# Patient Record
Sex: FEMALE | Race: WHITE | NOT HISPANIC OR LATINO | URBAN - METROPOLITAN AREA
[De-identification: names, ages, dates, MRNs, and addresses within clinical notes are randomized per-mention and may not be internally consistent; named-entity substitution may affect disease eponyms.]

---

## 2017-07-01 ENCOUNTER — OUTPATIENT (OUTPATIENT)
Dept: OUTPATIENT SERVICES | Facility: HOSPITAL | Age: 72
LOS: 1 days | End: 2017-07-01
Payer: MEDICAID

## 2017-07-01 DIAGNOSIS — Z98.89 OTHER SPECIFIED POSTPROCEDURAL STATES: Chronic | ICD-10-CM

## 2017-07-17 DIAGNOSIS — R69 ILLNESS, UNSPECIFIED: ICD-10-CM

## 2017-08-01 PROCEDURE — G9001: CPT

## 2018-01-13 ENCOUNTER — INPATIENT (INPATIENT)
Facility: HOSPITAL | Age: 73
LOS: 3 days | Discharge: HOME | DRG: 66 | End: 2018-01-17
Attending: GENERAL ACUTE CARE HOSPITAL | Admitting: HOSPITALIST
Payer: COMMERCIAL

## 2018-01-13 VITALS
TEMPERATURE: 98 F | HEIGHT: 64 IN | OXYGEN SATURATION: 100 % | SYSTOLIC BLOOD PRESSURE: 183 MMHG | WEIGHT: 244.93 LBS | HEART RATE: 80 BPM | DIASTOLIC BLOOD PRESSURE: 83 MMHG | RESPIRATION RATE: 16 BRPM

## 2018-01-13 DIAGNOSIS — Z98.89 OTHER SPECIFIED POSTPROCEDURAL STATES: Chronic | ICD-10-CM

## 2018-01-13 DIAGNOSIS — G45.9 TRANSIENT CEREBRAL ISCHEMIC ATTACK, UNSPECIFIED: ICD-10-CM

## 2018-01-13 LAB
ALBUMIN SERPL ELPH-MCNC: 4.4 G/DL — SIGNIFICANT CHANGE UP (ref 3.3–5.2)
ALP SERPL-CCNC: 95 U/L — SIGNIFICANT CHANGE UP (ref 40–120)
ALT FLD-CCNC: 21 U/L — SIGNIFICANT CHANGE UP
ANION GAP SERPL CALC-SCNC: 14 MMOL/L — SIGNIFICANT CHANGE UP (ref 5–17)
APTT BLD: 36.5 SEC — SIGNIFICANT CHANGE UP (ref 27.5–37.4)
AST SERPL-CCNC: 21 U/L — SIGNIFICANT CHANGE UP
BASOPHILS # BLD AUTO: 0 K/UL — SIGNIFICANT CHANGE UP (ref 0–0.2)
BASOPHILS NFR BLD AUTO: 0.3 % — SIGNIFICANT CHANGE UP (ref 0–2)
BILIRUB SERPL-MCNC: 0.3 MG/DL — LOW (ref 0.4–2)
BUN SERPL-MCNC: 18 MG/DL — SIGNIFICANT CHANGE UP (ref 8–20)
CALCIUM SERPL-MCNC: 9.7 MG/DL — SIGNIFICANT CHANGE UP (ref 8.6–10.2)
CHLORIDE SERPL-SCNC: 101 MMOL/L — SIGNIFICANT CHANGE UP (ref 98–107)
CO2 SERPL-SCNC: 28 MMOL/L — SIGNIFICANT CHANGE UP (ref 22–29)
CREAT SERPL-MCNC: 0.65 MG/DL — SIGNIFICANT CHANGE UP (ref 0.5–1.3)
EOSINOPHIL # BLD AUTO: 0.1 K/UL — SIGNIFICANT CHANGE UP (ref 0–0.5)
EOSINOPHIL NFR BLD AUTO: 1.8 % — SIGNIFICANT CHANGE UP (ref 0–6)
GLUCOSE SERPL-MCNC: 105 MG/DL — SIGNIFICANT CHANGE UP (ref 70–115)
HCT VFR BLD CALC: 39.6 % — SIGNIFICANT CHANGE UP (ref 37–47)
HGB BLD-MCNC: 12.6 G/DL — SIGNIFICANT CHANGE UP (ref 12–16)
INR BLD: 1.36 RATIO — HIGH (ref 0.88–1.16)
LYMPHOCYTES # BLD AUTO: 2.2 K/UL — SIGNIFICANT CHANGE UP (ref 1–4.8)
LYMPHOCYTES # BLD AUTO: 28.6 % — SIGNIFICANT CHANGE UP (ref 20–55)
MCHC RBC-ENTMCNC: 26.9 PG — LOW (ref 27–31)
MCHC RBC-ENTMCNC: 31.8 G/DL — LOW (ref 32–36)
MCV RBC AUTO: 84.6 FL — SIGNIFICANT CHANGE UP (ref 81–99)
MONOCYTES # BLD AUTO: 0.7 K/UL — SIGNIFICANT CHANGE UP (ref 0–0.8)
MONOCYTES NFR BLD AUTO: 9.3 % — SIGNIFICANT CHANGE UP (ref 3–10)
NEUTROPHILS # BLD AUTO: 4.7 K/UL — SIGNIFICANT CHANGE UP (ref 1.8–8)
NEUTROPHILS NFR BLD AUTO: 59.9 % — SIGNIFICANT CHANGE UP (ref 37–73)
PLATELET # BLD AUTO: 364 K/UL — SIGNIFICANT CHANGE UP (ref 150–400)
POTASSIUM SERPL-MCNC: 3.4 MMOL/L — LOW (ref 3.5–5.3)
POTASSIUM SERPL-SCNC: 3.4 MMOL/L — LOW (ref 3.5–5.3)
PROT SERPL-MCNC: 8.2 G/DL — SIGNIFICANT CHANGE UP (ref 6.6–8.7)
PROTHROM AB SERPL-ACNC: 15.1 SEC — HIGH (ref 9.8–12.7)
RBC # BLD: 4.68 M/UL — SIGNIFICANT CHANGE UP (ref 4.4–5.2)
RBC # FLD: 15.2 % — SIGNIFICANT CHANGE UP (ref 11–15.6)
SODIUM SERPL-SCNC: 143 MMOL/L — SIGNIFICANT CHANGE UP (ref 135–145)
WBC # BLD: 7.8 K/UL — SIGNIFICANT CHANGE UP (ref 4.8–10.8)
WBC # FLD AUTO: 7.8 K/UL — SIGNIFICANT CHANGE UP (ref 4.8–10.8)

## 2018-01-13 PROCEDURE — 93010 ELECTROCARDIOGRAM REPORT: CPT

## 2018-01-13 PROCEDURE — 99223 1ST HOSP IP/OBS HIGH 75: CPT

## 2018-01-13 PROCEDURE — 71045 X-RAY EXAM CHEST 1 VIEW: CPT | Mod: 26

## 2018-01-13 PROCEDURE — 70450 CT HEAD/BRAIN W/O DYE: CPT | Mod: 26

## 2018-01-13 PROCEDURE — 99285 EMERGENCY DEPT VISIT HI MDM: CPT

## 2018-01-13 RX ORDER — SENNA PLUS 8.6 MG/1
2 TABLET ORAL AT BEDTIME
Qty: 0 | Refills: 0 | Status: DISCONTINUED | OUTPATIENT
Start: 2018-01-13 | End: 2018-01-17

## 2018-01-13 RX ORDER — ATORVASTATIN CALCIUM 80 MG/1
40 TABLET, FILM COATED ORAL AT BEDTIME
Qty: 0 | Refills: 0 | Status: DISCONTINUED | OUTPATIENT
Start: 2018-01-13 | End: 2018-01-17

## 2018-01-13 RX ORDER — ASPIRIN/CALCIUM CARB/MAGNESIUM 324 MG
81 TABLET ORAL ONCE
Qty: 0 | Refills: 0 | Status: COMPLETED | OUTPATIENT
Start: 2018-01-13 | End: 2018-01-13

## 2018-01-13 RX ORDER — VALSARTAN 80 MG/1
160 TABLET ORAL DAILY
Qty: 0 | Refills: 0 | Status: DISCONTINUED | OUTPATIENT
Start: 2018-01-13 | End: 2018-01-17

## 2018-01-13 RX ORDER — APIXABAN 2.5 MG/1
5 TABLET, FILM COATED ORAL EVERY 12 HOURS
Qty: 0 | Refills: 0 | Status: DISCONTINUED | OUTPATIENT
Start: 2018-01-13 | End: 2018-01-17

## 2018-01-13 RX ORDER — METOPROLOL TARTRATE 50 MG
25 TABLET ORAL
Qty: 0 | Refills: 0 | Status: DISCONTINUED | OUTPATIENT
Start: 2018-01-13 | End: 2018-01-15

## 2018-01-13 RX ORDER — DOCUSATE SODIUM 100 MG
100 CAPSULE ORAL THREE TIMES A DAY
Qty: 0 | Refills: 0 | Status: DISCONTINUED | OUTPATIENT
Start: 2018-01-13 | End: 2018-01-17

## 2018-01-13 RX ORDER — TRAMADOL HYDROCHLORIDE 50 MG/1
50 TABLET ORAL EVERY 6 HOURS
Qty: 0 | Refills: 0 | Status: DISCONTINUED | OUTPATIENT
Start: 2018-01-13 | End: 2018-01-17

## 2018-01-13 RX ADMIN — Medication 81 MILLIGRAM(S): at 23:00

## 2018-01-13 NOTE — CONSULT NOTE ADULT - PROBLEM SELECTOR RECOMMENDATION 9
-telemetry monitoring  -continue Eliquis 5 mg BID  -continue Lipitor 40 mg   -start Aspirin 81 mg daily  -carotid dopplers  -MRI/MRA brain, MRA neck  -2D echo  -neuro checks

## 2018-01-13 NOTE — CONSULT NOTE ADULT - ATTENDING COMMENTS
Unclear etiology for TIA. Has had significant workup for afib recently. On eliquis. Could be related to plaque. Plan for aspirin 81 mg. MRi pending.   No [facility] as inpt.   No further cardiac workup.

## 2018-01-13 NOTE — H&P ADULT - NEUROLOGICAL DETAILS
normal strength/responds to verbal commands/deep reflexes intact/alert and oriented x 3/responds to pain/sensation intact/cranial nerves intact/superficial reflexes intact

## 2018-01-13 NOTE — ED ADULT NURSE NOTE - OBJECTIVE STATEMENT
[facility] and daughter noticed sudden onset of confusion and probably a droop at the time. EMS called and symptoms improved en route.

## 2018-01-13 NOTE — ED CLERICAL - DIVISION
[place]... Visit Information Date & Time Provider Department Dept. Phone Encounter #  
 2/9/2017 11:15 AM Jose uRssell MD [facility] [address] [place] [address] [id number] Follow-up Instructions Return in about 6 months (around 8/9/2017) for Cleared for planned surgery-moderate risk. Your Appointments 8/24/2017 10:00 AM  
ESTABLISHED PATIENT with Jose Russell MD  
[facility]) Appt Note: 6 months [address], [address] [facility] [address] [facility], [address] [place] [address] Upcoming Health Maintenance Date Due  
 FOOT EXAM Q1 9/12/1966 MICROALBUMIN Q1 9/12/1966 EYE EXAM RETINAL OR DILATED Q1 9/12/1966 DTaP/Tdap/Td series (1 - Tdap) 9/12/1977 FOBT Q 1 YEAR AGE 50-75 9/12/2006 BREAST CANCER SCRN MAMMOGRAM 3/20/2015 PAP AKA CERVICAL CYTOLOGY 8/27/2015 Pneumococcal 19-64 Highest Risk (2 of 3 - PCV13) 7/10/2016 INFLUENZA AGE 9 TO ADULT 8/1/2016 ZOSTER VACCINE AGE 60> 9/12/2016 HEMOGLOBIN A1C Q6M 4/30/2017 LIPID PANEL Q1 11/6/2017 Allergies as of 2/9/2017  Review Complete On: 2/9/2017 By: Jose Russell MD  
  
 Severity Noted Reaction Type Reactions Moxifloxacin  09/24/2015    Rash Pcn [Penicillins]    Swelling Sulfa (Sulfonamide Antibiotics)    Swelling Current Immunizations  Reviewed on 11/29/2012 Name Date Influenza Vaccine 10/10/2015, 12/29/2014 Influenza Vaccine Split 10/30/2012 Pneumococcal Polysaccharide (PPSV-23) 7/10/2015 Not reviewed this visit You Were Diagnosed With   
  
 Codes Comments Chronic diastolic heart failure (HCC)    -  Primary ICD-10-CM: I50.32 
ICD-9-CM: 428.32 stable 
sob multifactrial  
 Abnormal nuclear stress test     ICD-10-CM: R94.39 
ICD-9-CM: 794.39 stable on medical managment Essential hypertension, benign     ICD-10-CM: I10 
ICD-9-CM: 401.1 controlled Pre-operative cardiovascular examination     ICD-10-CM: Z01.810 ICD-9-CM: V72.81 ok to stop plavix for colonosciopy 
moderate risk Vitals BP Pulse Height(growth percentile) Weight(growth percentile) LMP BMI  
 105/75 (BP 1 Location: Left arm, BP Patient Position: At rest) 90 5' 5\" (1.651 m) 219 lb (99.3 kg) 11/25/2012 36.44 kg/m2 OB Status Smoking Status Postmenopausal Former Smoker Vitals History BMI and BSA Data Body Mass Index Body Surface Area  
 36.44 kg/m 2 2.13 m 2 Preferred Pharmacy Pharmacy Name Phone [facility] Keturah Noriega, [address] [phone] Your Updated Medication List  
  
   
This list is accurate as of: 2/9/17 12:29 PM.  Always use your most recent med list.  
  
  
  
  
 ABILIFY 5 mg tablet Generic drug:  ARIPiprazole Take 10 mg by mouth daily. * albuterol 2.5 mg /3 mL (0.083 %) nebulizer solution Commonly known as:  PROVENTIL VENTOLIN  
USE 1 NEB EVERY 4 HOURS FOR WHEEZING AND SHORTNESS OF BREATH  Indications: CHRONIC OBSTRUCTIVE PULMONARY DISEASE * VENTOLIN HFA 90 mcg/actuation inhaler Generic drug:  albuterol INHALE 2 PUFFS EVERY 4 HOURS AS NEEDED  
  
 aspirin 81 mg tablet Take 81 mg by mouth daily. clopidogrel 75 mg Tab Commonly known as:  PLAVIX Take 1 Tab by mouth daily. DEPAKOTE 250 mg tablet Generic drug:  divalproex DR Take 250 mg by mouth nightly. eplerenone 25 mg tablet Commonly known as:  Ayo  Take  by mouth daily. furosemide 20 mg tablet Commonly known as:  LASIX Take 1 Tab by mouth daily. insulin CONCENTRATED regular 500 unit/mL Soln Commonly known as:  U-500 CONCENTRATED  
20 Units by SubCUTAneous route. With breakfast, lunch, and dinner  
  
 insulin glargine 100 unit/mL injection Commonly known as:  LANTUS  
60 units subcutaneously every night  
  
 insulin syringe,safetyneedle 1 mL 30 gauge x 5/16\" Syrg As directed METANX 2.8-2-25 mg Tab Generic drug:  l-methylfolate-vit b12-vit b6 Take  by mouth.  
  
 metOLazone 5 mg tablet Commonly known as:  Haven Ou Take 0.5 Tabs by mouth Every Mon, Wed & Sun.  
  
 [employer] Kit Use with nebulizer machine NEURONTIN 300 mg capsule Generic drug:  gabapentin Take 300 mg by mouth three (3) times daily. Oxygen-Air Delivery Systems  
by Nasal route continuous. 2 LNC potassium chloride 20 mEq packet Commonly known as:  KLOR-CON Take 1 Packet by mouth daily. predniSONE 5 mg tablet Commonly known as:  Samanta Hawks Take 1 Tab by mouth daily. rosuvastatin 5 mg tablet Commonly known as:  CRESTOR Take 1 Tab by mouth nightly. SYNTHROID 100 mcg tablet Generic drug:  levothyroxine Take  by mouth Daily (before breakfast). traZODone 100 mg tablet Commonly known as:  Orma Paddy Take 100 mg by mouth nightly. TYLENOL 325 mg tablet Generic drug:  acetaminophen Take 325 mg by mouth every six (6) hours as needed for Pain. * umeclidinium-vilanterol 62.5-25 mcg/actuation inhaler Commonly known as:  Paulo Gusman Take 1 Puff by inhalation daily. * umeclidinium-vilanterol 62.5-25 mcg/actuation inhaler Commonly known as:  Paulo Gusman Take 1 Puff by inhalation daily. VITAMIN D2 50,000 unit capsule Generic drug:  ergocalciferol Take 50,000 Units by mouth daily. ZOLOFT 100 mg tablet Generic drug:  sertraline Take 50 mg by mouth daily. * Notice: This list has 4 medication(s) that are the same as other medications prescribed for you. Read the directions carefully, and ask your doctor or other care provider to review them with you. Follow-up Instructions Return in about 6 months (around 8/9/2017) for Cleared for planned surgery-moderate risk. Please provide this summary of care documentation to your next provider. Your primary care clinician is listed as Cleveland Santo. If you have any questions after today's visit, please call [phone].

## 2018-01-13 NOTE — H&P ADULT - PMH
Afib  s/p ablation  Anxiety    CVA (cerebral vascular accident)    HLD (hyperlipidemia)    HTN (hypertension)

## 2018-01-13 NOTE — ED PROVIDER NOTE - OBJECTIVE STATEMENT
Pt. present to ED s/p episode of confusion and expressive aphasia and facial droop. As per daughter, she noticed her mother in the car and asked her to open the car. The patient could not open the car. Pt. was not able to follow any commands. Pt. was trying to speak but could not. Pt. was having expressive aphasia. Episode lasted a few minutes. Pt. currently has no symptoms. Pt. has hx of  CVA (2 years ago) and then A-fib with ablation 1 1/2 years ago. Pt. asymptomatic at this time.

## 2018-01-13 NOTE — ED ADULT TRIAGE NOTE - CHIEF COMPLAINT QUOTE
as per pt, had few minutes in the car where she had trouble speaking while in car with family and felt confused. as per family, facial droop with onset of confusion. pt is a and o x3, conversing w/o issue at this time. haskins with full stregnth. perrl. denies numbness, tingling. no facial droop noted. speech clear. breathing even and unlabored fingerstick 98. dr. preciado at bedside. pt has no complaints at this time. hx of cva.

## 2018-01-13 NOTE — H&P ADULT - NEGATIVE NEUROLOGICAL SYMPTOMS
no paresthesias/no headache/no tremors/no transient paralysis/no syncope/no vertigo/no loss of consciousness/no weakness/no generalized seizures/no focal seizures

## 2018-01-14 DIAGNOSIS — G45.9 TRANSIENT CEREBRAL ISCHEMIC ATTACK, UNSPECIFIED: ICD-10-CM

## 2018-01-14 DIAGNOSIS — E78.5 HYPERLIPIDEMIA, UNSPECIFIED: ICD-10-CM

## 2018-01-14 DIAGNOSIS — I10 ESSENTIAL (PRIMARY) HYPERTENSION: ICD-10-CM

## 2018-01-14 LAB
ANION GAP SERPL CALC-SCNC: 14 MMOL/L — SIGNIFICANT CHANGE UP (ref 5–17)
BUN SERPL-MCNC: 14 MG/DL — SIGNIFICANT CHANGE UP (ref 8–20)
CALCIUM SERPL-MCNC: 9.4 MG/DL — SIGNIFICANT CHANGE UP (ref 8.6–10.2)
CHLORIDE SERPL-SCNC: 102 MMOL/L — SIGNIFICANT CHANGE UP (ref 98–107)
CHOLEST SERPL-MCNC: 157 MG/DL — SIGNIFICANT CHANGE UP (ref 110–199)
CO2 SERPL-SCNC: 27 MMOL/L — SIGNIFICANT CHANGE UP (ref 22–29)
CREAT SERPL-MCNC: 0.56 MG/DL — SIGNIFICANT CHANGE UP (ref 0.5–1.3)
GLUCOSE SERPL-MCNC: 119 MG/DL — HIGH (ref 70–115)
HCT VFR BLD CALC: 38.2 % — SIGNIFICANT CHANGE UP (ref 37–47)
HDLC SERPL-MCNC: 75 MG/DL — SIGNIFICANT CHANGE UP
HGB BLD-MCNC: 12.2 G/DL — SIGNIFICANT CHANGE UP (ref 12–16)
LIPID PNL WITH DIRECT LDL SERPL: 63 MG/DL — SIGNIFICANT CHANGE UP
MCHC RBC-ENTMCNC: 26.5 PG — LOW (ref 27–31)
MCHC RBC-ENTMCNC: 31.9 G/DL — LOW (ref 32–36)
MCV RBC AUTO: 83 FL — SIGNIFICANT CHANGE UP (ref 81–99)
PLATELET # BLD AUTO: 352 K/UL — SIGNIFICANT CHANGE UP (ref 150–400)
POTASSIUM SERPL-MCNC: 4.3 MMOL/L — SIGNIFICANT CHANGE UP (ref 3.5–5.3)
POTASSIUM SERPL-SCNC: 4.3 MMOL/L — SIGNIFICANT CHANGE UP (ref 3.5–5.3)
RBC # BLD: 4.6 M/UL — SIGNIFICANT CHANGE UP (ref 4.4–5.2)
RBC # FLD: 15.1 % — SIGNIFICANT CHANGE UP (ref 11–15.6)
SODIUM SERPL-SCNC: 143 MMOL/L — SIGNIFICANT CHANGE UP (ref 135–145)
TOTAL CHOLESTEROL/HDL RATIO MEASUREMENT: 2 RATIO — LOW (ref 3.3–7.1)
TRIGL SERPL-MCNC: 97 MG/DL — SIGNIFICANT CHANGE UP (ref 10–200)
WBC # BLD: 7.5 K/UL — SIGNIFICANT CHANGE UP (ref 4.8–10.8)
WBC # FLD AUTO: 7.5 K/UL — SIGNIFICANT CHANGE UP (ref 4.8–10.8)

## 2018-01-14 PROCEDURE — 99233 SBSQ HOSP IP/OBS HIGH 50: CPT

## 2018-01-14 PROCEDURE — 99222 1ST HOSP IP/OBS MODERATE 55: CPT

## 2018-01-14 PROCEDURE — 93880 EXTRACRANIAL BILAT STUDY: CPT | Mod: 26

## 2018-01-14 RX ORDER — ASPIRIN/CALCIUM CARB/MAGNESIUM 324 MG
81 TABLET ORAL DAILY
Qty: 0 | Refills: 0 | Status: DISCONTINUED | OUTPATIENT
Start: 2018-01-14 | End: 2018-01-17

## 2018-01-14 RX ORDER — ALPRAZOLAM 0.25 MG
0.5 TABLET ORAL
Qty: 0 | Refills: 0 | Status: DISCONTINUED | OUTPATIENT
Start: 2018-01-14 | End: 2018-01-17

## 2018-01-14 RX ADMIN — APIXABAN 5 MILLIGRAM(S): 2.5 TABLET, FILM COATED ORAL at 17:26

## 2018-01-14 RX ADMIN — Medication 25 MILLIGRAM(S): at 05:32

## 2018-01-14 RX ADMIN — Medication 0.5 MILLIGRAM(S): at 08:11

## 2018-01-14 RX ADMIN — Medication 0.5 MILLIGRAM(S): at 21:39

## 2018-01-14 RX ADMIN — VALSARTAN 160 MILLIGRAM(S): 80 TABLET ORAL at 05:32

## 2018-01-14 RX ADMIN — Medication 25 MILLIGRAM(S): at 17:26

## 2018-01-14 RX ADMIN — APIXABAN 5 MILLIGRAM(S): 2.5 TABLET, FILM COATED ORAL at 05:32

## 2018-01-14 RX ADMIN — Medication 81 MILLIGRAM(S): at 11:38

## 2018-01-14 RX ADMIN — ATORVASTATIN CALCIUM 40 MILLIGRAM(S): 80 TABLET, FILM COATED ORAL at 21:39

## 2018-01-14 NOTE — PROGRESS NOTE ADULT - SUBJECTIVE AND OBJECTIVE BOX
CHIEF COMPLAINT/INTERVAL HISTORY:    Patient is a 72y old  Female who presents with a chief complaint of Slurred speech/Expressive aphagia, and facial droop (13 Jan 2018 22:07)    SUBJECTIVE & OBJECTIVE: Pt seen and examined at bedside. Admitted with TIA symptoms. No overnight events. Denies any active complaints today. MRI with conscious sedation to be arranged.     ROS: No chest pain, palpitations, SOB, light headedness, dizziness, headache, nausea/vomiting, fevers/chills, abdominal pain, dysuria or increased urinary frequency.      ICU Vital Signs Last 24 Hrs  T(C): 36.7 (14 Jan 2018 17:25), Max: 37 (14 Jan 2018 02:13)  T(F): 98.1 (14 Jan 2018 17:25), Max: 98.6 (14 Jan 2018 02:13)  HR: 74 (14 Jan 2018 17:25) (63 - 84)  BP: 140/60 (14 Jan 2018 17:25) (135/65 - 189/75)  BP(mean): 116 (13 Jan 2018 22:07) (116 - 116)  RR: 20 (14 Jan 2018 17:25) (16 - 20)  SpO2: 100% (14 Jan 2018 17:25) (97% - 100%)    MEDICATIONS  (STANDING):  apixaban 5 milliGRAM(s) Oral every 12 hours  aspirin enteric coated 81 milliGRAM(s) Oral daily  atorvastatin 40 milliGRAM(s) Oral at bedtime  metoprolol     tartrate 25 milliGRAM(s) Oral two times a day  valsartan 160 milliGRAM(s) Oral daily    MEDICATIONS  (PRN):  ALPRAZolam 0.5 milliGRAM(s) Oral two times a day PRN anxiety  docusate sodium 100 milliGRAM(s) Oral three times a day PRN Constipation  senna 2 Tablet(s) Oral at bedtime PRN Constipation  traMADol 50 milliGRAM(s) Oral every 6 hours PRN Severe Pain (7 - 10)      LABS:                        12.2   7.5   )-----------( 352      ( 14 Jan 2018 06:33 )             38.2     01-14    143  |  102  |  14.0  ----------------------------<  119<H>  4.3   |  27.0  |  0.56    Ca    9.4      14 Jan 2018 06:33    TPro  8.2  /  Alb  4.4  /  TBili  0.3<L>  /  DBili  x   /  AST  21  /  ALT  21  /  AlkPhos  95  01-13    PT/INR - ( 13 Jan 2018 19:13 )   PT: 15.1 sec;   INR: 1.36 ratio         PTT - ( 13 Jan 2018 19:13 )  PTT:36.5 sec      RADIOLOGY & ADDITIONAL TESTS:  < from: US Duplex Carotid Arteries Complete, Bilateral (01.14.18 @ 11:19) >  Impression:  No evidence of hemodynamically significant carotid stenosis..    < end of copied text >      PHYSICAL EXAM:    GENERAL: NAD, well-groomed, well-developed  HEAD:  Atraumatic, Normocephalic  EYES: EOMI, PERRLA, conjunctiva and sclera clear  ENMT: Moist mucous membranes  NECK: Supple, No JVD  NERVOUS SYSTEM:  Alert & Oriented X3, Motor Strength 5/5 B/L upper and lower extremities, no facial droop, no aphasia, sensation in tact  CHEST/LUNG: Clear to auscultation bilaterally; No rales, rhonchi, wheezing, or rubs  HEART: Regular rate and rhythm; + S1/S2  ABDOMEN: Soft, Nontender, Nondistended; Bowel sounds present  EXTREMITIES:  2+ Peripheral Pulses, No clubbing, cyanosis, or edema

## 2018-01-14 NOTE — PROGRESS NOTE ADULT - ASSESSMENT
Patient is 72 year old female with PMH of HTN, HLD, PAF S/P ablation, Anxiety and previous CVA (2015) who presented with left facial droop and slurred speech likely secondary to TIA.     1. TIA - symptoms resolved; no facial droop or slurred speech on exam today.   -Telemonitoring  -Started ASA 81 mg daily  -Lipid panel reviewed - continue statin   -CT head negative  -Carotid duplex - no stenosis  -TTE pending  -MRI of the brain to be arranged with conscious sedation in AM  -Neurology recommendations appreciated   -Continue serial neurochecks     2. HTN  -Continue with metoprolol, and Diovan with holding parameters  -low sodium diet    3. HLD  -Continue with statin     4. Atrial Fib  -EKG shows NSR  -Continue with metoprolol for rate control  -Continue eliquis  -Cardiology recommendations appreciated; no further cardiac work up or indication for inpatient [facility]    5. Anxiety- Xanax PRN    6. DVT prophylaxis - on Eliquis

## 2018-01-15 LAB
ANION GAP SERPL CALC-SCNC: 12 MMOL/L — SIGNIFICANT CHANGE UP (ref 5–17)
BASOPHILS # BLD AUTO: 0 K/UL — SIGNIFICANT CHANGE UP (ref 0–0.2)
BASOPHILS NFR BLD AUTO: 0.2 % — SIGNIFICANT CHANGE UP (ref 0–2)
BUN SERPL-MCNC: 14 MG/DL — SIGNIFICANT CHANGE UP (ref 8–20)
CALCIUM SERPL-MCNC: 9.1 MG/DL — SIGNIFICANT CHANGE UP (ref 8.6–10.2)
CHLORIDE SERPL-SCNC: 105 MMOL/L — SIGNIFICANT CHANGE UP (ref 98–107)
CO2 SERPL-SCNC: 26 MMOL/L — SIGNIFICANT CHANGE UP (ref 22–29)
CREAT SERPL-MCNC: 0.61 MG/DL — SIGNIFICANT CHANGE UP (ref 0.5–1.3)
EOSINOPHIL # BLD AUTO: 0.1 K/UL — SIGNIFICANT CHANGE UP (ref 0–0.5)
EOSINOPHIL NFR BLD AUTO: 2.3 % — SIGNIFICANT CHANGE UP (ref 0–6)
GLUCOSE SERPL-MCNC: 112 MG/DL — SIGNIFICANT CHANGE UP (ref 70–115)
HCT VFR BLD CALC: 38.5 % — SIGNIFICANT CHANGE UP (ref 37–47)
HGB BLD-MCNC: 12.4 G/DL — SIGNIFICANT CHANGE UP (ref 12–16)
LYMPHOCYTES # BLD AUTO: 1.9 K/UL — SIGNIFICANT CHANGE UP (ref 1–4.8)
LYMPHOCYTES # BLD AUTO: 31.1 % — SIGNIFICANT CHANGE UP (ref 20–55)
MAGNESIUM SERPL-MCNC: 2.1 MG/DL — SIGNIFICANT CHANGE UP (ref 1.6–2.6)
MCHC RBC-ENTMCNC: 26.8 PG — LOW (ref 27–31)
MCHC RBC-ENTMCNC: 32.2 G/DL — SIGNIFICANT CHANGE UP (ref 32–36)
MCV RBC AUTO: 83.3 FL — SIGNIFICANT CHANGE UP (ref 81–99)
MONOCYTES # BLD AUTO: 0.5 K/UL — SIGNIFICANT CHANGE UP (ref 0–0.8)
MONOCYTES NFR BLD AUTO: 7.7 % — SIGNIFICANT CHANGE UP (ref 3–10)
NEUTROPHILS # BLD AUTO: 3.5 K/UL — SIGNIFICANT CHANGE UP (ref 1.8–8)
NEUTROPHILS NFR BLD AUTO: 58.5 % — SIGNIFICANT CHANGE UP (ref 37–73)
PHOSPHATE SERPL-MCNC: 3.5 MG/DL — SIGNIFICANT CHANGE UP (ref 2.4–4.7)
PLATELET # BLD AUTO: 322 K/UL — SIGNIFICANT CHANGE UP (ref 150–400)
POTASSIUM SERPL-MCNC: 4 MMOL/L — SIGNIFICANT CHANGE UP (ref 3.5–5.3)
POTASSIUM SERPL-SCNC: 4 MMOL/L — SIGNIFICANT CHANGE UP (ref 3.5–5.3)
RBC # BLD: 4.62 M/UL — SIGNIFICANT CHANGE UP (ref 4.4–5.2)
RBC # FLD: 15 % — SIGNIFICANT CHANGE UP (ref 11–15.6)
SODIUM SERPL-SCNC: 143 MMOL/L — SIGNIFICANT CHANGE UP (ref 135–145)
WBC # BLD: 6 K/UL — SIGNIFICANT CHANGE UP (ref 4.8–10.8)
WBC # FLD AUTO: 6 K/UL — SIGNIFICANT CHANGE UP (ref 4.8–10.8)

## 2018-01-15 PROCEDURE — 99231 SBSQ HOSP IP/OBS SF/LOW 25: CPT

## 2018-01-15 PROCEDURE — 99233 SBSQ HOSP IP/OBS HIGH 50: CPT

## 2018-01-15 RX ORDER — METOPROLOL TARTRATE 50 MG
50 TABLET ORAL
Qty: 0 | Refills: 0 | Status: DISCONTINUED | OUTPATIENT
Start: 2018-01-15 | End: 2018-01-17

## 2018-01-15 RX ADMIN — Medication 81 MILLIGRAM(S): at 13:28

## 2018-01-15 RX ADMIN — VALSARTAN 160 MILLIGRAM(S): 80 TABLET ORAL at 05:17

## 2018-01-15 RX ADMIN — Medication 0.5 MILLIGRAM(S): at 21:00

## 2018-01-15 RX ADMIN — APIXABAN 5 MILLIGRAM(S): 2.5 TABLET, FILM COATED ORAL at 17:45

## 2018-01-15 RX ADMIN — APIXABAN 5 MILLIGRAM(S): 2.5 TABLET, FILM COATED ORAL at 05:17

## 2018-01-15 RX ADMIN — Medication 50 MILLIGRAM(S): at 17:45

## 2018-01-15 RX ADMIN — ATORVASTATIN CALCIUM 40 MILLIGRAM(S): 80 TABLET, FILM COATED ORAL at 21:00

## 2018-01-15 RX ADMIN — Medication 25 MILLIGRAM(S): at 05:17

## 2018-01-15 NOTE — PROGRESS NOTE ADULT - SUBJECTIVE AND OBJECTIVE BOX
CHIEF COMPLAINT/INTERVAL HISTORY:    Patient is a 72y old  Female who presents with a chief complaint of Slurred speech/Expressive aphagia, and facial droop (13 Jan 2018 22:07)    SUBJECTIVE & OBJECTIVE: Pt seen and examined at bedside. No overnight events. Denies any new complaints today. MRI/MRA scheduled with anesthesia on 1/16. NPO after midnight. TTE today.     ROS: No chest pain, palpitations, SOB, light headedness, dizziness, headache, nausea/vomiting, fevers/chills, abdominal pain, dysuria or increased urinary frequency.    ICU Vital Signs Last 24 Hrs  T(C): 36.7 (15 Stephen 2018 10:05), Max: 36.9 (14 Jan 2018 21:23)  T(F): 98 (15 Stephen 2018 10:05), Max: 98.5 (14 Jan 2018 21:23)  HR: 70 (15 Stephen 2018 10:05) (69 - 77)  BP: 159/70 (15 Stephen 2018 10:05) (118/54 - 183/77)  RR: 18 (15 Stephen 2018 10:05) (18 - 20)  SpO2: 97% (15 Stephen 2018 10:05) (97% - 100%)    MEDICATIONS  (STANDING):  apixaban 5 milliGRAM(s) Oral every 12 hours  aspirin enteric coated 81 milliGRAM(s) Oral daily  atorvastatin 40 milliGRAM(s) Oral at bedtime  metoprolol     tartrate 50 milliGRAM(s) Oral two times a day  valsartan 160 milliGRAM(s) Oral daily    MEDICATIONS  (PRN):  ALPRAZolam 0.5 milliGRAM(s) Oral two times a day PRN anxiety  docusate sodium 100 milliGRAM(s) Oral three times a day PRN Constipation  senna 2 Tablet(s) Oral at bedtime PRN Constipation  traMADol 50 milliGRAM(s) Oral every 6 hours PRN Severe Pain (7 - 10)      LABS:                        12.4   6.0   )-----------( 322      ( 15 Stephen 2018 06:03 )             38.5     01-15    143  |  105  |  14.0  ----------------------------<  112  4.0   |  26.0  |  0.61    Ca    9.1      15 Stephen 2018 06:03  Phos  3.5     01-15  Mg     2.1     01-15    TPro  8.2  /  Alb  4.4  /  TBili  0.3<L>  /  DBili  x   /  AST  21  /  ALT  21  /  AlkPhos  95  01-13    PT/INR - ( 13 Jan 2018 19:13 )   PT: 15.1 sec;   INR: 1.36 ratio         PTT - ( 13 Jan 2018 19:13 )  PTT:36.5 sec      RADIOLOGY & ADDITIONAL TESTS:      PHYSICAL EXAM:    GENERAL: NAD, well-groomed, well-developed  HEAD:  Atraumatic, Normocephalic  EYES: EOMI, PERRLA, conjunctiva and sclera clear  ENMT: Moist mucous membranes  NECK: Supple, No JVD  NERVOUS SYSTEM:  Alert & Oriented X3, Motor Strength 5/5 B/L upper and lower extremities, no facial droop, no aphasia, sensation in tact  CHEST/LUNG: Clear to auscultation bilaterally; No rales, rhonchi, wheezing, or rubs  HEART: Regular rate and rhythm; + S1/S2  ABDOMEN: Soft, Nontender, Nondistended; Bowel sounds present  EXTREMITIES:  2+ Peripheral Pulses, No clubbing, cyanosis, or edema

## 2018-01-15 NOTE — PROGRESS NOTE ADULT - ASSESSMENT
Patient is 72 year old female with PMH of HTN, HLD, PAF S/P ablation, Anxiety and previous CVA (2015) who presented with left facial droop and slurred speech likely secondary to TIA.     1. TIA - symptoms resolved; no facial droop or slurred speech on exam today.   -Telemonitoring - no events overnight  -Started on ASA 81 on admission  -Lipid panel reviewed - continue statin   -CT head negative  -Carotid duplex - no stenosis  -TTE completed; f/u results  -MRI/MRA of the brain to be arranged with conscious sedation on 1/16 (discussed with radiology)  -Neurology recommendations appreciated   -No further cardiac work up      2. HTN  -Continue with metoprolol, and Diovan with holding parameters  -low sodium diet    3. HLD  -Continue with statin     4. Atrial Fib  -EKG shows NSR  -Continue with metoprolol   -Continue eliquis  -Cardiology recommendations appreciated; no further cardiac work up or indication for inpatient [facility]    5. Anxiety- Xanax PRN    6. DVT prophylaxis - on Eliquis    Attending Attestation:   Plan discussed with patient. Patient is 72 year old female with PMH of HTN, HLD, PAF S/P ablation, Anxiety and previous CVA (2015) who presented with left facial droop and slurred speech likely secondary to TIA.     1. TIA - symptoms resolved; no facial droop or slurred speech on exam today.   -Telemonitoring - no events overnight  -Started on ASA 81 on admission  -Lipid panel reviewed - continue statin   -CT head negative  -Carotid duplex - no stenosis  -TTE completed; f/u results  -MRI/MRA of the brain to be arranged with conscious sedation on 1/16 (discussed with radiology)  -Neurology recommendations appreciated   -No further cardiac work up      2. HTN  -Continue with metoprolol, and Diovan with holding parameters  -low sodium diet    3. HLD  -Continue with statin     4. Atrial Fib  -EKG shows NSR  -Continue with metoprolol   -Continue eliquis  -Cardiology recommendations appreciated; no further cardiac work up or indication for inpatient [facility]    5. Anxiety- Xanax PRN    6. DVT prophylaxis - on Eliquis    PT -  home    Attending Attestation:   Plan discussed with patient. Patient is 72 year old female with PMH of HTN, HLD, PAF S/P ablation, Anxiety and previous CVA (2015) who presented with left facial droop and slurred speech likely secondary to TIA.     1. TIA - symptoms resolved; no facial droop or slurred speech on exam today.   -Telemonitoring - no events overnight  -Started on ASA 81 on admission  -Lipid panel reviewed - continue statin   -CT head negative  -Carotid duplex - no stenosis  -TTE completed; f/u results  -MRI/MRA of the brain to be arranged with conscious sedation on 1/16 (discussed with radiology)  -Neurology recommendations appreciated   -No further cardiac work up      2. HTN  -BP elevated; increase metoprolol to 50 mg BID with holding parameters  -Continue Diovan  -low sodium diet    3. HLD  -Continue with statin     4. Atrial Fib  -EKG shows NSR  -Continue with metoprolol   -Continue eliquis  -Cardiology recommendations appreciated; no further cardiac work up or indication for inpatient [facility]    5. Anxiety- Xanax PRN    6. DVT prophylaxis - on Eliquis    PT -  home    Attending Attestation:   Plan discussed with patient.

## 2018-01-15 NOTE — PROGRESS NOTE ADULT - ASSESSMENT
The patient is a 72y Female status post TIA.   Prior atrial fibrillation status post ablation.  On Eliquis and statin.     Awaiting MRI with sedation.

## 2018-01-15 NOTE — PROGRESS NOTE ADULT - SUBJECTIVE AND OBJECTIVE BOX
TTe reviewed- normal.   No tele events.     Planned for MRI with sedation tomorrow.     No further cardiac workup.

## 2018-01-16 LAB
ANION GAP SERPL CALC-SCNC: 12 MMOL/L — SIGNIFICANT CHANGE UP (ref 5–17)
BASOPHILS # BLD AUTO: 0 K/UL — SIGNIFICANT CHANGE UP (ref 0–0.2)
BASOPHILS NFR BLD AUTO: 0.3 % — SIGNIFICANT CHANGE UP (ref 0–2)
BUN SERPL-MCNC: 14 MG/DL — SIGNIFICANT CHANGE UP (ref 8–20)
CALCIUM SERPL-MCNC: 8.6 MG/DL — SIGNIFICANT CHANGE UP (ref 8.6–10.2)
CHLORIDE SERPL-SCNC: 107 MMOL/L — SIGNIFICANT CHANGE UP (ref 98–107)
CO2 SERPL-SCNC: 25 MMOL/L — SIGNIFICANT CHANGE UP (ref 22–29)
CREAT SERPL-MCNC: 0.61 MG/DL — SIGNIFICANT CHANGE UP (ref 0.5–1.3)
EOSINOPHIL # BLD AUTO: 0.2 K/UL — SIGNIFICANT CHANGE UP (ref 0–0.5)
EOSINOPHIL NFR BLD AUTO: 2.9 % — SIGNIFICANT CHANGE UP (ref 0–6)
GLUCOSE SERPL-MCNC: 127 MG/DL — HIGH (ref 70–115)
HCT VFR BLD CALC: 34.9 % — LOW (ref 37–47)
HGB BLD-MCNC: 11.1 G/DL — LOW (ref 12–16)
LYMPHOCYTES # BLD AUTO: 1.5 K/UL — SIGNIFICANT CHANGE UP (ref 1–4.8)
LYMPHOCYTES # BLD AUTO: 24.5 % — SIGNIFICANT CHANGE UP (ref 20–55)
MAGNESIUM SERPL-MCNC: 2 MG/DL — SIGNIFICANT CHANGE UP (ref 1.6–2.6)
MCHC RBC-ENTMCNC: 26.5 PG — LOW (ref 27–31)
MCHC RBC-ENTMCNC: 31.8 G/DL — LOW (ref 32–36)
MCV RBC AUTO: 83.3 FL — SIGNIFICANT CHANGE UP (ref 81–99)
MONOCYTES # BLD AUTO: 0.6 K/UL — SIGNIFICANT CHANGE UP (ref 0–0.8)
MONOCYTES NFR BLD AUTO: 9.5 % — SIGNIFICANT CHANGE UP (ref 3–10)
NEUTROPHILS # BLD AUTO: 3.9 K/UL — SIGNIFICANT CHANGE UP (ref 1.8–8)
NEUTROPHILS NFR BLD AUTO: 62.5 % — SIGNIFICANT CHANGE UP (ref 37–73)
PHOSPHATE SERPL-MCNC: 3.6 MG/DL — SIGNIFICANT CHANGE UP (ref 2.4–4.7)
PLATELET # BLD AUTO: 289 K/UL — SIGNIFICANT CHANGE UP (ref 150–400)
POTASSIUM SERPL-MCNC: 3.7 MMOL/L — SIGNIFICANT CHANGE UP (ref 3.5–5.3)
POTASSIUM SERPL-SCNC: 3.7 MMOL/L — SIGNIFICANT CHANGE UP (ref 3.5–5.3)
RBC # BLD: 4.19 M/UL — LOW (ref 4.4–5.2)
RBC # FLD: 14.9 % — SIGNIFICANT CHANGE UP (ref 11–15.6)
SODIUM SERPL-SCNC: 144 MMOL/L — SIGNIFICANT CHANGE UP (ref 135–145)
WBC # BLD: 6.2 K/UL — SIGNIFICANT CHANGE UP (ref 4.8–10.8)
WBC # FLD AUTO: 6.2 K/UL — SIGNIFICANT CHANGE UP (ref 4.8–10.8)

## 2018-01-16 PROCEDURE — 70551 MRI BRAIN STEM W/O DYE: CPT | Mod: 26

## 2018-01-16 PROCEDURE — 70544 MR ANGIOGRAPHY HEAD W/O DYE: CPT | Mod: 26,59

## 2018-01-16 PROCEDURE — 99221 1ST HOSP IP/OBS SF/LOW 40: CPT

## 2018-01-16 PROCEDURE — 99233 SBSQ HOSP IP/OBS HIGH 50: CPT

## 2018-01-16 RX ADMIN — APIXABAN 5 MILLIGRAM(S): 2.5 TABLET, FILM COATED ORAL at 05:48

## 2018-01-16 RX ADMIN — Medication 81 MILLIGRAM(S): at 17:37

## 2018-01-16 RX ADMIN — Medication 50 MILLIGRAM(S): at 05:48

## 2018-01-16 RX ADMIN — APIXABAN 5 MILLIGRAM(S): 2.5 TABLET, FILM COATED ORAL at 17:37

## 2018-01-16 RX ADMIN — VALSARTAN 160 MILLIGRAM(S): 80 TABLET ORAL at 05:48

## 2018-01-16 RX ADMIN — ATORVASTATIN CALCIUM 40 MILLIGRAM(S): 80 TABLET, FILM COATED ORAL at 21:32

## 2018-01-16 RX ADMIN — Medication 50 MILLIGRAM(S): at 17:37

## 2018-01-16 RX ADMIN — Medication 0.5 MILLIGRAM(S): at 21:39

## 2018-01-16 NOTE — PROGRESS NOTE ADULT - SUBJECTIVE AND OBJECTIVE BOX
[facility]                                        [facility] [place]                                 Shashi Cabrera, & Miguel                                  [address]                                        [place], [place], [address]                                             [phone]        No new complaints.    Vital Signs Last 24 Hrs  T(F): 98.3 (16 Jan 2018 05:36), Max: 98.6 (15 Stephen 2018 20:55)  HR: 76 (16 Jan 2018 05:36) (70 - 85)  BP: 146/65 (16 Jan 2018 05:36) (120/62 - 146/65)  RR: 18 (16 Jan 2018 05:36) (18 - 18)  SpO2: 99% (16 Jan 2018 05:36) (99% - 99%)    Awake and alert.  Pupils react.  Face symmetric.  Good strength bilaterally.

## 2018-01-16 NOTE — PROGRESS NOTE ADULT - SUBJECTIVE AND OBJECTIVE BOX
CHIEF COMPLAINT/INTERVAL HISTORY:    Patient is a 72y old  Female who presents with a chief complaint of Slurred speech/Expressive aphagia, and facial droop (13 Jan 2018 22:07)    SUBJECTIVE & OBJECTIVE: Pt seen and examined at bedside. No overnight events. Denies any new complaints today. MRI/MRA completed today.    ROS: No chest pain, palpitations, SOB, light headedness, dizziness, headache, nausea/vomiting, fevers/chills, abdominal pain, dysuria or increased urinary frequency.    ICU Vital Signs Last 24 Hrs  T(C): 36.5 (16 Jan 2018 15:46), Max: 37 (15 Stephen 2018 20:55)  T(F): 97.7 (16 Jan 2018 15:46), Max: 98.6 (15 Stephen 2018 20:55)  HR: 67 (16 Jan 2018 15:46) (63 - 76)  BP: 133/52 (16 Jan 2018 15:46) (132/48 - 152/67)  RR: 18 (16 Jan 2018 15:46) (18 - 21)  SpO2: 100% (16 Jan 2018 14:00) (98% - 100%)    MEDICATIONS  (STANDING):  apixaban 5 milliGRAM(s) Oral every 12 hours  aspirin enteric coated 81 milliGRAM(s) Oral daily  atorvastatin 40 milliGRAM(s) Oral at bedtime  metoprolol     tartrate 50 milliGRAM(s) Oral two times a day  valsartan 160 milliGRAM(s) Oral daily    MEDICATIONS  (PRN):  ALPRAZolam 0.5 milliGRAM(s) Oral two times a day PRN anxiety  docusate sodium 100 milliGRAM(s) Oral three times a day PRN Constipation  senna 2 Tablet(s) Oral at bedtime PRN Constipation  traMADol 50 milliGRAM(s) Oral every 6 hours PRN Severe Pain (7 - 10)      LABS:                        11.1   6.2   )-----------( 289      ( 16 Jan 2018 06:18 )             34.9     01-16    144  |  107  |  14.0  ----------------------------<  127<H>  3.7   |  25.0  |  0.61    Ca    8.6      16 Jan 2018 06:19  Phos  3.6     01-16  Mg     2.0     01-16    RADIOLOGY & ADDITIONAL TESTS:  < from: MR Angio Head No Cont (01.16.18 @ 13:00) >  FINDINGS:  Moderate bilateral cavernous carotid artery stenosis, unchanged.  The [place] of [place] and vertebrobasilar system shows no other evidence   of significant stenosis, occlusion or saccular aneurysm dilation. No   evidence for arterial venous malformation. The vertebral arteries are   codominant.    IMPRESSION: No large vessel occlusion.    < end of copied text >    < from: MR Head No Cont (01.16.18 @ 13:00) >    IMPRESSION: Acute tiny left frontal lobe infarcts.    < end of copied text >      PHYSICAL EXAM: CHIEF COMPLAINT/INTERVAL HISTORY:    Patient is a 72y old  Female who presents with a chief complaint of Slurred speech/Expressive aphagia, and facial droop (13 Jan 2018 22:07)    SUBJECTIVE & OBJECTIVE: Pt seen and examined at bedside. No overnight events. Denies any new complaints today. MRI/MRA completed today.    ROS: No chest pain, palpitations, SOB, light headedness, dizziness, headache, nausea/vomiting, fevers/chills, abdominal pain, dysuria or increased urinary frequency.    ICU Vital Signs Last 24 Hrs  T(C): 36.5 (16 Jan 2018 15:46), Max: 37 (15 Stephen 2018 20:55)  T(F): 97.7 (16 Jan 2018 15:46), Max: 98.6 (15 Stephen 2018 20:55)  HR: 67 (16 Jan 2018 15:46) (63 - 76)  BP: 133/52 (16 Jan 2018 15:46) (132/48 - 152/67)  RR: 18 (16 Jan 2018 15:46) (18 - 21)  SpO2: 100% (16 Jan 2018 14:00) (98% - 100%)    MEDICATIONS  (STANDING):  apixaban 5 milliGRAM(s) Oral every 12 hours  aspirin enteric coated 81 milliGRAM(s) Oral daily  atorvastatin 40 milliGRAM(s) Oral at bedtime  metoprolol     tartrate 50 milliGRAM(s) Oral two times a day  valsartan 160 milliGRAM(s) Oral daily    MEDICATIONS  (PRN):  ALPRAZolam 0.5 milliGRAM(s) Oral two times a day PRN anxiety  docusate sodium 100 milliGRAM(s) Oral three times a day PRN Constipation  senna 2 Tablet(s) Oral at bedtime PRN Constipation  traMADol 50 milliGRAM(s) Oral every 6 hours PRN Severe Pain (7 - 10)      LABS:                        11.1   6.2   )-----------( 289      ( 16 Jan 2018 06:18 )             34.9     01-16    144  |  107  |  14.0  ----------------------------<  127<H>  3.7   |  25.0  |  0.61    Ca    8.6      16 Jan 2018 06:19  Phos  3.6     01-16  Mg     2.0     01-16    RADIOLOGY & ADDITIONAL TESTS:  < from: MR Angio Head No Cont (01.16.18 @ 13:00) >  FINDINGS:  Moderate bilateral cavernous carotid artery stenosis, unchanged.  The [place] of [place] and vertebrobasilar system shows no other evidence   of significant stenosis, occlusion or saccular aneurysm dilation. No   evidence for arterial venous malformation. The vertebral arteries are   codominant.    IMPRESSION: No large vessel occlusion.    < end of copied text >    < from: MR Head No Cont (01.16.18 @ 13:00) >    IMPRESSION: Acute tiny left frontal lobe infarcts.    < end of copied text >      PHYSICAL EXAM:  GENERAL: NAD, well-groomed, well-developed  HEAD:  Atraumatic, Normocephalic  EYES: EOMI, PERRLA, conjunctiva and sclera clear  ENMT: Moist mucous membranes  NECK: Supple, No JVD  NERVOUS SYSTEM:  Alert & Oriented X3, Motor Strength 5/5 B/L upper and lower extremities, no facial droop, no aphasia, sensation in tact  CHEST/LUNG: Clear to auscultation bilaterally; No rales, rhonchi, wheezing, or rubs  HEART: Regular rate and rhythm; + S1/S2  ABDOMEN: Soft, Nontender, Nondistended; Bowel sounds present  EXTREMITIES:  2+ Peripheral Pulses, No clubbing, cyanosis, or edema

## 2018-01-16 NOTE — PROGRESS NOTE ADULT - ASSESSMENT
The patient is a 72y Female status post TIA.   Prior atrial fibrillation status post ablation.  On Eliquis and statin.     Still waiting for MRI with sedation.

## 2018-01-16 NOTE — CONSULT NOTE ADULT - ASSESSMENT
ASSESSMENT: Patient is a 72y old f , s/p TIA 1/13, vascular surgery consulted for findings of MRA 1/16 which showed moderated carotid artery stenosis in cavernous.     PLAN:   - continue medical management - continue statin, antiplatelet asa, elequis  - carotid duplex negative   - no vascular surgery indicated at this time for carotid stenosis, recommend consulting neurosx or neuroradiology based on location of stenosis.  - Patient discussed with attending Dr. Keenan - detailed plan to follow in AM.
Patient is 71 yo female with hx of HTN, HLD, CVA (3 years ago, residual R wrist numbness), and Atrial fib (s/p successful ablation August 2016) presenting with slurred speech, expressive aphagia, and left facial droop. Symptoms only lasted a few minutes and resolved by the time she got to the hospital. Head CT negative for any acute pathology.
The patient is a 72y Female with history of atrial fibrillation status post ablation.   Current episode suggestive of TIA.     Agree with adding baby aspirin.   Continue Eliquis and statin.     Agree with checking MRI brain.

## 2018-01-16 NOTE — PROGRESS NOTE ADULT - ASSESSMENT
Patient is 72 year old female with PMH of HTN, HLD, PAF S/P ablation, Anxiety and previous CVA (2015) who presented with left facial droop and slurred speech likely secondary to TIA.     1. CVA-  -NIHSS 0  -continue telemonitoring  -Continue ASA and statin   -CT head negative  -Carotid duplex - no stenosis  -TTE reviewed  -MRI/MRA completed and reviewed - tiny left frontal infarcts  -Vascular surgery consult due to moderate carotid stenosis   -No further cardiac work up as per cardio, but discuss re: inpatient [facility] vs loop recorder  -F/u further neuro recommendations    2. HTN  -BP better controlled  -continue metoprolol to 50 mg BID with holding parameters  -Continue Diovan  -low sodium diet    3. HLD  -Continue with statin     4. Atrial Fib  -EKG shows NSR  -Continue with metoprolol   -Continue eliquis  -Cardiology recommendations appreciated    5. Anxiety- Xanax PRN    6. DVT prophylaxis - on Eliquis    PT -  home    Attending Attestation:   Plan discussed with patient. Patient is 72 year old female with PMH of HTN, HLD, PAF S/P ablation, Anxiety and previous CVA (2015) who presented with left facial droop and slurred speech likely secondary to TIA.     1. CVA-  -NIHSS 0  -continue telemonitoring  -Continue ASA and statin   -CT head negative  -Carotid duplex - no stenosis  -TTE reviewed  -MRI/MRA completed and reviewed - tiny left frontal infarcts  -Vascular surgery consult due to moderate carotid stenosis   -No further cardiac work up as per cardio   -F/u further neuro recommendations    2. HTN  -BP better controlled  -continue metoprolol to 50 mg BID with holding parameters  -Continue Diovan  -low sodium diet    3. HLD  -Continue with statin     4. Atrial Fib  -EKG shows NSR  -Continue with metoprolol   -Continue eliquis  -Cardiology recommendations appreciated    5. Anxiety- Xanax PRN    6. DVT prophylaxis - on Eliquis    PT -  home    Attending Attestation:   Plan discussed with patient.

## 2018-01-16 NOTE — CONSULT NOTE ADULT - SUBJECTIVE AND OBJECTIVE BOX
History obtained by: Patient and medical record     obtained: No    Chief complaint:  "I couldn't speak"    HPI:  Patient is 73 yo female with hx of HTN, HLD, CVA (3 years ago, residual R wrist numbness), and Atrial fib (s/p successful ablation August 2016) presenting with slurred speech, expressive aphagia, and left facial droop that started today around 5:30pm, and resolved by the time patient was seen.  Patient reports that she was with her daughter in the car, her daughter asked her to open the door and that's when she found her mother to be confused with slurred speech, expressive aphasia and unilateral facial drooping.  911 was called, and patient presented to the ER.  At this time, symptoms resolved. Pt denies headache, CP or palpitations. Pt reports she had Holter monitor a few times since her ablation, as recent as last month and she's been in sinus rhythm with no episodes of A-fib. She's been compliant with her Eliquis BID.     Cardiologist: Dr Lele Palmer- [place] tel. [phone]      REVIEW OF SYMPTOMS: Cardiovascular:  denies chest pain,  dyspnea,  syncope, palpitations, orthopnea, paroxsymal nocturnal dyspnea;    Respiratory: denies dyspnea, cough,    Genitourinary:  No dysuria, no hematuria;   Gastrointestinal:   No dark color stool, no melena, no diarrhea, no constipation, no abdominal pain;   Neurological: as per HPI   Psychiatric: No agitation, no anxiety.  ALL OTHER REVIEW OF SYSTEMS ARE NEGATIVE.    MEDICATIONS  (STANDING):  apixaban 5 milliGRAM(s) Oral every 12 hours  atorvastatin 40 milliGRAM(s) Oral at bedtime  metoprolol     tartrate 25 milliGRAM(s) Oral two times a day  valsartan 160 milliGRAM(s) Oral daily    MEDICATIONS  (PRN):  docusate sodium 100 milliGRAM(s) Oral three times a day PRN Constipation  senna 2 Tablet(s) Oral at bedtime PRN Constipation  traMADol 50 milliGRAM(s) Oral every 6 hours PRN Severe Pain (7 - 10)        PAST MEDICAL & SURGICAL HISTORY:  CVA (cerebral vascular accident)  HLD (hyperlipidemia)  Anxiety  Afib: s/p ablation x2  HTN (hypertension)  Status post ORIF of fracture of ankle   s/p R hip replacement  s/p R shoulder surgery  S/P popliteal-tibial bypass      FAMILY HISTORY:  No pertinent family history in first degree relatives      SOCIAL HISTORY: , lives alone    CIGARETTES: never smoked    ALCOHOL: denies    DRUGS: denies    Vital Signs Last 24 Hrs  T(C): 36.7 (13 Jan 2018 22:17), Max: 36.7 (13 Jan 2018 18:19)  T(F): 98 (13 Jan 2018 22:17), Max: 98.1 (13 Jan 2018 18:19)  HR: 84 (13 Jan 2018 22:17) (80 - 84)  BP: 142/63 (13 Jan 2018 22:17) (142/63 - 183/83)  BP(mean): 116 (13 Jan 2018 22:07) (116 - 116)  RR: 16 (13 Jan 2018 22:17) (16 - 16)  SpO2: 100% (13 Jan 2018 22:17) (100% - 100%)    PHYSICAL EXAM:  General: WN/WD NAD  Neurology: A&Ox3, nonfocal, PAGAN x 4  Eyes: PERRLA/ EOMI, Gross vision intact  ENT/Neck: Neck supple, trachea midline, No JVD, Gross hearing intact  Respiratory: CTA B/L, No wheezing, rales, rhonchi  CV: RRR, S1S2, no murmurs, rubs or gallops  Abdominal: Soft, NT, ND +BS,   Extremities: No edema, + peripheral pulses  Skin: No Rashes, Hematoma, Ecchymosis    INTERPRETATION OF TELEMETRY: SR 80's, occasional PVC    ECG: SR 83 bpm with sinus arrhythmia    I&O's Detail      LABS:                        12.6   7.8   )-----------( 364      ( 13 Jan 2018 19:13 )             39.6     01-13    143  |  101  |  18.0  ----------------------------<  105  3.4<L>   |  28.0  |  0.65    Ca    9.7      13 Jan 2018 19:13    TPro  8.2  /  Alb  4.4  /  TBili  0.3<L>  /  DBili  x   /  AST  21  /  ALT  21  /  AlkPhos  95  01-13        PT/INR - ( 13 Jan 2018 19:13 )   PT: 15.1 sec;   INR: 1.36 ratio         PTT - ( 13 Jan 2018 19:13 )  PTT:36.5 sec    I&O's Summary      RADIOLOGY & ADDITIONAL STUDIES:    < from: CT Brain Stroke Protocol (01.13.18 @ 19:34) >    FINDINGS:    There is volume loss and atherosclerosis. White matter hypodensities   noted compatible with at least moderate chronic microvascular ischemic   change in this age group. There is no midline shift, mass effect, extra   axial collection, intracranial hemorrhage, or ventriculomegaly.    The calvarium is intact. Polyps or mucous retention cysts noted along the   maxillary sinuses. The mastoid air cells are clear.    IMPRESSION:    No acute hemorrhage or mass effect. Chronic changes noted    < end of copied text >    PREVIOUS DIAGNOSTIC TESTING:      ECHO: n/a  STRESS: n/a  CATHETERIZATION: n/a
[facility]                                        [facility] [place]                                  Shashi Cabrera, & Miguel                                      [address]                                           [place], [place], [address]                                                [phone]    HISTORY:    The patient is a 72y Female who was waiting in the car for her daughter yesterday when she became confused and did not understand what was being said to her. Her daughter felt she had some drooping of the face and called 911.    By the time of EMS arrival the symptoms had resolved.     PAST MEDICAL & SURGICAL HISTORY:  CVA (cerebral vascular accident)  HLD (hyperlipidemia)  Anxiety  Afib: s/p ablation  HTN (hypertension)  Status post ORIF of fracture of ankle: s/p rght TR  S/P popliteal-tibial bypass    MEDICATION PRIOR TO ADMISSION:  · 	flecainide 100 mg oral tablet  · 	flecainide 50 mg oral tablet  · 	Diovan HCT 12.5 mg-160 mg oral tablet  · 	Lopressor 100 mg oral tablet  · 	lovastatin 40 mg oral tablet  · 	sertraline 25 mg oral tablet  .           Eliquis.     MEDICATIONS  (STANDING):  apixaban 5 milliGRAM(s) Oral every 12 hours  aspirin enteric coated 81 milliGRAM(s) Oral daily  atorvastatin 40 milliGRAM(s) Oral at bedtime  metoprolol     tartrate 25 milliGRAM(s) Oral two times a day  valsartan 160 milliGRAM(s) Oral daily    MEDICATIONS  (PRN):  ALPRAZolam 0.5 milliGRAM(s) Oral two times a day PRN anxiety  docusate sodium 100 milliGRAM(s) Oral three times a day PRN Constipation  senna 2 Tablet(s) Oral at bedtime PRN Constipation  traMADol 50 milliGRAM(s) Oral every 6 hours PRN Severe Pain (7 - 10)      Allergies  No Known Drug Allergies  shellfish (Other (Moderate))    SOCIAL HISTORY:  Non smoker.    FAMILY HISTORY:  No pertinent family history in first degree relatives    ROS:  The patient denies fevers or weight changes.  Denies headache or dizziness.  Denies chest pain.  Denies shortness of breath.  Denies abdominal pain, nausea, or vomiting.  Denies change in urinary pattern.  Denies rash.  Denies recent mood changes.    Exam:  Vital Signs Last 24 Hrs  T(F): 97.7 (14 Jan 2018 07:33), Max: 98.6 (14 Jan 2018 02:13)  HR: 69 (14 Jan 2018 11:17) (63 - 84)  BP: 157/68 (14 Jan 2018 11:17) (135/65 - 189/75)  BP(mean): 116 (13 Jan 2018 22:07) (116 - 116)  RR: 17 (14 Jan 2018 11:17) (16 - 18)  SpO2: 97% (14 Jan 2018 11:17) (97% - 100%)  General: NAD.   Carotid bruits absent.     Mental status: The patient is awake, alert, and fully oriented. There is no aphasia.    Cranial nerves: There is no papilledema. Pupils react Symmetrically to light. There is no visual field deficit to confrontation. Extraocular motion is full with no nystagmus. There is no ptosis. Facial sensation is intact. Facial musculature is symmetric. Palate elevates symmetrically. Tongue is midline.    Motor: There is normal bulk and tone.  Strength is 5/5 in the right arm and leg.   Strength is 5/5 in the left arm and leg.    Sensation: Intact to light touch and pin.    Reflexes: 2+ throughout and plantar responses are flexor.    Cerebellar: There is no dysmetria on finger to nose testing.    LABS:                         12.2   7.5   )-----------( 352      ( 14 Jan 2018 06:33 )             38.2       01-14    143  |  102  |  14.0  ----------------------------<  119<H>  4.3   |  27.0  |  0.56    Ca    9.4      14 Jan 2018 06:33    TPro  8.2  /  Alb  4.4  /  TBili  0.3<L>  /  DBili  x   /  AST  21  /  ALT  21  /  AlkPhos  95  01-13      PT/INR - ( 13 Jan 2018 19:13 )   PT: 15.1 sec;   INR: 1.36 ratio         PTT - ( 13 Jan 2018 19:13 )  PTT:36.5 sec    RADIOLOGY & ADDITIONAL STUDIES:  CT head reviewed: There is no acute pathology.     Carotid duplex negative.
VASCULAR SURGERY CONSULT NOTE  --------------------------------------------------------------------------------------------    Patient is a 72y old  Female who presents with a chief complaint of Slurred speech/Expressive aphagia, and facial droop (13 Jan 2018 22:07)      HPI: Patient is 73 yo female with hx of HTN, HLD, CVA, and Atrial fib presenting with Slurred speech, expressive aphagia, and left facial droop that started 1/13 and lasted about 5 minutes, as per patient. Patient reports that she was with her daughter in the Car, when patient asked to open the door, patient noticed to be confused with slurred speech, and expressive aphasia.  At the time patient had presented to the ED, her symptoms had resolved.   CT head showed no hemorrhage   Carotid duplex was negative for carotid stenosis.   Vascular surgery was consulted on 1/16 for results of MRA 1/16 which showed Moderate bilateral cavernous carotid artery stenosis.    Pt was seen and examined at bedside, laying comfortably and in good spirits. Denies any residual effects from her TIA, denies any vision changes for the past few months, denies weaknes. Patient denies fevers/chills, denies lightheadedness/dizziness, denies SOB/chest pain, denies nausea/vomiting, denies constipation/diarrhea.      ROS: 10-system review is otherwise negative except HPI above.      PAST MEDICAL & SURGICAL HISTORY:  CVA (cerebral vascular accident)  HLD (hyperlipidemia)  Anxiety  Afib: s/p ablation  HTN (hypertension)  Status post ORIF of fracture of ankle: s/p rght TR  S/P popliteal-tibial bypass    FAMILY HISTORY:  No pertinent family history in first degree relatives    Family history not pertinent as reviewed with the patient and family    ALLERGIES: No Known Drug Allergies  shellfish (Other (Moderate))      HOME MEDICATIONS:    CURRENT MEDICATIONS  MEDICATIONS (STANDING): apixaban 5 milliGRAM(s) Oral every 12 hours  aspirin enteric coated 81 milliGRAM(s) Oral daily  atorvastatin 40 milliGRAM(s) Oral at bedtime  metoprolol     tartrate 50 milliGRAM(s) Oral two times a day  valsartan 160 milliGRAM(s) Oral daily    MEDICATIONS (PRN):ALPRAZolam 0.5 milliGRAM(s) Oral two times a day PRN anxiety  docusate sodium 100 milliGRAM(s) Oral three times a day PRN Constipation  senna 2 Tablet(s) Oral at bedtime PRN Constipation  traMADol 50 milliGRAM(s) Oral every 6 hours PRN Severe Pain (7 - 10)    --------------------------------------------------------------------------------------------    Vitals:   T(C): 37.2 (01-16-18 @ 21:29), Max: 37.2 (01-16-18 @ 21:29)  HR: 71 (01-16-18 @ 21:29) (63 - 76)  BP: 129/52 (01-16-18 @ 21:29) (129/52 - 152/67)  BP(mean): --  RR: 18 (01-16-18 @ 21:29) (18 - 21)  SpO2: 98% (01-16-18 @ 21:29) (98% - 100%)  Wt(kg): --  CAPILLARY BLOOD GLUCOSE        CAPILLARY BLOOD GLUCOSE      01-15 @ 07:01  -  01-16 @ 07:00  --------------------------------------------------------  IN:    Oral Fluid: 960 mL  Total IN: 960 mL    OUT:  Total OUT: 0 mL    Total NET: 960 mL      Height (cm): 162.56 (01-13 @ 18:19)  Weight (kg): 111.1 (01-13 @ 18:19)  BMI (kg/m2): 42 (01-13 @ 18:19)  BSA (m2): 2.13 (01-13 @ 18:19)    PHYSICAL EXAM:  General: NAD, Lying in bed comfortably  Neuro: A+Ox3  HEENT: NC/AT, EOMI  Neck: Soft, supple  Cardio: RRR, nml S1/S2  Resp: Good effort, CTA b/l, non-labored  GI/Abd: Soft, NT/ND, no rebound/guarding, no masses palpated  Vascular: All 4 extremities warm, B/l radial pulses palpable, extremties are warm and well perfused.   Skin: Intact, no breakdown  Lymphatic/Nodes: No palpable lymphadenopathy  Musculoskeletal: All 4 extremities moving spontaneously, no limitations.  strength 5/5 to UE and LE.   --------------------------------------------------------------------------------------------    LABS  CBC (01-16 @ 06:18)                              11.1<L>                         6.2     )----------------(  289        62.5  % Neutrophils, 24.5  % Lymphocytes, ANC: 3.9                                 34.9<L>    BMP (01-16 @ 06:19)             144     |  107     |  14.0  		Ca++ --      Ca 8.6                ---------------------------------( 127<H>		Mg 2.0                3.7     |  25.0    |  0.61  			Ph 3.6           --------------------------------------------------------------------------------------------    MICROBIOLOGY      --------------------------------------------------------------------------------------------    IMAGING    < from: MR Angio Head No Cont (01.16.18 @ 13:00) >    TECHNIQUE: MRA brain. 3-D time of flight imaging with 2D MIP   reconstructions.    COMPARISON: 1/13/2015    FINDINGS:  Moderate bilateral cavernous carotid artery stenosis, unchanged.  The [place] of [place] and vertebrobasilar system shows no other evidence   of significant stenosis, occlusion or saccular aneurysm dilation. No   evidence for arterial venous malformation. The vertebral arteries are   codominant.    < end of copied text >    < from: US Duplex Carotid Arteries Complete, Bilateral (01.14.18 @ 11:19) >    INTERPRETATION:  VASCULAR CAROTID DUPLEX:    History: Slurred speech.    Date and time of exam: 1/14/2018 10:55 AM.    Technique: Real time, color flow,and pulsed Doppler study of the carotid   arteries were performed.  Peak systolic velocities were obtained and   compared with tables establishing criteria for carotid stenosis.    Findings:   Peak systolic velocities:  Right CCA 93.5 cm/sec  Right internal carotid artery 103.2 cm/sec  Right external carotid artery 66.0 cm/sec  Left CCA 99.9 cm/sec  Left internal carotid artery 1.0 cm/sec  Left external carotid artery 64.4 cm/sec  Internal carotid/common carotid ratio: 1.4 on the right and 1.9 onthe   left.  Vertebral artery flow is antegrade bilaterally.    Impression:  No evidence of hemodynamically significant carotid stenosis..    < end of copied text >

## 2018-01-16 NOTE — OCCUPATIONAL THERAPY INITIAL EVALUATION ADULT - ADDITIONAL COMMENTS
pt independent prior.  Pt had recent THR and still adheres to THP's.  Pt owns hip kit, commode rw.  Pt lives alone and has no stairs.

## 2018-01-17 ENCOUNTER — TRANSCRIPTION ENCOUNTER (OUTPATIENT)
Age: 73
End: 2018-01-17

## 2018-01-17 VITALS
RESPIRATION RATE: 17 BRPM | HEART RATE: 74 BPM | OXYGEN SATURATION: 99 % | SYSTOLIC BLOOD PRESSURE: 140 MMHG | DIASTOLIC BLOOD PRESSURE: 70 MMHG | TEMPERATURE: 98 F

## 2018-01-17 LAB
ANION GAP SERPL CALC-SCNC: 12 MMOL/L — SIGNIFICANT CHANGE UP (ref 5–17)
BASOPHILS # BLD AUTO: 0 K/UL — SIGNIFICANT CHANGE UP (ref 0–0.2)
BASOPHILS NFR BLD AUTO: 0.1 % — SIGNIFICANT CHANGE UP (ref 0–2)
BUN SERPL-MCNC: 18 MG/DL — SIGNIFICANT CHANGE UP (ref 8–20)
CALCIUM SERPL-MCNC: 8.7 MG/DL — SIGNIFICANT CHANGE UP (ref 8.6–10.2)
CHLORIDE SERPL-SCNC: 101 MMOL/L — SIGNIFICANT CHANGE UP (ref 98–107)
CO2 SERPL-SCNC: 26 MMOL/L — SIGNIFICANT CHANGE UP (ref 22–29)
CREAT SERPL-MCNC: 0.59 MG/DL — SIGNIFICANT CHANGE UP (ref 0.5–1.3)
EOSINOPHIL # BLD AUTO: 0.2 K/UL — SIGNIFICANT CHANGE UP (ref 0–0.5)
EOSINOPHIL NFR BLD AUTO: 1.9 % — SIGNIFICANT CHANGE UP (ref 0–6)
GLUCOSE SERPL-MCNC: 104 MG/DL — SIGNIFICANT CHANGE UP (ref 70–115)
HCT VFR BLD CALC: 34.2 % — LOW (ref 37–47)
HGB BLD-MCNC: 11 G/DL — LOW (ref 12–16)
LYMPHOCYTES # BLD AUTO: 1.7 K/UL — SIGNIFICANT CHANGE UP (ref 1–4.8)
LYMPHOCYTES # BLD AUTO: 21.6 % — SIGNIFICANT CHANGE UP (ref 20–55)
MAGNESIUM SERPL-MCNC: 2 MG/DL — SIGNIFICANT CHANGE UP (ref 1.6–2.6)
MCHC RBC-ENTMCNC: 26.6 PG — LOW (ref 27–31)
MCHC RBC-ENTMCNC: 32.2 G/DL — SIGNIFICANT CHANGE UP (ref 32–36)
MCV RBC AUTO: 82.6 FL — SIGNIFICANT CHANGE UP (ref 81–99)
MONOCYTES # BLD AUTO: 0.7 K/UL — SIGNIFICANT CHANGE UP (ref 0–0.8)
MONOCYTES NFR BLD AUTO: 9.3 % — SIGNIFICANT CHANGE UP (ref 3–10)
NEUTROPHILS # BLD AUTO: 5.2 K/UL — SIGNIFICANT CHANGE UP (ref 1.8–8)
NEUTROPHILS NFR BLD AUTO: 66.8 % — SIGNIFICANT CHANGE UP (ref 37–73)
PHOSPHATE SERPL-MCNC: 3.4 MG/DL — SIGNIFICANT CHANGE UP (ref 2.4–4.7)
PLATELET # BLD AUTO: 289 K/UL — SIGNIFICANT CHANGE UP (ref 150–400)
POTASSIUM SERPL-MCNC: 3.8 MMOL/L — SIGNIFICANT CHANGE UP (ref 3.5–5.3)
POTASSIUM SERPL-SCNC: 3.8 MMOL/L — SIGNIFICANT CHANGE UP (ref 3.5–5.3)
RBC # BLD: 4.14 M/UL — LOW (ref 4.4–5.2)
RBC # FLD: 14.9 % — SIGNIFICANT CHANGE UP (ref 11–15.6)
SODIUM SERPL-SCNC: 139 MMOL/L — SIGNIFICANT CHANGE UP (ref 135–145)
WBC # BLD: 7.7 K/UL — SIGNIFICANT CHANGE UP (ref 4.8–10.8)
WBC # FLD AUTO: 7.7 K/UL — SIGNIFICANT CHANGE UP (ref 4.8–10.8)

## 2018-01-17 PROCEDURE — 93306 TTE W/DOPPLER COMPLETE: CPT

## 2018-01-17 PROCEDURE — 82962 GLUCOSE BLOOD TEST: CPT

## 2018-01-17 PROCEDURE — 93005 ELECTROCARDIOGRAM TRACING: CPT

## 2018-01-17 PROCEDURE — 84100 ASSAY OF PHOSPHORUS: CPT

## 2018-01-17 PROCEDURE — 99231 SBSQ HOSP IP/OBS SF/LOW 25: CPT

## 2018-01-17 PROCEDURE — 71045 X-RAY EXAM CHEST 1 VIEW: CPT

## 2018-01-17 PROCEDURE — 80053 COMPREHEN METABOLIC PANEL: CPT

## 2018-01-17 PROCEDURE — 70544 MR ANGIOGRAPHY HEAD W/O DYE: CPT

## 2018-01-17 PROCEDURE — 97167 OT EVAL HIGH COMPLEX 60 MIN: CPT

## 2018-01-17 PROCEDURE — 85027 COMPLETE CBC AUTOMATED: CPT

## 2018-01-17 PROCEDURE — 70450 CT HEAD/BRAIN W/O DYE: CPT

## 2018-01-17 PROCEDURE — 83735 ASSAY OF MAGNESIUM: CPT

## 2018-01-17 PROCEDURE — 36415 COLL VENOUS BLD VENIPUNCTURE: CPT

## 2018-01-17 PROCEDURE — 99239 HOSP IP/OBS DSCHRG MGMT >30: CPT

## 2018-01-17 PROCEDURE — 80048 BASIC METABOLIC PNL TOTAL CA: CPT

## 2018-01-17 PROCEDURE — 70551 MRI BRAIN STEM W/O DYE: CPT

## 2018-01-17 PROCEDURE — 85610 PROTHROMBIN TIME: CPT

## 2018-01-17 PROCEDURE — 93880 EXTRACRANIAL BILAT STUDY: CPT

## 2018-01-17 PROCEDURE — 80061 LIPID PANEL: CPT

## 2018-01-17 PROCEDURE — 99285 EMERGENCY DEPT VISIT HI MDM: CPT | Mod: 25

## 2018-01-17 PROCEDURE — 97163 PT EVAL HIGH COMPLEX 45 MIN: CPT

## 2018-01-17 PROCEDURE — 85730 THROMBOPLASTIN TIME PARTIAL: CPT

## 2018-01-17 RX ORDER — ALPRAZOLAM 0.25 MG
1 TABLET ORAL
Qty: 0 | Refills: 0 | DISCHARGE
Start: 2018-01-17

## 2018-01-17 RX ORDER — ASPIRIN/CALCIUM CARB/MAGNESIUM 324 MG
1 TABLET ORAL
Qty: 0 | Refills: 0 | DISCHARGE
Start: 2018-01-17

## 2018-01-17 RX ORDER — ASPIRIN/CALCIUM CARB/MAGNESIUM 324 MG
1 TABLET ORAL
Qty: 30 | Refills: 0
Start: 2018-01-17

## 2018-01-17 RX ADMIN — Medication 50 MILLIGRAM(S): at 05:31

## 2018-01-17 RX ADMIN — APIXABAN 5 MILLIGRAM(S): 2.5 TABLET, FILM COATED ORAL at 17:06

## 2018-01-17 RX ADMIN — VALSARTAN 160 MILLIGRAM(S): 80 TABLET ORAL at 05:31

## 2018-01-17 RX ADMIN — APIXABAN 5 MILLIGRAM(S): 2.5 TABLET, FILM COATED ORAL at 05:31

## 2018-01-17 RX ADMIN — Medication 50 MILLIGRAM(S): at 17:06

## 2018-01-17 RX ADMIN — Medication 81 MILLIGRAM(S): at 11:18

## 2018-01-17 NOTE — PROGRESS NOTE ADULT - ASSESSMENT
The patient is a 72y Female with transient symptoms of aphasia and tiny infarcts on MRI, possible m,ore small vessle in anture than embolic.  She is on eliquis for Afib, but is s/p ablation and has been in sinus rhythm for long time.  no further neuro workup  ok to d/c home on ECASA 81 and eliquis and statin  no need to further w/u stable moderate stenosis of intracranial carotid stenosis    she should f/u as outpatient in 2-4 weeks.    Thank you for allowing me to participate in the care of your patient    Mark Danielle MD, PhD   [id number]

## 2018-01-17 NOTE — DISCHARGE NOTE ADULT - CARE PLAN
Principal Discharge DX:	CVA (cerebral vascular accident)  Goal:	compliance with therapy  Assessment and plan of treatment:	continue aspirin and lipitor daily  follow up with neurology as outpatient  please follow up with your PCP within 1 week  Secondary Diagnosis:	Afib  Goal:	rate controlled  Assessment and plan of treatment:	continue metoprolol and eliquis  follow up with your cardiologist upon discharge  Secondary Diagnosis:	Hypertension, unspecified type  Assessment and plan of treatment:	continue metoprolol and diovan HCT  Secondary Diagnosis:	Anxiety  Assessment and plan of treatment:	continue xanax  Secondary Diagnosis:	HLD (hyperlipidemia)  Assessment and plan of treatment:	continue lipitor

## 2018-01-17 NOTE — DISCHARGE NOTE ADULT - MEDICATION SUMMARY - MEDICATIONS TO STOP TAKING
I will STOP taking the medications listed below when I get home from the hospital:    sertraline 25 mg oral tablet  -- 1 tab(s) by mouth once a day    flecainide 100 mg oral tablet  -- 1 tab(s) by mouth once a day    flecainide 50 mg oral tablet  -- 1 tab(s) by mouth once a day (at bedtime)    Coumadin 2.5 mg oral tablet  -- 1 tab(s) by mouth once a day

## 2018-01-17 NOTE — DISCHARGE NOTE ADULT - HOSPITAL COURSE
Patient is 72 year old female with PMH of HTN, HLD, PAF S/P ablation, Anxiety and previous CVA (2015) who presented with left facial droop and slurred speech; admitted with acute left frontal CVA    1. CVA  -symptoms resolved; no facial droop or slurred speech on exam today.   -Started on ASA 81 on admission  -Lipid panel reviewed - continue statin   -CT head negative  -Carotid duplex - no stenosis  -TTE completed - EF 60-65% - no cardiac work up required as per cardiology. patient may follow up with her cardiologist upon discharge.  -MRI/MRA completed and reviewed - tiny left frontal infarcts  -Vascular surgery consulted due to moderate cavernous carotid stenosis on imaging; no intervention indicated  -OT/PT recommendations appreciated; patient independent  -Cleared for discharge home as per neurology with outpatient follow up    2. HTN  -BP controlled  -Continue metoprolol and resume Diovan-HCTZ upon discharge  -low sodium diet    3. HLD  -Continue lipitor    4. Atrial Fib  -EKG shows NSR  -Continue with metoprolol   -Continue eliquis  -Cardiology recommendations appreciated; no further cardiac work up or indication for inpatient INGRIS    5. Anxiety- Xanax PRN    Medically cleared for discharge. Stable for discharge from neurology standpoint. No PT/OT needs. Time spent on discharge 45 minutes.

## 2018-01-17 NOTE — DISCHARGE NOTE ADULT - MEDICATION SUMMARY - MEDICATIONS TO TAKE
I will START or STAY ON the medications listed below when I get home from the hospital:    traMADol 50 mg oral tablet  -- 1 tab(s) by mouth every 6 hours, As Needed  -- Indication: For Pain    aspirin 81 mg oral delayed release tablet  -- 1 tab(s) by mouth once a day  -- Indication: For CVA (cerebral vascular accident)    Eliquis 5 mg oral tablet  -- 1 tab(s) by mouth 2 times a day  -- Indication: For Atrial Fibrillation    Lipitor  -- 40 milligram(s) by mouth once a day (at bedtime)  -- Indication: For HLD and CVA    Diovan HCT 12.5 mg-160 mg oral tablet  -- 1 tab(s) by mouth once a day  -- Indication: For HTN    ALPRAZolam 0.5 mg oral tablet  -- 1 tab(s) by mouth 2 times a day, As needed, anxiety  -- Indication: For Anxiety    Metoprolol Tartrate 25 mg oral tablet  -- 1 tab(s) by mouth 2 times a day  -- Indication: For HTN

## 2018-01-17 NOTE — DISCHARGE NOTE ADULT - PLAN OF CARE
compliance with therapy continue aspirin and lipitor daily  follow up with neurology as outpatient  please follow up with your PCP within 1 week rate controlled continue metoprolol and eliquis  follow up with your cardiologist upon discharge continue metoprolol and diovan HCT continue xanax continue lipitor

## 2018-01-17 NOTE — DISCHARGE NOTE ADULT - CARE PROVIDER_API CALL
Nicanor Lomas), Neurology; Vascular Neurology  [address] [place] [address]  Phone: [phone]  Fax: [phone]

## 2018-01-17 NOTE — DISCHARGE NOTE ADULT - ADDITIONAL INSTRUCTIONS
Please follow up with your PMD within 1 week  Please follow up with Neurology within 1-2 weeks  Please continue aspirin and lipitor  Please return to the ED if symptoms reoccur.

## 2018-01-17 NOTE — PROGRESS NOTE ADULT - SUBJECTIVE AND OBJECTIVE BOX
[facility]                                     Neurology at [place]                                 Shashi Cabrera & Miguel                                  [address]                                        [place], [place], [address]                                             [phone]      Vital signs:  T(C): 36.8 (01-17-18 @ 16:59), Max: 37.2 (01-16-18 @ 21:29)  HR: 74 (01-17-18 @ 16:59) (71 - 74)  BP: 140/70 (01-17-18 @ 16:59) (115/59 - 145/69)  RR: 17 (01-17-18 @ 16:59) (15 - 18)  SpO2: 99% (01-17-18 @ 16:59) (97% - 99%)  Wt(kg): --    Exam:    No new complaints.  Awake and alert.  speech language intact  Pupils react.  Face symmetric smile and sensation  hearing symmetric  tongue ML  5/5 power in 4 ext  no drift  intact FT x 4 ext  no dysmetria FTN    MRI brain shows tiny/punctate CVAs in left frontal cortex  MRA head moderate b/l cavernous carotid stenosis, stable c/w 1/13/15 MRA

## 2018-01-19 PROBLEM — I63.9 CEREBRAL INFARCTION, UNSPECIFIED: Chronic | Status: ACTIVE | Noted: 2018-01-13

## 2018-01-26 ENCOUNTER — APPOINTMENT (OUTPATIENT)
Dept: NEUROLOGY | Facility: CLINIC | Age: 73
End: 2018-01-26
Payer: MEDICARE

## 2018-01-26 VITALS
WEIGHT: 258 LBS | SYSTOLIC BLOOD PRESSURE: 158 MMHG | BODY MASS INDEX: 46.29 KG/M2 | DIASTOLIC BLOOD PRESSURE: 77 MMHG | HEIGHT: 62.5 IN | HEART RATE: 82 BPM

## 2018-01-26 PROCEDURE — 99214 OFFICE O/P EST MOD 30 MIN: CPT

## 2018-01-30 ENCOUNTER — APPOINTMENT (OUTPATIENT)
Dept: OPHTHALMOLOGY | Facility: CLINIC | Age: 73
End: 2018-01-30
Payer: MEDICARE

## 2018-01-30 DIAGNOSIS — Z86.39 PERSONAL HISTORY OF OTHER ENDOCRINE, NUTRITIONAL AND METABOLIC DISEASE: ICD-10-CM

## 2018-01-30 PROCEDURE — 99204 OFFICE O/P NEW MOD 45 MIN: CPT

## 2018-01-30 PROCEDURE — 92133 CPTRZD OPH DX IMG PST SGM ON: CPT

## 2018-01-30 PROCEDURE — 92083 EXTENDED VISUAL FIELD XM: CPT

## 2018-02-07 ENCOUNTER — APPOINTMENT (OUTPATIENT)
Dept: MRI IMAGING | Facility: CLINIC | Age: 73
End: 2018-02-07
Payer: MEDICARE

## 2018-02-07 ENCOUNTER — APPOINTMENT (OUTPATIENT)
Dept: CT IMAGING | Facility: CLINIC | Age: 73
End: 2018-02-07
Payer: MEDICARE

## 2018-02-07 ENCOUNTER — OUTPATIENT (OUTPATIENT)
Dept: OUTPATIENT SERVICES | Facility: HOSPITAL | Age: 73
LOS: 1 days | End: 2018-02-07
Payer: COMMERCIAL

## 2018-02-07 DIAGNOSIS — H53.462 HOMONYMOUS BILATERAL FIELD DEFECTS, LEFT SIDE: ICD-10-CM

## 2018-02-07 DIAGNOSIS — Z98.89 OTHER SPECIFIED POSTPROCEDURAL STATES: Chronic | ICD-10-CM

## 2018-02-07 PROCEDURE — 70553 MRI BRAIN STEM W/O & W/DYE: CPT

## 2018-02-07 PROCEDURE — 70553 MRI BRAIN STEM W/O & W/DYE: CPT | Mod: 26

## 2018-02-07 PROCEDURE — A9585: CPT

## 2018-02-09 ENCOUNTER — OTHER (OUTPATIENT)
Age: 73
End: 2018-02-09

## 2018-02-23 ENCOUNTER — OTHER (OUTPATIENT)
Age: 73
End: 2018-02-23

## 2018-02-28 ENCOUNTER — APPOINTMENT (OUTPATIENT)
Dept: CT IMAGING | Facility: CLINIC | Age: 73
End: 2018-02-28
Payer: MEDICARE

## 2018-02-28 ENCOUNTER — OUTPATIENT (OUTPATIENT)
Dept: OUTPATIENT SERVICES | Facility: HOSPITAL | Age: 73
LOS: 1 days | End: 2018-02-28
Payer: COMMERCIAL

## 2018-02-28 DIAGNOSIS — Z98.89 OTHER SPECIFIED POSTPROCEDURAL STATES: Chronic | ICD-10-CM

## 2018-02-28 DIAGNOSIS — Z00.8 ENCOUNTER FOR OTHER GENERAL EXAMINATION: ICD-10-CM

## 2018-02-28 PROCEDURE — 70496 CT ANGIOGRAPHY HEAD: CPT

## 2018-02-28 PROCEDURE — 82565 ASSAY OF CREATININE: CPT

## 2018-02-28 PROCEDURE — 70496 CT ANGIOGRAPHY HEAD: CPT | Mod: 26

## 2018-03-14 ENCOUNTER — APPOINTMENT (OUTPATIENT)
Dept: NEUROLOGY | Facility: CLINIC | Age: 73
End: 2018-03-14
Payer: MEDICARE

## 2018-03-14 VITALS — DIASTOLIC BLOOD PRESSURE: 80 MMHG | SYSTOLIC BLOOD PRESSURE: 173 MMHG | HEIGHT: 64 IN | HEART RATE: 72 BPM

## 2018-03-14 DIAGNOSIS — H53.462 HOMONYMOUS BILATERAL FIELD DEFECTS, LEFT SIDE: ICD-10-CM

## 2018-03-14 DIAGNOSIS — Z82.3 FAMILY HISTORY OF STROKE: ICD-10-CM

## 2018-03-14 DIAGNOSIS — I63.9 CEREBRAL INFARCTION, UNSPECIFIED: ICD-10-CM

## 2018-03-14 PROCEDURE — 99215 OFFICE O/P EST HI 40 MIN: CPT

## 2018-04-12 ENCOUNTER — APPOINTMENT (OUTPATIENT)
Dept: NEUROLOGY | Facility: CLINIC | Age: 73
End: 2018-04-12

## 2018-04-17 NOTE — ED ADULT NURSE NOTE - NIH STROKE SCALE: 5B. MOTOR ARM, RIGHT, QM
Calling about results.   [phone]   (0) No drift; limb holds 90 (or 45) degrees for full 10 secs

## 2018-05-09 ENCOUNTER — APPOINTMENT (OUTPATIENT)
Dept: OPHTHALMOLOGY | Facility: CLINIC | Age: 73
End: 2018-05-09
Payer: MEDICARE

## 2018-05-09 DIAGNOSIS — Z83.511 FAMILY HISTORY OF GLAUCOMA: ICD-10-CM

## 2018-05-09 PROCEDURE — 92012 INTRM OPH EXAM EST PATIENT: CPT

## 2018-05-16 ENCOUNTER — APPOINTMENT (OUTPATIENT)
Dept: OPHTHALMOLOGY | Facility: CLINIC | Age: 73
End: 2018-05-16
Payer: MEDICARE

## 2018-05-16 PROCEDURE — 92083 EXTENDED VISUAL FIELD XM: CPT

## 2018-09-25 ENCOUNTER — APPOINTMENT (OUTPATIENT)
Dept: NEUROLOGY | Facility: CLINIC | Age: 73
End: 2018-09-25

## 2018-11-21 ENCOUNTER — APPOINTMENT (OUTPATIENT)
Dept: OPHTHALMOLOGY | Facility: CLINIC | Age: 73
End: 2018-11-21

## 2021-02-09 ENCOUNTER — INPATIENT (INPATIENT)
Facility: HOSPITAL | Age: 76
LOS: 2 days | Discharge: HOME | DRG: 312 | End: 2021-02-12
Attending: HOSPITALIST | Admitting: INTERNAL MEDICINE
Payer: MEDICARE

## 2021-02-09 VITALS
HEART RATE: 100 BPM | WEIGHT: 259.93 LBS | TEMPERATURE: 99 F | DIASTOLIC BLOOD PRESSURE: 87 MMHG | HEIGHT: 64 IN | OXYGEN SATURATION: 90 % | SYSTOLIC BLOOD PRESSURE: 195 MMHG | RESPIRATION RATE: 17 BRPM

## 2021-02-09 DIAGNOSIS — Z98.89 OTHER SPECIFIED POSTPROCEDURAL STATES: Chronic | ICD-10-CM

## 2021-02-09 LAB
HCT VFR BLD CALC: 38.7 % — SIGNIFICANT CHANGE UP (ref 34.5–45)
HGB BLD-MCNC: 12.3 G/DL — SIGNIFICANT CHANGE UP (ref 11.5–15.5)
MCHC RBC-ENTMCNC: 26.7 PG — LOW (ref 27–34)
MCHC RBC-ENTMCNC: 31.8 GM/DL — LOW (ref 32–36)
MCV RBC AUTO: 83.9 FL — SIGNIFICANT CHANGE UP (ref 80–100)
PLATELET # BLD AUTO: 303 K/UL — SIGNIFICANT CHANGE UP (ref 150–400)
RBC # BLD: 4.61 M/UL — SIGNIFICANT CHANGE UP (ref 3.8–5.2)
RBC # FLD: 16.4 % — HIGH (ref 10.3–14.5)
WBC # BLD: 8.1 K/UL — SIGNIFICANT CHANGE UP (ref 3.8–10.5)
WBC # FLD AUTO: 8.1 K/UL — SIGNIFICANT CHANGE UP (ref 3.8–10.5)

## 2021-02-09 PROCEDURE — 71045 X-RAY EXAM CHEST 1 VIEW: CPT | Mod: 26

## 2021-02-09 RX ORDER — ONDANSETRON 8 MG/1
4 TABLET, FILM COATED ORAL ONCE
Refills: 0 | Status: COMPLETED | OUTPATIENT
Start: 2021-02-09 | End: 2021-02-09

## 2021-02-09 RX ORDER — FAMOTIDINE 10 MG/ML
20 INJECTION INTRAVENOUS ONCE
Refills: 0 | Status: COMPLETED | OUTPATIENT
Start: 2021-02-09 | End: 2021-02-09

## 2021-02-09 RX ORDER — SODIUM CHLORIDE 9 MG/ML
1000 INJECTION INTRAMUSCULAR; INTRAVENOUS; SUBCUTANEOUS ONCE
Refills: 0 | Status: COMPLETED | OUTPATIENT
Start: 2021-02-09 | End: 2021-02-09

## 2021-02-09 RX ADMIN — ONDANSETRON 4 MILLIGRAM(S): 8 TABLET, FILM COATED ORAL at 23:29

## 2021-02-09 RX ADMIN — FAMOTIDINE 20 MILLIGRAM(S): 10 INJECTION INTRAVENOUS at 23:29

## 2021-02-09 RX ADMIN — SODIUM CHLORIDE 1000 MILLILITER(S): 9 INJECTION INTRAMUSCULAR; INTRAVENOUS; SUBCUTANEOUS at 23:30

## 2021-02-09 NOTE — ED PROVIDER NOTE - OBJECTIVE STATEMENT
74 yo female pw nausea, vomiting and dizziness. Pt had ablation yesterday at [facility] for a fib, was d/c from [facility] today and just "did not feel well" pt is on eliquis

## 2021-02-09 NOTE — ED ADULT NURSE NOTE - CHIEF COMPLAINT QUOTE
near syncope, patient states she has been feeling weak and dizzy today.  patient had an ablation done yesterday at [facility] for Afib

## 2021-02-09 NOTE — ED ADULT TRIAGE NOTE - NS ED NURSE AMBULANCES
Patient missed appointment on 4/17/17.  Left message to reschedule.  Missed appointment letter sent.   [facility]

## 2021-02-09 NOTE — ED ADULT TRIAGE NOTE - CHIEF COMPLAINT QUOTE
near syncope, patient states she has been feeling weak and dizzy today.  patient had an ablation done yesterday at [facility] for Afib

## 2021-02-09 NOTE — ED ADULT NURSE NOTE - OBJECTIVE STATEMENT
74 y/o female comes in with c/o of N/V and syncope. Pt states that "I Passed out and my daughter and  caught me." pt denies falling on floor and hitting her head, denies LOC or pain at this time. pt states one episode of emesis denies blood in stool and emesis.  EKG performed, cardiac monitor in place, call bell within reach, bed in lowest position.

## 2021-02-10 DIAGNOSIS — I63.9 CEREBRAL INFARCTION, UNSPECIFIED: ICD-10-CM

## 2021-02-10 DIAGNOSIS — R77.8 OTHER SPECIFIED ABNORMALITIES OF PLASMA PROTEINS: ICD-10-CM

## 2021-02-10 DIAGNOSIS — E78.5 HYPERLIPIDEMIA, UNSPECIFIED: ICD-10-CM

## 2021-02-10 DIAGNOSIS — R42 DIZZINESS AND GIDDINESS: ICD-10-CM

## 2021-02-10 DIAGNOSIS — I10 ESSENTIAL (PRIMARY) HYPERTENSION: ICD-10-CM

## 2021-02-10 DIAGNOSIS — I48.11 LONGSTANDING PERSISTENT ATRIAL FIBRILLATION: ICD-10-CM

## 2021-02-10 LAB
ALBUMIN SERPL ELPH-MCNC: 3.1 G/DL — LOW (ref 3.3–5)
ALP SERPL-CCNC: 78 U/L — SIGNIFICANT CHANGE UP (ref 40–120)
ALT FLD-CCNC: 39 U/L — SIGNIFICANT CHANGE UP (ref 12–78)
ANION GAP SERPL CALC-SCNC: 8 MMOL/L — SIGNIFICANT CHANGE UP (ref 5–17)
APPEARANCE UR: ABNORMAL
AST SERPL-CCNC: 35 U/L — SIGNIFICANT CHANGE UP (ref 15–37)
BASOPHILS # BLD AUTO: 0.02 K/UL — SIGNIFICANT CHANGE UP (ref 0–0.2)
BASOPHILS NFR BLD AUTO: 0.2 % — SIGNIFICANT CHANGE UP (ref 0–2)
BILIRUB SERPL-MCNC: 0.9 MG/DL — SIGNIFICANT CHANGE UP (ref 0.2–1.2)
BILIRUB UR-MCNC: NEGATIVE — SIGNIFICANT CHANGE UP
BUN SERPL-MCNC: 8 MG/DL — SIGNIFICANT CHANGE UP (ref 7–23)
CALCIUM SERPL-MCNC: 9 MG/DL — SIGNIFICANT CHANGE UP (ref 8.5–10.1)
CHLORIDE SERPL-SCNC: 102 MMOL/L — SIGNIFICANT CHANGE UP (ref 96–108)
CO2 SERPL-SCNC: 29 MMOL/L — SIGNIFICANT CHANGE UP (ref 22–31)
COLOR SPEC: YELLOW — SIGNIFICANT CHANGE UP
CREAT SERPL-MCNC: 0.73 MG/DL — SIGNIFICANT CHANGE UP (ref 0.5–1.3)
DIFF PNL FLD: ABNORMAL
EOSINOPHIL # BLD AUTO: 0.01 K/UL — SIGNIFICANT CHANGE UP (ref 0–0.5)
EOSINOPHIL NFR BLD AUTO: 0.1 % — SIGNIFICANT CHANGE UP (ref 0–6)
GLUCOSE SERPL-MCNC: 165 MG/DL — HIGH (ref 70–99)
GLUCOSE UR QL: NEGATIVE MG/DL — SIGNIFICANT CHANGE UP
IMM GRANULOCYTES NFR BLD AUTO: 0.4 % — SIGNIFICANT CHANGE UP (ref 0–1.5)
KETONES UR-MCNC: NEGATIVE — SIGNIFICANT CHANGE UP
LACTATE SERPL-SCNC: 0.9 MMOL/L — SIGNIFICANT CHANGE UP (ref 0.7–2)
LEUKOCYTE ESTERASE UR-ACNC: NEGATIVE — SIGNIFICANT CHANGE UP
LYMPHOCYTES # BLD AUTO: 0.3 K/UL — LOW (ref 1–3.3)
LYMPHOCYTES # BLD AUTO: 3.7 % — LOW (ref 13–44)
MONOCYTES # BLD AUTO: 0.59 K/UL — SIGNIFICANT CHANGE UP (ref 0–0.9)
MONOCYTES NFR BLD AUTO: 7.3 % — SIGNIFICANT CHANGE UP (ref 2–14)
NEUTROPHILS # BLD AUTO: 7.15 K/UL — SIGNIFICANT CHANGE UP (ref 1.8–7.4)
NEUTROPHILS NFR BLD AUTO: 88.3 % — HIGH (ref 43–77)
NITRITE UR-MCNC: NEGATIVE — SIGNIFICANT CHANGE UP
PH UR: 6 — SIGNIFICANT CHANGE UP (ref 5–8)
POTASSIUM SERPL-MCNC: 3.3 MMOL/L — LOW (ref 3.5–5.3)
POTASSIUM SERPL-SCNC: 3.3 MMOL/L — LOW (ref 3.5–5.3)
PROT SERPL-MCNC: 7.4 GM/DL — SIGNIFICANT CHANGE UP (ref 6–8.3)
PROT UR-MCNC: 15 MG/DL
RAPID RVP RESULT: SIGNIFICANT CHANGE UP
SARS-COV-2 IGG SERPL QL IA: NEGATIVE — SIGNIFICANT CHANGE UP
SARS-COV-2 IGM SERPL IA-ACNC: 0.06 INDEX — SIGNIFICANT CHANGE UP
SARS-COV-2 RNA SPEC QL NAA+PROBE: SIGNIFICANT CHANGE UP
SODIUM SERPL-SCNC: 139 MMOL/L — SIGNIFICANT CHANGE UP (ref 135–145)
SP GR SPEC: 1.02 — SIGNIFICANT CHANGE UP (ref 1.01–1.02)
TROPONIN I SERPL-MCNC: 0.09 NG/ML — HIGH (ref 0.01–0.04)
TROPONIN I SERPL-MCNC: 0.12 NG/ML — HIGH (ref 0.01–0.04)
TROPONIN I SERPL-MCNC: 0.21 NG/ML — HIGH (ref 0.01–0.04)
UROBILINOGEN FLD QL: NEGATIVE MG/DL — SIGNIFICANT CHANGE UP

## 2021-02-10 PROCEDURE — 70450 CT HEAD/BRAIN W/O DYE: CPT | Mod: 26

## 2021-02-10 PROCEDURE — 80048 BASIC METABOLIC PNL TOTAL CA: CPT

## 2021-02-10 PROCEDURE — 83735 ASSAY OF MAGNESIUM: CPT

## 2021-02-10 PROCEDURE — 83605 ASSAY OF LACTIC ACID: CPT

## 2021-02-10 PROCEDURE — 85652 RBC SED RATE AUTOMATED: CPT

## 2021-02-10 PROCEDURE — 93306 TTE W/DOPPLER COMPLETE: CPT | Mod: 26

## 2021-02-10 PROCEDURE — 36415 COLL VENOUS BLD VENIPUNCTURE: CPT

## 2021-02-10 PROCEDURE — 86769 SARS-COV-2 COVID-19 ANTIBODY: CPT

## 2021-02-10 PROCEDURE — U0003: CPT

## 2021-02-10 PROCEDURE — 93880 EXTRACRANIAL BILAT STUDY: CPT | Mod: 26

## 2021-02-10 PROCEDURE — U0005: CPT

## 2021-02-10 PROCEDURE — 93306 TTE W/DOPPLER COMPLETE: CPT

## 2021-02-10 PROCEDURE — 86140 C-REACTIVE PROTEIN: CPT

## 2021-02-10 PROCEDURE — 85027 COMPLETE CBC AUTOMATED: CPT

## 2021-02-10 PROCEDURE — 99223 1ST HOSP IP/OBS HIGH 75: CPT

## 2021-02-10 PROCEDURE — 87086 URINE CULTURE/COLONY COUNT: CPT

## 2021-02-10 PROCEDURE — 87040 BLOOD CULTURE FOR BACTERIA: CPT

## 2021-02-10 PROCEDURE — 93880 EXTRACRANIAL BILAT STUDY: CPT

## 2021-02-10 PROCEDURE — 81001 URINALYSIS AUTO W/SCOPE: CPT

## 2021-02-10 PROCEDURE — 70450 CT HEAD/BRAIN W/O DYE: CPT

## 2021-02-10 PROCEDURE — 84484 ASSAY OF TROPONIN QUANT: CPT

## 2021-02-10 PROCEDURE — 83880 ASSAY OF NATRIURETIC PEPTIDE: CPT

## 2021-02-10 RX ORDER — ALPRAZOLAM 0.25 MG
0.25 TABLET ORAL DAILY
Refills: 0 | Status: DISCONTINUED | OUTPATIENT
Start: 2021-02-10 | End: 2021-02-12

## 2021-02-10 RX ORDER — APIXABAN 2.5 MG/1
5 TABLET, FILM COATED ORAL EVERY 12 HOURS
Refills: 0 | Status: DISCONTINUED | OUTPATIENT
Start: 2021-02-10 | End: 2021-02-12

## 2021-02-10 RX ORDER — ATORVASTATIN CALCIUM 80 MG/1
40 TABLET, FILM COATED ORAL AT BEDTIME
Refills: 0 | Status: DISCONTINUED | OUTPATIENT
Start: 2021-02-10 | End: 2021-02-12

## 2021-02-10 RX ORDER — LANOLIN ALCOHOL/MO/W.PET/CERES
5 CREAM (GRAM) TOPICAL AT BEDTIME
Refills: 0 | Status: DISCONTINUED | OUTPATIENT
Start: 2021-02-10 | End: 2021-02-12

## 2021-02-10 RX ORDER — METOPROLOL TARTRATE 50 MG
1 TABLET ORAL
Qty: 0 | Refills: 0 | DISCHARGE

## 2021-02-10 RX ORDER — ACETAMINOPHEN 500 MG
1000 TABLET ORAL ONCE
Refills: 0 | Status: COMPLETED | OUTPATIENT
Start: 2021-02-10 | End: 2021-02-10

## 2021-02-10 RX ORDER — METOPROLOL TARTRATE 50 MG
50 TABLET ORAL
Refills: 0 | Status: DISCONTINUED | OUTPATIENT
Start: 2021-02-10 | End: 2021-02-12

## 2021-02-10 RX ORDER — ASPIRIN/CALCIUM CARB/MAGNESIUM 324 MG
81 TABLET ORAL DAILY
Refills: 0 | Status: DISCONTINUED | OUTPATIENT
Start: 2021-02-10 | End: 2021-02-12

## 2021-02-10 RX ORDER — TRAMADOL HYDROCHLORIDE 50 MG/1
1 TABLET ORAL
Qty: 0 | Refills: 0 | DISCHARGE

## 2021-02-10 RX ORDER — POTASSIUM CHLORIDE 20 MEQ
10 PACKET (EA) ORAL ONCE
Refills: 0 | Status: COMPLETED | OUTPATIENT
Start: 2021-02-10 | End: 2021-02-10

## 2021-02-10 RX ORDER — ACETAMINOPHEN 500 MG
650 TABLET ORAL EVERY 6 HOURS
Refills: 0 | Status: DISCONTINUED | OUTPATIENT
Start: 2021-02-10 | End: 2021-02-12

## 2021-02-10 RX ORDER — ASPIRIN/CALCIUM CARB/MAGNESIUM 324 MG
325 TABLET ORAL ONCE
Refills: 0 | Status: COMPLETED | OUTPATIENT
Start: 2021-02-10 | End: 2021-02-10

## 2021-02-10 RX ADMIN — Medication 5 MILLIGRAM(S): at 22:05

## 2021-02-10 RX ADMIN — Medication 50 MILLIGRAM(S): at 22:05

## 2021-02-10 RX ADMIN — Medication 0.25 MILLIGRAM(S): at 22:05

## 2021-02-10 RX ADMIN — Medication 325 MILLIGRAM(S): at 01:38

## 2021-02-10 RX ADMIN — Medication 400 MILLIGRAM(S): at 00:38

## 2021-02-10 RX ADMIN — ATORVASTATIN CALCIUM 40 MILLIGRAM(S): 80 TABLET, FILM COATED ORAL at 22:05

## 2021-02-10 RX ADMIN — Medication 81 MILLIGRAM(S): at 09:35

## 2021-02-10 RX ADMIN — APIXABAN 5 MILLIGRAM(S): 2.5 TABLET, FILM COATED ORAL at 09:35

## 2021-02-10 RX ADMIN — Medication 50 MILLIGRAM(S): at 09:35

## 2021-02-10 RX ADMIN — Medication 100 MILLIEQUIVALENT(S): at 01:38

## 2021-02-10 RX ADMIN — Medication 650 MILLIGRAM(S): at 22:05

## 2021-02-10 RX ADMIN — Medication 10 MILLIEQUIVALENT(S): at 02:59

## 2021-02-10 RX ADMIN — APIXABAN 5 MILLIGRAM(S): 2.5 TABLET, FILM COATED ORAL at 22:04

## 2021-02-10 NOTE — H&P ADULT - NSHPPHYSICALEXAM_GEN_ALL_CORE
PHYSICAL EXAM:    General: female in no acute distress  Eyes: PERRLA, EOMI; conjunctiva and sclera clear  Head: Normocephalic; atraumatic  ENMT: No nasal discharge; airway clear  Neck: Supple; non tender; no masses  Respiratory: CTA BL, + crackles at bases  Cardiovascular: S1, S2 reg  Gastrointestinal: Soft non-tender non-distended; Normal bowel sounds  Genitourinary: No costovertebral angle tenderness  Extremities: No clubbing, cyanosis, BL LE pitting edema R>L  Vascular: Peripheral pulses palpable 2+ bilaterally  Neurological: Alert and oriented x4, no gross deficits  Skin: Warm and dry.   Musculoskeletal: Normal tone, without deformities  Psychiatric: Cooperative and appropriate

## 2021-02-10 NOTE — H&P ADULT - ASSESSMENT
74 y.o. female with PMHx of HTN, HLD, Hx of CVA, PAF s/p ablation x2 last 2 days ago after neg INGRIS by Dr. Meredith developed chills/weakness, lethargy, poor appetite and fainted as per daughter - pt does not remember.    1. Syncope in the settings of dehydration with decreased po intake/diuretics/chills 2 days after atrial flutter ablation - likely vasovagal, but can not rule out new pericardial effusion after recent ablation/less likely but possible CVA after ablation as eliquis was stopped for 24h   - due to fever will need to r/o infectious etiology - BCx, UA and if abn UCx   - no abx for now as fevers resolved and no leukocytosis present    2. Troponinemia - expected after ablation - trend    3. LE edema with crackles on exam of lungs - possibly related to IVF given vs underlying HFpEF   - f/u BNP and TTE    4. HTN on BBL    5. HLD on statin    6. PAF s/p ablation - discussed with NP from Dr. Meredith ([phone]) - will f/u TTE, BNP, troponins, cardiology eval and update [facility] team if significant abnormalities present    7. Hx of PVD s/p bypass RLE - neg cath 4 y.a., will obtain CUS    8. VTE proph - on eliquis

## 2021-02-10 NOTE — H&P ADULT - NSICDXPASTSURGICALHX_GEN_ALL_CORE_FT
PAST SURGICAL HISTORY:  S/P popliteal-tibial bypass     Status post ORIF of fracture of ankle s/p rght TR

## 2021-02-10 NOTE — PROGRESS NOTE ADULT - ASSESSMENT
My differential diagnosis includes but is not limited to     Most likely: Dehydration/orthostasis due to pt report of decreased Po intake  Most lethal: CVA due to history of CVA and risk factors such as Afib, HTN and HLD. Pt is also on Eliquis. However, Pt has no sensory or motor deficits.  Most lethal: MI due to elevated troponin, however EKG shows no ischemia or ST elevation. This could also be elevated from demand ischemia as her serum Pro- BNP is elevated  Most lethal: pericardial effusion due to recent atrial ablation     This is a 74 yo female with PMH of CVA, HLD, Anxiety , Afib on Eliquis  s/p ablation and INGRIS 2 days ago at [facility], hx Cardioversion x2 and HTN who presents with chills, decreased appetite, dizziness and syncope reported by daughter. Pt reports associated symptom of diarrhea.  This is a 76 yo female with PMH of CVA, HLD, Anxiety , Afib on Eliquis  s/p ablation and INGRIS 2 days ago at [facility], hx Cardioversion x2 and HTN who presents with chills, decreased appetite, dizziness and syncope reported by daughter. Pt reports associated symptom of diarrhea.     Assessment/Plan    1. Syncope in setting of dehydration due to decreased po intake, diuretics. CVA also likely due to hx of PAF, HTn,HLD-   Non-con Ct head ordered. F/U carotid ultrasound     2. Elevated troponin    3. Fever/Chills: UA, C&S if positive, BC ordered. No leukocytosis. Will not order antibiotics for now    4.Pericardial effusion possible as pt is s/p ablation. TTE ordered    5. Edema probably from IV bolus received vs HFpEF, f/u BNP and TTE     6. HTN on Metoprolol tartrate    7. HLD on Lipitor    8. PAF- s/p ablation, cardiology f/u TTE ordered    9. VTE prophy- on Eliquis

## 2021-02-10 NOTE — PROGRESS NOTE ADULT - SUBJECTIVE AND OBJECTIVE BOX
Chief complaint: "I felt dizzy while walking to the bathroom, my daughter said I fainted but I don't remember."    HPI: This is a 74 yo female with PMH of CVA, HLD, Anxiety , Afib on Eliquis  s/p ablation and INGRIS 2 days ago at [facility], hx Cardioversion x2 and HTN who presents with acute onset of dizziness and nausea while ambulating to bathroom at home. Pt also reports 2 day hx of diarrhea. Pt stated that she was weak and had nothing to eat or drink after discharged home from [facility]. Pt reported lightheadedness and vertigo prior to syncope. Pt denies headache, chest pain, dyspnea, vomiting, hematuria or hematochezia. Clinical finding include RLL crackles, RLE +1 pitting edema and trace edema to LLE, elevated troponin I 0.2 and hypokalemia 3.3 and elevated serum Pro-.      Review Of Systems  General: negative for fever, weight loss/gain, heat/cold intolerance  HEENT: negative trauma, blurry or double vision, nasal discharge, hearing loss  Skin: No rash or erythema  Respiratory: positive cough, sore throat; negative for dyspnea, shortness of breath or orthopnea  Cardiac: negative for chest pain, palpitations  GI: positive nausea and diarrhea; negative emesis, bloating, hematochezia or melena  : negative for dysuria, hematuria, urinary frequency, incontinence  Musculoskeletal: negative for myalgia, arthralgia    PE:  VS: /58, HR 67, Resp 20, Temp 98.3, SPO2 100% on 2L nc  Constitutional: Well nourished, obese appearing female in no acute distress, Alert to person, place, time and situation  Integumentary: Skin is warm and dry, no rash or bruising   HEENT: Atraumatic, normocephalic, No tinnitus nasal discharge, uvula midline, no lymphadenopathies  Neuro: No focal deficits, No motor or sensory deficits  Eyes: no icteric sclera, redness or discharge  Cardiovascular: + Irregular rhythm, RLE +2 pitting edema, LLE +1 trace edema S1 S2 normal, no murmurs, gallops or bruits  Respiratory: RLL crackles, airway is patent no dyspnea, able to complete full sentences  GI: + nausea; abdomen is soft, non-tender, +bowel sounds in all quadrants, no vomiting  Musculoskeletal: positive ROM to all extremities muscle strength 5/5, no ataxia  Psych: no anxiety, depression or suicidal ideation   Chief complaint: "I felt dizzy while walking to the bathroom, my daughter said I fainted but I don't remember."    HPI: This is a 74 yo female with PMH of CVA, HLD, Anxiety , Afib on Eliquis  s/p ablation and INGRIS 2 days ago at [facility], hx Cardioversion x2 and HTN who presents with acute onset of dizziness and nausea while ambulating to bathroom at home. Pt also reports 2 day hx of diarrhea. Pt reports decreased appetite post discharge from [facility]. Pt reported lightheadedness and vertigo prior to syncope. Pt denies headache, chest pain, dyspnea, vomiting, hematuria or hematochezia. Clinical finding include RLL crackles, RLE +1 pitting edema and trace edema to LLE, elevated troponin I 0.2 and hypokalemia 3.3 and elevated serum Pro-.      Review Of Systems  General: + chills; negative weight loss/gain, heat/cold intolerance  HEENT: negative trauma, blurry or double vision, nasal discharge, hearing loss  Skin: No rash or erythema  Respiratory: positive cough, sore throat; negative for dyspnea, shortness of breath or orthopnea  Cardiac: negative for chest pain, palpitations  GI: positive nausea and diarrhea; negative emesis, bloating, hematochezia or melena  : negative for dysuria, hematuria, urinary frequency, incontinence  Musculoskeletal: negative for myalgia, arthralgia    PE:  VS: /58, HR 67, Resp 20, Temp 98.3, SPO2 100% on 2L nc  Constitutional: Well nourished, obese appearing female in no acute distress, Alert to person, place, time and situation  Integumentary: Skin is warm and dry, no rash or bruising   HEENT: Atraumatic, normocephalic, No tinnitus nasal discharge, uvula midline, no lymphadenopathies  Neuro: No focal deficits, No motor or sensory deficits  Eyes: no icteric sclera, redness or discharge  Cardiovascular: Sinus rhythm with PACs, RLE +2 pitting edema, LLE +1 trace edema S1 S2 normal, no murmurs, gallops or bruits  Respiratory: RLL crackles, airway is patent no dyspnea, able to complete full sentences  GI: + nausea; abdomen is soft, non-tender, +bowel sounds in all quadrants, no vomiting  Musculoskeletal: positive ROM to all extremities muscle strength 5/5, no ataxia  Psych: no anxiety, depression or suicidal ideation   Chief complaint: "I felt dizzy while walking to the bathroom, my daughter said I fainted but I don't remember."    HPI: This is a 76 yo female s/p ablation and negative INGRIS 2 days ago at [facility], with PMH of CVA, HTN, HLD, Anxiety , Afib, who presents with chills, weakness, decreased appetite and syncope per daughter. Pt also reports associated symptom of diarrhea.  Pt reported lightheadedness and vertigo prior to syncope. Pt denies headache, chest pain, dyspnea, vomiting, hematuria or hematochezia. Clinical finding include RLL crackles, RLE +1 pitting edema and trace edema to LLE, elevated troponin I 0.2 and hypokalemia 3.3 and elevated serum Pro-.      Review Of Systems  General: + chills; negative weight loss/gain, heat/cold intolerance  HEENT: negative trauma, blurry or double vision, nasal discharge, hearing loss  Skin: No rash or erythema  Respiratory: positive cough, sore throat; negative for dyspnea, shortness of breath or orthopnea  Cardiac: negative for chest pain, palpitations  GI: positive nausea and diarrhea; negative emesis, bloating, hematochezia or melena  : negative for dysuria, hematuria, urinary frequency, incontinence  Musculoskeletal: negative for myalgia, arthralgia    PE:  VS: /58, HR 67, Resp 20, Temp 98.3, SPO2 100% on 2L nc  Constitutional: Well nourished, obese appearing female in no acute distress, Alert to person, place, time and situation  Integumentary: Skin is warm and dry, no rash or bruising   HEENT: Atraumatic, normocephalic, No tinnitus nasal discharge, uvula midline, no lymphadenopathies  Neuro: No focal deficits, No motor or sensory deficits.  Eyes: PERRLA, EOMI, no icteric sclera, redness or discharge  Cardiac: NSR with PACs S1 S2 normal, no murmurs, gallops or bruits  Vascular: Palpable peripheral pulses 2+ bilaterally  Respiratory: CTA, crackles at bases; airway is patent no dyspnea, able to complete full sentences  GI: + nausea; abdomen is soft, non-tender, +bowel sounds in all quadrants, no vomiting  Extremities: Bilateral LE pitting edema R>L, no clubbing or cyanosis  Musculoskeletal: positive ROM to all extremities muscle strength 5/5, no ataxia  Psych: no anxiety, depression or suicidal ideation   Chief complaint: "I felt dizzy while walking to the bathroom, my daughter said I fainted but I don't remember."    HPI: This is a 74 yo female s/p ablation and INGRIS 2 days ago at [facility], with PMH of CVA, HTN, HLD, Anxiety , Afib, who presents with chills, weakness, decreased appetite and syncope per daughter. Pt does not remember passing out. Pt also reports associated symptom of diarrhea.  Pt reported lightheadedness and vertigo prior to syncope. Pt denies headache, chest pain, dyspnea, vomiting, hematuria or hematochezia.    Review Of Systems  General: + chills; negative weight loss/gain, heat/cold intolerance  HEENT: negative trauma, blurry or double vision, nasal discharge, hearing loss  Skin: No rash or erythema  Respiratory: positive cough, sore throat; negative for dyspnea, shortness of breath or orthopnea  Cardiac: negative for chest pain, palpitations  GI: positive nausea and diarrhea; negative emesis, bloating, hematochezia or melena  : negative for dysuria, hematuria, urinary frequency, incontinence  Musculoskeletal: negative for myalgia, arthralgia    PE:  VS: /58, HR 67, Resp 20, Temp 98.3, SPO2 100% on 2L nc  Constitutional: Well nourished, obese appearing female in no acute distress, Alert to person, place, time and situation  Integumentary: Skin is warm and dry, no rash or bruising   HEENT: Atraumatic, normocephalic, No tinnitus nasal discharge, uvula midline, no lymphadenopathies  Neuro: No focal deficits, No motor or sensory deficits.  Eyes: PERRLA, EOMI, no icteric sclera, redness or discharge  Cardiac: NSR with PACs S1 S2 normal, no murmurs, gallops or bruits  Vascular: Palpable peripheral pulses 2+ bilaterally  Respiratory: CTA, crackles at bases; airway is patent no dyspnea, able to complete full sentences  GI: + nausea; abdomen is soft, non-tender, +bowel sounds in all quadrants, no vomiting  Extremities: Bilateral LE pitting edema R>L, no clubbing or cyanosis  Musculoskeletal: positive ROM to all extremities muscle strength 5/5, no ataxia  Psych: no anxiety, depression or suicidal ideation

## 2021-02-10 NOTE — H&P ADULT - HISTORY OF PRESENT ILLNESS
74 y.o. female s/p recent ablation by Dr. Meredith 2 d.a. developed chills/weakness, lethargy, poor appetite and fainted as per daughter - pt does not remember. On the floor felt weak, dizzy, nauseous, had 1 episode of vomiting in ambulance. Feels better now

## 2021-02-10 NOTE — H&P ADULT - NSICDXPASTMEDICALHX_GEN_ALL_CORE_FT
PAST MEDICAL HISTORY:  Afib s/p ablation    Anxiety     CVA (cerebral vascular accident)     HLD (hyperlipidemia)     HTN (hypertension)

## 2021-02-10 NOTE — ED ADULT NURSE REASSESSMENT NOTE - NS ED NURSE REASSESS COMMENT FT1
v/s stable, pt is up brushing her hair, cardiac monitor, bed in lowest position, call bell in reach assisted pt to the bathroom.

## 2021-02-10 NOTE — PROGRESS NOTE ADULT - PROBLEM SELECTOR PLAN 2
EKG showed SR with 1 deg AV block and PACs. No ST elevations will repeat as needed.  Trend troponin per protocol  Echo to r/o HF or pericardial effusion. Pt is s/p Cardiac ablation 2 days ago.

## 2021-02-11 LAB
ADD ON TEST-SPECIMEN IN LAB: SIGNIFICANT CHANGE UP
ADD ON TEST-SPECIMEN IN LAB: SIGNIFICANT CHANGE UP
ANION GAP SERPL CALC-SCNC: 3 MMOL/L — LOW (ref 5–17)
BUN SERPL-MCNC: 12 MG/DL — SIGNIFICANT CHANGE UP (ref 7–23)
CALCIUM SERPL-MCNC: 8.6 MG/DL — SIGNIFICANT CHANGE UP (ref 8.5–10.1)
CHLORIDE SERPL-SCNC: 108 MMOL/L — SIGNIFICANT CHANGE UP (ref 96–108)
CO2 SERPL-SCNC: 30 MMOL/L — SIGNIFICANT CHANGE UP (ref 22–31)
CREAT SERPL-MCNC: 0.65 MG/DL — SIGNIFICANT CHANGE UP (ref 0.5–1.3)
CRP SERPL-MCNC: 4.1 MG/DL — HIGH (ref 0–0.4)
ERYTHROCYTE [SEDIMENTATION RATE] IN BLOOD: 54 MM/HR — HIGH (ref 0–20)
GLUCOSE SERPL-MCNC: 120 MG/DL — HIGH (ref 70–99)
HCT VFR BLD CALC: 34.5 % — SIGNIFICANT CHANGE UP (ref 34.5–45)
HGB BLD-MCNC: 11.1 G/DL — LOW (ref 11.5–15.5)
MCHC RBC-ENTMCNC: 26.8 PG — LOW (ref 27–34)
MCHC RBC-ENTMCNC: 32.2 GM/DL — SIGNIFICANT CHANGE UP (ref 32–36)
MCV RBC AUTO: 83.3 FL — SIGNIFICANT CHANGE UP (ref 80–100)
PLATELET # BLD AUTO: 266 K/UL — SIGNIFICANT CHANGE UP (ref 150–400)
POTASSIUM SERPL-MCNC: 3.3 MMOL/L — LOW (ref 3.5–5.3)
POTASSIUM SERPL-SCNC: 3.3 MMOL/L — LOW (ref 3.5–5.3)
RBC # BLD: 4.14 M/UL — SIGNIFICANT CHANGE UP (ref 3.8–5.2)
RBC # FLD: 16.4 % — HIGH (ref 10.3–14.5)
SODIUM SERPL-SCNC: 141 MMOL/L — SIGNIFICANT CHANGE UP (ref 135–145)
WBC # BLD: 5.31 K/UL — SIGNIFICANT CHANGE UP (ref 3.8–10.5)
WBC # FLD AUTO: 5.31 K/UL — SIGNIFICANT CHANGE UP (ref 3.8–10.5)

## 2021-02-11 PROCEDURE — 99223 1ST HOSP IP/OBS HIGH 75: CPT

## 2021-02-11 PROCEDURE — 99232 SBSQ HOSP IP/OBS MODERATE 35: CPT

## 2021-02-11 RX ORDER — POTASSIUM CHLORIDE 20 MEQ
40 PACKET (EA) ORAL EVERY 4 HOURS
Refills: 0 | Status: COMPLETED | OUTPATIENT
Start: 2021-02-11 | End: 2021-02-11

## 2021-02-11 RX ORDER — BENZOCAINE AND MENTHOL 5; 1 G/100ML; G/100ML
1 LIQUID ORAL
Refills: 0 | Status: DISCONTINUED | OUTPATIENT
Start: 2021-02-11 | End: 2021-02-12

## 2021-02-11 RX ADMIN — Medication 50 MILLIGRAM(S): at 10:39

## 2021-02-11 RX ADMIN — Medication 5 MILLIGRAM(S): at 21:07

## 2021-02-11 RX ADMIN — Medication 0.25 MILLIGRAM(S): at 23:37

## 2021-02-11 RX ADMIN — Medication 40 MILLIEQUIVALENT(S): at 10:39

## 2021-02-11 RX ADMIN — BENZOCAINE AND MENTHOL 1 LOZENGE: 5; 1 LIQUID ORAL at 14:19

## 2021-02-11 RX ADMIN — ATORVASTATIN CALCIUM 40 MILLIGRAM(S): 80 TABLET, FILM COATED ORAL at 21:07

## 2021-02-11 RX ADMIN — Medication 81 MILLIGRAM(S): at 21:06

## 2021-02-11 RX ADMIN — Medication 40 MILLIEQUIVALENT(S): at 17:06

## 2021-02-11 RX ADMIN — Medication 50 MILLIGRAM(S): at 21:06

## 2021-02-11 RX ADMIN — APIXABAN 5 MILLIGRAM(S): 2.5 TABLET, FILM COATED ORAL at 10:39

## 2021-02-11 RX ADMIN — APIXABAN 5 MILLIGRAM(S): 2.5 TABLET, FILM COATED ORAL at 21:06

## 2021-02-11 NOTE — CONSULT NOTE ADULT - SUBJECTIVE AND OBJECTIVE BOX
Patient is a 75y old  Female who presents with a chief complaint of Fainted (2021 13:56)    ________________________________  PTed LOPEZ is a 75y year old Female with a past medical history of atrial fibrillation status post cryoablation , Non obs CAD, HTN, CVA due to subtherapeutic INR with rpt radiofrequency ablation of mitral flutter with an anterior mitral line 21 at [facility] who presents to the ER for syncope and fevers.  Syncope was assoc with diaphoresis and nausea.  Cardiac enzymes trending down.  Carotid Doppler negative.  EKG showed nonbilious brief bouts of PAT on telemetry.  ________________________________  Review of systems: A 10 point review of system has been performed, and is negative except for what has been mentioned in the above history of present illness.     PAST MEDICAL & SURGICAL HISTORY:  CVA (cerebral vascular accident)    HLD (hyperlipidemia)    Anxiety    Afib  s/p ablation    HTN (hypertension)    Status post ORIF of fracture of ankle  s/p rght TR    S/P popliteal-tibial bypass      FAMILY HISTORY:  No pertinent family history in first degree relatives       The patient denies any history of premature CAD or sudden cardiac death.    SOCIAL HISTORY: The patient denies any history of tobacco abuse, alcohol abuse or illicit drug use.    ALLERGIES:  No Known Drug Allergies  shellfish (Other (Moderate))    Home Medications:  Eliquis 5 mg oral tablet: 1 tab(s) orally 2 times a day (10 Feb 2021 08:59)  hydroCHLOROthiazide 25 mg oral tablet: 1 tab(s) orally once a day (10 Feb 2021 08:59)  Lipitor: 40 milligram(s) orally once a day (at bedtime) (10 Feb 2021 08:59)  Metoprolol Tartrate 25 mg oral tablet: 2 tab(s) orally 2 times a day (10 Feb 2021 08:59)  Xanax 0.25 mg oral tablet: 1 tab(s) orally once a day, As Needed (10 Feb 2021 08:59)    MEDICATIONS  (STANDING):  apixaban 5 milliGRAM(s) Oral every 12 hours  aspirin enteric coated 81 milliGRAM(s) Oral daily  atorvastatin 40 milliGRAM(s) Oral at bedtime  melatonin 5 milliGRAM(s) Oral at bedtime  metoprolol tartrate 50 milliGRAM(s) Oral two times a day    MEDICATIONS  (PRN):  acetaminophen   Tablet .. 650 milliGRAM(s) Oral every 6 hours PRN Temp greater or equal to 38C (100.4F), Mild Pain (1 - 3)  ALPRAZolam 0.25 milliGRAM(s) Oral daily PRN anxiety  benzocaine 15 mG/menthol 3.6 mG (Sugar-Free) Lozenge 1 Lozenge Oral every 3 hours PRN Sore Throat    Vital Signs Last 24 Hrs  T(C): 36.9 (2021 08:51), Max: 38.5 (10 Feb 2021 21:13)  T(F): 98.5 (2021 08:51), Max: 101.3 (10 Feb 2021 21:13)  HR: 75 (2021 08:51) (75 - 92)  BP: 157/64 (2021 08:51) (153/68 - 165/85)  BP(mean): --  RR: 18 (2021 08:51) (15 - 18)  SpO2: 95% (2021 08:51) (95% - 99%)  I&O's Summary    ________________________________  GENERAL APPEARANCE:  No acute distress  HEAD: normocephalic, atraumatic  NECK: supple, no jugular venous distention, no carotid bruit    HEART: Regular rate and rhythm, S1, S2 normal, 2/6 regurgitant murmur    CHEST:  No anterior chest wall tenderness    LUNGS:  Clear to auscultation, without any wheezing, rhonchi or rales    ABDOMEN: soft, nontender, nondistended, with positive bowel sounds appreciated  EXTREMITIES: no clubbing, cyanosis, or edema.   NEURO: Alert and oriented x3  PSYC:  Normal affect  SKIN:  Dry  ________________________________   TELEMETRY: Sinus rhythm with PAT.    ECG: Sinus rhythm without any ST-T wave changes.    LABS:                        11.1   5.31  )-----------( 266      ( 2021 07:15 )             34.5             02-11    141  |  108  |  12  ----------------------------<  120<H>  3.3<L>   |  30  |  0.65    Ca    8.6      2021 07:15    TPro  7.4  /  Alb  3.1<L>  /  TBili  0.9  /  DBili  x   /  AST  35  /  ALT  39  /  AlkPhos  78  02-09      LIVER FUNCTIONS - ( 2021 23:23 )  Alb: 3.1 g/dL / Pro: 7.4 gm/dL / ALK PHOS: 78 U/L / ALT: 39 U/L / AST: 35 U/L / GGT: x             02-10 @ 15:02  Trop-I  .087  CK      --  CK-MB   --    02-10 @ 10:04  Trop-I  0.119  CK      --  CK-MB   --     @ 23:23  Trop-I  0.210  CK      --  CK-MB   --  Urinalysis Basic - ( 10 Feb 2021 14:54 )    Color: Yellow / Appearance: Slightly Turbid / S.020 / pH: x  Gluc: x / Ketone: Negative  / Bili: Negative / Urobili: Negative mg/dL   Blood: x / Protein: 15 mg/dL / Nitrite: Negative   Leuk Esterase: Negative / RBC: 6-10 /HPF / WBC 0-2   Sq Epi: x / Non Sq Epi: Few / Bacteria: Occasional      Pro BNP  830 -10 @ 10:04  D Dimer  -- 02-10 @ 10:04      Urinalysis Basic - ( 10 Feb 2021 14:54 )    Color: Yellow / Appearance: Slightly Turbid / S.020 / pH: x  Gluc: x / Ketone: Negative  / Bili: Negative / Urobili: Negative mg/dL   Blood: x / Protein: 15 mg/dL / Nitrite: Negative   Leuk Esterase: Negative / RBC: 6-10 /HPF / WBC 0-2   Sq Epi: x / Non Sq Epi: Few / Bacteria: Occasional        P. LOPECK  CARDIAC MARKERS ( 10 Feb 2021 15:02 )  .087 ng/mL / x     / x     / x     / x      CARDIAC MARKERS ( 10 Feb 2021 10:04 )  0.119 ng/mL / x     / x     / x     / x      CARDIAC MARKERS ( 2021 23:23 )  0.210 ng/mL / x     / x     / x     / x          ________________________________    RADIOLOGY & ADDITIONAL STUDIES:     21  --Left ventricular ejection fraction is normal (65 %). No left heart thrombus was visualized.  --There is no thrombus in the left atrial appendage.  --There is no thrombus in the body of the left atrium.  --The right ventricle is normal in size. The right ventricle has normal wall motion.  --There is mild aortic valve thickening.  --There is mild mitral valve thickening.  --There is an atrial septal aneurysm. Location of fossa ovalis on the interatrial septum is   central. There is no patent foramen ovale (PFO) by color Doppler. There is no atrial septal   defect (ASD).  --There is trace pericardial effusion.  --There is moderate atherosclerosis in the descending aorta.      2/10/21   The left ventricle is normal in size, wall motion and contractility.   Mild concentric left ventricular hypertrophy is present.   Estimated left ventricular ejection fraction is 60-65 %.   The left atrium is mildly dilated.   Normal appearing right ventricle structure and function.   Normal aortic valve structure and function.   Mild to Moderate mitral regurgitation is present.   Mild mitral annular calcification is present.   Trace pericardial effusion is present.  ________________________________    ASSESSMENT:  Syncope, likely vagal  Atrial fibrillation status post ablation  Nonobstructive CAD  Cardiac enzymes-likely due to recent cardiac ablation  Trace pericardial effusion - seen on prior echo    PLAN:  In summary, this is a 75-year-old female admitted for syncope.  Syncope likely vasovagal in etiology given symptoms.  Continue anticoagulation.  Fever management per primary.  Regarding PAT on telemetry, can be seen after ablation.  No intervention.  Follow-up with EP after discharge.  Discharge planning per primary.    __________________________________________________________________________  Thank you for allowing me to participate in the care of your patient. Please contact me should any questions arise.    MAGDA Petersen DO, [facility]  [phone]  
Patient is a 75y old  Female who presents with a chief complaint of Fainted (11 Feb 2021 09:15)      HPI:  74 y.o. female s/p recent ablation by Dr. Meredith 2 d.a. developed chills/weakness, lethargy, poor appetite and fainted as per daughter - pt does not remember. On the floor felt weak, dizzy, nauseous, had 1 episode of vomiting in ambulance. Feels better now.    2/11/21: The patient tells me that she recalls being at home and felt like she had chills and felt nauseated. Her daughter assisted her walking to the bathroom and she started to feel worse - dizzy/lightheadedness and generalized weakness. She had no focal weakness. She began to pass out.  Her daughter lowered her to the floor. Duration of LOC was very brief (seconds) and not followed by confusion.  She denies any tongue bite but may have had urinary incontinence. When she came to she was helped to the toilet and had diarrhea.    She is feeling better now.      PAST MEDICAL & SURGICAL HISTORY:  CVA (cerebral vascular accident)    HLD (hyperlipidemia)    Anxiety    Afib  s/p ablation    HTN (hypertension)    Status post ORIF of fracture of ankle  s/p rght TR    S/P popliteal-tibial bypass        FAMILY HISTORY:  No pertinent family history in first degree relatives        Social Hx:  Nonsmoker, no drug or alcohol use    MEDICATIONS  (STANDING):  apixaban 5 milliGRAM(s) Oral every 12 hours  aspirin enteric coated 81 milliGRAM(s) Oral daily  atorvastatin 40 milliGRAM(s) Oral at bedtime  melatonin 5 milliGRAM(s) Oral at bedtime  metoprolol tartrate 50 milliGRAM(s) Oral two times a day  potassium chloride    Tablet ER 40 milliEquivalent(s) Oral every 4 hours       Allergies    No Known Drug Allergies  shellfish (Other (Moderate))    Intolerances        ROS: Pertinent positives in HPI, all other ROS were reviewed and are negative.      Vital Signs Last 24 Hrs  T(C): 36.9 (11 Feb 2021 08:51), Max: 38.5 (10 Feb 2021 21:13)  T(F): 98.5 (11 Feb 2021 08:51), Max: 101.3 (10 Feb 2021 21:13)  HR: 75 (11 Feb 2021 08:51) (75 - 92)  BP: 157/64 (11 Feb 2021 08:51) (140/48 - 165/85)  BP(mean): 71 (10 Feb 2021 15:21) (71 - 71)  RR: 18 (11 Feb 2021 08:51) (14 - 18)  SpO2: 95% (11 Feb 2021 08:51) (95% - 99%)        Constitutional: awake and alert.  HEENT: PERRLA, EOMI,   Neck: Supple.  Respiratory: Breath sounds are clear bilaterally  Cardiovascular: S1 and S2, regular / irregular rhythm  Gastrointestinal: soft, nontender  Extremities:  no edema  Vascular: Carotid Bruit - no  Musculoskeletal: no joint swelling/tenderness, no abnormal movements  Skin: No rashes    Neurological exam:  HF: A x O x 3. Appropriately interactive, normal affect. Speech fluent, No Aphasia or paraphasic errors. Naming /repetition intact   CN: JAUN, EOMI, VFF, facial sensation normal, no NLFD, tongue midline, Palate moves equally, SCM equal bilaterally  Motor: No pronator drift, Strength 5/5 in all 4 ext, normal bulk and tone, no tremor, rigidity or bradykinesia.    Sens: Intact to light touch   Reflexes: Symmetric and normal . BJ 1+, BR 1+, KJ 1+,  downgoing toes b/l  Coord:  No FNFA, dysmetria, FRANDY intact                 Labs:   02-11    141  |  108  |  12  ----------------------------<  120<H>  3.3<L>   |  30  |  0.65    Ca    8.6      11 Feb 2021 07:15    TPro  7.4  /  Alb  3.1<L>  /  TBili  0.9  /  DBili  x   /  AST  35  /  ALT  39  /  AlkPhos  78  02-09                              11.1   5.31  )-----------( 266      ( 11 Feb 2021 07:15 )             34.5       Radiology:  CT head 2/10/21:  No acute intracranial hemorrhage or mass effect.

## 2021-02-11 NOTE — CONSULT NOTE ADULT - ASSESSMENT
74 y.o. female s/p recent ablation by Dr. Meredith 2 d.a. developed chills/weakness, lethargy, poor appetite and LOC.    Syncope  -Likely vasovagal syncope  -Orthostatics negative  -Carotid ultrasound without any significant stenosis  -No suspicion of seizure at this time - very brief LOC with no post event confusion  -Not concerned for stroke, no focal findings. Patient states she can only do MRI brain with sedation and I do think this test would be low yield.    Will f/u as needed

## 2021-02-12 ENCOUNTER — TRANSCRIPTION ENCOUNTER (OUTPATIENT)
Age: 76
End: 2021-02-12

## 2021-02-12 VITALS
TEMPERATURE: 98 F | DIASTOLIC BLOOD PRESSURE: 61 MMHG | HEART RATE: 75 BPM | OXYGEN SATURATION: 95 % | RESPIRATION RATE: 18 BRPM | SYSTOLIC BLOOD PRESSURE: 134 MMHG

## 2021-02-12 LAB
ANION GAP SERPL CALC-SCNC: 5 MMOL/L — SIGNIFICANT CHANGE UP (ref 5–17)
BUN SERPL-MCNC: 9 MG/DL — SIGNIFICANT CHANGE UP (ref 7–23)
CALCIUM SERPL-MCNC: 8.6 MG/DL — SIGNIFICANT CHANGE UP (ref 8.5–10.1)
CHLORIDE SERPL-SCNC: 109 MMOL/L — HIGH (ref 96–108)
CO2 SERPL-SCNC: 27 MMOL/L — SIGNIFICANT CHANGE UP (ref 22–31)
CREAT SERPL-MCNC: 0.62 MG/DL — SIGNIFICANT CHANGE UP (ref 0.5–1.3)
GLUCOSE SERPL-MCNC: 119 MG/DL — HIGH (ref 70–99)
MAGNESIUM SERPL-MCNC: 2 MG/DL — SIGNIFICANT CHANGE UP (ref 1.6–2.6)
POTASSIUM SERPL-MCNC: 3.9 MMOL/L — SIGNIFICANT CHANGE UP (ref 3.5–5.3)
POTASSIUM SERPL-SCNC: 3.9 MMOL/L — SIGNIFICANT CHANGE UP (ref 3.5–5.3)
SARS-COV-2 RNA SPEC QL NAA+PROBE: SIGNIFICANT CHANGE UP
SODIUM SERPL-SCNC: 141 MMOL/L — SIGNIFICANT CHANGE UP (ref 135–145)

## 2021-02-12 PROCEDURE — 99239 HOSP IP/OBS DSCHRG MGMT >30: CPT

## 2021-02-12 RX ORDER — ATORVASTATIN CALCIUM 80 MG/1
40 TABLET, FILM COATED ORAL
Qty: 0 | Refills: 0 | DISCHARGE

## 2021-02-12 RX ORDER — ATORVASTATIN CALCIUM 80 MG/1
1 TABLET, FILM COATED ORAL
Qty: 0 | Refills: 0 | DISCHARGE
Start: 2021-02-12

## 2021-02-12 RX ADMIN — APIXABAN 5 MILLIGRAM(S): 2.5 TABLET, FILM COATED ORAL at 08:53

## 2021-02-12 RX ADMIN — Medication 50 MILLIGRAM(S): at 08:53

## 2021-02-12 NOTE — PROGRESS NOTE ADULT - SUBJECTIVE AND OBJECTIVE BOX
The patient was seen and examined. No acute events overnight.  No chest pain.  No shortness of breath.  No palpitations.    Initial Consult HPI    SID LOPEZ is a 75y year old Female with a past medical history of atrial fibrillation status post cryoablation , Non obs CAD, HTN, CVA due to subtherapeutic INR with rpt radiofrequency ablation of mitral flutter with an anterior mitral line 21 at [facility] who presents to the ER for syncope and fevers.  Syncope was assoc with diaphoresis and nausea.  Cardiac enzymes trending down.  Carotid Doppler negative.  EKG showed nonbilious brief bouts of PAT on telemetry.  ________________________________  Review of systems: A 10 point review of system has been performed, and is negative except for what has been mentioned in the above history of present illness.     PAST MEDICAL & SURGICAL HISTORY:  CVA (cerebral vascular accident)    HLD (hyperlipidemia)    Anxiety    Afib  s/p ablation    HTN (hypertension)    Status post ORIF of fracture of ankle  s/p rght TR    S/P popliteal-tibial bypass     ALLERGIES:  No Known Drug Allergies  shellfish (Other (Moderate))    Home Medications:  Eliquis 5 mg oral tablet: 1 tab(s) orally 2 times a day (10 Feb 2021 08:59)  hydroCHLOROthiazide 25 mg oral tablet: 1 tab(s) orally once a day (10 Feb 2021 08:59)  Lipitor: 40 milligram(s) orally once a day (at bedtime) (10 Feb 2021 08:59)  Metoprolol Tartrate 25 mg oral tablet: 2 tab(s) orally 2 times a day (10 Feb 2021 08:59)  Xanax 0.25 mg oral tablet: 1 tab(s) orally once a day, As Needed (10 Feb 2021 08:59)           MEDICATIONS  (STANDING):  apixaban 5 milliGRAM(s) Oral every 12 hours  aspirin enteric coated 81 milliGRAM(s) Oral daily  atorvastatin 40 milliGRAM(s) Oral at bedtime  melatonin 5 milliGRAM(s) Oral at bedtime  metoprolol tartrate 50 milliGRAM(s) Oral two times a day    MEDICATIONS  (PRN):  acetaminophen   Tablet .. 650 milliGRAM(s) Oral every 6 hours PRN Temp greater or equal to 38C (100.4F), Mild Pain (1 - 3)  ALPRAZolam 0.25 milliGRAM(s) Oral daily PRN anxiety  benzocaine 15 mG/menthol 3.6 mG (Sugar-Free) Lozenge 1 Lozenge Oral every 3 hours PRN Sore Throat      Vital Signs Last 24 Hrs  T(C): 36.8 (2021 08:28), Max: 37.2 (2021 21:00)  T(F): 98.3 (2021 08:28), Max: 99 (2021 21:00)  HR: 75 (2021 08:28) (75 - 84)  BP: 134/61 (2021 08:28) (126/53 - 134/61)  BP(mean): --  RR: 18 (2021 08:28) (17 - 18)  SpO2: 95% (2021 08:28) (95% - 95%)  I&O's Summary       ________________________________  GENERAL APPEARANCE:  No acute distress  HEAD: normocephalic, atraumatic  NECK: supple, no jugular venous distention, no carotid bruit    HEART: Regular rate and rhythm, S1, S2 normal, 2/6 regurgitant murmur    CHEST:  No anterior chest wall tenderness    LUNGS:  Clear to auscultation, without any wheezing, rhonchi or rales    ABDOMEN: soft, nontender, nondistended, with positive bowel sounds appreciated  EXTREMITIES: no clubbing, cyanosis, or edema.   NEURO: Alert and oriented x3  PSYC:  Normal affect  SKIN:  Dry  ________________________________   TELEMETRY: Sinus rhythm with PAT.    ECG: Sinus rhythm without any ST-T wave changes.    LABS:                          11.1   5.31  )-----------( 266      ( 2021 07:15 )             34.5          02-12    141  |  109<H>  |  9   ----------------------------<  119<H>  3.9   |  27  |  0.62    Ca    8.6      2021 07:20  Mg     2.0     02-12      CARDIAC MARKERS ( 10 Feb 2021 15:02 )  .087 ng/mL / x     / x     / x     / x            Urinalysis Basic - ( 10 Feb 2021 14:54 )    Color: Yellow / Appearance: Slightly Turbid / S.020 / pH: x  Gluc: x / Ketone: Negative  / Bili: Negative / Urobili: Negative mg/dL   Blood: x / Protein: 15 mg/dL / Nitrite: Negative   Leuk Esterase: Negative / RBC: 6-10 /HPF / WBC 0-2   Sq Epi: x / Non Sq Epi: Few / Bacteria: Occasional        ________________________________    RADIOLOGY & ADDITIONAL STUDIES:     21  --Left ventricular ejection fraction is normal (65 %). No left heart thrombus was visualized.  --There is no thrombus in the left atrial appendage.  --There is no thrombus in the body of the left atrium.  --The right ventricle is normal in size. The right ventricle has normal wall motion.  --There is mild aortic valve thickening.  --There is mild mitral valve thickening.  --There is an atrial septal aneurysm. Location of fossa ovalis on the interatrial septum is   central. There is no patent foramen ovale (PFO) by color Doppler. There is no atrial septal   defect (ASD).  --There is trace pericardial effusion.  --There is moderate atherosclerosis in the descending aorta.      2/10/21   The left ventricle is normal in size, wall motion and contractility.   Mild concentric left ventricular hypertrophy is present.   Estimated left ventricular ejection fraction is 60-65 %.   The left atrium is mildly dilated.   Normal appearing right ventricle structure and function.   Normal aortic valve structure and function.   Mild to Moderate mitral regurgitation is present.   Mild mitral annular calcification is present.   Trace pericardial effusion is present.  ________________________________    ASSESSMENT:  Syncope, likely vagal  Atrial fibrillation status post ablation  Nonobstructive CAD  Cardiac enzymes-likely due to recent cardiac ablation  Trace pericardial effusion - seen on prior echo    PLAN:  In summary, this is a 75-year-old female admitted for syncope.    Thus far work up negative.  Continue anticoagulation.  Fever management per primary.  Regarding PAT on telemetry, can be seen after ablation.  No intervention.  Follow-up with EP after discharge.  Discharge planning per primary.    __________________________________________________________________________  Thank you for allowing me to participate in the care of your patient. Please contact me should any questions arise.    MAGDA Petersen DO, [facility]  [phone]

## 2021-02-12 NOTE — DISCHARGE NOTE PROVIDER - CARE PROVIDER_API CALL
LAZARO ALLEN  Cardiovascular Diseases  [address] [place] [address]  Phone: ()-  Fax: ()-  Follow Up Time:    LAZARO ALLEN  Cardiovascular Diseases  [address] [place] [address]  Phone: ()-  Fax: ()-  Follow Up Time:     DIVYA GARCIA  [id number]  [address] [place] [id number]  Phone: ()-  Fax: ()-  Follow Up Time:

## 2021-02-12 NOTE — DISCHARGE NOTE PROVIDER - NSDCMRMEDTOKEN_GEN_ALL_CORE_FT
aspirin 81 mg oral delayed release tablet: 1 tab(s) orally once a day  atorvastatin 40 mg oral tablet: 1 tab(s) orally once a day (at bedtime)  Eliquis 5 mg oral tablet: 1 tab(s) orally 2 times a day  Metoprolol Tartrate 25 mg oral tablet: 2 tab(s) orally 2 times a day  Xanax 0.25 mg oral tablet: 1 tab(s) orally once a day, As Needed

## 2021-02-12 NOTE — DISCHARGE NOTE PROVIDER - NSDCCPCAREPLAN_GEN_ALL_CORE_FT
PRINCIPAL DISCHARGE DIAGNOSIS  Diagnosis: Dizziness  Assessment and Plan of Treatment: - You were admitted due to syncope and dizziness likely from dehydration and possible vasovagal episode. Your carotid ultrasound did not show any significant stenosis and your CT of your head did not reveal a stroke. Stay hydrated!  - Follow up with your cardiologist in 1 week after dscharge, please call to make an appointment.         SECONDARY DISCHARGE DIAGNOSES  Diagnosis: Atrial fibrillation  Assessment and Plan of Treatment: - You had history of atrial fibrillation and had a cardiac ablation.  THINGS TO DO: (1) Monitor your blood pressure and heart rate (2) Limit or avoid alcohol intake – alcohol can increase your risk of AFIB (3) Do not smoke – nicotine can damage your heart and make it difficult to manage your AFIB (4) Eat heart healthy foods like fruits, vegetables, and whole grains (5) Manage a healthy weight (5) Get regular physical activity  MONITOR THESE SIGNS AND SYMPTOMS: (1) Shortness of breath (2) Nausea or vomiting (3) Chest pain or pressure (4) Chest palpitations. If you experience any of these, DO alert your primary care provider, or return to the Emergency Department if you feel very sick.  MEDICATIONS:  - Metoprolol 50mg twice a day (no change, continue home dose), Eliquis 5mg twice a day (no change, continue home dose).   FOLLOW UP APPOINTMENTS:  - Schedule a follow up visit with your cardiologist in 1 week after dscharge, please call to make an appointment.     PRINCIPAL DISCHARGE DIAGNOSIS  Diagnosis: Dizziness  Assessment and Plan of Treatment: - You were admitted due to syncope and dizziness likely from dehydration and possible vasovagal episode. Your carotid ultrasound did not show any significant stenosis and your CT of your head did not reveal a stroke. Stay hydrated!  - Follow up with your cardiologist in 1 week after discharge, please call to make an appointment.         SECONDARY DISCHARGE DIAGNOSES  Diagnosis: Atrial fibrillation  Assessment and Plan of Treatment: - You had history of atrial fibrillation and had a cardiac ablation.  THINGS TO DO: (1) Monitor your blood pressure and heart rate (2) Limit or avoid alcohol intake – alcohol can increase your risk of AFIB (3) Do not smoke – nicotine can damage your heart and make it difficult to manage your AFIB (4) Eat heart healthy foods like fruits, vegetables, and whole grains (5) Manage a healthy weight (5) Get regular physical activity  MONITOR THESE SIGNS AND SYMPTOMS: (1) Shortness of breath (2) Nausea or vomiting (3) Chest pain or pressure (4) Chest palpitations. If you experience any of these, DO alert your primary care provider, or return to the Emergency Department if you feel very sick.  MEDICATIONS:  - Metoprolol 50mg twice a day (no change, continue home dose), Eliquis 5mg twice a day (no change, continue home dose).   FOLLOW UP APPOINTMENTS:  - Schedule a follow up visit with your cardiologist in 1 week after dscharge, please call to make an appointment.    Diagnosis: HTN (hypertension)  Assessment and Plan of Treatment: You have a history of high blood pressure  - STOP TAKING HYDROCHLORITHIAZIDE  - Continue to monitor your blood pressure 1 to 2 times a day and keep a log for your cardiologist.  Follow up with your cardiologist Dr. Patel in 1 to 2 weeks after discharge for further management and to discuss resuming home medications.    Diagnosis: Hematuria  Assessment and Plan of Treatment: - Repeat your urinalysis with your primary care provider at your next follow up appointment.

## 2021-02-12 NOTE — DISCHARGE NOTE NURSING/CASE MANAGEMENT/SOCIAL WORK - PATIENT PORTAL LINK FT
You can access the FollowMyHealth Patient Portal offered by [facility] by registering at the following website: [email or web address]. By joining [employer]’s FollowMyHealth portal, you will also be able to view your health information using other applications (apps) compatible with our system.

## 2021-02-12 NOTE — DISCHARGE NOTE PROVIDER - PROVIDER TOKENS
PROVIDER:[TOKEN:[[id number]]] PROVIDER:[TOKEN:[[id number]]],PROVIDER:[TOKEN:[[id number]]]

## 2021-02-12 NOTE — DISCHARGE NOTE PROVIDER - NSDCCAREPROVSEEN_GEN_ALL_CORE_FT
Rebecca Hyatt (Hospitalist)  Mirna Coronado (Nurse Practitioner)  Mayra Petersen (Cardiologist)  Paula Chavez (Neurologist)

## 2021-02-12 NOTE — DISCHARGE NOTE PROVIDER - HOSPITAL COURSE
CHIEF COMPLAINT/DIAGNOSIS: Syncope     HPI: 75 y/o F PMHx Afib s/p ablation, anxiety, CVA, HLD, HTN; s/p recent ablation by Dr. Meredith two days ago developed chills/weakness, lethargy, poor appetite and fainted as per daughter - pt does not remember. On the floor felt weak, dizzy, nauseous, had 1 episode of vomiting in ambulance. Feels better now    2/12:  REVIEW OF SYSTEMS:  All other review of systems is negative unless indicated above    PHYSICAL EXAM:  Constitutional: NAD, awake and alert, well-developed  HEENT: MMM  Neck: Soft and supple, No LAD, No JVD  Respiratory: Breath sounds are clear bilaterally, No wheezing, rales or rhonchi  Cardiovascular: S1 and S2, regular rate and rhythm, no Murmurs, gallops or rubs  Gastrointestinal: Bowel Sounds present, soft, nontender, nondistended, no guarding, no rebound  Extremities: No peripheral edema  Vascular: 2+ peripheral pulses  Neurological: A/O x 3, no focal deficits  Musculoskeletal: 5/5 strength b/l upper and lower extremities  Skin: No rashes. Groin incision C/D/I     Vital Signs Last 24 Hrs  T(C): 36.8 (12 Feb 2021 08:28), Max: 37.2 (11 Feb 2021 21:00)  T(F): 98.3 (12 Feb 2021 08:28), Max: 99 (11 Feb 2021 21:00)  HR: 75 (12 Feb 2021 08:28) (75 - 84)  BP: 134/61 (12 Feb 2021 08:28) (126/53 - 134/61)  BP(mean): --  RR: 18 (12 Feb 2021 08:28) (17 - 18)  SpO2: 95% (12 Feb 2021 08:28) (95% - 95%)    Labs, Radiology, Echo, Meds, Tele - reviewed     ASSESSMENT AND PLAN:    1) Syncope, suspected dehydration / poss vasovagal episode   - tele monitoring. tele review as above.   - s/p IVF in ED. Orthostatic VS negative  - Tmax 101.3 ~ Infectious w/u: UA neg, CXR clear, BCX NGTD, RVP/COVID neg, f/u Ucx  - CTH neg. Eliquis held for recent ablation. no MRI for now. no focal neuro deficits   - US Carotid Arterial, Bilateral: No significant hemodynamic stenosis of either carotid artery.  - Cardio, Neuro     2) PAF s/p ablation   - ECHO: EF 60-65%,  Mild to Moderate mitral regurgitation is present,  -Mild mitral annular calcification is present, -Trace pericardial effusion is present, expected after ablation. No clinical signs of pericarditis   - Troponin elevated expected after ablation.   - Cards eval appreciated.     3) HTN | HLD  - on BBL, statin  - stop HCTZ on d/c    4) Hx of PVD s/p bypass RLE      5) VTE proph   - Cont. Eliquis     Dispo: discharge to home in stable condition    Final diagnosis, treatment plan, and follow-up recommendations were discussed and explained to the patient. The patient was given an opportunity to ask questions concerning the diagnosis and treatment plan. The patient acknowledged understanding of the diagnosis, treatment, and follow-up recommendations. The patient was advised to seek urgent care upon discharge if worsening symptoms develop prior to scheduled follow-up. Time spent on discharge included time with the patient, and also coordinating discharge care as outlined below.    Total time spent: 50 min CHIEF COMPLAINT/DIAGNOSIS: Syncope     HPI: 73 y/o F PMHx Afib s/p ablation, anxiety, CVA, HLD, HTN; s/p recent ablation by Dr. Meredith two days ago developed chills/weakness, lethargy, poor appetite and fainted as per daughter - pt does not remember. On the floor felt weak, dizzy, nauseous, had 1 episode of vomiting in ambulance. Feels better now    2/12: no complaints. feeling well. reports x2 episodes of diarrhea and nothing further. family requesting re-swab on d/c.  REVIEW OF SYSTEMS:  All other review of systems is negative unless indicated above    PHYSICAL EXAM:  Constitutional: NAD, awake and alert, well-developed  HEENT: MMM  Neck: Soft and supple, No LAD, No JVD  Respiratory: Breath sounds are clear bilaterally, No wheezing, rales or rhonchi  Cardiovascular: S1 and S2, regular rate and rhythm, no Murmurs, gallops or rubs  Gastrointestinal: Bowel Sounds present, soft, nontender, nondistended, no guarding, no rebound  Extremities: No peripheral edema  Vascular: 2+ peripheral pulses  Neurological: A/O x 3, no focal deficits  Musculoskeletal: 5/5 strength b/l upper and lower extremities  Skin: No rashes. Groin incision C/D/I     Vital Signs Last 24 Hrs  T(C): 36.8 (12 Feb 2021 08:28), Max: 37.2 (11 Feb 2021 21:00)  T(F): 98.3 (12 Feb 2021 08:28), Max: 99 (11 Feb 2021 21:00)  HR: 75 (12 Feb 2021 08:28) (75 - 84)  BP: 134/61 (12 Feb 2021 08:28) (126/53 - 134/61)  BP(mean): --  RR: 18 (12 Feb 2021 08:28) (17 - 18)  SpO2: 95% (12 Feb 2021 08:28) (95% - 95%) -- room air    Labs, Radiology, Echo, Meds, Tele - reviewed     ASSESSMENT AND PLAN:    1) Syncope, suspected dehydration / poss vasovagal episode   - tele monitoring. tele review as above.   - s/p IVF in ED. Orthostatic VS negative  - Tmax 101.3 ~ Infectious w/u: UA neg, CXR clear, BCX NGTD, RVP/COVID neg, f/u Ucx  - CTH neg. Eliquis held for recent ablation. no MRI for now. no focal neuro deficits   - US Carotid Arterial, Bilateral: No significant hemodynamic stenosis of either carotid artery.  - Cardio, Neuro     2) PAF s/p ablation   - ECHO: EF 60-65%,  Mild to Moderate mitral regurgitation is present,  -Mild mitral annular calcification is present, -Trace pericardial effusion is present, expected after ablation. No clinical signs of pericarditis   - Troponin elevated expected after ablation.   - esr,crp mildly elevated - likely due to recent ablation.  - Cards eval appreciated.     3) HTN | HLD  - on BBL, statin  - stop HCTZ on d/c    4) Hx of PVD s/p bypass RLE      5) VTE proph   - Cont. Eliquis     Dispo: discharge to home in stable condition    Final diagnosis, treatment plan, and follow-up recommendations were discussed and explained to the patient. The patient was given an opportunity to ask questions concerning the diagnosis and treatment plan. The patient acknowledged understanding of the diagnosis, treatment, and follow-up recommendations. The patient was advised to seek urgent care upon discharge if worsening symptoms develop prior to scheduled follow-up. Time spent on discharge included time with the patient, and also coordinating discharge care as outlined below.    Total time spent: 50 min

## 2021-02-13 LAB
CULTURE RESULTS: SIGNIFICANT CHANGE UP
SPECIMEN SOURCE: SIGNIFICANT CHANGE UP

## 2021-02-15 LAB
CULTURE RESULTS: SIGNIFICANT CHANGE UP
CULTURE RESULTS: SIGNIFICANT CHANGE UP
SPECIMEN SOURCE: SIGNIFICANT CHANGE UP
SPECIMEN SOURCE: SIGNIFICANT CHANGE UP

## 2021-02-18 DIAGNOSIS — F41.9 ANXIETY DISORDER, UNSPECIFIED: ICD-10-CM

## 2021-02-18 DIAGNOSIS — I31.3 PERICARDIAL EFFUSION (NONINFLAMMATORY): ICD-10-CM

## 2021-02-18 DIAGNOSIS — Z87.81 PERSONAL HISTORY OF (HEALED) TRAUMATIC FRACTURE: ICD-10-CM

## 2021-02-18 DIAGNOSIS — Z91.013 ALLERGY TO SEAFOOD: ICD-10-CM

## 2021-02-18 DIAGNOSIS — Z79.899 OTHER LONG TERM (CURRENT) DRUG THERAPY: ICD-10-CM

## 2021-02-18 DIAGNOSIS — I48.0 PAROXYSMAL ATRIAL FIBRILLATION: ICD-10-CM

## 2021-02-18 DIAGNOSIS — Z86.79 PERSONAL HISTORY OF OTHER DISEASES OF THE CIRCULATORY SYSTEM: ICD-10-CM

## 2021-02-18 DIAGNOSIS — E78.5 HYPERLIPIDEMIA, UNSPECIFIED: ICD-10-CM

## 2021-02-18 DIAGNOSIS — R55 SYNCOPE AND COLLAPSE: ICD-10-CM

## 2021-02-18 DIAGNOSIS — R31.9 HEMATURIA, UNSPECIFIED: ICD-10-CM

## 2021-02-18 DIAGNOSIS — R79.89 OTHER SPECIFIED ABNORMAL FINDINGS OF BLOOD CHEMISTRY: ICD-10-CM

## 2021-02-18 DIAGNOSIS — Z86.73 PERSONAL HISTORY OF TRANSIENT ISCHEMIC ATTACK (TIA), AND CEREBRAL INFARCTION WITHOUT RESIDUAL DEFICITS: ICD-10-CM

## 2021-02-18 DIAGNOSIS — Z79.01 LONG TERM (CURRENT) USE OF ANTICOAGULANTS: ICD-10-CM

## 2021-02-18 DIAGNOSIS — I25.10 ATHEROSCLEROTIC HEART DISEASE OF NATIVE CORONARY ARTERY WITHOUT ANGINA PECTORIS: ICD-10-CM

## 2021-02-18 DIAGNOSIS — I10 ESSENTIAL (PRIMARY) HYPERTENSION: ICD-10-CM

## 2021-02-18 DIAGNOSIS — Z79.82 LONG TERM (CURRENT) USE OF ASPIRIN: ICD-10-CM

## 2021-02-18 DIAGNOSIS — E86.0 DEHYDRATION: ICD-10-CM

## 2021-07-19 ENCOUNTER — APPOINTMENT (OUTPATIENT)
Dept: INTERNAL MEDICINE | Facility: CLINIC | Age: 76
End: 2021-07-19
Payer: MEDICARE

## 2021-07-19 VITALS
TEMPERATURE: 97.8 F | DIASTOLIC BLOOD PRESSURE: 96 MMHG | HEART RATE: 92 BPM | HEIGHT: 64 IN | SYSTOLIC BLOOD PRESSURE: 168 MMHG | WEIGHT: 220 LBS | OXYGEN SATURATION: 94 % | RESPIRATION RATE: 17 BRPM | BODY MASS INDEX: 37.56 KG/M2

## 2021-07-19 DIAGNOSIS — J01.90 ACUTE SINUSITIS, UNSPECIFIED: ICD-10-CM

## 2021-07-19 DIAGNOSIS — H61.21 IMPACTED CERUMEN, RIGHT EAR: ICD-10-CM

## 2021-07-19 PROCEDURE — 99214 OFFICE O/P EST MOD 30 MIN: CPT

## 2021-07-19 NOTE — PHYSICAL EXAM
[No Acute Distress] : no acute distress [Well Nourished] : well nourished [Well Developed] : well developed [Well-Appearing] : well-appearing [Normal Sclera/Conjunctiva] : normal sclera/conjunctiva [PERRL] : pupils equal round and reactive to light [EOMI] : extraocular movements intact [Normal Oropharynx] : the oropharynx was normal [No JVD] : no jugular venous distention [No Lymphadenopathy] : no lymphadenopathy [Supple] : supple [Thyroid Normal, No Nodules] : the thyroid was normal and there were no nodules present [No Respiratory Distress] : no respiratory distress  [No Accessory Muscle Use] : no accessory muscle use [Clear to Auscultation] : lungs were clear to auscultation bilaterally [Normal Rate] : normal rate  [Regular Rhythm] : with a regular rhythm [Normal S1, S2] : normal S1 and S2 [No Murmur] : no murmur heard [No Carotid Bruits] : no carotid bruits [No Abdominal Bruit] : a ~M bruit was not heard ~T in the abdomen [No Varicosities] : no varicosities [Pedal Pulses Present] : the pedal pulses are present [No Edema] : there was no peripheral edema [No Palpable Aorta] : no palpable aorta [No Extremity Clubbing/Cyanosis] : no extremity clubbing/cyanosis [Soft] : abdomen soft [Non Tender] : non-tender [Non-distended] : non-distended [No Masses] : no abdominal mass palpated [No HSM] : no HSM [Normal Bowel Sounds] : normal bowel sounds [Normal Posterior Cervical Nodes] : no posterior cervical lymphadenopathy [Normal Anterior Cervical Nodes] : no anterior cervical lymphadenopathy [No CVA Tenderness] : no CVA  tenderness [No Spinal Tenderness] : no spinal tenderness [No Joint Swelling] : no joint swelling [Grossly Normal Strength/Tone] : grossly normal strength/tone [No Rash] : no rash [Coordination Grossly Intact] : coordination grossly intact [No Focal Deficits] : no focal deficits [Normal Gait] : normal gait [Deep Tendon Reflexes (DTR)] : deep tendon reflexes were 2+ and symmetric [Normal Affect] : the affect was normal [Normal Insight/Judgement] : insight and judgment were intact [de-identified] : right ear- wax, nasal congestion

## 2021-07-19 NOTE — HISTORY OF PRESENT ILLNESS
[FreeTextEntry1] : Sick Visit [de-identified] : 3d of prod cough, nonsmoker, tried mucinex-helped. \par also has a uti on macrobid, that’s feeling better.

## 2021-07-20 LAB — SARS-COV-2 N GENE NPH QL NAA+PROBE: NOT DETECTED

## 2021-08-14 DIAGNOSIS — Z78.9 OTHER SPECIFIED HEALTH STATUS: ICD-10-CM

## 2021-08-14 DIAGNOSIS — E66.01 MORBID (SEVERE) OBESITY DUE TO EXCESS CALORIES: ICD-10-CM

## 2021-08-14 DIAGNOSIS — R55 SYNCOPE AND COLLAPSE: ICD-10-CM

## 2021-08-14 DIAGNOSIS — M16.9 OSTEOARTHRITIS OF HIP, UNSPECIFIED: ICD-10-CM

## 2021-08-14 DIAGNOSIS — M25.559 PAIN IN UNSPECIFIED HIP: ICD-10-CM

## 2021-08-14 DIAGNOSIS — N95.0 POSTMENOPAUSAL BLEEDING: ICD-10-CM

## 2021-08-16 ENCOUNTER — APPOINTMENT (OUTPATIENT)
Dept: INTERNAL MEDICINE | Facility: CLINIC | Age: 76
End: 2021-08-16

## 2021-08-31 ENCOUNTER — RX RENEWAL (OUTPATIENT)
Age: 76
End: 2021-08-31

## 2021-09-13 ENCOUNTER — NON-APPOINTMENT (OUTPATIENT)
Age: 76
End: 2021-09-13

## 2021-09-17 ENCOUNTER — APPOINTMENT (OUTPATIENT)
Dept: INTERNAL MEDICINE | Facility: CLINIC | Age: 76
End: 2021-09-17
Payer: MEDICARE

## 2021-09-17 VITALS
WEIGHT: 219 LBS | HEART RATE: 80 BPM | SYSTOLIC BLOOD PRESSURE: 146 MMHG | RESPIRATION RATE: 17 BRPM | BODY MASS INDEX: 37.39 KG/M2 | DIASTOLIC BLOOD PRESSURE: 82 MMHG | HEIGHT: 64 IN | OXYGEN SATURATION: 96 % | TEMPERATURE: 97.4 F

## 2021-09-17 DIAGNOSIS — N83.9 NONINFLAMMATORY DISORDER OF OVARY, FALLOPIAN TUBE AND BROAD LIGAMENT, UNSPECIFIED: ICD-10-CM

## 2021-09-17 DIAGNOSIS — J12.9 VIRAL PNEUMONIA, UNSPECIFIED: ICD-10-CM

## 2021-09-17 PROCEDURE — 36415 COLL VENOUS BLD VENIPUNCTURE: CPT

## 2021-09-17 PROCEDURE — 99214 OFFICE O/P EST MOD 30 MIN: CPT | Mod: 25

## 2021-09-19 LAB
ALBUMIN SERPL ELPH-MCNC: 4.3 G/DL
ALP BLD-CCNC: 92 U/L
ALT SERPL-CCNC: 19 U/L
ANION GAP SERPL CALC-SCNC: 11 MMOL/L
AST SERPL-CCNC: 21 U/L
BASOPHILS # BLD AUTO: 0.05 K/UL
BASOPHILS NFR BLD AUTO: 0.5 %
BILIRUB SERPL-MCNC: 0.5 MG/DL
BUN SERPL-MCNC: 12 MG/DL
CALCIUM SERPL-MCNC: 10.2 MG/DL
CHLORIDE SERPL-SCNC: 104 MMOL/L
CHOLEST SERPL-MCNC: 144 MG/DL
CO2 SERPL-SCNC: 26 MMOL/L
CREAT SERPL-MCNC: 0.79 MG/DL
EOSINOPHIL # BLD AUTO: 0.11 K/UL
EOSINOPHIL NFR BLD AUTO: 1 %
ESTIMATED AVERAGE GLUCOSE: 154 MG/DL
GLUCOSE SERPL-MCNC: 152 MG/DL
HBA1C MFR BLD HPLC: 7 %
HCT VFR BLD CALC: 43 %
HDLC SERPL-MCNC: 61 MG/DL
HGB BLD-MCNC: 12.8 G/DL
IMM GRANULOCYTES NFR BLD AUTO: 0.3 %
LDLC SERPL CALC-MCNC: 63 MG/DL
LYMPHOCYTES # BLD AUTO: 1.47 K/UL
LYMPHOCYTES NFR BLD AUTO: 14 %
MAN DIFF?: NORMAL
MCHC RBC-ENTMCNC: 26.9 PG
MCHC RBC-ENTMCNC: 29.8 GM/DL
MCV RBC AUTO: 90.3 FL
MONOCYTES # BLD AUTO: 0.64 K/UL
MONOCYTES NFR BLD AUTO: 6.1 %
NEUTROPHILS # BLD AUTO: 8.18 K/UL
NEUTROPHILS NFR BLD AUTO: 78.1 %
NONHDLC SERPL-MCNC: 83 MG/DL
PLATELET # BLD AUTO: 419 K/UL
POTASSIUM SERPL-SCNC: 5.4 MMOL/L
PROT SERPL-MCNC: 7.6 G/DL
RBC # BLD: 4.76 M/UL
RBC # FLD: 15.7 %
SODIUM SERPL-SCNC: 141 MMOL/L
TRIGL SERPL-MCNC: 103 MG/DL
TSH SERPL-ACNC: 1.39 UIU/ML
WBC # FLD AUTO: 10.48 K/UL

## 2021-09-19 RX ORDER — MOMETASONE FUROATE 1 MG/G
0.1 CREAM TOPICAL
Refills: 0 | Status: DISCONTINUED | COMMUNITY
End: 2021-09-19

## 2021-09-19 RX ORDER — SULFAMETHOXAZOLE AND TRIMETHOPRIM 800; 160 MG/1; MG/1
800-160 TABLET ORAL
Refills: 0 | Status: DISCONTINUED | COMMUNITY
End: 2021-09-19

## 2021-09-19 RX ORDER — AZITHROMYCIN DIHYDRATE 250 MG/1
250 TABLET, FILM COATED ORAL
Refills: 0 | Status: DISCONTINUED | COMMUNITY
End: 2021-09-19

## 2021-09-19 RX ORDER — EUCALYP/ME-SALICYLATE/MEN/THYM
325 MOUTHWASH MUCOUS MEMBRANE
Qty: 70 | Refills: 0 | Status: DISCONTINUED | COMMUNITY
Start: 2017-09-25 | End: 2021-09-19

## 2021-09-19 RX ORDER — VALSARTAN 320 MG/1
320 TABLET ORAL
Refills: 0 | Status: DISCONTINUED | COMMUNITY
End: 2021-09-19

## 2021-09-19 RX ORDER — METOPROLOL TARTRATE 50 MG/1
50 TABLET, FILM COATED ORAL
Qty: 180 | Refills: 0 | Status: DISCONTINUED | COMMUNITY
Start: 2021-08-05

## 2021-09-19 RX ORDER — VALSARTAN AND HYDROCHLOROTHIAZIDE 160; 12.5 MG/1; MG/1
160-12.5 TABLET, FILM COATED ORAL
Qty: 90 | Refills: 0 | Status: DISCONTINUED | COMMUNITY
Start: 2017-11-10 | End: 2021-09-19

## 2021-09-19 RX ORDER — MOMETASONE FUROATE 1 MG/G
0.1 CREAM TOPICAL DAILY
Qty: 80 | Refills: 0 | Status: DISCONTINUED | COMMUNITY
Start: 2021-07-26 | End: 2021-09-19

## 2021-09-19 RX ORDER — AZITHROMYCIN 250 MG/1
250 TABLET, FILM COATED ORAL
Qty: 6 | Refills: 0 | Status: DISCONTINUED | COMMUNITY
Start: 2017-10-09 | End: 2021-09-19

## 2021-09-19 RX ORDER — MECLIZINE HYDROCHLORIDE 25 MG/1
25 TABLET ORAL
Refills: 0 | Status: DISCONTINUED | COMMUNITY
End: 2021-09-19

## 2021-09-19 RX ORDER — AMOXICILLIN 500 MG/1
500 TABLET, FILM COATED ORAL
Qty: 21 | Refills: 0 | Status: DISCONTINUED | COMMUNITY
Start: 2021-03-30

## 2021-09-19 RX ORDER — CLOTRIMAZOLE AND BETAMETHASONE DIPROPIONATE 10; .5 MG/G; MG/G
1-0.05 CREAM TOPICAL
Qty: 60 | Refills: 0 | Status: DISCONTINUED | COMMUNITY
Start: 2018-01-22 | End: 2021-09-19

## 2021-09-19 RX ORDER — TRAMADOL HYDROCHLORIDE 50 MG/1
50 TABLET, COATED ORAL
Qty: 90 | Refills: 0 | Status: DISCONTINUED | COMMUNITY
Start: 2017-08-25 | End: 2021-09-19

## 2021-09-19 RX ORDER — AZILSARTAN KAMEDOXOMIL AND CHLORTHALIDONE 40; 25 MG/1; MG/1
40-25 TABLET ORAL
Refills: 0 | Status: DISCONTINUED | COMMUNITY
End: 2021-09-19

## 2021-09-19 RX ORDER — AMOXICILLIN 500 MG/1
500 CAPSULE ORAL 3 TIMES DAILY
Qty: 30 | Refills: 0 | Status: DISCONTINUED | COMMUNITY
Start: 2017-08-15 | End: 2021-09-19

## 2021-09-19 RX ORDER — VALSARTAN AND HYDROCHLOROTHIAZIDE 320; 12.5 MG/1; MG/1
320-12.5 TABLET, FILM COATED ORAL
Qty: 90 | Refills: 0 | Status: DISCONTINUED | COMMUNITY
Start: 2018-01-22 | End: 2021-09-19

## 2021-09-19 RX ORDER — VALSARTAN 40 MG/1
40 TABLET, COATED ORAL
Qty: 90 | Refills: 0 | Status: DISCONTINUED | COMMUNITY
Start: 2021-03-19

## 2021-09-19 RX ORDER — TRAMADOL HYDROCHLORIDE AND ACETAMINOPHEN 37.5; 325 MG/1; MG/1
37.5-325 TABLET, FILM COATED ORAL
Qty: 100 | Refills: 0 | Status: DISCONTINUED | COMMUNITY
Start: 2018-01-12 | End: 2021-09-19

## 2021-09-19 RX ORDER — OSELTAMIVIR PHOSPHATE 75 MG/1
75 CAPSULE ORAL
Refills: 0 | Status: DISCONTINUED | COMMUNITY
End: 2021-09-19

## 2021-09-19 RX ORDER — HYDROMORPHONE HYDROCHLORIDE 2 MG/1
2 TABLET ORAL
Qty: 100 | Refills: 0 | Status: DISCONTINUED | COMMUNITY
Start: 2017-09-27 | End: 2021-09-19

## 2021-09-19 RX ORDER — METOPROLOL TARTRATE 25 MG/1
25 TABLET, FILM COATED ORAL
Qty: 180 | Refills: 0 | Status: DISCONTINUED | COMMUNITY
Start: 2017-07-18 | End: 2021-09-19

## 2021-09-19 RX ORDER — HYDROCHLOROTHIAZIDE 25 MG/1
25 TABLET ORAL
Refills: 0 | Status: DISCONTINUED | COMMUNITY
End: 2021-09-19

## 2021-09-19 RX ORDER — SULFAMETHOXAZOLE AND TRIMETHOPRIM 800; 160 MG/1; MG/1
800-160 TABLET ORAL
Qty: 10 | Refills: 0 | Status: DISCONTINUED | COMMUNITY
Start: 2018-01-02 | End: 2021-09-19

## 2021-09-19 RX ORDER — TRAMADOL HYDROCHLORIDE AND ACETAMINOPHEN 37.5; 325 MG/1; MG/1
37.5-325 TABLET, FILM COATED ORAL
Refills: 0 | Status: DISCONTINUED | COMMUNITY
End: 2021-09-19

## 2021-09-19 RX ORDER — IBUPROFEN 800 MG/1
800 TABLET, FILM COATED ORAL
Qty: 21 | Refills: 0 | Status: DISCONTINUED | COMMUNITY
Start: 2021-06-19

## 2021-09-19 RX ORDER — NITROFURANTOIN (MONOHYDRATE/MACROCRYSTALS) 25; 75 MG/1; MG/1
100 CAPSULE ORAL
Qty: 14 | Refills: 0 | Status: DISCONTINUED | COMMUNITY
Start: 2021-07-15 | End: 2021-09-19

## 2021-09-19 NOTE — PHYSICAL EXAM
[No Acute Distress] : no acute distress [Well Nourished] : well nourished [Well Developed] : well developed [Well-Appearing] : well-appearing [PERRL] : pupils equal round and reactive to light [Normal Sclera/Conjunctiva] : normal sclera/conjunctiva [EOMI] : extraocular movements intact [Normal Outer Ear/Nose] : the outer ears and nose were normal in appearance [Normal Oropharynx] : the oropharynx was normal [No JVD] : no jugular venous distention [No Lymphadenopathy] : no lymphadenopathy [Supple] : supple [Thyroid Normal, No Nodules] : the thyroid was normal and there were no nodules present [No Respiratory Distress] : no respiratory distress  [No Accessory Muscle Use] : no accessory muscle use [Clear to Auscultation] : lungs were clear to auscultation bilaterally [Normal Rate] : normal rate  [Normal S1, S2] : normal S1 and S2 [Regular Rhythm] : with a regular rhythm [No Murmur] : no murmur heard [No Carotid Bruits] : no carotid bruits [No Abdominal Bruit] : a ~M bruit was not heard ~T in the abdomen [No Varicosities] : no varicosities [Pedal Pulses Present] : the pedal pulses are present [No Edema] : there was no peripheral edema [No Palpable Aorta] : no palpable aorta [No Extremity Clubbing/Cyanosis] : no extremity clubbing/cyanosis [Soft] : abdomen soft [Non Tender] : non-tender [Non-distended] : non-distended [No Masses] : no abdominal mass palpated [No HSM] : no HSM [Normal Bowel Sounds] : normal bowel sounds [Normal Posterior Cervical Nodes] : no posterior cervical lymphadenopathy [Normal Anterior Cervical Nodes] : no anterior cervical lymphadenopathy [No CVA Tenderness] : no CVA  tenderness [No Spinal Tenderness] : no spinal tenderness [No Joint Swelling] : no joint swelling [Grossly Normal Strength/Tone] : grossly normal strength/tone [Coordination Grossly Intact] : coordination grossly intact [No Rash] : no rash [No Focal Deficits] : no focal deficits [Normal Gait] : normal gait [Deep Tendon Reflexes (DTR)] : deep tendon reflexes were 2+ and symmetric [Normal Affect] : the affect was normal [Normal Insight/Judgement] : insight and judgment were intact

## 2021-09-20 PROBLEM — N83.9 LESION OF LEFT OVARY: Status: ACTIVE | Noted: 2021-09-20

## 2021-09-20 RX ORDER — SPIRONOLACTONE 25 MG/1
25 TABLET ORAL
Qty: 90 | Refills: 0 | Status: DISCONTINUED | COMMUNITY
Start: 2021-09-09 | End: 2021-09-20

## 2021-09-20 RX ORDER — OLMESARTAN MEDOXOMIL AND HYDROCHLOROTHIAZIDE 40; 25 MG/1; MG/1
40-25 TABLET ORAL
Refills: 0 | Status: DISCONTINUED | COMMUNITY
End: 2021-09-20

## 2021-09-20 NOTE — ASSESSMENT
[FreeTextEntry1] : Pancreatic Lesion- in optimal condition for MRI with sedation\par h/o CVA-asa, lipitor\par h/o afib- s/p ablation, eliquis\par HTN- stable,cotn valsartan,metoprolol\par HLD- stable, cont atorvastatin\par Left Ovarian Lesion- see gyn\par Viral Pneumonia- f/u with pulm, f/u cxr to document resolution\par Total time 30 min- 20 min. FTF and 10 min chart review and documentation.\par

## 2021-09-20 NOTE — HISTORY OF PRESENT ILLNESS
[FreeTextEntry1] : post hospital [de-identified] : s/p hosp for viral pneumonia. feeling better. Had CT a/p while in hosp which showed pancreatic distal cystic lesion ? IPMN.\par going for MRI pancreas - somethilng was seen while in recent hosp. MRI will be with sedation\par also found to have left ovarian lesion.

## 2021-09-23 ENCOUNTER — TRANSCRIPTION ENCOUNTER (OUTPATIENT)
Age: 76
End: 2021-09-23

## 2021-10-08 ENCOUNTER — TRANSCRIPTION ENCOUNTER (OUTPATIENT)
Age: 76
End: 2021-10-08

## 2021-12-06 RX ORDER — ONDANSETRON 8 MG/1
1 TABLET, FILM COATED ORAL
Qty: 0 | Refills: 3 | DISCHARGE
Start: 2021-12-06

## 2021-12-23 ENCOUNTER — INPATIENT (INPATIENT)
Facility: HOSPITAL | Age: 76
LOS: 4 days | Discharge: HOME HEALTH | DRG: 808 | End: 2021-12-28
Attending: FAMILY MEDICINE | Admitting: INTERNAL MEDICINE
Payer: MEDICARE

## 2021-12-23 VITALS
TEMPERATURE: 99 F | OXYGEN SATURATION: 94 % | WEIGHT: 210.1 LBS | RESPIRATION RATE: 17 BRPM | SYSTOLIC BLOOD PRESSURE: 127 MMHG | DIASTOLIC BLOOD PRESSURE: 47 MMHG | HEART RATE: 103 BPM | HEIGHT: 64 IN

## 2021-12-23 DIAGNOSIS — Z98.89 OTHER SPECIFIED POSTPROCEDURAL STATES: Chronic | ICD-10-CM

## 2021-12-23 LAB
APTT BLD: 29.7 SEC — SIGNIFICANT CHANGE UP (ref 27.5–35.5)
HCT VFR BLD CALC: 28.7 % — LOW (ref 34.5–45)
HGB BLD-MCNC: 9.1 G/DL — LOW (ref 11.5–15.5)
INR BLD: 2.42 RATIO — HIGH (ref 0.88–1.16)
LACTATE SERPL-SCNC: 2.3 MMOL/L — HIGH (ref 0.7–2)
MCHC RBC-ENTMCNC: 27.3 PG — SIGNIFICANT CHANGE UP (ref 27–34)
MCHC RBC-ENTMCNC: 31.7 GM/DL — LOW (ref 32–36)
MCV RBC AUTO: 86.2 FL — SIGNIFICANT CHANGE UP (ref 80–100)
PLATELET # BLD AUTO: 208 K/UL — SIGNIFICANT CHANGE UP (ref 150–400)
PROTHROM AB SERPL-ACNC: 27.1 SEC — HIGH (ref 10.6–13.6)
RBC # BLD: 3.33 M/UL — LOW (ref 3.8–5.2)
RBC # FLD: 15.5 % — HIGH (ref 10.3–14.5)
WBC # BLD: 1.53 K/UL — LOW (ref 3.8–10.5)
WBC # FLD AUTO: 1.53 K/UL — LOW (ref 3.8–10.5)

## 2021-12-23 PROCEDURE — 93010 ELECTROCARDIOGRAM REPORT: CPT

## 2021-12-23 PROCEDURE — 71045 X-RAY EXAM CHEST 1 VIEW: CPT | Mod: 26

## 2021-12-23 PROCEDURE — 99285 EMERGENCY DEPT VISIT HI MDM: CPT

## 2021-12-23 RX ORDER — ACETAMINOPHEN 500 MG
1000 TABLET ORAL ONCE
Refills: 0 | Status: COMPLETED | OUTPATIENT
Start: 2021-12-23 | End: 2021-12-23

## 2021-12-23 RX ORDER — CEFTRIAXONE 500 MG/1
1000 INJECTION, POWDER, FOR SOLUTION INTRAMUSCULAR; INTRAVENOUS ONCE
Refills: 0 | Status: DISCONTINUED | OUTPATIENT
Start: 2021-12-23 | End: 2021-12-23

## 2021-12-23 RX ORDER — SODIUM CHLORIDE 9 MG/ML
1700 INJECTION, SOLUTION INTRAVENOUS ONCE
Refills: 0 | Status: COMPLETED | OUTPATIENT
Start: 2021-12-23 | End: 2021-12-23

## 2021-12-23 RX ORDER — CEFTRIAXONE 500 MG/1
1000 INJECTION, POWDER, FOR SOLUTION INTRAMUSCULAR; INTRAVENOUS ONCE
Refills: 0 | Status: COMPLETED | OUTPATIENT
Start: 2021-12-23 | End: 2021-12-23

## 2021-12-23 RX ADMIN — Medication 1000 MILLIGRAM(S): at 23:39

## 2021-12-23 RX ADMIN — CEFTRIAXONE 100 MILLIGRAM(S): 500 INJECTION, POWDER, FOR SOLUTION INTRAMUSCULAR; INTRAVENOUS at 23:39

## 2021-12-23 RX ADMIN — SODIUM CHLORIDE 1700 MILLILITER(S): 9 INJECTION, SOLUTION INTRAVENOUS at 23:39

## 2021-12-23 NOTE — ED PROVIDER NOTE - OBJECTIVE STATEMENT
75 y/o F with h/o pancreatic CA treated with chemotherapy last dose on 12/20/21 at [facility] with right chest port placed about 3 weeks ago BIBEMS for fever with TMax of 101.1 F about 1-2 hours ago.  She denies any cough, sore throat, chest pain, SOB, abdominal pain or vomiting.  She endorses diarrhea for the past 2 days.  Pt notes this decreased with imodium taken yesterday.  Pt was seen at [place] ED this afternoon for syncope and had a normal cardiac workup.  Pt had an ablation for Afib about 3 months ago.  She is not vaccinated against Covid.  She didn't take any antipyretics PTA.

## 2021-12-23 NOTE — ED ADULT NURSE NOTE - OBJECTIVE STATEMENT
77 y/o female comes into the ED for c/o of fever, pt reports a fever of 101.0 temp at home. pt is alert airway is patent, pt on room air 90%, placed on 3L NC spo3 now 96-97%, breathing is unlabored but pt reports a stuffy nose to right nostril. pmhx of  pancreatic cancer dx october 2021, last chemo was Monday. pt with febrile in  rectal temp. MD Arias aware, sepsis workup in place, EKG performed at bedside

## 2021-12-23 NOTE — ED ADULT NURSE NOTE - CHIEF COMPLAINT QUOTE
Patient presents in ED with fever and chills from home. Patient was seen in [place] ED today and discharged to home after a near syncope episode.

## 2021-12-23 NOTE — ED PROVIDER NOTE - CLINICAL SUMMARY MEDICAL DECISION MAKING FREE TEXT BOX
Sepsis - undifferentiated.  (Other DDx includes: PE, adrenal insufficiency, DKA, pancreatitis, anaphylaxis, SBO/LBO, hypovolemia/severe anemia, colitis, vasculitis, tox or w/d depending on clinical context & hx)  Plan: 1) D-stick/rectal temp/IV Access/IVF/Labs/UA/BCx & UCx.  2) CXR.  3) IV Abx.  4) Frequent reassessment - if no improvement, consider expanding ABx, pressors, echo, CT, LP or nonifectious etiology (above).  5) ICU if poor responce to therapy or persistently unstable VS.  6) Admit

## 2021-12-23 NOTE — ED ADULT TRIAGE NOTE - CHIEF COMPLAINT QUOTE
Patient presents in ED with fever and chills from home. Patient was seen in [place] ED today and discharged to home after a near syncope episode.

## 2021-12-23 NOTE — ED ADULT NURSE NOTE - NSIMPLEMENTINTERV_GEN_ALL_ED
Implemented All Fall with Harm Risk Interventions:  [place] to call system. Call bell, personal items and telephone within reach. Instruct patient to call for assistance. Room bathroom lighting operational. Non-slip footwear when patient is off stretcher. Physically safe environment: no spills, clutter or unnecessary equipment. Stretcher in lowest position, wheels locked, appropriate side rails in place. Provide visual cue, wrist band, yellow gown, etc. Monitor gait and stability. Monitor for mental status changes and reorient to person, place, and time. Review medications for side effects contributing to fall risk. Reinforce activity limits and safety measures with patient and family. Provide visual clues: red socks.

## 2021-12-23 NOTE — ED PROVIDER NOTE - PHYSICAL EXAMINATION

## 2021-12-24 DIAGNOSIS — D70.9 NEUTROPENIA, UNSPECIFIED: ICD-10-CM

## 2021-12-24 LAB
ALBUMIN SERPL ELPH-MCNC: 2.2 G/DL — LOW (ref 3.3–5)
ALP SERPL-CCNC: 61 U/L — SIGNIFICANT CHANGE UP (ref 40–120)
ALT FLD-CCNC: 74 U/L — SIGNIFICANT CHANGE UP (ref 12–78)
ANION GAP SERPL CALC-SCNC: 3 MMOL/L — LOW (ref 5–17)
ANION GAP SERPL CALC-SCNC: 5 MMOL/L — SIGNIFICANT CHANGE UP (ref 5–17)
APPEARANCE UR: CLEAR — SIGNIFICANT CHANGE UP
AST SERPL-CCNC: 37 U/L — SIGNIFICANT CHANGE UP (ref 15–37)
BASOPHILS # BLD AUTO: 0 K/UL — SIGNIFICANT CHANGE UP (ref 0–0.2)
BASOPHILS NFR BLD AUTO: 0 % — SIGNIFICANT CHANGE UP (ref 0–2)
BILIRUB SERPL-MCNC: 1 MG/DL — SIGNIFICANT CHANGE UP (ref 0.2–1.2)
BILIRUB UR-MCNC: NEGATIVE — SIGNIFICANT CHANGE UP
BUN SERPL-MCNC: 11 MG/DL — SIGNIFICANT CHANGE UP (ref 7–23)
BUN SERPL-MCNC: 8 MG/DL — SIGNIFICANT CHANGE UP (ref 7–23)
CALCIUM SERPL-MCNC: 8.3 MG/DL — LOW (ref 8.5–10.1)
CALCIUM SERPL-MCNC: 8.4 MG/DL — LOW (ref 8.5–10.1)
CHLORIDE SERPL-SCNC: 107 MMOL/L — SIGNIFICANT CHANGE UP (ref 96–108)
CHLORIDE SERPL-SCNC: 109 MMOL/L — HIGH (ref 96–108)
CO2 SERPL-SCNC: 28 MMOL/L — SIGNIFICANT CHANGE UP (ref 22–31)
CO2 SERPL-SCNC: 28 MMOL/L — SIGNIFICANT CHANGE UP (ref 22–31)
COLOR SPEC: YELLOW — SIGNIFICANT CHANGE UP
CREAT SERPL-MCNC: 0.47 MG/DL — LOW (ref 0.5–1.3)
CREAT SERPL-MCNC: 0.65 MG/DL — SIGNIFICANT CHANGE UP (ref 0.5–1.3)
DIFF PNL FLD: NEGATIVE — SIGNIFICANT CHANGE UP
EOSINOPHIL # BLD AUTO: 0 K/UL — SIGNIFICANT CHANGE UP (ref 0–0.5)
EOSINOPHIL NFR BLD AUTO: 0 % — SIGNIFICANT CHANGE UP (ref 0–6)
FLUAV AG NPH QL: SIGNIFICANT CHANGE UP
FLUBV AG NPH QL: SIGNIFICANT CHANGE UP
GLUCOSE SERPL-MCNC: 122 MG/DL — HIGH (ref 70–99)
GLUCOSE SERPL-MCNC: 160 MG/DL — HIGH (ref 70–99)
GLUCOSE UR QL: NEGATIVE MG/DL — SIGNIFICANT CHANGE UP
HCT VFR BLD CALC: 25.6 % — LOW (ref 34.5–45)
HGB BLD-MCNC: 8.2 G/DL — LOW (ref 11.5–15.5)
KETONES UR-MCNC: NEGATIVE — SIGNIFICANT CHANGE UP
LEUKOCYTE ESTERASE UR-ACNC: NEGATIVE — SIGNIFICANT CHANGE UP
LYMPHOCYTES # BLD AUTO: 0.26 K/UL — LOW (ref 1–3.3)
LYMPHOCYTES # BLD AUTO: 17 % — SIGNIFICANT CHANGE UP (ref 13–44)
MCHC RBC-ENTMCNC: 27.6 PG — SIGNIFICANT CHANGE UP (ref 27–34)
MCHC RBC-ENTMCNC: 32 GM/DL — SIGNIFICANT CHANGE UP (ref 32–36)
MCV RBC AUTO: 86.2 FL — SIGNIFICANT CHANGE UP (ref 80–100)
MONOCYTES # BLD AUTO: 0.02 K/UL — SIGNIFICANT CHANGE UP (ref 0–0.9)
MONOCYTES NFR BLD AUTO: 1 % — LOW (ref 2–14)
NEUTROPHILS # BLD AUTO: 1.25 K/UL — LOW (ref 1.8–7.4)
NEUTROPHILS NFR BLD AUTO: 82 % — HIGH (ref 43–77)
NITRITE UR-MCNC: NEGATIVE — SIGNIFICANT CHANGE UP
NRBC # BLD: SIGNIFICANT CHANGE UP /100 WBCS (ref 0–0)
PH UR: 5 — SIGNIFICANT CHANGE UP (ref 5–8)
PLATELET # BLD AUTO: 192 K/UL — SIGNIFICANT CHANGE UP (ref 150–400)
POTASSIUM SERPL-MCNC: 4.2 MMOL/L — SIGNIFICANT CHANGE UP (ref 3.5–5.3)
POTASSIUM SERPL-MCNC: 4.3 MMOL/L — SIGNIFICANT CHANGE UP (ref 3.5–5.3)
POTASSIUM SERPL-SCNC: 4.2 MMOL/L — SIGNIFICANT CHANGE UP (ref 3.5–5.3)
POTASSIUM SERPL-SCNC: 4.3 MMOL/L — SIGNIFICANT CHANGE UP (ref 3.5–5.3)
PROT SERPL-MCNC: 6.1 GM/DL — SIGNIFICANT CHANGE UP (ref 6–8.3)
PROT UR-MCNC: 30 MG/DL
RBC # BLD: 2.97 M/UL — LOW (ref 3.8–5.2)
RBC # FLD: 15.4 % — HIGH (ref 10.3–14.5)
RSV RNA NPH QL NAA+NON-PROBE: SIGNIFICANT CHANGE UP
SARS-COV-2 RNA SPEC QL NAA+PROBE: SIGNIFICANT CHANGE UP
SODIUM SERPL-SCNC: 140 MMOL/L — SIGNIFICANT CHANGE UP (ref 135–145)
SODIUM SERPL-SCNC: 140 MMOL/L — SIGNIFICANT CHANGE UP (ref 135–145)
SP GR SPEC: 1.01 — SIGNIFICANT CHANGE UP (ref 1.01–1.02)
UROBILINOGEN FLD QL: NEGATIVE MG/DL — SIGNIFICANT CHANGE UP
WBC # BLD: 1.07 K/UL — CRITICAL LOW (ref 3.8–10.5)
WBC # FLD AUTO: 1.07 K/UL — CRITICAL LOW (ref 3.8–10.5)

## 2021-12-24 PROCEDURE — 71250 CT THORAX DX C-: CPT | Mod: 26,MA

## 2021-12-24 PROCEDURE — U0005: CPT

## 2021-12-24 PROCEDURE — 97116 GAIT TRAINING THERAPY: CPT | Mod: GP

## 2021-12-24 PROCEDURE — 97161 PT EVAL LOW COMPLEX 20 MIN: CPT | Mod: GP

## 2021-12-24 PROCEDURE — 83735 ASSAY OF MAGNESIUM: CPT

## 2021-12-24 PROCEDURE — 83550 IRON BINDING TEST: CPT

## 2021-12-24 PROCEDURE — 86140 C-REACTIVE PROTEIN: CPT

## 2021-12-24 PROCEDURE — 84100 ASSAY OF PHOSPHORUS: CPT

## 2021-12-24 PROCEDURE — 99222 1ST HOSP IP/OBS MODERATE 55: CPT

## 2021-12-24 PROCEDURE — 80048 BASIC METABOLIC PNL TOTAL CA: CPT

## 2021-12-24 PROCEDURE — 82728 ASSAY OF FERRITIN: CPT

## 2021-12-24 PROCEDURE — 82306 VITAMIN D 25 HYDROXY: CPT

## 2021-12-24 PROCEDURE — 86769 SARS-COV-2 COVID-19 ANTIBODY: CPT

## 2021-12-24 PROCEDURE — 80053 COMPREHEN METABOLIC PANEL: CPT

## 2021-12-24 PROCEDURE — 82607 VITAMIN B-12: CPT

## 2021-12-24 PROCEDURE — 36415 COLL VENOUS BLD VENIPUNCTURE: CPT

## 2021-12-24 PROCEDURE — 85025 COMPLETE CBC W/AUTO DIFF WBC: CPT

## 2021-12-24 PROCEDURE — 82746 ASSAY OF FOLIC ACID SERUM: CPT

## 2021-12-24 PROCEDURE — 83540 ASSAY OF IRON: CPT

## 2021-12-24 PROCEDURE — U0003: CPT

## 2021-12-24 PROCEDURE — 85027 COMPLETE CBC AUTOMATED: CPT

## 2021-12-24 RX ORDER — ACETAMINOPHEN 500 MG
650 TABLET ORAL EVERY 6 HOURS
Refills: 0 | Status: DISCONTINUED | OUTPATIENT
Start: 2021-12-24 | End: 2021-12-28

## 2021-12-24 RX ORDER — METOPROLOL TARTRATE 50 MG
50 TABLET ORAL
Refills: 0 | Status: DISCONTINUED | OUTPATIENT
Start: 2021-12-24 | End: 2021-12-28

## 2021-12-24 RX ORDER — CEFTRIAXONE 500 MG/1
1000 INJECTION, POWDER, FOR SOLUTION INTRAMUSCULAR; INTRAVENOUS EVERY 24 HOURS
Refills: 0 | Status: DISCONTINUED | OUTPATIENT
Start: 2021-12-24 | End: 2021-12-24

## 2021-12-24 RX ORDER — ONDANSETRON 8 MG/1
4 TABLET, FILM COATED ORAL EVERY 6 HOURS
Refills: 0 | Status: DISCONTINUED | OUTPATIENT
Start: 2021-12-24 | End: 2021-12-28

## 2021-12-24 RX ORDER — CEFTRIAXONE 500 MG/1
1000 INJECTION, POWDER, FOR SOLUTION INTRAMUSCULAR; INTRAVENOUS EVERY 24 HOURS
Refills: 0 | Status: DISCONTINUED | OUTPATIENT
Start: 2021-12-24 | End: 2021-12-27

## 2021-12-24 RX ORDER — APIXABAN 2.5 MG/1
5 TABLET, FILM COATED ORAL EVERY 12 HOURS
Refills: 0 | Status: DISCONTINUED | OUTPATIENT
Start: 2021-12-24 | End: 2021-12-28

## 2021-12-24 RX ORDER — AZITHROMYCIN 500 MG/1
500 TABLET, FILM COATED ORAL ONCE
Refills: 0 | Status: COMPLETED | OUTPATIENT
Start: 2021-12-24 | End: 2021-12-24

## 2021-12-24 RX ORDER — ALPRAZOLAM 0.25 MG
0.25 TABLET ORAL DAILY
Refills: 0 | Status: DISCONTINUED | OUTPATIENT
Start: 2021-12-24 | End: 2021-12-28

## 2021-12-24 RX ORDER — SODIUM CHLORIDE 9 MG/ML
500 INJECTION, SOLUTION INTRAVENOUS ONCE
Refills: 0 | Status: COMPLETED | OUTPATIENT
Start: 2021-12-24 | End: 2021-12-24

## 2021-12-24 RX ORDER — LANOLIN ALCOHOL/MO/W.PET/CERES
3 CREAM (GRAM) TOPICAL AT BEDTIME
Refills: 0 | Status: DISCONTINUED | OUTPATIENT
Start: 2021-12-24 | End: 2021-12-28

## 2021-12-24 RX ORDER — ASPIRIN/CALCIUM CARB/MAGNESIUM 324 MG
81 TABLET ORAL DAILY
Refills: 0 | Status: DISCONTINUED | OUTPATIENT
Start: 2021-12-24 | End: 2021-12-28

## 2021-12-24 RX ORDER — AZITHROMYCIN 500 MG/1
500 TABLET, FILM COATED ORAL EVERY 24 HOURS
Refills: 0 | Status: DISCONTINUED | OUTPATIENT
Start: 2021-12-24 | End: 2021-12-27

## 2021-12-24 RX ORDER — ATORVASTATIN CALCIUM 80 MG/1
40 TABLET, FILM COATED ORAL AT BEDTIME
Refills: 0 | Status: DISCONTINUED | OUTPATIENT
Start: 2021-12-24 | End: 2021-12-28

## 2021-12-24 RX ADMIN — CEFTRIAXONE 100 MILLIGRAM(S): 500 INJECTION, POWDER, FOR SOLUTION INTRAMUSCULAR; INTRAVENOUS at 21:34

## 2021-12-24 RX ADMIN — APIXABAN 5 MILLIGRAM(S): 2.5 TABLET, FILM COATED ORAL at 21:34

## 2021-12-24 RX ADMIN — SODIUM CHLORIDE 500 MILLILITER(S): 9 INJECTION, SOLUTION INTRAVENOUS at 05:19

## 2021-12-24 RX ADMIN — APIXABAN 5 MILLIGRAM(S): 2.5 TABLET, FILM COATED ORAL at 10:34

## 2021-12-24 RX ADMIN — ONDANSETRON 4 MILLIGRAM(S): 8 TABLET, FILM COATED ORAL at 20:00

## 2021-12-24 RX ADMIN — ATORVASTATIN CALCIUM 40 MILLIGRAM(S): 80 TABLET, FILM COATED ORAL at 21:34

## 2021-12-24 RX ADMIN — Medication 81 MILLIGRAM(S): at 10:34

## 2021-12-24 RX ADMIN — Medication 3 MILLIGRAM(S): at 21:34

## 2021-12-24 RX ADMIN — Medication 0.25 MILLIGRAM(S): at 21:34

## 2021-12-24 RX ADMIN — AZITHROMYCIN 255 MILLIGRAM(S): 500 TABLET, FILM COATED ORAL at 01:55

## 2021-12-24 RX ADMIN — Medication 50 MILLIGRAM(S): at 10:34

## 2021-12-24 RX ADMIN — Medication 50 MILLIGRAM(S): at 21:34

## 2021-12-24 NOTE — H&P ADULT - NSHPPHYSICALEXAM_GEN_ALL_CORE
T(C): 36.6 (12-24-21 @ 05:41), Max: 38.9 (12-23-21 @ 23:13)  HR: 91 (12-24-21 @ 05:41) (84 - 103)  BP: 146/57 (12-24-21 @ 05:41) (81/61 - 146/57)  RR: 17 (12-24-21 @ 05:41) (16 - 21)  SpO2: 100% (12-24-21 @ 05:41) (90% - 100%)    CONSTITUTIONAL: Well groomed, no apparent distress    EYES: PERRLA and symmetric, EOMI, No conjunctival or scleral injection, non-icteric    ENMT: Oral mucosa with moist membranes. No external nasal lesions; nasal mucosa not inflamed; normal dentition; no pharyngeal injection or exudates. Otoscopic exam with normal tympanic membranes; no gross hearing impairment noted.  	NECK: Supple, symmetric and without tracheal deviation; thyroid gland not enlarged and without palpable masses    RESPIRATORY: No respiratory distress, no use of accessory muscles; CTA b/l, no wheezes, rales or rhonchi, no dullness or hyperresonance to percussion, no tactile fremitus, no subcutaneous emphysema    CARDIOVASCULAR: RRRR, +S1S2, no murmurs, no rubs, no gallops; no JVD; no peripheral edema  	Vascular: no carotid bruits; no abdominal bruit; carotid pulse palpable, radial pulse palpable, femoral pulse palpable, dorsalis pedis pulse palpable, posterior tibialis pulse palpable    GASTROINTESTINAL: Soft, non tender, non distended, no rebound, no guarding; No palpable masses; no hepatosplenomegaly; no hernia palpated;  	  LYMPHATIC: No cervical LAD or tenderness; no axillary LAD or tenderness; no inguinal LAD or tenderness    MUSCULOSKELETAL: Normal gait and station; no digital clubbing or cyanosis; examination of the (head/neck, spine/ribs/pelvis, RUE, LUE, RLE, LLE) without misalignment, normal range of motion without pain, no spinal tenderness, normal muscle strength/tone    SKIN: No rashes or ulcers noted; no subcutaneous nodules or induration palpable    NEUROLOGIC: CN II-XII intact; normal reflexes in upper and lower extremities, sensation intact in upper and lower extremities b/l to light touch; Babinski down b/l; no Kernig’s sign, no Bruingriszinski’s sign    PSYCHIATRIC: Appropriate insight/judgment; A+O x 3, mood and affect appropriate, recent/remote memory intact

## 2021-12-24 NOTE — H&P ADULT - ASSESSMENT
A/P:    1.  Neutropenia  Fever  Lung Infiltrate  -started on IV Ceftriaxone and Zithromax  -follow cultures   -give tylenol as needed for fever  -follow hem/onc consult for possible Neupogen therapy    2.  h/o Afib  -continue Metoprolol and Eliquis     3.  Eliquis for DVT ppx    4.  Code status  -patient is in Full code status

## 2021-12-24 NOTE — H&P ADULT - HISTORY OF PRESENT ILLNESS
75 y/o F with h/o pancreatic CA treated with chemotherapy last dose on 12/20/21 at [facility] with right chest port placed about 3 weeks ago BIBEMS for fever with TMax of 101.1 F about 1-2 hours ago.  She denies any cough, sore throat, chest pain, SOB, abdominal pain or vomiting.  She endorses diarrhea for the past 2 days.  Pt notes this decreased with imodium taken yesterday.  Pt was seen at [place] ED this afternoon for syncope and had a normal cardiac workup.  Pt had an ablation for Afib about 3 months ago.  She is not vaccinated against Covid.  She didn't take any antipyretics PTA. 75 y/o Female with h/o Pancreatic CA treated with chemotherapy last dose on 12/20/21 at [facility] with right chest port placed about 3 weeks ago BIBEMS for fever with TMax of 101.1 F about 1-2 hours ago before arriving to the ED.  She denies any cough, sore throat, chest pain, SOB, abdominal pain or vomiting.  She endorses diarrhea for the past 2 days. No diarrhea today. Patient notes this decreased with imodium taken yesterday. Patient was seen at [place] ED this afternoon for syncope and had a normal cardiac workup.  Patient had an ablation for Afib about 3 months ago.  She is not vaccinated against Covid.  She didn't take any antipyretics Prior to arrival.

## 2021-12-24 NOTE — PATIENT PROFILE ADULT - FALL HARM RISK - HARM RISK INTERVENTIONS

## 2021-12-25 LAB
ADD ON TEST-SPECIMEN IN LAB: SIGNIFICANT CHANGE UP
ANION GAP SERPL CALC-SCNC: 4 MMOL/L — LOW (ref 5–17)
BASOPHILS # BLD AUTO: 0.01 K/UL — SIGNIFICANT CHANGE UP (ref 0–0.2)
BASOPHILS NFR BLD AUTO: 0.6 % — SIGNIFICANT CHANGE UP (ref 0–2)
BUN SERPL-MCNC: 6 MG/DL — LOW (ref 7–23)
CALCIUM SERPL-MCNC: 8.2 MG/DL — LOW (ref 8.5–10.1)
CHLORIDE SERPL-SCNC: 109 MMOL/L — HIGH (ref 96–108)
CO2 SERPL-SCNC: 28 MMOL/L — SIGNIFICANT CHANGE UP (ref 22–31)
CREAT SERPL-MCNC: 0.53 MG/DL — SIGNIFICANT CHANGE UP (ref 0.5–1.3)
CULTURE RESULTS: SIGNIFICANT CHANGE UP
EOSINOPHIL # BLD AUTO: 0.06 K/UL — SIGNIFICANT CHANGE UP (ref 0–0.5)
EOSINOPHIL NFR BLD AUTO: 3.7 % — SIGNIFICANT CHANGE UP (ref 0–6)
GLUCOSE SERPL-MCNC: 114 MG/DL — HIGH (ref 70–99)
HCT VFR BLD CALC: 26.3 % — LOW (ref 34.5–45)
HGB BLD-MCNC: 8.4 G/DL — LOW (ref 11.5–15.5)
IMM GRANULOCYTES NFR BLD AUTO: 0.6 % — SIGNIFICANT CHANGE UP (ref 0–1.5)
LYMPHOCYTES # BLD AUTO: 0.71 K/UL — LOW (ref 1–3.3)
LYMPHOCYTES # BLD AUTO: 43.8 % — SIGNIFICANT CHANGE UP (ref 13–44)
MAGNESIUM SERPL-MCNC: 1.8 MG/DL — SIGNIFICANT CHANGE UP (ref 1.6–2.6)
MCHC RBC-ENTMCNC: 27.4 PG — SIGNIFICANT CHANGE UP (ref 27–34)
MCHC RBC-ENTMCNC: 31.9 GM/DL — LOW (ref 32–36)
MCV RBC AUTO: 85.7 FL — SIGNIFICANT CHANGE UP (ref 80–100)
MONOCYTES # BLD AUTO: 0.04 K/UL — SIGNIFICANT CHANGE UP (ref 0–0.9)
MONOCYTES NFR BLD AUTO: 2.5 % — SIGNIFICANT CHANGE UP (ref 2–14)
NEUTROPHILS # BLD AUTO: 0.79 K/UL — LOW (ref 1.8–7.4)
NEUTROPHILS NFR BLD AUTO: 48.8 % — SIGNIFICANT CHANGE UP (ref 43–77)
PLATELET # BLD AUTO: 220 K/UL — SIGNIFICANT CHANGE UP (ref 150–400)
POTASSIUM SERPL-MCNC: 3.8 MMOL/L — SIGNIFICANT CHANGE UP (ref 3.5–5.3)
POTASSIUM SERPL-SCNC: 3.8 MMOL/L — SIGNIFICANT CHANGE UP (ref 3.5–5.3)
RBC # BLD: 3.07 M/UL — LOW (ref 3.8–5.2)
RBC # FLD: 15.5 % — HIGH (ref 10.3–14.5)
SODIUM SERPL-SCNC: 141 MMOL/L — SIGNIFICANT CHANGE UP (ref 135–145)
SPECIMEN SOURCE: SIGNIFICANT CHANGE UP
WBC # BLD: 1.62 K/UL — LOW (ref 3.8–10.5)
WBC # FLD AUTO: 1.62 K/UL — LOW (ref 3.8–10.5)

## 2021-12-25 PROCEDURE — 99232 SBSQ HOSP IP/OBS MODERATE 35: CPT

## 2021-12-25 PROCEDURE — 12345: CPT | Mod: NC

## 2021-12-25 RX ORDER — LOPERAMIDE HCL 2 MG
2 TABLET ORAL DAILY
Refills: 0 | Status: DISCONTINUED | OUTPATIENT
Start: 2021-12-25 | End: 2021-12-28

## 2021-12-25 RX ORDER — LACTOBACILLUS ACIDOPHILUS 100MM CELL
1 CAPSULE ORAL
Refills: 0 | Status: DISCONTINUED | OUTPATIENT
Start: 2021-12-25 | End: 2021-12-28

## 2021-12-25 RX ORDER — FILGRASTIM 480MCG/1.6
300 VIAL (ML) INJECTION DAILY
Refills: 0 | Status: COMPLETED | OUTPATIENT
Start: 2021-12-25 | End: 2021-12-27

## 2021-12-25 RX ADMIN — ATORVASTATIN CALCIUM 40 MILLIGRAM(S): 80 TABLET, FILM COATED ORAL at 22:54

## 2021-12-25 RX ADMIN — Medication 3 MILLIGRAM(S): at 22:56

## 2021-12-25 RX ADMIN — ONDANSETRON 4 MILLIGRAM(S): 8 TABLET, FILM COATED ORAL at 09:20

## 2021-12-25 RX ADMIN — Medication 50 MILLIGRAM(S): at 10:21

## 2021-12-25 RX ADMIN — APIXABAN 5 MILLIGRAM(S): 2.5 TABLET, FILM COATED ORAL at 22:53

## 2021-12-25 RX ADMIN — Medication 50 MILLIGRAM(S): at 22:53

## 2021-12-25 RX ADMIN — AZITHROMYCIN 255 MILLIGRAM(S): 500 TABLET, FILM COATED ORAL at 00:15

## 2021-12-25 RX ADMIN — Medication 0.25 MILLIGRAM(S): at 22:56

## 2021-12-25 RX ADMIN — Medication 81 MILLIGRAM(S): at 09:20

## 2021-12-25 RX ADMIN — CEFTRIAXONE 100 MILLIGRAM(S): 500 INJECTION, POWDER, FOR SOLUTION INTRAMUSCULAR; INTRAVENOUS at 22:54

## 2021-12-25 RX ADMIN — APIXABAN 5 MILLIGRAM(S): 2.5 TABLET, FILM COATED ORAL at 09:20

## 2021-12-25 RX ADMIN — Medication 300 MICROGRAM(S): at 22:54

## 2021-12-25 NOTE — CONSULT NOTE ADULT - ASSESSMENT
77 y/o Female with h/o Pancreatic Ca treated with chemotherapy last dose on 12/20/21 at [facility], right chest mediport placed about 3 weeks PTA, A.fib s/p ablation, old CVA, HL, HTN was admitted on 12/24 for fever with TMax of 101.1 F about 1-2 hours ago before arriving to the ED.  She denies cough, sore throat, chest pain, SOB, abdominal pain or vomiting.  She endorses diarrhea for the past 2 days PTA. No diarrhea on the day of admission s/p imodium. Patient was seen at [place] ED on the day of admission for syncope and had a normal cardiac workup. In ER she received ceftriaxone and azithromycin.     1. Febrile syndrome. Multiple innumerable patchy nodular pulmonary opacities. Possible superimposed pneumonia. Pancreatic Ca on chemotherapy. Immunocompromised host.   -leukopenia  -obtain BC x 2, urine c/s  -start ceftriaxone 1 gm IV qd and azithromycin 500 mg OV qd  -reason for abx use and side effects reviewed with patient; monitor BMP   -respiratory care  -old chart reviewed to assess prior cultures  -monitor temps  -f/u CBC  -supportive care  2. Other issues:   -care per medicine       77 y/o Female with h/o Pancreatic Ca treated with chemotherapy last dose on 12/20/21 at [facility], right chest mediport placed about 3 weeks PTA, A.fib s/p ablation, old CVA, HL, HTN was admitted on 12/24 for fever with TMax of 101.1 F about 1-2 hours ago before arriving to the ED.  She denies cough, sore throat, chest pain, SOB, abdominal pain or vomiting.  She endorses diarrhea for the past 2 days PTA. No diarrhea on the day of admission s/p imodium. Patient was seen at [place] ED on the day of admission for syncope and had a normal cardiac workup. In ER she received ceftriaxone and azithromycin.     1. Febrile syndrome. Multiple innumerable patchy nodular pulmonary opacities. Possible superimposed pneumonia. Pancreatic Ca on chemotherapy. Immunocompromised host.   -leukopenia  -obtain BC x 2, urine c/s  -start ceftriaxone 1 gm IV qd and azithromycin 500 mg OV qd  -reason for abx use and side effects reviewed with patient; monitor BMP   -respiratory care  -old chart reviewed to assess prior cultures  -urged her to get the COVID vaccine  -monitor temps  -f/u CBC  -supportive care  2. Other issues:   -care per medicine

## 2021-12-25 NOTE — CONSULT NOTE ADULT - SUBJECTIVE AND OBJECTIVE BOX
76-year-old female  Lives in [facility]  Staying with the daughter and excels    October 2021 diagnosed with pancreatic cancer, Biopsy-proven metastasis to liver  Being treated with palliative gemcitabine and Abraxane day 1, 5, 15  At [facility]  Completed 3 cycles,Last given December 20 2021  Had loose stools and vasovagal Syncope yesterday, Went to [place] ED and discharged home with cardiac evaluation negative  Went back to her daughter developed fevers to 101.1 and chills  Return to [place] ED and admitted    Loose bowel movement times several days  No fevers cough drenching night sweats dysuria,Pain nausea or vomiting    Patient and family are not vaccinated Covid    Past medical history includes A. fib, ablation, anxiety, CVA, hypertension hyperlipidemia    Current non-smoker nondrinker     lives alone in [place]    PE  Well-developed increased BMI well-nourished 76-year-old female comfortable at rest  HEENT normocephalic anicteric poor dentition  Neck supple  Lungs clear  Abdomen soft nontender bowel sounds present no definitive masses noted  Extremities without edema  Lymph nodes nonpalpable    
Patient is a 76y old  Female who presents with a chief complaint of Fever     HPI:  75 y/o Female with h/o Pancreatic Ca treated with chemotherapy last dose on 21 at [facility], right chest mediport placed about 3 weeks PTA, A.fib s/p ablation, old CVA, HL, HTN was admitted on  for fever with TMax of 101.1 F about 1-2 hours ago before arriving to the ED.  She denies cough, sore throat, chest pain, SOB, abdominal pain or vomiting.  She endorses diarrhea for the past 2 days PTA. No diarrhea on the day of admission s/p imodium. Patient was seen at [place] ED on the day of admission for syncope and had a normal cardiac workup. In ER she received ceftriaxone and azithromycin.   She is not vaccinated against Covid.      PAST MEDICAL HISTORY:  Afib s/p ablation  Anxiety   CVA (cerebral vascular accident)   HLD (hyperlipidemia)   HTN (hypertension).     PAST SURGICAL HISTORY:  S/P popliteal-tibial bypass   Status post ORIF of fracture of ankle s/p rght TR.       Meds: per reconciliation sheet, noted below  MEDICATIONS  (STANDING):  apixaban 5 milliGRAM(s) Oral every 12 hours  aspirin enteric coated 81 milliGRAM(s) Oral daily  atorvastatin 40 milliGRAM(s) Oral at bedtime  azithromycin  IVPB 500 milliGRAM(s) IV Intermittent every 24 hours  cefTRIAXone   IVPB 1000 milliGRAM(s) IV Intermittent every 24 hours  metoprolol tartrate 50 milliGRAM(s) Oral two times a day    MEDICATIONS  (PRN):  acetaminophen     Tablet .. 650 milliGRAM(s) Oral every 6 hours PRN Mild Pain (1 - 3)  ALPRAZolam 0.25 milliGRAM(s) Oral daily PRN anxiety  aluminum hydroxide/magnesium hydroxide/simethicone Suspension 30 milliLiter(s) Oral every 4 hours PRN Dyspepsia  melatonin 3 milliGRAM(s) Oral at bedtime PRN Insomnia  ondansetron Injectable 4 milliGRAM(s) IV Push every 6 hours PRN Nausea and/or Vomiting    Allergies    No Known Drug Allergies  shellfish (Other (Moderate))    Intolerances      Social: no smoking, no alcohol, no illegal drugs; no recent travel, no exposure to TB  FAMILY HISTORY:  No pertinent family history in first degree relatives      no history of premature cardiovascular disease in first degree relatives    ROS: the patient denies HA, no seizures, no dizziness, no sore throat, no nasal congestion, no blurry vision, no CP, no palpitations, has SOB, has cough, no abdominal pain, no diarrhea, no N/V, no dysuria, no leg pain, no claudication, no rash, no joint aches, no rectal pain or bleeding, no night sweats  All other systems reviewed and are negative    Vital Signs Last 24 Hrs  T(C): 36.6 (25 Dec 2021 09:27), Max: 37.2 (24 Dec 2021 16:59)  T(F): 97.9 (25 Dec 2021 09:27), Max: 98.9 (24 Dec 2021 16:59)  HR: 92 (25 Dec 2021 09:) (87 - 92)  BP: 111/51 (25 Dec 2021 09:27) (111/51 - 136/50)  BP(mean): --  RR: 18 (25 Dec 2021 09:) (18 - 18)  SpO2: 95% (25 Dec 2021 09:27) (95% - 98%)  Daily     Daily     PE:    Constitutional:  No acute distress  HEENT: NC/AT, EOMI, PERRLA, conjunctivae clear; ears and nose atraumatic; pharynx benign  Neck: supple; thyroid not palpable  Back: no tenderness  Respiratory: respiratory effort normal; crackles at bases  Cardiovascular: S1S2 regular, no murmurs  Abdomen: soft, not tender, not distended, positive BS; no liver or spleen organomegaly  Genitourinary: no suprapubic tenderness  Lymphatic: no LN palpable  Musculoskeletal: no muscle tenderness, no joint swelling or tenderness  Extremities: no pedal edema  Neurological/ Psychiatric: AxOx3, judgement and insight normal; moving all extremities  Skin: no rashes; no palpable lesions    Labs: all available labs reviewed                        8.4    1.62  )-----------( 220      ( 25 Dec 2021 06:37 )             26.3         141  |  109<H>  |  6<L>  ----------------------------<  114<H>  3.8   |  28  |  0.53    Ca    8.2<L>      25 Dec 2021 06:37  Mg     1.8     12-    TPro  6.1  /  Alb  2.2<L>  /  TBili  1.0  /  DBili  x   /  AST  37  /  ALT  74  /  AlkPhos  61  12-     LIVER FUNCTIONS - ( 23 Dec 2021 23:29 )  Alb: 2.2 g/dL / Pro: 6.1 gm/dL / ALK PHOS: 61 U/L / ALT: 74 U/L / AST: 37 U/L / GGT: x           Urinalysis Basic - ( 24 Dec 2021 02:51 )    Color: Yellow / Appearance: Clear / S.010 / pH: x  Gluc: x / Ketone: Negative  / Bili: Negative / Urobili: Negative mg/dL   Blood: x / Protein: 30 mg/dL / Nitrite: Negative   Leuk Esterase: Negative / RBC: 3-5 /HPF / WBC 3-5   Sq Epi: x / Non Sq Epi: Occasional / Bacteria: Few        Radiology: all available radiological tests reviewed    < from: CT Chest No Cont (. @ 01:51) >  Innumerable patchy nodular opacities throughout the lungs, probably   infectious/inflammatory. Follow-up is recommended to ensure resolution.    < end of copied text >      Advanced directives addressed: full resuscitation

## 2021-12-25 NOTE — CONSULT NOTE ADULT - ASSESSMENT
CBC Full  -  ( 25 Dec 2021 06:37 )  WBC Count : 1.62 K/uL  RBC Count : 3.07 M/uL  Hemoglobin : 8.4 g/dL  Hematocrit : 26.3 %  Platelet Count - Automated : 220 K/uL  Mean Cell Volume : 85.7 fl  Mean Cell Hemoglobin : 27.4 pg  Mean Cell Hemoglobin Concentration : 31.9 gm/dL  Auto Neutrophil # : 0.79 K/uL  Auto Lymphocyte # : 0.71 K/uL  Auto Monocyte # : 0.04 K/uL  Auto Eosinophil # : 0.06 K/uL  Auto Basophil # : 0.01 K/uL  Auto Neutrophil % : 48.8 %  Auto Lymphocyte % : 43.8 %  Auto Monocyte % : 2.5 %  Auto Eosinophil % : 3.7 %  Auto Basophil % : 0.6 %      12-25    141  |  109<H>  |  6<L>  ----------------------------<  114<H>  3.8   |  28  |  0.53    Ca    8.2<L>      25 Dec 2021 06:37  Mg     1.8     12-25    TPro  6.1  /  Alb  2.2<L>  /  TBili  1.0  /  DBili  x   /  AST  37  /  ALT  74  /  AlkPhos  61  12-23    PT/INR - ( 23 Dec 2021 23:29 )   PT: 27.1 sec;   INR: 2.42 ratio         PTT - ( 23 Dec 2021 23:29 )  PTT:29.7 sec    Vital Signs Last 24 Hrs  T(C): 36.6 (25 Dec 2021 09:27), Max: 37.2 (24 Dec 2021 16:59)  T(F): 97.9 (25 Dec 2021 09:27), Max: 98.9 (24 Dec 2021 16:59)  HR: 92 (25 Dec 2021 09:27) (87 - 92)  BP: 111/51 (25 Dec 2021 09:27) (111/51 - 136/50)  BP(mean): --  RR: 18 (25 Dec 2021 09:27) (18 - 18)  SpO2: 95% (25 Dec 2021 09:27) (95% - 98%)      ACC: [id number] EXAM:  CT CHEST                          PROCEDURE DATE:  12/24/2021          INTERPRETATION:  CLINICAL INFORMATION: Fever    COMPARISON: None.    CONTRAST/COMPLICATIONS:  IV Contrast: NONE  Oral Contrast: NONE  Complications: None reported at time of study completion    PROCEDURE:  CT of the Chest was performed.  Sagittal and coronal reformats were performed.    FINDINGS:    LUNGS AND AIRWAYS: Patent central airways.  Innumerable patchy nodular   opacities throughout the lungs, probably infectious/inflammatory.  PLEURA: No pleural effusion.  MEDIASTINUM AND KAREN: No lymphadenopathy.  VESSELS: Atherosclerosis.  HEART: Heart size is normal. No pericardial effusion. Coronary artery   calcifications.  CHEST WALL AND LOWER NECK: Within normal limits.  VISUALIZED UPPER ABDOMEN: Within normal limits.  BONES: Degenerative changes.    IMPRESSION:  Innumerable patchy nodular opacities throughout the lungs, probably   infectious/inflammatory. Follow-up is recommended to ensure resolution.

## 2021-12-25 NOTE — CONSULT NOTE ADULT - NSCONSULTADDITIONALINFOA_GEN_ALL_CORE
76-year-old female October 2021 diagnosed with stage IV pancreatic cancer  Disease in pancreas and apparently lung biopsy confirmed metastatic disease  Has now completed cycle #3 of gemcitabine and Abraxane, all treatment at [facility]    Now with neutropenia and fevers fatigue rigors  Chest Skin possible infectious process    Plan  Antibiotics as per infectious disease  2 days of Zarxio/G-CSF in light of neutropenia    Nutritional supports Check B12 iron folate levels    I strongly suggested patient and her family consider Covid vaccine given her high risk situation    Above reviewed with hospitalist and patient's daughter

## 2021-12-26 LAB
24R-OH-CALCIDIOL SERPL-MCNC: 16 NG/ML — LOW (ref 30–80)
ALBUMIN SERPL ELPH-MCNC: 1.9 G/DL — LOW (ref 3.3–5)
ALP SERPL-CCNC: 66 U/L — SIGNIFICANT CHANGE UP (ref 40–120)
ALT FLD-CCNC: 65 U/L — SIGNIFICANT CHANGE UP (ref 12–78)
ANION GAP SERPL CALC-SCNC: 7 MMOL/L — SIGNIFICANT CHANGE UP (ref 5–17)
AST SERPL-CCNC: 36 U/L — SIGNIFICANT CHANGE UP (ref 15–37)
BASOPHILS # BLD AUTO: 0.04 K/UL — SIGNIFICANT CHANGE UP (ref 0–0.2)
BASOPHILS NFR BLD AUTO: 0.8 % — SIGNIFICANT CHANGE UP (ref 0–2)
BILIRUB SERPL-MCNC: 0.4 MG/DL — SIGNIFICANT CHANGE UP (ref 0.2–1.2)
BUN SERPL-MCNC: 9 MG/DL — SIGNIFICANT CHANGE UP (ref 7–23)
CALCIUM SERPL-MCNC: 8.2 MG/DL — LOW (ref 8.5–10.1)
CHLORIDE SERPL-SCNC: 109 MMOL/L — HIGH (ref 96–108)
CO2 SERPL-SCNC: 25 MMOL/L — SIGNIFICANT CHANGE UP (ref 22–31)
COVID-19 NUCLEOCAPSID GAM AB INTERP: NEGATIVE — SIGNIFICANT CHANGE UP
COVID-19 NUCLEOCAPSID TOTAL GAM ANTIBODY RESULT: 0.09 INDEX — SIGNIFICANT CHANGE UP
COVID-19 SPIKE DOMAIN AB INTERP: NEGATIVE — SIGNIFICANT CHANGE UP
COVID-19 SPIKE DOMAIN ANTIBODY RESULT: 0.4 U/ML — SIGNIFICANT CHANGE UP
CREAT SERPL-MCNC: 0.56 MG/DL — SIGNIFICANT CHANGE UP (ref 0.5–1.3)
CRP SERPL-MCNC: 66 MG/L — HIGH
EOSINOPHIL # BLD AUTO: 0.02 K/UL — SIGNIFICANT CHANGE UP (ref 0–0.5)
EOSINOPHIL NFR BLD AUTO: 0.4 % — SIGNIFICANT CHANGE UP (ref 0–6)
FERRITIN SERPL-MCNC: 472 NG/ML — HIGH (ref 15–150)
FOLATE SERPL-MCNC: 10.3 NG/ML — SIGNIFICANT CHANGE UP
FOLATE SERPL-MCNC: 13.2 NG/ML — SIGNIFICANT CHANGE UP
GLUCOSE SERPL-MCNC: 103 MG/DL — HIGH (ref 70–99)
HCT VFR BLD CALC: 27.1 % — LOW (ref 34.5–45)
HGB BLD-MCNC: 8.8 G/DL — LOW (ref 11.5–15.5)
IMM GRANULOCYTES NFR BLD AUTO: 1.1 % — SIGNIFICANT CHANGE UP (ref 0–1.5)
IRON SATN MFR SERPL: 14 % — SIGNIFICANT CHANGE UP (ref 14–50)
IRON SATN MFR SERPL: 21 % — SIGNIFICANT CHANGE UP (ref 14–50)
IRON SATN MFR SERPL: 27 UG/DL — LOW (ref 30–160)
IRON SATN MFR SERPL: 41 UG/DL — SIGNIFICANT CHANGE UP (ref 30–160)
LYMPHOCYTES # BLD AUTO: 0.67 K/UL — LOW (ref 1–3.3)
LYMPHOCYTES # BLD AUTO: 14.2 % — SIGNIFICANT CHANGE UP (ref 13–44)
MCHC RBC-ENTMCNC: 27.7 PG — SIGNIFICANT CHANGE UP (ref 27–34)
MCHC RBC-ENTMCNC: 32.5 GM/DL — SIGNIFICANT CHANGE UP (ref 32–36)
MCV RBC AUTO: 85.2 FL — SIGNIFICANT CHANGE UP (ref 80–100)
MONOCYTES # BLD AUTO: 0.25 K/UL — SIGNIFICANT CHANGE UP (ref 0–0.9)
MONOCYTES NFR BLD AUTO: 5.3 % — SIGNIFICANT CHANGE UP (ref 2–14)
NEUTROPHILS # BLD AUTO: 3.69 K/UL — SIGNIFICANT CHANGE UP (ref 1.8–7.4)
NEUTROPHILS NFR BLD AUTO: 78.2 % — HIGH (ref 43–77)
PLATELET # BLD AUTO: 244 K/UL — SIGNIFICANT CHANGE UP (ref 150–400)
POTASSIUM SERPL-MCNC: 3.3 MMOL/L — LOW (ref 3.5–5.3)
POTASSIUM SERPL-SCNC: 3.3 MMOL/L — LOW (ref 3.5–5.3)
PROT SERPL-MCNC: 5.8 GM/DL — LOW (ref 6–8.3)
RBC # BLD: 3.18 M/UL — LOW (ref 3.8–5.2)
RBC # FLD: 15.6 % — HIGH (ref 10.3–14.5)
SARS-COV-2 IGG+IGM SERPL QL IA: 0.09 INDEX — SIGNIFICANT CHANGE UP
SARS-COV-2 IGG+IGM SERPL QL IA: 0.4 U/ML — SIGNIFICANT CHANGE UP
SARS-COV-2 IGG+IGM SERPL QL IA: NEGATIVE — SIGNIFICANT CHANGE UP
SARS-COV-2 IGG+IGM SERPL QL IA: NEGATIVE — SIGNIFICANT CHANGE UP
SODIUM SERPL-SCNC: 141 MMOL/L — SIGNIFICANT CHANGE UP (ref 135–145)
TIBC SERPL-MCNC: 195 UG/DL — LOW (ref 220–430)
TIBC SERPL-MCNC: 202 UG/DL — LOW (ref 220–430)
UIBC SERPL-MCNC: 154 UG/DL — SIGNIFICANT CHANGE UP (ref 110–370)
UIBC SERPL-MCNC: 175 UG/DL — SIGNIFICANT CHANGE UP (ref 110–370)
VIT B12 SERPL-MCNC: 783 PG/ML — SIGNIFICANT CHANGE UP (ref 232–1245)
VIT B12 SERPL-MCNC: 898 PG/ML — SIGNIFICANT CHANGE UP (ref 232–1245)
WBC # BLD: 4.72 K/UL — SIGNIFICANT CHANGE UP (ref 3.8–10.5)
WBC # FLD AUTO: 4.72 K/UL — SIGNIFICANT CHANGE UP (ref 3.8–10.5)

## 2021-12-26 PROCEDURE — 99232 SBSQ HOSP IP/OBS MODERATE 35: CPT

## 2021-12-26 RX ORDER — METOCLOPRAMIDE HCL 10 MG
10 TABLET ORAL ONCE
Refills: 0 | Status: COMPLETED | OUTPATIENT
Start: 2021-12-26 | End: 2021-12-26

## 2021-12-26 RX ORDER — ERGOCALCIFEROL 1.25 MG/1
50000 CAPSULE ORAL
Refills: 0 | Status: DISCONTINUED | OUTPATIENT
Start: 2021-12-26 | End: 2021-12-28

## 2021-12-26 RX ORDER — POTASSIUM CHLORIDE 20 MEQ
10 PACKET (EA) ORAL
Refills: 0 | Status: COMPLETED | OUTPATIENT
Start: 2021-12-26 | End: 2021-12-26

## 2021-12-26 RX ADMIN — Medication 0.25 MILLIGRAM(S): at 22:15

## 2021-12-26 RX ADMIN — Medication 10 MILLIEQUIVALENT(S): at 09:58

## 2021-12-26 RX ADMIN — Medication 300 MICROGRAM(S): at 21:32

## 2021-12-26 RX ADMIN — AZITHROMYCIN 255 MILLIGRAM(S): 500 TABLET, FILM COATED ORAL at 00:07

## 2021-12-26 RX ADMIN — Medication 50 MILLIGRAM(S): at 09:58

## 2021-12-26 RX ADMIN — Medication 2 MILLIGRAM(S): at 09:58

## 2021-12-26 RX ADMIN — Medication 3 MILLIGRAM(S): at 22:15

## 2021-12-26 RX ADMIN — Medication 10 MILLIGRAM(S): at 21:31

## 2021-12-26 RX ADMIN — ONDANSETRON 4 MILLIGRAM(S): 8 TABLET, FILM COATED ORAL at 10:05

## 2021-12-26 RX ADMIN — Medication 81 MILLIGRAM(S): at 09:59

## 2021-12-26 RX ADMIN — ATORVASTATIN CALCIUM 40 MILLIGRAM(S): 80 TABLET, FILM COATED ORAL at 21:28

## 2021-12-26 RX ADMIN — Medication 1 TABLET(S): at 18:19

## 2021-12-26 RX ADMIN — Medication 10 MILLIEQUIVALENT(S): at 18:19

## 2021-12-26 RX ADMIN — APIXABAN 5 MILLIGRAM(S): 2.5 TABLET, FILM COATED ORAL at 21:28

## 2021-12-26 RX ADMIN — Medication 50 MILLIGRAM(S): at 21:28

## 2021-12-26 RX ADMIN — CEFTRIAXONE 100 MILLIGRAM(S): 500 INJECTION, POWDER, FOR SOLUTION INTRAMUSCULAR; INTRAVENOUS at 21:28

## 2021-12-26 RX ADMIN — ONDANSETRON 4 MILLIGRAM(S): 8 TABLET, FILM COATED ORAL at 18:18

## 2021-12-26 RX ADMIN — ERGOCALCIFEROL 50000 UNIT(S): 1.25 CAPSULE ORAL at 18:19

## 2021-12-26 RX ADMIN — Medication 10 MILLIEQUIVALENT(S): at 14:40

## 2021-12-26 RX ADMIN — APIXABAN 5 MILLIGRAM(S): 2.5 TABLET, FILM COATED ORAL at 09:58

## 2021-12-26 RX ADMIN — Medication 1 TABLET(S): at 09:58

## 2021-12-26 RX ADMIN — Medication 10 MILLIEQUIVALENT(S): at 12:26

## 2021-12-26 RX ADMIN — AZITHROMYCIN 255 MILLIGRAM(S): 500 TABLET, FILM COATED ORAL at 22:11

## 2021-12-27 ENCOUNTER — TRANSCRIPTION ENCOUNTER (OUTPATIENT)
Age: 76
End: 2021-12-27

## 2021-12-27 LAB
ANION GAP SERPL CALC-SCNC: 7 MMOL/L — SIGNIFICANT CHANGE UP (ref 5–17)
BUN SERPL-MCNC: 7 MG/DL — SIGNIFICANT CHANGE UP (ref 7–23)
CALCIUM SERPL-MCNC: 8 MG/DL — LOW (ref 8.5–10.1)
CHLORIDE SERPL-SCNC: 112 MMOL/L — HIGH (ref 96–108)
CO2 SERPL-SCNC: 24 MMOL/L — SIGNIFICANT CHANGE UP (ref 22–31)
CREAT SERPL-MCNC: 0.59 MG/DL — SIGNIFICANT CHANGE UP (ref 0.5–1.3)
GLUCOSE SERPL-MCNC: 107 MG/DL — HIGH (ref 70–99)
HCT VFR BLD CALC: 26.3 % — LOW (ref 34.5–45)
HGB BLD-MCNC: 8.4 G/DL — LOW (ref 11.5–15.5)
MAGNESIUM SERPL-MCNC: 1.6 MG/DL — SIGNIFICANT CHANGE UP (ref 1.6–2.6)
MCHC RBC-ENTMCNC: 27.5 PG — SIGNIFICANT CHANGE UP (ref 27–34)
MCHC RBC-ENTMCNC: 31.9 GM/DL — LOW (ref 32–36)
MCV RBC AUTO: 85.9 FL — SIGNIFICANT CHANGE UP (ref 80–100)
NRBC # BLD: 1 /100 WBCS — HIGH (ref 0–0)
PHOSPHATE SERPL-MCNC: 3 MG/DL — SIGNIFICANT CHANGE UP (ref 2.5–4.5)
PLATELET # BLD AUTO: 213 K/UL — SIGNIFICANT CHANGE UP (ref 150–400)
POTASSIUM SERPL-MCNC: 3.7 MMOL/L — SIGNIFICANT CHANGE UP (ref 3.5–5.3)
POTASSIUM SERPL-SCNC: 3.7 MMOL/L — SIGNIFICANT CHANGE UP (ref 3.5–5.3)
RBC # BLD: 3.06 M/UL — LOW (ref 3.8–5.2)
RBC # FLD: 15.8 % — HIGH (ref 10.3–14.5)
SARS-COV-2 RNA SPEC QL NAA+PROBE: SIGNIFICANT CHANGE UP
SODIUM SERPL-SCNC: 143 MMOL/L — SIGNIFICANT CHANGE UP (ref 135–145)
WBC # BLD: 12.27 K/UL — HIGH (ref 3.8–10.5)
WBC # FLD AUTO: 12.27 K/UL — HIGH (ref 3.8–10.5)

## 2021-12-27 PROCEDURE — 99232 SBSQ HOSP IP/OBS MODERATE 35: CPT

## 2021-12-27 RX ORDER — ONDANSETRON 8 MG/1
1 TABLET, FILM COATED ORAL
Qty: 30 | Refills: 0
Start: 2021-12-27 | End: 2022-01-05

## 2021-12-27 RX ORDER — LOPERAMIDE HCL 2 MG
1 TABLET ORAL
Qty: 0 | Refills: 0 | DISCHARGE
Start: 2021-12-27

## 2021-12-27 RX ORDER — LACTOBACILLUS ACIDOPHILUS 100MM CELL
1 CAPSULE ORAL
Qty: 20 | Refills: 0
Start: 2021-12-27 | End: 2022-01-05

## 2021-12-27 RX ORDER — CEFUROXIME AXETIL 250 MG
500 TABLET ORAL EVERY 12 HOURS
Refills: 0 | Status: DISCONTINUED | OUTPATIENT
Start: 2021-12-27 | End: 2021-12-28

## 2021-12-27 RX ORDER — VALSARTAN 80 MG/1
1 TABLET ORAL
Qty: 0 | Refills: 0 | DISCHARGE

## 2021-12-27 RX ORDER — AZITHROMYCIN 500 MG/1
500 TABLET, FILM COATED ORAL DAILY
Refills: 0 | Status: DISCONTINUED | OUTPATIENT
Start: 2021-12-28 | End: 2021-12-28

## 2021-12-27 RX ORDER — AZITHROMYCIN 500 MG/1
1 TABLET, FILM COATED ORAL
Qty: 2 | Refills: 0
Start: 2021-12-27 | End: 2021-12-28

## 2021-12-27 RX ORDER — ERGOCALCIFEROL 1.25 MG/1
1 CAPSULE ORAL
Qty: 4 | Refills: 0
Start: 2021-12-27 | End: 2022-01-25

## 2021-12-27 RX ORDER — SPIRONOLACTONE 25 MG/1
1 TABLET, FILM COATED ORAL
Qty: 0 | Refills: 0 | DISCHARGE

## 2021-12-27 RX ORDER — CEFUROXIME AXETIL 250 MG
1 TABLET ORAL
Qty: 8 | Refills: 0
Start: 2021-12-27 | End: 2021-12-30

## 2021-12-27 RX ADMIN — APIXABAN 5 MILLIGRAM(S): 2.5 TABLET, FILM COATED ORAL at 09:34

## 2021-12-27 RX ADMIN — Medication 2 MILLIGRAM(S): at 09:33

## 2021-12-27 RX ADMIN — ATORVASTATIN CALCIUM 40 MILLIGRAM(S): 80 TABLET, FILM COATED ORAL at 21:33

## 2021-12-27 RX ADMIN — Medication 81 MILLIGRAM(S): at 09:33

## 2021-12-27 RX ADMIN — Medication 0.25 MILLIGRAM(S): at 23:22

## 2021-12-27 RX ADMIN — Medication 1 TABLET(S): at 09:33

## 2021-12-27 RX ADMIN — Medication 500 MILLIGRAM(S): at 21:33

## 2021-12-27 RX ADMIN — ONDANSETRON 4 MILLIGRAM(S): 8 TABLET, FILM COATED ORAL at 12:30

## 2021-12-27 RX ADMIN — APIXABAN 5 MILLIGRAM(S): 2.5 TABLET, FILM COATED ORAL at 21:33

## 2021-12-27 RX ADMIN — Medication 50 MILLIGRAM(S): at 09:34

## 2021-12-27 RX ADMIN — Medication 50 MILLIGRAM(S): at 21:33

## 2021-12-27 RX ADMIN — Medication 3 MILLIGRAM(S): at 23:23

## 2021-12-27 NOTE — PHYSICAL THERAPY INITIAL EVALUATION ADULT - DIAGNOSIS, PT EVAL
Neutropenia and anemia due to recent chemo, Febrile syndrome with possible postobstructive  PNA h/o lung metastasis

## 2021-12-27 NOTE — PHYSICAL THERAPY INITIAL EVALUATION ADULT - GENERAL OBSERVATIONS, REHAB EVAL
Pt was found lying in bed, Pt is pleasant and willing to participate in PT, requesting for Imodium prior to amb as having diarrhea frequently

## 2021-12-27 NOTE — DISCHARGE NOTE PROVIDER - HOSPITAL COURSE
Patient is a 76y old  Female who presents with a chief complaint of Fever     HPI:      77 y/o Female with h/o Pancreatic CA  with metastasis to the lung s/p  chemotherapy last dose on 12/20/21 at [facility] with right chest port placed about 3 weeks ago BIBEMS for fever with TMax of 101.1 F about 1-2 hours ago before arriving to the ED on 12/24/21 .  She denies any cough, sore throat, chest pain, SOB, abdominal pain or vomiting.  She endorses diarrhea for the past 2 days. No diarrhea today. Patient notes this decreased with imodium taken yesterday. Patient was seen at [place] ED this afternoon for syncope and had a normal cardiac workup.  Patient had an ablation for Afib about 3 months ago.  She is not vaccinated against Covid.  She didn't take any antipyretics Prior to arrival.      12/25 -  Patient seen and examined at bedside earlier today, denies cp, dyspnea, cough, afebrile, tolerating po intake , + lose bm , plan discussed in length  12/26 - pt seen and examined ,denies cough, dyspnea, abdominal pain, 1 lose bm in am, tolerating po intake, afebrile  12/27 - pt seen and examined, denies dyspnea, + occasional cough, afebrile, lose bm, plan discussed     Review of system- Rest of the review of system are negative except mentioned in HPI    Objective:   Vitals reviewed for last 24 hours  T(C): 37.1 (12-27-21 @ 16:05), Max: 37.1 (12-27-21 @ 16:05)  T(F): 98.8 (12-27-21 @ 16:05), Max: 98.8 (12-27-21 @ 16:05)  HR: 80 (12-27-21 @ 16:05) (80 - 98)  BP: 111/48 (12-27-21 @ 16:05) (111/48 - 138/51)  RR: 18 (12-27-21 @ 16:05) (18 - 18)  SpO2: 93% (12-27-21 @ 16:05) (93% - 98%)  Wt(kg):Physical exam:   General : NAD  NERVOUS SYSTEM:  Alert & Oriented X3, non- focal exam, Motor Strength 5/5 B/L upper and lower extremities; DTRs 2+ intact and symmetric  HEAD:  Atraumatic, Normocephalic  EYES: EOMI, PERRLA, conjunctiva and sclera clear  HEENT: Moist mucous membranes  NECK: Supple, No JVD  CHEST/LUNG: Clear to auscultation bilaterally; No rales, no rhonchi, no wheezing  HEART: Regular rate and rhythm; No murmurs, no rubs or gallops  ABDOMEN: Soft, Non-tender, Non-distended; Bowel sounds present  GENITOURINARY- Voiding, no suprapubic tenderness  EXTREMITIES:  2+ Peripheral Pulses, No clubbing, cyanosis,   edema  MUSCULOSKELETAL:- No muscle tenderness, Muscle tone normal, No joint tenderness, no Joint swelling,  Joint ROM –normal   SKIN-no rash, no lesion    LABS: all reviewed  Neutropenia and anemia due to recent chemo  Febrile syndrome with possible postobstructive  PNA h/o lung metastasis   Metastatic pancreatic cancer on chemo   - c/w IV Ceftriaxone and Zithromax--> ceftin  po to complete 7 days , azithromycin 5 days  - follow cultures  - neg blood and urine   - hem/onc consult for possible Neupogen therapy   - ID consult appreciated   - lose bm - monitor can be due to recent fever, start probiotics  - check iron studies - anemia in neoplastic disease , stable   -  s/p  Zarxio x2 days  as per oncology  WBC 4     Paroxysmal Afib  -continue Metoprolol and Eliquis     HTN- overcontrolled - hold valsartan for now, continue metoprolol only     h/o CVA - c/w ASA, statins    Anxiety - c/w xanax prn     Severe vitamin D deficiency - start high dose vit D weekly     COVID exposure  - pts grandson is Dx with covid today, check covid PCR  neg  , patient can only return to her home upon discharge, pt has negative covid ab       Code status  -patient is in Full code status  Disposition - medically optimized to be discharged home with close follow up with PCP within 1 week  complete antibiotics  return to ED if fever, abdominal pain, nausea, vomiting, chest pain, dyspnea  Discharge plan discussed with patient, RN  Patient advised to follow up with PCP within 3-7 days  time spend 40 min  Discharge note faxed to PCP with my contact information to call me back   PCP Dr. Riley Patient is a 76y old  Female who presents with a chief complaint of Fever     HPI:      75 y/o Female with h/o Pancreatic CA  with metastasis to the lung s/p  chemotherapy last dose on 12/20/21 at [facility] with right chest port placed about 3 weeks ago BIBEMS for fever with TMax of 101.1 F about 1-2 hours ago before arriving to the ED on 12/24/21 .  She denies any cough, sore throat, chest pain, SOB, abdominal pain or vomiting.  She endorses diarrhea for the past 2 days. No diarrhea today. Patient notes this decreased with imodium taken yesterday. Patient was seen at [place] ED this afternoon for syncope and had a normal cardiac workup.  Patient had an ablation for Afib about 3 months ago.  She is not vaccinated against Covid.  She didn't take any antipyretics Prior to arrival.      12/25 -  Patient seen and examined at bedside earlier today, denies cp, dyspnea, cough, afebrile, tolerating po intake , + lose bm , plan discussed in length  12/26 - pt seen and examined ,denies cough, dyspnea, abdominal pain, 1 lose bm in am, tolerating po intake, afebrile  12/27 - pt seen and examined, denies dyspnea, + occasional cough, afebrile, lose bm, plan discussed   12/28 - pt seen and examined, no events, denies cp, dyspnea, afebrile, no diarrhea   Review of system- Rest of the review of system are negative except mentioned in HPI    Objective:   Vitals reviewed for last 24 hours  T(C): 36.5 (12-28-21 @ 09:18), Max: 37.1 (12-27-21 @ 16:05)  T(F): 97.7 (12-28-21 @ 09:18), Max: 98.8 (12-27-21 @ 16:05)  HR: 85 (12-28-21 @ 09:18) (80 - 85)  BP: 138/51 (12-28-21 @ 09:18) (111/48 - 138/51)  RR: 18 (12-28-21 @ 09:18) (18 - 18)  SpO2: 94% (12-28-21 @ 09:18) (93% - 96%)  Wt(kg): --      Wt(kg):Physical exam:   General : NAD  NERVOUS SYSTEM:  Alert & Oriented X3, non- focal exam, Motor Strength 5/5 B/L upper and lower extremities; DTRs 2+ intact and symmetric  HEAD:  Atraumatic, Normocephalic  EYES: EOMI, PERRLA, conjunctiva and sclera clear  HEENT: Moist mucous membranes  NECK: Supple, No JVD  CHEST/LUNG: Clear to auscultation bilaterally; No rales, no rhonchi, no wheezing  HEART: Regular rate and rhythm; No murmurs, no rubs or gallops  ABDOMEN: Soft, Non-tender, Non-distended; Bowel sounds present  GENITOURINARY- Voiding, no suprapubic tenderness  EXTREMITIES:  2+ Peripheral Pulses, No clubbing, cyanosis,   edema  MUSCULOSKELETAL:- No muscle tenderness, Muscle tone normal, No joint tenderness, no Joint swelling,  Joint ROM –normal   SKIN-no rash, no lesion    LABS: all reviewed  Neutropenia and anemia due to recent chemo  Febrile syndrome with possible postobstructive  PNA h/o lung metastasis   Metastatic pancreatic cancer on chemo   - c/w IV Ceftriaxone and Zithromax--> ceftin  po to complete 7 days , azithromycin 5 days  - follow cultures  - neg blood and urine   - hem/onc consult for possible Neupogen therapy   - ID consult appreciated   - lose bm - monitor can be due to recent fever, start probiotics  - check iron studies - anemia in neoplastic disease , stable   -  s/p  Zarxio x2 days  as per oncology  WBC 4     Paroxysmal Afib  -continue Metoprolol and Eliquis     HTN- overcontrolled - hold valsartan for now, continue metoprolol only     h/o CVA - c/w ASA, statins    Anxiety - c/w xanax prn     Severe vitamin D deficiency - start high dose vit D weekly     COVID exposure  - pts grandson is Dx with covid today, check covid PCR  neg  , patient can only return to her home upon discharge, pt has negative covid ab       Code status  -patient is in Full code status  Disposition - medically optimized to be discharged home with close follow up with PCP within 1 week  complete antibiotics  return to ED if fever, abdominal pain, nausea, vomiting, chest pain, dyspnea  Discharge plan discussed with patient, RN  Patient advised to follow up with PCP within 3-7 days  time spend 40 min  Discharge note faxed to PCP with my contact information to call me back   PCP Dr. Riley

## 2021-12-27 NOTE — DISCHARGE NOTE PROVIDER - CARE PROVIDER_API CALL
John Riley)  Family Medicine  [address] [place] [id number]  Phone: [phone]  Fax: [phone]  Follow Up Time: 1 week    Roberto Matias)  Medical Oncology  [address] [place] [address], [address] [place] [address]  Phone: [phone]  Fax: [phone]  Follow Up Time: 1 week

## 2021-12-27 NOTE — DISCHARGE NOTE NURSING/CASE MANAGEMENT/SOCIAL WORK - PATIENT PORTAL LINK FT
You can access the FollowMyHealth Patient Portal offered by [facility] by registering at the following website: [email or web address]. By joining [employer]’s FollowMyHealth portal, you will also be able to view your health information using other applications (apps) compatible with our system.

## 2021-12-27 NOTE — DISCHARGE NOTE NURSING/CASE MANAGEMENT/SOCIAL WORK - NSDCPEFALRISK_GEN_ALL_CORE
For information on Fall & Injury Prevention, visit: [email or web address] OR  [email or web address] OR  https://www.cdc.gov/steadi/patient.html

## 2021-12-27 NOTE — DISCHARGE NOTE PROVIDER - CARE PROVIDERS DIRECT ADDRESSES
,DirectAddress_Unknown,[email or web address]

## 2021-12-27 NOTE — DISCHARGE NOTE PROVIDER - NSDCCPCAREPLAN_GEN_ALL_CORE_FT
PRINCIPAL DISCHARGE DIAGNOSIS  Diagnosis: Pneumonia  Assessment and Plan of Treatment: complete antibiotics as prescribed, follow up with PCP within 1 week, repeat imaging to establish resolution  stongly advised to get covid vaccine, patient  refused      SECONDARY DISCHARGE DIAGNOSES  Diagnosis: Neutropenia, febrile  Assessment and Plan of Treatment: due to chemo for pancreatic cancer , resolved, follow up with oncologist within 1 week for further treatment    Diagnosis: Mild HTN  Assessment and Plan of Treatment: stop valsartan, continue metoprolo, follow up with PCP within 1 week for further monitoring    Diagnosis: Vitamin D deficiency  Assessment and Plan of Treatment: take vitamin D once weekly high dose, recheck the level in 4 weeks , follow up with PCP within 1 week

## 2021-12-27 NOTE — DISCHARGE NOTE PROVIDER - PROVIDER TOKENS
PROVIDER:[TOKEN:[[id number]],FOLLOWUP:[1 week]],PROVIDER:[TOKEN:[[id number]],FOLLOWUP:[1 week]]

## 2021-12-27 NOTE — PHYSICAL THERAPY INITIAL EVALUATION ADULT - PERTINENT HX OF CURRENT PROBLEM, REHAB EVAL
75 y/o Female with h/o Pancreatic CA  with metastasis to the lung s/p  chemotherapy last dose on 12/20/21 at [facility] with right chest port placed about 3 weeks ago BIBEMS for fever with TMax of 101.1 F about 1-2 hours ago before arriving to the ED on 12/24/21, She endorses diarrhea for the past 2 days,Patient was seen at [place] ED this afternoon for syncope and had a normal cardiac workup.  Patient had an ablation for Afib about 3 months ago.  She is not vaccinated against Covid.,

## 2021-12-28 VITALS
RESPIRATION RATE: 18 BRPM | DIASTOLIC BLOOD PRESSURE: 76 MMHG | HEART RATE: 99 BPM | TEMPERATURE: 98 F | SYSTOLIC BLOOD PRESSURE: 124 MMHG | OXYGEN SATURATION: 99 %

## 2021-12-28 PROCEDURE — 99239 HOSP IP/OBS DSCHRG MGMT >30: CPT

## 2021-12-28 RX ADMIN — APIXABAN 5 MILLIGRAM(S): 2.5 TABLET, FILM COATED ORAL at 09:34

## 2021-12-28 RX ADMIN — AZITHROMYCIN 500 MILLIGRAM(S): 500 TABLET, FILM COATED ORAL at 09:35

## 2021-12-28 RX ADMIN — Medication 50 MILLIGRAM(S): at 09:35

## 2021-12-28 RX ADMIN — Medication 500 MILLIGRAM(S): at 09:35

## 2021-12-28 RX ADMIN — ONDANSETRON 4 MILLIGRAM(S): 8 TABLET, FILM COATED ORAL at 09:34

## 2021-12-28 RX ADMIN — Medication 1 TABLET(S): at 09:35

## 2021-12-28 RX ADMIN — Medication 81 MILLIGRAM(S): at 09:34

## 2021-12-28 NOTE — PROGRESS NOTE ADULT - ASSESSMENT
75 y/o Female with h/o Pancreatic CA  with metastasis to the lung s/p  chemotherapy last dose on 12/20/21 at [facility] with right chest port placed about 3 weeks ago BIBEMS for fever with TMax of 101.1 F about 1-2 hours ago before arriving to the ED on 12/24/21 .      Neutropenia and anemia due to recent chemo  Febrile syndrome with possible postobstructive  PNA h/o lung metastasis   Metastatic pancreatic cancer on chemo   - c/w IV Ceftriaxone and Zithromax--> ceftin  po to complete 7 days , azithromycin 5 days  - follow cultures  - neg blood and urine   - hem/onc consult for possible Neupogen therapy   - ID consult appreciated   - lose bm - monitor can be due to recent fever, start probiotics  - check iron studies - anemia in neoplastic disease , stable   -  s/p  Zarxio x2 days  as per oncology  WBC 4     Paroxysmal Afib  -continue Metoprolol and Eliquis     HTN- overcontrolled - hold valsartan for now, continue metoprolol only     h/o CVA - c/w ASA, statins    Anxiety - c/w xanax prn     Severe vitamin D deficiency - start high dose vit D weekly     COVID exposure  - pts grandson is Dx with covid today, check covid PCR  neg  , patient can only return to her home upon discharge, pt has negative covid ab       Code status  -patient is in Full code status    Dispo - stable  for  d/c 
 77 y/o Female with h/o Pancreatic CA  with metastasis to the lung s/p  chemotherapy last dose on 12/20/21 at [facility] with right chest port placed about 3 weeks ago BIBEMS for fever with TMax of 101.1 F about 1-2 hours ago before arriving to the ED on 12/24/21 .      Neutropenia and anemia due to recent chemo  Febrile syndrome with possible postobstructive  PNA h/o lung metastasis   Metastatic pancreatic cancer on chemo   - c/w IV Ceftriaxone and Zithromax--> ceftin  po to complete 7 days , azithromycin 5 days  - follow cultures  - neg blood and urine   - hem/onc consult for possible Neupogen therapy   - ID consult appreciated   - lose bm - monitor can be due to recent fever, start probiotics  - check iron studies - anemia in neoplastic disease , stable   -  s/p  Zarxio x2 days  as per oncology  WBC 4     Paroxysmal Afib  -continue Metoprolol and Eliquis     HTN- overcontrolled - hold valsartan for now, continue metoprolol only     h/o CVA - c/w ASA, statins    Anxiety - c/w xanax prn     Severe vitamin D deficiency - start high dose vit D weekly     COVID exposure  - pts grandson is Dx with covid today, check covid PCR  neg  , patient can only return to her home upon discharge, pt has negative covid ab       Code status  -patient is in Full code status    Dispo - stable  for  d/c 
75 y/o Female with h/o Pancreatic Ca treated with chemotherapy last dose on 12/20/21 at [facility], right chest mediport placed about 3 weeks PTA, A.fib s/p ablation, old CVA, HL, HTN was admitted on 12/24 for fever with TMax of 101.1 F about 1-2 hours ago before arriving to the ED.  She denies cough, sore throat, chest pain, SOB, abdominal pain or vomiting.  She endorses diarrhea for the past 2 days PTA. No diarrhea on the day of admission s/p imodium. Patient was seen at [place] ED on the day of admission for syncope and had a normal cardiac workup. In ER she received ceftriaxone and azithromycin.     1. Febrile syndrome improving. Multiple innumerable patchy nodular pulmonary opacities. Possible superimposed pneumonia. Pancreatic Ca on chemotherapy. Immunocompromised host.   -cell cts improving  -fever is down  -BC x 2, urine c/s collected  -s/p ceftriaxone 1 gm IV qd and azithromycin 500 mg OV qd # 3  -on po ceftin complete 4 days, 5 days total azithromycin  -tolerating abx well so far; no side effects noted  -respiratory care  -monitor temps  -f/u CBC  -supportive care  2. Other issues:   -care per medicine      
77 y/o Female with h/o Pancreatic Ca treated with chemotherapy last dose on 12/20/21 at [facility], right chest mediport placed about 3 weeks PTA, A.fib s/p ablation, old CVA, HL, HTN was admitted on 12/24 for fever with TMax of 101.1 F about 1-2 hours ago before arriving to the ED.  She denies cough, sore throat, chest pain, SOB, abdominal pain or vomiting.  She endorses diarrhea for the past 2 days PTA. No diarrhea on the day of admission s/p imodium. Patient was seen at [place] ED on the day of admission for syncope and had a normal cardiac workup. In ER she received ceftriaxone and azithromycin.     1. Febrile syndrome improving. Multiple innumerable patchy nodular pulmonary opacities. Possible superimposed pneumonia. Pancreatic Ca on chemotherapy. Immunocompromised host.   -cell cts improving  -fever is down  -BC x 2, urine c/s collected  -on ceftriaxone 1 gm IV qd and azithromycin 500 mg OV qd # 3  -tolerating abx well so far; no side effects noted  -respiratory care  -continue abx coverage  -nonvaxxed; immunosuppressed; urged her to get the COVID vaccine  -monitor temps  -f/u CBC  -supportive care  2. Other issues:   -care per medicine      
A/P:    1.  Neutropenia  Fever  Lung Infiltrates - likely infectious - multifocal pneumonia  -started on IV Ceftriaxone and Zithromax  -follow cultures   -give tylenol as needed for fever  -follow hem/onc consult for possible Neupogen therapy    2.  h/o Afib  -continue Metoprolol and Eliquis     3.  Eliquis for DVT ppx    4.  Code status  -patient is in Full code status    
 77 y/o Female with h/o Pancreatic CA  with metastasis to the lung s/p  chemotherapy last dose on 12/20/21 at [facility] with right chest port placed about 3 weeks ago BIBEMS for fever with TMax of 101.1 F about 1-2 hours ago before arriving to the ED on 12/24/21 .      Neutropenia and anemia due to recent chemo  Febrile syndrome with possible postobstructive  PNA h/o lung metastasis   Metastatic pancreatic cancer on chemo   - c/w IV Ceftriaxone and Zithromax  - follow cultures   - hem/onc consult for possible Neupogen therapy   - ID consult appreciated   - lose bm - monitor can be due to recent fever, start probiotics  - check iron studies   - 12/25 start Zarxio  as per oncology     Paroxysmal Afib  -continue Metoprolol and Eliquis     HTN- overcontrolled - hold valsartan for now, continue metoprolol only     h/o CVA - c/w ASA, statins    Anxiety - c/w xanax prn       Code status  -patient is in Full code status    Dispo - IV abx, inpatient monitoring 
 77 y/o Female with h/o Pancreatic CA  with metastasis to the lung s/p  chemotherapy last dose on 12/20/21 at [facility] with right chest port placed about 3 weeks ago BIBEMS for fever with TMax of 101.1 F about 1-2 hours ago before arriving to the ED on 12/24/21 .      Neutropenia and anemia due to recent chemo  Febrile syndrome with possible postobstructive  PNA h/o lung metastasis   Metastatic pancreatic cancer on chemo   - c/w IV Ceftriaxone and Zithromax  - follow cultures  - neg blood and urine  - hem/onc consult for possible Neupogen therapy   - ID consult appreciated   - lose bm - monitor can be due to recent fever, start probiotics  - check iron studies - anemia in neoplastic disease , stable   -  s/p  Zarxio x2 days  as per oncology  WBC 4     Paroxysmal Afib  -continue Metoprolol and Eliquis     HTN- overcontrolled - hold valsartan for now, continue metoprolol only     h/o CVA - c/w ASA, statins    Anxiety - c/w xanax prn     Severe vitamin D deficiency - start high dose vit D weekly     COVID exposure  - pts grandson is Dx with covid today, check covid PCR in am , patient can only return to her home upon discharge      Code status  -patient is in Full code status    Dispo - IV abx, inpatient monitoring , d/c planning 
77 y/o Female with h/o Pancreatic Ca treated with chemotherapy last dose on 12/20/21 at [facility], right chest mediport placed about 3 weeks PTA, A.fib s/p ablation, old CVA, HL, HTN was admitted on 12/24 for fever with TMax of 101.1 F about 1-2 hours ago before arriving to the ED.  She denies cough, sore throat, chest pain, SOB, abdominal pain or vomiting.  She endorses diarrhea for the past 2 days PTA. No diarrhea on the day of admission s/p imodium. Patient was seen at [place] ED on the day of admission for syncope and had a normal cardiac workup. In ER she received ceftriaxone and azithromycin.     1. Febrile syndrome improving. Multiple innumerable patchy nodular pulmonary opacities. Possible superimposed pneumonia. Pancreatic Ca on chemotherapy. Immunocompromised host.   -leukopenia/ neutropenia  -fever is down  -BC x 2, urine c/s collected  -on ceftriaxone 1 gm IV qd and azithromycin 500 mg OV qd # 2  -tolerating abx well so far; no side effects noted  -respiratory care  -continue abx coverage  -nonvaxxed; immunosuppressed; urged her to get the COVID vaccine  -monitor temps  -f/u CBC  -supportive care  2. Other issues:   -care per medicine

## 2021-12-28 NOTE — PROGRESS NOTE ADULT - SUBJECTIVE AND OBJECTIVE BOX
Date of service: 21 @ 08:41    Lying in bed in NAD  Has mild cough  Fever is down    ROS: denies dizziness, no HA, no abdominal pain, no diarrhea or constipation; no dysuria, no legs pain, no rashes    MEDICATIONS  (STANDING):  apixaban 5 milliGRAM(s) Oral every 12 hours  aspirin enteric coated 81 milliGRAM(s) Oral daily  atorvastatin 40 milliGRAM(s) Oral at bedtime  azithromycin  IVPB 500 milliGRAM(s) IV Intermittent every 24 hours  cefTRIAXone   IVPB 1000 milliGRAM(s) IV Intermittent every 24 hours  filgrastim-sndz (ZARXIO) Injectable 300 MICROGram(s) SubCutaneous daily  lactobacillus acidophilus 1 Tablet(s) Oral two times a day with meals  metoprolol tartrate 50 milliGRAM(s) Oral two times a day    Vital Signs Last 24 Hrs  T(C): 37.2 (26 Dec 2021 05:00), Max: 37.2 (26 Dec 2021 05:00)  T(F): 98.9 (26 Dec 2021 05:00), Max: 98.9 (26 Dec 2021 05:00)  HR: 91 (26 Dec 2021 05:00) (77 - 94)  BP: 121/44 (26 Dec 2021 05:00) (105/50 - 121/44)  BP(mean): --  RR: 18 (26 Dec 2021 05:00) (18 - 18)  SpO2: 97% (26 Dec 2021 05:00) (95% - 97%)     Physical exam:    Constitutional:  No acute distress  HEENT: NC/AT, EOMI, PERRLA, conjunctivae clear; ears and nose atraumatic  Neck: supple; thyroid not palpable  Back: no tenderness  Respiratory: respiratory effort normal; crackles at bases  Cardiovascular: S1S2 regular, no murmurs  Abdomen: soft, not tender, not distended, positive BS  Genitourinary: no suprapubic tenderness  Lymphatic: no LN palpable  Musculoskeletal: no muscle tenderness, no joint swelling or tenderness  Extremities: no pedal edema  Neurological/ Psychiatric: AxOx3, moving all extremities  Skin: no rashes; no palpable lesions    Labs: all available labs reviewed                        8.4    1.62  )-----------( 220      ( 25 Dec 2021 06:37 )             26.3     12-    141  |  109<H>  |  6<L>  ----------------------------<  114<H>  3.8   |  28  |  0.53    Ca    8.2<L>      25 Dec 2021 06:37  Mg     1.8         TPro  6.1  /  Alb  2.2<L>  /  TBili  1.0  /  DBili  x   /  AST  37  /  ALT  74  /  AlkPhos  61       LIVER FUNCTIONS - ( 23 Dec 2021 23:29 )  Alb: 2.2 g/dL / Pro: 6.1 gm/dL / ALK PHOS: 61 U/L / ALT: 74 U/L / AST: 37 U/L / GGT: x           Urinalysis Basic - ( 24 Dec 2021 02:51 )    Color: Yellow / Appearance: Clear / S.010 / pH: x  Gluc: x / Ketone: Negative  / Bili: Negative / Urobili: Negative mg/dL   Blood: x / Protein: 30 mg/dL / Nitrite: Negative   Leuk Esterase: Negative / RBC: 3-5 /HPF / WBC 3-5   Sq Epi: x / Non Sq Epi: Occasional / Bacteria: Few      Culture - Urine (collected 24 Dec 2021 02:51)  Source: Clean Catch None  Final Report (25 Dec 2021 13:32):    <10,000 CFU/mL Normal Urogenital Gisselle    Culture - Blood (collected 23 Dec 2021 23:29)  Source: .Blood None  Preliminary Report (25 Dec 2021 05:01):    No growth to date.    Culture - Blood (collected 23 Dec 2021 23:29)  Source: .Blood None  Preliminary Report (25 Dec 2021 05:01):    No growth to date.    Radiology: all available radiological tests reviewed    < from: CT Chest No Cont (12.24.21 @ 01:51) >  Innumerable patchy nodular opacities throughout the lungs, probably   infectious/inflammatory. Follow-up is recommended to ensure resolution.    < end of copied text >      Advanced directives addressed: full resuscitation
Subjective:  Patient is a 76y old  Female who presents with a chief complaint of Fever     HPI:      77 y/o Female with h/o Pancreatic CA  with metastasis to the lung s/p  chemotherapy last dose on 21 at [facility] with right chest port placed about 3 weeks ago BIBEMS for fever with TMax of 101.1 F about 1-2 hours ago before arriving to the ED on 21 .  She denies any cough, sore throat, chest pain, SOB, abdominal pain or vomiting.  She endorses diarrhea for the past 2 days. No diarrhea today. Patient notes this decreased with imodium taken yesterday. Patient was seen at [place] ED this afternoon for syncope and had a normal cardiac workup.  Patient had an ablation for Afib about 3 months ago.  She is not vaccinated against Covid.  She didn't take any antipyretics Prior to arrival.       -  Patient seen and examined at bedside earlier today, denies cp, dyspnea, cough, afebrile, tolerating po intake , + lose bm , plan discussed in length   - pt seen and examined ,denies cough, dyspnea, abdominal pain, 1 lose bm in am, tolerating po intake, afebrile    Review of system- Rest of the review of system are negative except mentioned in HPI    Objective:   Vitals reviewed for last 24 hours  T(C): 37.1 (21 @ 09:09), Max: 37.2 (21 @ 05:00)  T(F): 98.7 (21 @ 09:09), Max: 98.9 (21 @ 05:00)  HR: 83 (21 @ 09:09) (77 - 94)  BP: 128/45 (21 @ 09:09) (105/50 - 128/45)  RR: 17 (21 @ 09:09) (17 - 18)  SpO2: 94% (21 @ 09:09) (94% - 97%)  Wt(kg): --      Physical exam:   General : NAD  NERVOUS SYSTEM:  Alert & Oriented X3, non- focal exam, Motor Strength 5/5 B/L upper and lower extremities; DTRs 2+ intact and symmetric  HEAD:  Atraumatic, Normocephalic  EYES: EOMI, PERRLA, conjunctiva and sclera clear  HEENT: Moist mucous membranes  NECK: Supple, No JVD  CHEST/LUNG: Clear to auscultation bilaterally; No rales, no rhonchi, no wheezing  HEART: Regular rate and rhythm; No murmurs, no rubs or gallops  ABDOMEN: Soft, Non-tender, Non-distended; Bowel sounds present  GENITOURINARY- Voiding, no suprapubic tenderness  EXTREMITIES:  2+ Peripheral Pulses, No clubbing, cyanosis,   edema  MUSCULOSKELETAL:- No muscle tenderness, Muscle tone normal, No joint tenderness, no Joint swelling,  Joint ROM –normal   SKIN-no rash, no lesion    LABS: all reviewed      141  |  109<H>  |  9   ----------------------------<  103<H>  3.3<L>   |  25  |  0.56    Ca    8.2<L>      26 Dec 2021 07:29  Mg     1.8         TPro  5.8<L>  /  Alb  1.9<L>  /  TBili  0.4  /  DBili  x   /  AST  36  /  ALT  65  /  AlkPhos  66                              8.8    4.72  )-----------( 244      ( 26 Dec 2021 07:29 )             27.1             LIVER FUNCTIONS - ( 26 Dec 2021 07:29 )  Alb: 1.9 g/dL / Pro: 5.8 gm/dL / ALK PHOS: 66 U/L / ALT: 65 U/L / AST: 36 U/L / GGT: x                                         8.4    1.62  )-----------( 220      ( 25 Dec 2021 06:37 )             26.3         141  |  109<H>  |  6<L>  ----------------------------<  114<H>  3.8   |  28  |  0.53    Ca    8.2<L>      25 Dec 2021 06:37  Mg     1.8         TPro  6.1  /  Alb  2.2<L>  /  TBili  1.0  /  DBili  x   /  AST  37  /  ALT  74  /  AlkPhos  61      PT/INR - ( 23 Dec 2021 23:29 )   PT: 27.1 sec;   INR: 2.42 ratio         PTT - ( 23 Dec 2021 23:29 )  PTT:29.7 sec        Urinalysis Basic - ( 24 Dec 2021 02:51 )    Color: Yellow / Appearance: Clear / S.010 / pH: x  Gluc: x / Ketone: Negative  / Bili: Negative / Urobili: Negative mg/dL   Blood: x / Protein: 30 mg/dL / Nitrite: Negative   Leuk Esterase: Negative / RBC: 3-5 /HPF / WBC 3-5   Sq Epi: x / Non Sq Epi: Occasional / Bacteria: Few        RECENT CULTURES:    RADIOLOGY & ADDITIONAL TESTS: all reviewed   EKG noted    < from: 12 Lead ECG (21 @ 22:30) >  Ventricular Rate 105 BPM  Diagnosis Line Sinus tachycardia with 1st degree A-V block with Premature atrial complexes  Otherwise normal ECG  When compared with ECG of 2021 22:47,  No significant change was found    < from: CT Chest No Cont (21 @ 01:51) >    LUNGS AND AIRWAYS: Patent central airways.  Innumerable patchy nodular   opacities throughout the lungs, probably infectious/inflammatory.  PLEURA: No pleural effusion.  MEDIASTINUM AND KAREN: No lymphadenopathy.  VESSELS: Atherosclerosis.  HEART: Heart size is normal. No pericardial effusion. Coronary artery   calcifications.  CHEST WALL AND LOWER NECK: Within normal limits.  VISUALIZED UPPER ABDOMEN: Within normal limits.  BONES: Degenerative changes.    IMPRESSION:  Innumerable patchy nodular opacities throughout the lungs, probably   infectious/inflammatory. Follow-up is recommended to ensure resolution.          Current medications:  acetaminophen     Tablet .. 650 milliGRAM(s) Oral every 6 hours PRN  ALPRAZolam 0.25 milliGRAM(s) Oral daily PRN  aluminum hydroxide/magnesium hydroxide/simethicone Suspension 30 milliLiter(s) Oral every 4 hours PRN  apixaban 5 milliGRAM(s) Oral every 12 hours  aspirin enteric coated 81 milliGRAM(s) Oral daily  atorvastatin 40 milliGRAM(s) Oral at bedtime  azithromycin  IVPB 500 milliGRAM(s) IV Intermittent every 24 hours  cefTRIAXone   IVPB 1000 milliGRAM(s) IV Intermittent every 24 hours  lactobacillus acidophilus 1 Tablet(s) Oral two times a day with meals  loperamide 2 milliGRAM(s) Oral daily PRN  melatonin 3 milliGRAM(s) Oral at bedtime PRN  metoprolol tartrate 50 milliGRAM(s) Oral two times a day  ondansetron Injectable 4 milliGRAM(s) IV Push every 6 hours PRN            
CHIEF COMPLAINT:75 y/o Female with h/o Pancreatic CA treated with chemotherapy last dose on 12/20/21 at [facility] with right chest port placed about 3 weeks ago BIBEMS for fever with TMax of 101.1 F about 1-2 hours ago before arriving to the ED.  She denies any cough, sore throat, chest pain, SOB, abdominal pain or vomiting.  She endorses diarrhea for the past 2 days. No diarrhea today. Patient notes this decreased with imodium taken yesterday. Patient was seen at [place] ED this afternoon for syncope and had a normal cardiac workup.  Patient had an ablation for Afib about 3 months ago.  She is not vaccinated against Covid.  She didn't take any antipyretics Prior to arrival.       Vital Signs Last 24 Hrs  T(C): 37.2 (24 Dec 2021 16:59), Max: 38.9 (23 Dec 2021 23:13)  T(F): 98.9 (24 Dec 2021 16:59), Max: 102 (23 Dec 2021 23:13)  HR: 87 (24 Dec 2021 16:59) (84 - 103)  BP: 136/50 (24 Dec 2021 16:59) (81/61 - 146/57)  BP(mean): 71 (24 Dec 2021 05:18) (62 - 72)  RR: 18 (24 Dec 2021 16:59) (16 - 21)  SpO2: 98% (24 Dec 2021 16:59) (90% - 100%)  Constitutional: NAD, awake and alert  HEENT: PERR, EOMI  Neck: Soft and supple,  No JVD  Respiratory: few rhonchi bilaterally   Cardiovascular: S1 and S2, regular rate and rhythm, no Murmurs  Gastrointestinal: Bowel Sounds present, soft, nontender, nondistended  Extremities: No peripheral edema  Vascular: 2+ peripheral pulses  Neurological: A/O x 3, no focal deficits  Musculoskeletal: 5/5 strength b/l upper and lower extremities  Skin: No rashes    MEDICATIONS:  MEDICATIONS  (STANDING):  apixaban 5 milliGRAM(s) Oral every 12 hours  aspirin enteric coated 81 milliGRAM(s) Oral daily  atorvastatin 40 milliGRAM(s) Oral at bedtime  azithromycin  IVPB 500 milliGRAM(s) IV Intermittent every 24 hours  cefTRIAXone   IVPB 1000 milliGRAM(s) IV Intermittent every 24 hours  metoprolol tartrate 50 milliGRAM(s) Oral two times a day      LABS: All Labs Reviewed:                      8.2    1.07  )-----------( 192      ( 24 Dec 2021 07:36 )             25.6    140  |  109<H>  |  8   ----------------------------<  122<H>  4.2   |  28  |  0.47<L>      RADIOLOGY/EKG:  CT chest rev by me shows extensive bilateral infiltrates     
Subjective:  Patient is a 76y old  Female who presents with a chief complaint of Fever     HPI:      77 y/o Female with h/o Pancreatic CA  with metastasis to the lung s/p  chemotherapy last dose on 21 at [facility] with right chest port placed about 3 weeks ago BIBEMS for fever with TMax of 101.1 F about 1-2 hours ago before arriving to the ED on 21 .  She denies any cough, sore throat, chest pain, SOB, abdominal pain or vomiting.  She endorses diarrhea for the past 2 days. No diarrhea today. Patient notes this decreased with imodium taken yesterday. Patient was seen at [place] ED this afternoon for syncope and had a normal cardiac workup.  Patient had an ablation for Afib about 3 months ago.  She is not vaccinated against Covid.  She didn't take any antipyretics Prior to arrival.       -  Patient seen and examined at bedside earlier today, denies cp, dyspnea, cough, afebrile, tolerating po intake , + lose bm , plan discussed in length   - pt seen and examined ,denies cough, dyspnea, abdominal pain, 1 lose bm in am, tolerating po intake, afebrile   - pt seen and examined, denies dyspnea, + occasional cough, afebrile, lose bm, plan discussed     Review of system- Rest of the review of system are negative except mentioned in HPI    Objective:   Vitals reviewed for last 24 hours  T(C): 37.1 (21 @ 16:05), Max: 37.1 (21 @ 16:05)  T(F): 98.8 (21 @ 16:05), Max: 98.8 (21 @ 16:05)  HR: 80 (21 @ 16:05) (80 - 98)  BP: 111/48 (21 @ 16:05) (111/48 - 138/51)  RR: 18 (21 @ 16:05) (18 - 18)  SpO2: 93% (21 @ 16:05) (93% - 98%)  Wt(kg): --      Physical exam:   General : NAD  NERVOUS SYSTEM:  Alert & Oriented X3, non- focal exam, Motor Strength 5/5 B/L upper and lower extremities; DTRs 2+ intact and symmetric  HEAD:  Atraumatic, Normocephalic  EYES: EOMI, PERRLA, conjunctiva and sclera clear  HEENT: Moist mucous membranes  NECK: Supple, No JVD  CHEST/LUNG: Clear to auscultation bilaterally; No rales, no rhonchi, no wheezing  HEART: Regular rate and rhythm; No murmurs, no rubs or gallops  ABDOMEN: Soft, Non-tender, Non-distended; Bowel sounds present  GENITOURINARY- Voiding, no suprapubic tenderness  EXTREMITIES:  2+ Peripheral Pulses, No clubbing, cyanosis,   edema  MUSCULOSKELETAL:- No muscle tenderness, Muscle tone normal, No joint tenderness, no Joint swelling,  Joint ROM –normal   SKIN-no rash, no lesion    LABS: all reviewed      143  |  112<H>  |  7   ----------------------------<  107<H>  3.7   |  24  |  0.59    Ca    8.0<L>      27 Dec 2021 06:52  Phos  3.0       Mg     1.6         TPro  5.8<L>  /  Alb  1.9<L>  /  TBili  0.4  /  DBili  x   /  AST  36  /  ALT  65  /  AlkPhos  66                              8.4    12.27 )-----------( 213      ( 27 Dec 2021 06:52 )             26.3             LIVER FUNCTIONS - ( 26 Dec 2021 07:29 )  Alb: 1.9 g/dL / Pro: 5.8 gm/dL / ALK PHOS: 66 U/L / ALT: 65 U/L / AST: 36 U/L / GGT: x                       141  |  109<H>  |  9   ----------------------------<  103<H>  3.3<L>   |  25  |  0.56    Ca    8.2<L>      26 Dec 2021 07:29  Mg     1.8         TPro  5.8<L>  /  Alb  1.9<L>  /  TBili  0.4  /  DBili  x   /  AST  36  /  ALT  65  /  AlkPhos  66                              8.8    4.72  )-----------( 244      ( 26 Dec 2021 07:29 )             27.1             LIVER FUNCTIONS - ( 26 Dec 2021 07:29 )  Alb: 1.9 g/dL / Pro: 5.8 gm/dL / ALK PHOS: 66 U/L / ALT: 65 U/L / AST: 36 U/L / GGT: x                                         8.4    1.62  )-----------( 220      ( 25 Dec 2021 06:37 )             26.3         141  |  109<H>  |  6<L>  ----------------------------<  114<H>  3.8   |  28  |  0.53    Ca    8.2<L>      25 Dec 2021 06:37  Mg     1.8         TPro  6.1  /  Alb  2.2<L>  /  TBili  1.0  /  DBili  x   /  AST  37  /  ALT  74  /  AlkPhos  61      PT/INR - ( 23 Dec 2021 23:29 )   PT: 27.1 sec;   INR: 2.42 ratio         PTT - ( 23 Dec 2021 23:29 )  PTT:29.7 sec        Urinalysis Basic - ( 24 Dec 2021 02:51 )    Color: Yellow / Appearance: Clear / S.010 / pH: x  Gluc: x / Ketone: Negative  / Bili: Negative / Urobili: Negative mg/dL   Blood: x / Protein: 30 mg/dL / Nitrite: Negative   Leuk Esterase: Negative / RBC: 3-5 /HPF / WBC 3-5   Sq Epi: x / Non Sq Epi: Occasional / Bacteria: Few        RECENT CULTURES:    RADIOLOGY & ADDITIONAL TESTS: all reviewed   EKG noted    < from: 12 Lead ECG (21 @ 22:30) >  Ventricular Rate 105 BPM  Diagnosis Line Sinus tachycardia with 1st degree A-V block with Premature atrial complexes  Otherwise normal ECG  When compared with ECG of 2021 22:47,  No significant change was found    < from: CT Chest No Cont (21 @ 01:51) >    LUNGS AND AIRWAYS: Patent central airways.  Innumerable patchy nodular   opacities throughout the lungs, probably infectious/inflammatory.  PLEURA: No pleural effusion.  MEDIASTINUM AND KAREN: No lymphadenopathy.  VESSELS: Atherosclerosis.  HEART: Heart size is normal. No pericardial effusion. Coronary artery   calcifications.  CHEST WALL AND LOWER NECK: Within normal limits.  VISUALIZED UPPER ABDOMEN: Within normal limits.  BONES: Degenerative changes.    IMPRESSION:  Innumerable patchy nodular opacities throughout the lungs, probably   infectious/inflammatory. Follow-up is recommended to ensure resolution.          Current medications:  acetaminophen     Tablet .. 650 milliGRAM(s) Oral every 6 hours PRN  ALPRAZolam 0.25 milliGRAM(s) Oral daily PRN  aluminum hydroxide/magnesium hydroxide/simethicone Suspension 30 milliLiter(s) Oral every 4 hours PRN  apixaban 5 milliGRAM(s) Oral every 12 hours  aspirin enteric coated 81 milliGRAM(s) Oral daily  atorvastatin 40 milliGRAM(s) Oral at bedtime  azithromycin  IVPB 500 milliGRAM(s) IV Intermittent every 24 hours  cefTRIAXone   IVPB 1000 milliGRAM(s) IV Intermittent every 24 hours  lactobacillus acidophilus 1 Tablet(s) Oral two times a day with meals  loperamide 2 milliGRAM(s) Oral daily PRN  melatonin 3 milliGRAM(s) Oral at bedtime PRN  metoprolol tartrate 50 milliGRAM(s) Oral two times a day  ondansetron Injectable 4 milliGRAM(s) IV Push every 6 hours PRN            
Subjective:  Patient is a 76y old  Female who presents with a chief complaint of Fever     HPI:      77 y/o Female with h/o Pancreatic CA  with metastasis to the lung s/p  chemotherapy last dose on 21 at [facility] with right chest port placed about 3 weeks ago BIBEMS for fever with TMax of 101.1 F about 1-2 hours ago before arriving to the ED on 21 .  She denies any cough, sore throat, chest pain, SOB, abdominal pain or vomiting.  She endorses diarrhea for the past 2 days. No diarrhea today. Patient notes this decreased with imodium taken yesterday. Patient was seen at [place] ED this afternoon for syncope and had a normal cardiac workup.  Patient had an ablation for Afib about 3 months ago.  She is not vaccinated against Covid.  She didn't take any antipyretics Prior to arrival.       -  Patient seen and examined at bedside earlier today, denies cp, dyspnea, cough, afebrile, tolerating po intake , + lose bm , plan discussed in length   - pt seen and examined ,denies cough, dyspnea, abdominal pain, 1 lose bm in am, tolerating po intake, afebrile   - pt seen and examined, denies dyspnea, + occasional cough, afebrile, lose bm, plan discussed    - pt seen and examined , denies cp, dyspnea , denies abdominal pain, afebrile     Review of system- Rest of the review of system are negative except mentioned in HPI    Objective:   Vitals reviewed for last 24 hours  T(C): 36.5 (21 @ 09:18), Max: 37.1 (21 @ 16:05)  T(F): 97.7 (21 @ 09:18), Max: 98.8 (21 @ 16:05)  HR: 85 (21 @ 09:18) (80 - 85)  BP: 138/51 (21 @ 09:18) (111/48 - 138/51)  RR: 18 (21 @ 09:18) (18 - 18)  SpO2: 94% (21 @ 09:18) (93% - 96%)  Wt(kg): --    Physical exam:   General : NAD  NERVOUS SYSTEM:  Alert & Oriented X3, non- focal exam, Motor Strength 5/5 B/L upper and lower extremities; DTRs 2+ intact and symmetric  HEAD:  Atraumatic, Normocephalic  EYES: EOMI, PERRLA, conjunctiva and sclera clear  HEENT: Moist mucous membranes  NECK: Supple, No JVD  CHEST/LUNG: Clear to auscultation bilaterally; No rales, no rhonchi, no wheezing  HEART: Regular rate and rhythm; No murmurs, no rubs or gallops  ABDOMEN: Soft, Non-tender, Non-distended; Bowel sounds present  GENITOURINARY- Voiding, no suprapubic tenderness  EXTREMITIES:  2+ Peripheral Pulses, No clubbing, cyanosis,   edema  MUSCULOSKELETAL:- No muscle tenderness, Muscle tone normal, No joint tenderness, no Joint swelling,  Joint ROM –normal   SKIN-no rash, no lesion    LABS: all reviewed      143  |  112<H>  |  7   ----------------------------<  107<H>  3.7   |  24  |  0.59    Ca    8.0<L>      27 Dec 2021 06:52  Phos  3.0     12-  Mg     1.6                                   8.4    . )-----------(       ( 27 Dec 2021 06:52 )             26.3                           143  |  112<H>  |  7   ----------------------------<  107<H>  3.7   |  24  |  0.59    Ca    8.0<L>      27 Dec 2021 06:52  Phos  3.0     12-  Mg     1.6         TPro  5.8<L>  /  Alb  1.9<L>  /  TBili  0.4  /  DBili  x   /  AST  36  /  ALT  65  /  AlkPhos  66                              8.4    . )-----------( 213      ( 27 Dec 2021 06:52 )             26.3             LIVER FUNCTIONS - ( 26 Dec 2021 07:29 )  Alb: 1.9 g/dL / Pro: 5.8 gm/dL / ALK PHOS: 66 U/L / ALT: 65 U/L / AST: 36 U/L / GGT: x                       141  |  109<H>  |  9   ----------------------------<  103<H>  3.3<L>   |  25  |  0.56    Ca    8.2<L>      26 Dec 2021 07:29  Mg     1.8         TPro  5.8<L>  /  Alb  1.9<L>  /  TBili  0.4  /  DBili  x   /  AST  36  /  ALT  65  /  AlkPhos  66                              8.8    4.72  )-----------( 244      ( 26 Dec 2021 07:29 )             27.1             LIVER FUNCTIONS - ( 26 Dec 2021 07:29 )  Alb: 1.9 g/dL / Pro: 5.8 gm/dL / ALK PHOS: 66 U/L / ALT: 65 U/L / AST: 36 U/L / GGT: x                                         8.4    1.62  )-----------( 220      ( 25 Dec 2021 06:37 )             26.3     12    141  |  109<H>  |  6<L>  ----------------------------<  114<H>  3.8   |  28  |  0.53    Ca    8.2<L>      25 Dec 2021 06:37  Mg     1.8         TPro  6.1  /  Alb  2.2<L>  /  TBili  1.0  /  DBili  x   /  AST  37  /  ALT  74  /  AlkPhos  61      PT/INR - ( 23 Dec 2021 23:29 )   PT: 27.1 sec;   INR: 2.42 ratio         PTT - ( 23 Dec 2021 23:29 )  PTT:29.7 sec        Urinalysis Basic - ( 24 Dec 2021 02:51 )    Color: Yellow / Appearance: Clear / S.010 / pH: x  Gluc: x / Ketone: Negative  / Bili: Negative / Urobili: Negative mg/dL   Blood: x / Protein: 30 mg/dL / Nitrite: Negative   Leuk Esterase: Negative / RBC: 3-5 /HPF / WBC 3-5   Sq Epi: x / Non Sq Epi: Occasional / Bacteria: Few        RECENT CULTURES:    RADIOLOGY & ADDITIONAL TESTS: all reviewed   EKG noted    < from: 12 Lead ECG (21 @ 22:30) >  Ventricular Rate 105 BPM  Diagnosis Line Sinus tachycardia with 1st degree A-V block with Premature atrial complexes  Otherwise normal ECG  When compared with ECG of 2021 22:47,  No significant change was found    < from: CT Chest No Cont (21 @ 01:51) >    LUNGS AND AIRWAYS: Patent central airways.  Innumerable patchy nodular   opacities throughout the lungs, probably infectious/inflammatory.  PLEURA: No pleural effusion.  MEDIASTINUM AND KAREN: No lymphadenopathy.  VESSELS: Atherosclerosis.  HEART: Heart size is normal. No pericardial effusion. Coronary artery   calcifications.  CHEST WALL AND LOWER NECK: Within normal limits.  VISUALIZED UPPER ABDOMEN: Within normal limits.  BONES: Degenerative changes.    IMPRESSION:  Innumerable patchy nodular opacities throughout the lungs, probably   infectious/inflammatory. Follow-up is recommended to ensure resolution.          Current medications:  acetaminophen     Tablet .. 650 milliGRAM(s) Oral every 6 hours PRN  ALPRAZolam 0.25 milliGRAM(s) Oral daily PRN  aluminum hydroxide/magnesium hydroxide/simethicone Suspension 30 milliLiter(s) Oral every 4 hours PRN  apixaban 5 milliGRAM(s) Oral every 12 hours  aspirin enteric coated 81 milliGRAM(s) Oral daily  atorvastatin 40 milliGRAM(s) Oral at bedtime  azithromycin  IVPB 500 milliGRAM(s) IV Intermittent every 24 hours  cefTRIAXone   IVPB 1000 milliGRAM(s) IV Intermittent every 24 hours  lactobacillus acidophilus 1 Tablet(s) Oral two times a day with meals  loperamide 2 milliGRAM(s) Oral daily PRN  melatonin 3 milliGRAM(s) Oral at bedtime PRN  metoprolol tartrate 50 milliGRAM(s) Oral two times a day  ondansetron Injectable 4 milliGRAM(s) IV Push every 6 hours PRN            
  Date of service: 21 @ 10:31    Pt seen and examined   Laying in bed, NAD  No fevers  Feels better     ROS: denies dizziness, no HA, no abdominal pain, no diarrhea or constipation; no dysuria, no legs pain, no rashes    MEDICATIONS  (STANDING):  apixaban 5 milliGRAM(s) Oral every 12 hours  aspirin enteric coated 81 milliGRAM(s) Oral daily  atorvastatin 40 milliGRAM(s) Oral at bedtime  azithromycin   Tablet 500 milliGRAM(s) Oral daily  cefuroxime   Tablet 500 milliGRAM(s) Oral every 12 hours  ergocalciferol 14944 Unit(s) Oral <User Schedule>  lactobacillus acidophilus 1 Tablet(s) Oral two times a day with meals  metoprolol tartrate 50 milliGRAM(s) Oral two times a day    Vital Signs Last 24 Hrs  T(C): 36.5 (28 Dec 2021 09:18), Max: 37.1 (27 Dec 2021 16:05)  T(F): 97.7 (28 Dec 2021 09:18), Max: 98.8 (27 Dec 2021 16:05)  HR: 85 (28 Dec 2021 09:18) (80 - 85)  BP: 138/51 (28 Dec 2021 09:18) (111/48 - 138/51)  BP(mean): --  RR: 18 (28 Dec 2021 09:18) (18 - 18)  SpO2: 94% (28 Dec 2021 09:18) (93% - 96%)    Physical exam:  Constitutional:  No acute distress  HEENT: NC/AT, EOMI, PERRLA, conjunctivae clear; ears and nose atraumatic  Neck: supple; thyroid not palpable  Back: no tenderness  Respiratory: respiratory effort normal; crackles at bases  Cardiovascular: S1S2 regular, no murmurs  Abdomen: soft, not tender, not distended, positive BS  Genitourinary: no suprapubic tenderness  Lymphatic: no LN palpable  Musculoskeletal: no muscle tenderness, no joint swelling or tenderness  Extremities: no pedal edema  Neurological/ Psychiatric: AxOx3, moving all extremities  Skin: no rashes; no palpable lesions    Labs: all available labs reviewed                                   8.4    12 )-----------( 213      ( 27 Dec 2021 06:52 )             26.3     12    143  |  112<H>  |  7   ----------------------------<  107<H>  3.7   |  24  |  0.59    Ca    8.0<L>      27 Dec 2021 06:52  Phos  3.0       Mg     1.6             Ferritin, Serum: 472 ng/mL (21 @ 07:29)  C-Reactive Protein, Serum: 66 mg/L (21 @ 07:29)  C-Reactive Protein, Serum: 133 mg/L (21 @ 06:37)    Urinalysis Basic - ( 24 Dec 2021 02:51 )    Color: Yellow / Appearance: Clear / S.010 / pH: x  Gluc: x / Ketone: Negative  / Bili: Negative / Urobili: Negative mg/dL   Blood: x / Protein: 30 mg/dL / Nitrite: Negative   Leuk Esterase: Negative / RBC: 3-5 /HPF / WBC 3-5   Sq Epi: x / Non Sq Epi: Occasional / Bacteria: Few    Culture - Urine (collected 24 Dec 2021 02:51)  Source: Clean Catch None  Final Report (25 Dec 2021 13:32):    <10,000 CFU/mL Normal Urogenital Gisselle    Culture - Blood (collected 23 Dec 2021 23:29)  Source: .Blood None  Preliminary Report (25 Dec 2021 05:01):    No growth to date.    Culture - Blood (collected 23 Dec 2021 23:29)  Source: .Blood None  Preliminary Report (25 Dec 2021 05:01):    No growth to date.    Radiology: all available radiological tests reviewed    < from: CT Chest No Cont (21 @ 01:51) >  Innumerable patchy nodular opacities throughout the lungs, probably   infectious/inflammatory. Follow-up is recommended to ensure resolution.    < end of copied text >      Advanced directives addressed: full resuscitation
Date of service: 21 @ 12:21    Pt seen and examined   Laying in bed, NAD  Afebrile  Feels better  Has 1 loose BM     ROS: denies dizziness, no HA, no abdominal pain, no diarrhea or constipation; no dysuria, no legs pain, no rashes    MEDICATIONS  (STANDING):  apixaban 5 milliGRAM(s) Oral every 12 hours  aspirin enteric coated 81 milliGRAM(s) Oral daily  atorvastatin 40 milliGRAM(s) Oral at bedtime  azithromycin  IVPB 500 milliGRAM(s) IV Intermittent every 24 hours  cefTRIAXone   IVPB 1000 milliGRAM(s) IV Intermittent every 24 hours  ergocalciferol 40612 Unit(s) Oral <User Schedule>  lactobacillus acidophilus 1 Tablet(s) Oral two times a day with meals  metoprolol tartrate 50 milliGRAM(s) Oral two times a day    Vital Signs Last 24 Hrs  T(C): 36.6 (27 Dec 2021 08:21), Max: 36.9 (26 Dec 2021 20:45)  T(F): 97.9 (27 Dec 2021 08:21), Max: 98.5 (26 Dec 2021 20:45)  HR: 92 (27 Dec 2021 08:21) (84 - 98)  BP: 138/51 (27 Dec 2021 08:21) (108/73 - 138/51)  BP(mean): --  RR: 18 (27 Dec 2021 08:21) (18 - 18)  SpO2: 98% (27 Dec 2021 08:21) (98% - 98%)    Physical exam:  Constitutional:  No acute distress  HEENT: NC/AT, EOMI, PERRLA, conjunctivae clear; ears and nose atraumatic  Neck: supple; thyroid not palpable  Back: no tenderness  Respiratory: respiratory effort normal; crackles at bases  Cardiovascular: S1S2 regular, no murmurs  Abdomen: soft, not tender, not distended, positive BS  Genitourinary: no suprapubic tenderness  Lymphatic: no LN palpable  Musculoskeletal: no muscle tenderness, no joint swelling or tenderness  Extremities: no pedal edema  Neurological/ Psychiatric: AxOx3, moving all extremities  Skin: no rashes; no palpable lesions    Labs: all available labs reviewed                                   8. )-----------( 213      ( 27 Dec 2021 06:52 )             26.3         143  |  112<H>  |  7   ----------------------------<  107<H>  3.7   |  24  |  0.59    Ca    8.0<L>      27 Dec 2021 06:52  Phos  3.0       Mg     1.6         TPro  5.8<L>  /  Alb  1.9<L>  /  TBili  0.4  /  DBili  x   /  AST  36  /  ALT  65  /  AlkPhos  66      Urinalysis Basic - ( 24 Dec 2021 02:51 )    Color: Yellow / Appearance: Clear / S.010 / pH: x  Gluc: x / Ketone: Negative  / Bili: Negative / Urobili: Negative mg/dL   Blood: x / Protein: 30 mg/dL / Nitrite: Negative   Leuk Esterase: Negative / RBC: 3-5 /HPF / WBC 3-5   Sq Epi: x / Non Sq Epi: Occasional / Bacteria: Few      Culture - Urine (collected 24 Dec 2021 02:51)  Source: Clean Catch None  Final Report (25 Dec 2021 13:32):    <10,000 CFU/mL Normal Urogenital Gisselle    Culture - Blood (collected 23 Dec 2021 23:29)  Source: .Blood None  Preliminary Report (25 Dec 2021 05:01):    No growth to date.    Culture - Blood (collected 23 Dec 2021 23:29)  Source: .Blood None  Preliminary Report (25 Dec 2021 05:01):    No growth to date.    Radiology: all available radiological tests reviewed    < from: CT Chest No Cont (12.24.21 @ 01:51) >  Innumerable patchy nodular opacities throughout the lungs, probably   infectious/inflammatory. Follow-up is recommended to ensure resolution.    < end of copied text >      Advanced directives addressed: full resuscitation
Subjective:  Patient is a 76y old  Female who presents with a chief complaint of Fever     HPI:      77 y/o Female with h/o Pancreatic CA  with metastasis to the lung s/p  chemotherapy last dose on 21 at [facility] with right chest port placed about 3 weeks ago BIBEMS for fever with TMax of 101.1 F about 1-2 hours ago before arriving to the ED on 21 .  She denies any cough, sore throat, chest pain, SOB, abdominal pain or vomiting.  She endorses diarrhea for the past 2 days. No diarrhea today. Patient notes this decreased with imodium taken yesterday. Patient was seen at [place] ED this afternoon for syncope and had a normal cardiac workup.  Patient had an ablation for Afib about 3 months ago.  She is not vaccinated against Covid.  She didn't take any antipyretics Prior to arrival.       -  Patient seen and examined at bedside earlier today, denies cp, dyspnea, cough, afebrile, tolerating po intake , + lose bm , plan discussed in length    Review of system- Rest of the review of system are negative except mentioned in HPI    Objective: Vital sings reviewed for last 24 h  T(C): 36.6 (21 @ 09:27), Max: 37.2 (21 @ 16:59)  HR: 92 (21 @ 09:27) (87 - 92)  BP: 111/51 (21 @ 09:27) (111/51 - 136/50)  RR: 18 (21 @ 09:27) (18 - 18)  SpO2: 95% (21 @ 09:27) (95% - 98%)  Wt(kg): --  Daily     Daily   CAPILLARY BLOOD GLUCOSE        Physical exam:   General : NAD  NERVOUS SYSTEM:  Alert & Oriented X3, non- focal exam, Motor Strength 5/5 B/L upper and lower extremities; DTRs 2+ intact and symmetric  HEAD:  Atraumatic, Normocephalic  EYES: EOMI, PERRLA, conjunctiva and sclera clear  HEENT: Moist mucous membranes  NECK: Supple, No JVD  CHEST/LUNG: Clear to auscultation bilaterally; No rales, no rhonchi, no wheezing  HEART: Regular rate and rhythm; No murmurs, no rubs or gallops  ABDOMEN: Soft, Non-tender, Non-distended; Bowel sounds present  GENITOURINARY- Voiding, no suprapubic tenderness  EXTREMITIES:  2+ Peripheral Pulses, No clubbing, cyanosis,   edema  MUSCULOSKELETAL:- No muscle tenderness, Muscle tone normal, No joint tenderness, no Joint swelling,  Joint ROM –normal   SKIN-no rash, no lesion    LABS: all reviewed                        8.4    1.62  )-----------( 220      ( 25 Dec 2021 06:37 )             26.3         141  |  109<H>  |  6<L>  ----------------------------<  114<H>  3.8   |  28  |  0.53    Ca    8.2<L>      25 Dec 2021 06:37  Mg     1.8         TPro  6.1  /  Alb  2.2<L>  /  TBili  1.0  /  DBili  x   /  AST  37  /  ALT  74  /  AlkPhos  61      PT/INR - ( 23 Dec 2021 23:29 )   PT: 27.1 sec;   INR: 2.42 ratio         PTT - ( 23 Dec 2021 23:29 )  PTT:29.7 sec        Urinalysis Basic - ( 24 Dec 2021 02:51 )    Color: Yellow / Appearance: Clear / S.010 / pH: x  Gluc: x / Ketone: Negative  / Bili: Negative / Urobili: Negative mg/dL   Blood: x / Protein: 30 mg/dL / Nitrite: Negative   Leuk Esterase: Negative / RBC: 3-5 /HPF / WBC 3-5   Sq Epi: x / Non Sq Epi: Occasional / Bacteria: Few        RECENT CULTURES:    RADIOLOGY & ADDITIONAL TESTS: all reviewed   EKG noted    < from: 12 Lead ECG (21 @ 22:30) >  Ventricular Rate 105 BPM  Diagnosis Line Sinus tachycardia with 1st degree A-V block with Premature atrial complexes  Otherwise normal ECG  When compared with ECG of 2021 22:47,  No significant change was found    < from: CT Chest No Cont (21 @ 01:51) >    LUNGS AND AIRWAYS: Patent central airways.  Innumerable patchy nodular   opacities throughout the lungs, probably infectious/inflammatory.  PLEURA: No pleural effusion.  MEDIASTINUM AND KAREN: No lymphadenopathy.  VESSELS: Atherosclerosis.  HEART: Heart size is normal. No pericardial effusion. Coronary artery   calcifications.  CHEST WALL AND LOWER NECK: Within normal limits.  VISUALIZED UPPER ABDOMEN: Within normal limits.  BONES: Degenerative changes.    IMPRESSION:  Innumerable patchy nodular opacities throughout the lungs, probably   infectious/inflammatory. Follow-up is recommended to ensure resolution.          Current medications:  acetaminophen     Tablet .. 650 milliGRAM(s) Oral every 6 hours PRN  ALPRAZolam 0.25 milliGRAM(s) Oral daily PRN  aluminum hydroxide/magnesium hydroxide/simethicone Suspension 30 milliLiter(s) Oral every 4 hours PRN  apixaban 5 milliGRAM(s) Oral every 12 hours  aspirin enteric coated 81 milliGRAM(s) Oral daily  atorvastatin 40 milliGRAM(s) Oral at bedtime  azithromycin  IVPB 500 milliGRAM(s) IV Intermittent every 24 hours  cefTRIAXone   IVPB 1000 milliGRAM(s) IV Intermittent every 24 hours  lactobacillus acidophilus 1 Tablet(s) Oral two times a day with meals  loperamide 2 milliGRAM(s) Oral daily PRN  melatonin 3 milliGRAM(s) Oral at bedtime PRN  metoprolol tartrate 50 milliGRAM(s) Oral two times a day  ondansetron Injectable 4 milliGRAM(s) IV Push every 6 hours PRN

## 2021-12-28 NOTE — PROGRESS NOTE ADULT - PROVIDER SPECIALTY LIST ADULT
Hospitalist
Infectious Disease
Infectious Disease
Hospitalist
Hospitalist
Infectious Disease

## 2021-12-29 ENCOUNTER — NON-APPOINTMENT (OUTPATIENT)
Age: 76
End: 2021-12-29

## 2022-01-01 ENCOUNTER — RESULT REVIEW (OUTPATIENT)
Age: 77
End: 2022-01-01

## 2022-01-01 ENCOUNTER — APPOINTMENT (OUTPATIENT)
Dept: HEMATOLOGY ONCOLOGY | Facility: CLINIC | Age: 77
End: 2022-01-01

## 2022-01-01 ENCOUNTER — APPOINTMENT (OUTPATIENT)
Dept: CV DIAGNOSITCS | Facility: HOSPITAL | Age: 77
End: 2022-01-01

## 2022-01-01 ENCOUNTER — TRANSCRIPTION ENCOUNTER (OUTPATIENT)
Age: 77
End: 2022-01-01

## 2022-01-01 ENCOUNTER — APPOINTMENT (OUTPATIENT)
Dept: INFUSION THERAPY | Facility: HOSPITAL | Age: 77
End: 2022-01-01

## 2022-01-01 ENCOUNTER — NON-APPOINTMENT (OUTPATIENT)
Age: 77
End: 2022-01-01

## 2022-01-01 ENCOUNTER — APPOINTMENT (OUTPATIENT)
Dept: UROLOGY | Facility: HOSPITAL | Age: 77
End: 2022-01-01

## 2022-01-01 ENCOUNTER — APPOINTMENT (OUTPATIENT)
Age: 77
End: 2022-01-01

## 2022-01-01 ENCOUNTER — OUTPATIENT (OUTPATIENT)
Dept: OUTPATIENT SERVICES | Facility: HOSPITAL | Age: 77
LOS: 1 days | Discharge: HOME | End: 2022-01-01

## 2022-01-01 ENCOUNTER — OUTPATIENT (OUTPATIENT)
Dept: OUTPATIENT SERVICES | Facility: HOSPITAL | Age: 77
LOS: 1 days | End: 2022-01-01
Payer: COMMERCIAL

## 2022-01-01 ENCOUNTER — RX RENEWAL (OUTPATIENT)
Age: 77
End: 2022-01-01

## 2022-01-01 ENCOUNTER — APPOINTMENT (OUTPATIENT)
Dept: UROLOGY | Facility: CLINIC | Age: 77
End: 2022-01-01

## 2022-01-01 ENCOUNTER — APPOINTMENT (OUTPATIENT)
Dept: CT IMAGING | Facility: IMAGING CENTER | Age: 77
End: 2022-01-01

## 2022-01-01 ENCOUNTER — APPOINTMENT (OUTPATIENT)
Dept: PULMONOLOGY | Facility: CLINIC | Age: 77
End: 2022-01-01

## 2022-01-01 ENCOUNTER — APPOINTMENT (OUTPATIENT)
Dept: HEMATOLOGY ONCOLOGY | Facility: CLINIC | Age: 77
End: 2022-01-01
Payer: MEDICARE

## 2022-01-01 ENCOUNTER — APPOINTMENT (OUTPATIENT)
Dept: CARDIOLOGY | Facility: CLINIC | Age: 77
End: 2022-01-01

## 2022-01-01 ENCOUNTER — LABORATORY RESULT (OUTPATIENT)
Age: 77
End: 2022-01-01

## 2022-01-01 ENCOUNTER — EMERGENCY (EMERGENCY)
Facility: HOSPITAL | Age: 77
LOS: 1 days | Discharge: HOME | End: 2022-01-01
Attending: STUDENT IN AN ORGANIZED HEALTH CARE EDUCATION/TRAINING PROGRAM
Payer: COMMERCIAL

## 2022-01-01 ENCOUNTER — APPOINTMENT (OUTPATIENT)
Dept: INTERNAL MEDICINE | Facility: CLINIC | Age: 77
End: 2022-01-01

## 2022-01-01 ENCOUNTER — APPOINTMENT (OUTPATIENT)
Dept: RADIOLOGY | Facility: IMAGING CENTER | Age: 77
End: 2022-01-01

## 2022-01-01 ENCOUNTER — OUTPATIENT (OUTPATIENT)
Dept: OUTPATIENT SERVICES | Facility: HOSPITAL | Age: 77
LOS: 1 days | Discharge: HOME | End: 2022-01-01
Payer: MEDICARE

## 2022-01-01 ENCOUNTER — INPATIENT (INPATIENT)
Facility: HOSPITAL | Age: 77
LOS: 7 days | Discharge: HOME | DRG: 189 | End: 2022-08-09
Attending: STUDENT IN AN ORGANIZED HEALTH CARE EDUCATION/TRAINING PROGRAM | Admitting: HOSPITALIST
Payer: COMMERCIAL

## 2022-01-01 ENCOUNTER — APPOINTMENT (OUTPATIENT)
Dept: CT IMAGING | Facility: CLINIC | Age: 77
End: 2022-01-01

## 2022-01-01 ENCOUNTER — APPOINTMENT (OUTPATIENT)
Dept: CT IMAGING | Facility: IMAGING CENTER | Age: 77
End: 2022-01-01
Payer: MEDICARE

## 2022-01-01 ENCOUNTER — INPATIENT (INPATIENT)
Facility: HOSPITAL | Age: 77
LOS: 2 days | Discharge: HOME | DRG: 394 | End: 2022-10-24
Attending: STUDENT IN AN ORGANIZED HEALTH CARE EDUCATION/TRAINING PROGRAM | Admitting: HOSPITALIST
Payer: COMMERCIAL

## 2022-01-01 ENCOUNTER — OUTPATIENT (OUTPATIENT)
Dept: OUTPATIENT SERVICES | Facility: HOSPITAL | Age: 77
LOS: 1 days | End: 2022-01-01

## 2022-01-01 VITALS
DIASTOLIC BLOOD PRESSURE: 73 MMHG | BODY MASS INDEX: 38.28 KG/M2 | OXYGEN SATURATION: 95 % | WEIGHT: 208 LBS | HEIGHT: 62 IN | SYSTOLIC BLOOD PRESSURE: 130 MMHG | HEART RATE: 80 BPM | RESPIRATION RATE: 15 BRPM | TEMPERATURE: 97.7 F

## 2022-01-01 VITALS
RESPIRATION RATE: 18 BRPM | HEIGHT: 64 IN | DIASTOLIC BLOOD PRESSURE: 84 MMHG | WEIGHT: 209 LBS | OXYGEN SATURATION: 94 % | HEART RATE: 87 BPM | TEMPERATURE: 98 F | SYSTOLIC BLOOD PRESSURE: 127 MMHG

## 2022-01-01 VITALS
WEIGHT: 201.72 LBS | RESPIRATION RATE: 16 BRPM | HEART RATE: 83 BPM | SYSTOLIC BLOOD PRESSURE: 161 MMHG | TEMPERATURE: 97 F | BODY MASS INDEX: 35.74 KG/M2 | OXYGEN SATURATION: 96 % | DIASTOLIC BLOOD PRESSURE: 76 MMHG | HEIGHT: 62.99 IN

## 2022-01-01 VITALS
HEART RATE: 88 BPM | RESPIRATION RATE: 18 BRPM | DIASTOLIC BLOOD PRESSURE: 88 MMHG | OXYGEN SATURATION: 97 % | HEIGHT: 64 IN | SYSTOLIC BLOOD PRESSURE: 138 MMHG

## 2022-01-01 VITALS
BODY MASS INDEX: 37.32 KG/M2 | WEIGHT: 208 LBS | OXYGEN SATURATION: 94 % | TEMPERATURE: 97.6 F | HEART RATE: 66 BPM | DIASTOLIC BLOOD PRESSURE: 71 MMHG | HEIGHT: 62.6 IN | RESPIRATION RATE: 16 BRPM | SYSTOLIC BLOOD PRESSURE: 145 MMHG

## 2022-01-01 VITALS
SYSTOLIC BLOOD PRESSURE: 125 MMHG | RESPIRATION RATE: 16 BRPM | OXYGEN SATURATION: 95 % | HEIGHT: 63 IN | BODY MASS INDEX: 35.77 KG/M2 | OXYGEN SATURATION: 91 % | HEART RATE: 78 BPM | DIASTOLIC BLOOD PRESSURE: 75 MMHG | WEIGHT: 201.94 LBS | SYSTOLIC BLOOD PRESSURE: 118 MMHG | TEMPERATURE: 98.4 F | HEART RATE: 78 BPM | BODY MASS INDEX: 36.14 KG/M2 | TEMPERATURE: 97.9 F | DIASTOLIC BLOOD PRESSURE: 72 MMHG | WEIGHT: 204 LBS

## 2022-01-01 VITALS
DIASTOLIC BLOOD PRESSURE: 82 MMHG | SYSTOLIC BLOOD PRESSURE: 111 MMHG | TEMPERATURE: 97 F | HEART RATE: 88 BPM | HEIGHT: 63 IN | OXYGEN SATURATION: 96 % | BODY MASS INDEX: 36.71 KG/M2 | SYSTOLIC BLOOD PRESSURE: 138 MMHG | RESPIRATION RATE: 17 BRPM | WEIGHT: 204.59 LBS | RESPIRATION RATE: 15 BRPM | HEIGHT: 62.52 IN | BODY MASS INDEX: 36.14 KG/M2 | TEMPERATURE: 97.2 F | OXYGEN SATURATION: 94 % | WEIGHT: 204 LBS | DIASTOLIC BLOOD PRESSURE: 73 MMHG | HEART RATE: 74 BPM

## 2022-01-01 VITALS
BODY MASS INDEX: 37.36 KG/M2 | SYSTOLIC BLOOD PRESSURE: 141 MMHG | HEIGHT: 62 IN | OXYGEN SATURATION: 84 % | HEART RATE: 83 BPM | TEMPERATURE: 97.5 F | WEIGHT: 203 LBS | DIASTOLIC BLOOD PRESSURE: 68 MMHG

## 2022-01-01 VITALS
SYSTOLIC BLOOD PRESSURE: 110 MMHG | HEART RATE: 60 BPM | HEIGHT: 64 IN | TEMPERATURE: 98 F | DIASTOLIC BLOOD PRESSURE: 58 MMHG | RESPIRATION RATE: 18 BRPM | OXYGEN SATURATION: 100 % | WEIGHT: 199.08 LBS

## 2022-01-01 VITALS
BODY MASS INDEX: 36.01 KG/M2 | HEART RATE: 75 BPM | WEIGHT: 203.26 LBS | DIASTOLIC BLOOD PRESSURE: 72 MMHG | TEMPERATURE: 98 F | OXYGEN SATURATION: 95 % | SYSTOLIC BLOOD PRESSURE: 115 MMHG | RESPIRATION RATE: 16 BRPM

## 2022-01-01 VITALS
BODY MASS INDEX: 37.5 KG/M2 | OXYGEN SATURATION: 90 % | SYSTOLIC BLOOD PRESSURE: 147 MMHG | WEIGHT: 205.03 LBS | RESPIRATION RATE: 16 BRPM | TEMPERATURE: 97 F | DIASTOLIC BLOOD PRESSURE: 75 MMHG | HEART RATE: 85 BPM

## 2022-01-01 VITALS
HEIGHT: 63 IN | OXYGEN SATURATION: 95 % | BODY MASS INDEX: 35.79 KG/M2 | WEIGHT: 202 LBS | TEMPERATURE: 97.8 F | HEART RATE: 68 BPM | SYSTOLIC BLOOD PRESSURE: 138 MMHG | DIASTOLIC BLOOD PRESSURE: 73 MMHG

## 2022-01-01 VITALS
TEMPERATURE: 97.7 F | DIASTOLIC BLOOD PRESSURE: 60 MMHG | SYSTOLIC BLOOD PRESSURE: 130 MMHG | WEIGHT: 200 LBS | OXYGEN SATURATION: 94 % | BODY MASS INDEX: 35.44 KG/M2 | HEART RATE: 76 BPM | HEIGHT: 63 IN

## 2022-01-01 VITALS
OXYGEN SATURATION: 95 % | RESPIRATION RATE: 18 BRPM | DIASTOLIC BLOOD PRESSURE: 66 MMHG | HEART RATE: 70 BPM | SYSTOLIC BLOOD PRESSURE: 110 MMHG | TEMPERATURE: 98 F

## 2022-01-01 VITALS
DIASTOLIC BLOOD PRESSURE: 79 MMHG | TEMPERATURE: 98 F | RESPIRATION RATE: 18 BRPM | SYSTOLIC BLOOD PRESSURE: 124 MMHG | HEART RATE: 83 BPM | OXYGEN SATURATION: 95 %

## 2022-01-01 VITALS
TEMPERATURE: 98 F | SYSTOLIC BLOOD PRESSURE: 99 MMHG | OXYGEN SATURATION: 94 % | DIASTOLIC BLOOD PRESSURE: 64 MMHG | HEART RATE: 70 BPM | RESPIRATION RATE: 18 BRPM

## 2022-01-01 VITALS
DIASTOLIC BLOOD PRESSURE: 72 MMHG | WEIGHT: 202 LBS | SYSTOLIC BLOOD PRESSURE: 116 MMHG | HEART RATE: 65 BPM | BODY MASS INDEX: 35.79 KG/M2 | RESPIRATION RATE: 16 BRPM | HEIGHT: 63 IN | OXYGEN SATURATION: 95 %

## 2022-01-01 DIAGNOSIS — C25.9 MALIGNANT NEOPLASM OF PANCREAS, UNSPECIFIED: ICD-10-CM

## 2022-01-01 DIAGNOSIS — Z98.89 OTHER SPECIFIED POSTPROCEDURAL STATES: Chronic | ICD-10-CM

## 2022-01-01 DIAGNOSIS — R11.2 NAUSEA WITH VOMITING, UNSPECIFIED: ICD-10-CM

## 2022-01-01 DIAGNOSIS — E66.9 OBESITY, UNSPECIFIED: ICD-10-CM

## 2022-01-01 DIAGNOSIS — R19.7 DIARRHEA, UNSPECIFIED: ICD-10-CM

## 2022-01-01 DIAGNOSIS — Z86.73 PERSONAL HISTORY OF TRANSIENT ISCHEMIC ATTACK (TIA), AND CEREBRAL INFARCTION W/OUT RESIDUAL DEFICITS: ICD-10-CM

## 2022-01-01 DIAGNOSIS — Z51.11 ENCOUNTER FOR ANTINEOPLASTIC CHEMOTHERAPY: ICD-10-CM

## 2022-01-01 DIAGNOSIS — I48.91 UNSPECIFIED ATRIAL FIBRILLATION: ICD-10-CM

## 2022-01-01 DIAGNOSIS — J81.1 CHRONIC PULMONARY EDEMA: ICD-10-CM

## 2022-01-01 DIAGNOSIS — C79.9 SECONDARY MALIGNANT NEOPLASM OF UNSPECIFIED SITE: ICD-10-CM

## 2022-01-01 DIAGNOSIS — I10 ESSENTIAL (PRIMARY) HYPERTENSION: ICD-10-CM

## 2022-01-01 DIAGNOSIS — J20.9 ACUTE BRONCHITIS, UNSPECIFIED: ICD-10-CM

## 2022-01-01 DIAGNOSIS — J90 PLEURAL EFFUSION, NOT ELSEWHERE CLASSIFIED: ICD-10-CM

## 2022-01-01 DIAGNOSIS — K59.00 CONSTIPATION, UNSPECIFIED: ICD-10-CM

## 2022-01-01 DIAGNOSIS — Z00.8 ENCOUNTER FOR OTHER GENERAL EXAMINATION: ICD-10-CM

## 2022-01-01 DIAGNOSIS — R31.9 HEMATURIA, UNSPECIFIED: ICD-10-CM

## 2022-01-01 DIAGNOSIS — D64.9 ANEMIA, UNSPECIFIED: ICD-10-CM

## 2022-01-01 DIAGNOSIS — D72.819 DECREASED WHITE BLOOD CELL COUNT, UNSPECIFIED: ICD-10-CM

## 2022-01-01 DIAGNOSIS — Z29.9 ENCOUNTER FOR PROPHYLACTIC MEASURES, UNSPECIFIED: ICD-10-CM

## 2022-01-01 DIAGNOSIS — I63.412 CEREBRAL INFARCTION DUE TO EMBOLISM OF LEFT MIDDLE CEREBRAL ARTERY: ICD-10-CM

## 2022-01-01 DIAGNOSIS — J96.01 ACUTE RESPIRATORY FAILURE WITH HYPOXIA: ICD-10-CM

## 2022-01-01 DIAGNOSIS — F41.9 ANXIETY DISORDER, UNSPECIFIED: ICD-10-CM

## 2022-01-01 DIAGNOSIS — R06.09 OTHER FORMS OF DYSPNEA: ICD-10-CM

## 2022-01-01 DIAGNOSIS — E78.5 HYPERLIPIDEMIA, UNSPECIFIED: ICD-10-CM

## 2022-01-01 DIAGNOSIS — Z86.79 PERSONAL HISTORY OF OTHER DISEASES OF THE CIRCULATORY SYSTEM: ICD-10-CM

## 2022-01-01 DIAGNOSIS — I48.92 UNSPECIFIED ATRIAL FLUTTER: ICD-10-CM

## 2022-01-01 DIAGNOSIS — R21 RASH AND OTHER NONSPECIFIC SKIN ERUPTION: ICD-10-CM

## 2022-01-01 DIAGNOSIS — I50.33 ACUTE ON CHRONIC DIASTOLIC (CONGESTIVE) HEART FAILURE: ICD-10-CM

## 2022-01-01 DIAGNOSIS — R09.02 HYPOXEMIA: ICD-10-CM

## 2022-01-01 DIAGNOSIS — Z71.89 OTHER SPECIFIED COUNSELING: ICD-10-CM

## 2022-01-01 DIAGNOSIS — R06.02 SHORTNESS OF BREATH: ICD-10-CM

## 2022-01-01 DIAGNOSIS — I63.9 CEREBRAL INFARCTION, UNSPECIFIED: ICD-10-CM

## 2022-01-01 LAB
-  AMIKACIN: SIGNIFICANT CHANGE UP
-  AMOXICILLIN/CLAVULANIC ACID: SIGNIFICANT CHANGE UP
-  AMPICILLIN/SULBACTAM: SIGNIFICANT CHANGE UP
-  AMPICILLIN: SIGNIFICANT CHANGE UP
-  AZTREONAM: SIGNIFICANT CHANGE UP
-  CEFAZOLIN: SIGNIFICANT CHANGE UP
-  CEFEPIME: SIGNIFICANT CHANGE UP
-  CEFOXITIN: SIGNIFICANT CHANGE UP
-  CEFTRIAXONE: SIGNIFICANT CHANGE UP
-  CIPROFLOXACIN: SIGNIFICANT CHANGE UP
-  ERTAPENEM: SIGNIFICANT CHANGE UP
-  GENTAMICIN: SIGNIFICANT CHANGE UP
-  IMIPENEM: SIGNIFICANT CHANGE UP
-  LEVOFLOXACIN: SIGNIFICANT CHANGE UP
-  MEROPENEM: SIGNIFICANT CHANGE UP
-  NITROFURANTOIN: SIGNIFICANT CHANGE UP
-  PIPERACILLIN/TAZOBACTAM: SIGNIFICANT CHANGE UP
-  TIGECYCLINE: SIGNIFICANT CHANGE UP
-  TOBRAMYCIN: SIGNIFICANT CHANGE UP
-  TRIMETHOPRIM/SULFAMETHOXAZOLE: SIGNIFICANT CHANGE UP
ALBUMIN SERPL ELPH-MCNC: 3 G/DL — LOW (ref 3.3–5)
ALBUMIN SERPL ELPH-MCNC: 3.2 G/DL — LOW (ref 3.3–5)
ALBUMIN SERPL ELPH-MCNC: 3.2 G/DL — LOW (ref 3.3–5)
ALBUMIN SERPL ELPH-MCNC: 3.3 G/DL — SIGNIFICANT CHANGE UP (ref 3.3–5)
ALBUMIN SERPL ELPH-MCNC: 3.3 G/DL — SIGNIFICANT CHANGE UP (ref 3.3–5)
ALBUMIN SERPL ELPH-MCNC: 3.4 G/DL — SIGNIFICANT CHANGE UP (ref 3.3–5)
ALBUMIN SERPL ELPH-MCNC: 3.5 G/DL — SIGNIFICANT CHANGE UP (ref 3.3–5)
ALBUMIN SERPL ELPH-MCNC: 3.6 G/DL — SIGNIFICANT CHANGE UP (ref 3.3–5)
ALBUMIN SERPL ELPH-MCNC: 3.7 G/DL — SIGNIFICANT CHANGE UP (ref 3.3–5)
ALBUMIN SERPL ELPH-MCNC: 3.8 G/DL — SIGNIFICANT CHANGE UP (ref 3.3–5)
ALBUMIN SERPL ELPH-MCNC: 3.9 G/DL
ALBUMIN SERPL ELPH-MCNC: 3.9 G/DL — SIGNIFICANT CHANGE UP (ref 3.3–5)
ALBUMIN SERPL ELPH-MCNC: 4 G/DL — SIGNIFICANT CHANGE UP (ref 3.3–5)
ALBUMIN SERPL ELPH-MCNC: 4.1 G/DL
ALBUMIN SERPL ELPH-MCNC: 4.1 G/DL
ALBUMIN SERPL ELPH-MCNC: 4.1 G/DL — SIGNIFICANT CHANGE UP (ref 3.3–5)
ALP BLD-CCNC: 89 U/L
ALP BLD-CCNC: 89 U/L
ALP BLD-CCNC: 95 U/L
ALP SERPL-CCNC: 109 U/L — SIGNIFICANT CHANGE UP (ref 40–120)
ALP SERPL-CCNC: 156 U/L — HIGH (ref 40–120)
ALP SERPL-CCNC: 67 U/L — SIGNIFICANT CHANGE UP (ref 40–120)
ALP SERPL-CCNC: 67 U/L — SIGNIFICANT CHANGE UP (ref 40–120)
ALP SERPL-CCNC: 68 U/L — SIGNIFICANT CHANGE UP (ref 40–120)
ALP SERPL-CCNC: 70 U/L — SIGNIFICANT CHANGE UP (ref 40–120)
ALP SERPL-CCNC: 70 U/L — SIGNIFICANT CHANGE UP (ref 40–120)
ALP SERPL-CCNC: 72 U/L — SIGNIFICANT CHANGE UP (ref 40–120)
ALP SERPL-CCNC: 74 U/L — SIGNIFICANT CHANGE UP (ref 40–120)
ALP SERPL-CCNC: 76 U/L — SIGNIFICANT CHANGE UP (ref 40–120)
ALP SERPL-CCNC: 78 U/L — SIGNIFICANT CHANGE UP (ref 40–120)
ALP SERPL-CCNC: 80 U/L — SIGNIFICANT CHANGE UP (ref 40–120)
ALP SERPL-CCNC: 80 U/L — SIGNIFICANT CHANGE UP (ref 40–120)
ALP SERPL-CCNC: 82 U/L — SIGNIFICANT CHANGE UP (ref 40–120)
ALP SERPL-CCNC: 85 U/L — SIGNIFICANT CHANGE UP (ref 40–120)
ALP SERPL-CCNC: 85 U/L — SIGNIFICANT CHANGE UP (ref 40–120)
ALP SERPL-CCNC: 86 U/L — SIGNIFICANT CHANGE UP (ref 40–120)
ALP SERPL-CCNC: 86 U/L — SIGNIFICANT CHANGE UP (ref 40–120)
ALP SERPL-CCNC: 88 U/L — SIGNIFICANT CHANGE UP (ref 40–120)
ALP SERPL-CCNC: 91 U/L — SIGNIFICANT CHANGE UP (ref 40–120)
ALP SERPL-CCNC: 93 U/L — SIGNIFICANT CHANGE UP (ref 40–120)
ALP SERPL-CCNC: 94 U/L — SIGNIFICANT CHANGE UP (ref 40–120)
ALP SERPL-CCNC: 94 U/L — SIGNIFICANT CHANGE UP (ref 40–120)
ALP SERPL-CCNC: 95 U/L — SIGNIFICANT CHANGE UP (ref 40–120)
ALP SERPL-CCNC: 95 U/L — SIGNIFICANT CHANGE UP (ref 40–120)
ALP SERPL-CCNC: 98 U/L — SIGNIFICANT CHANGE UP (ref 40–120)
ALT FLD-CCNC: 11 U/L — SIGNIFICANT CHANGE UP (ref 10–45)
ALT FLD-CCNC: 14 U/L — SIGNIFICANT CHANGE UP (ref 10–45)
ALT FLD-CCNC: 14 U/L — SIGNIFICANT CHANGE UP (ref 10–45)
ALT FLD-CCNC: 16 U/L — SIGNIFICANT CHANGE UP (ref 10–45)
ALT FLD-CCNC: 17 U/L — SIGNIFICANT CHANGE UP (ref 10–45)
ALT FLD-CCNC: 17 U/L — SIGNIFICANT CHANGE UP (ref 10–45)
ALT FLD-CCNC: 18 U/L — SIGNIFICANT CHANGE UP (ref 10–45)
ALT FLD-CCNC: 19 U/L — SIGNIFICANT CHANGE UP (ref 10–45)
ALT FLD-CCNC: 21 U/L — SIGNIFICANT CHANGE UP (ref 10–45)
ALT FLD-CCNC: 22 U/L — SIGNIFICANT CHANGE UP (ref 10–45)
ALT FLD-CCNC: 23 U/L — SIGNIFICANT CHANGE UP (ref 10–45)
ALT FLD-CCNC: 26 U/L — SIGNIFICANT CHANGE UP (ref 10–45)
ALT FLD-CCNC: 27 U/L — SIGNIFICANT CHANGE UP (ref 10–45)
ALT FLD-CCNC: 29 U/L — SIGNIFICANT CHANGE UP (ref 10–45)
ALT FLD-CCNC: 30 U/L — SIGNIFICANT CHANGE UP (ref 10–45)
ALT FLD-CCNC: 30 U/L — SIGNIFICANT CHANGE UP (ref 10–45)
ALT FLD-CCNC: 35 U/L — SIGNIFICANT CHANGE UP (ref 10–45)
ALT FLD-CCNC: 39 U/L — SIGNIFICANT CHANGE UP (ref 10–45)
ALT FLD-CCNC: 42 U/L — SIGNIFICANT CHANGE UP (ref 10–45)
ALT FLD-CCNC: 43 U/L — SIGNIFICANT CHANGE UP (ref 10–45)
ALT FLD-CCNC: 46 U/L — HIGH (ref 10–45)
ALT FLD-CCNC: 48 U/L — HIGH (ref 10–45)
ALT FLD-CCNC: 49 U/L — HIGH (ref 10–45)
ALT FLD-CCNC: 70 U/L — HIGH (ref 10–45)
ALT SERPL-CCNC: 15 U/L
ALT SERPL-CCNC: 15 U/L
ALT SERPL-CCNC: 23 U/L
ANION GAP SERPL CALC-SCNC: 10 MMOL/L — SIGNIFICANT CHANGE UP (ref 5–17)
ANION GAP SERPL CALC-SCNC: 11 MMOL/L
ANION GAP SERPL CALC-SCNC: 11 MMOL/L
ANION GAP SERPL CALC-SCNC: 11 MMOL/L — SIGNIFICANT CHANGE UP (ref 5–17)
ANION GAP SERPL CALC-SCNC: 12 MMOL/L — SIGNIFICANT CHANGE UP (ref 5–17)
ANION GAP SERPL CALC-SCNC: 13 MMOL/L
ANION GAP SERPL CALC-SCNC: 13 MMOL/L — SIGNIFICANT CHANGE UP (ref 5–17)
ANION GAP SERPL CALC-SCNC: 8 MMOL/L — SIGNIFICANT CHANGE UP (ref 5–17)
ANION GAP SERPL CALC-SCNC: 9 MMOL/L — SIGNIFICANT CHANGE UP (ref 5–17)
ANISOCYTOSIS BLD QL: SLIGHT — SIGNIFICANT CHANGE UP
APPEARANCE UR: ABNORMAL
APPEARANCE UR: ABNORMAL
APPEARANCE: ABNORMAL
APTT BLD: 30.4 SEC — SIGNIFICANT CHANGE UP (ref 27.5–35.5)
AST SERPL-CCNC: 15 U/L — SIGNIFICANT CHANGE UP (ref 10–40)
AST SERPL-CCNC: 16 U/L — SIGNIFICANT CHANGE UP (ref 10–40)
AST SERPL-CCNC: 16 U/L — SIGNIFICANT CHANGE UP (ref 10–40)
AST SERPL-CCNC: 17 U/L — SIGNIFICANT CHANGE UP (ref 10–40)
AST SERPL-CCNC: 18 U/L — SIGNIFICANT CHANGE UP (ref 10–40)
AST SERPL-CCNC: 19 U/L
AST SERPL-CCNC: 19 U/L — SIGNIFICANT CHANGE UP (ref 10–40)
AST SERPL-CCNC: 20 U/L
AST SERPL-CCNC: 21 U/L
AST SERPL-CCNC: 21 U/L — SIGNIFICANT CHANGE UP (ref 10–40)
AST SERPL-CCNC: 21 U/L — SIGNIFICANT CHANGE UP (ref 10–40)
AST SERPL-CCNC: 22 U/L — SIGNIFICANT CHANGE UP (ref 10–40)
AST SERPL-CCNC: 24 U/L — SIGNIFICANT CHANGE UP (ref 10–40)
AST SERPL-CCNC: 31 U/L — SIGNIFICANT CHANGE UP (ref 10–40)
AST SERPL-CCNC: 32 U/L — SIGNIFICANT CHANGE UP (ref 10–40)
AST SERPL-CCNC: 35 U/L — SIGNIFICANT CHANGE UP (ref 10–40)
AST SERPL-CCNC: 36 U/L — SIGNIFICANT CHANGE UP (ref 10–40)
AST SERPL-CCNC: 37 U/L — SIGNIFICANT CHANGE UP (ref 10–40)
AST SERPL-CCNC: 51 U/L — HIGH (ref 10–40)
AST SERPL-CCNC: 54 U/L — HIGH (ref 10–40)
BACTERIA # UR AUTO: NEGATIVE — SIGNIFICANT CHANGE UP
BACTERIA # UR AUTO: NEGATIVE — SIGNIFICANT CHANGE UP
BACTERIA UR CULT: NORMAL
BACTERIA: NEGATIVE
BASE EXCESS BLDV CALC-SCNC: -0.3 MMOL/L — SIGNIFICANT CHANGE UP (ref -2–3)
BASE EXCESS BLDV CALC-SCNC: -1.5 MMOL/L — SIGNIFICANT CHANGE UP (ref -2–2)
BASE EXCESS BLDV CALC-SCNC: 1.8 MMOL/L — SIGNIFICANT CHANGE UP (ref -2–2)
BASOPHILS # BLD AUTO: 0 K/UL — SIGNIFICANT CHANGE UP (ref 0–0.2)
BASOPHILS # BLD AUTO: 0 K/UL — SIGNIFICANT CHANGE UP (ref 0–0.2)
BASOPHILS # BLD AUTO: 0.01 K/UL — SIGNIFICANT CHANGE UP (ref 0–0.2)
BASOPHILS # BLD AUTO: 0.02 K/UL — SIGNIFICANT CHANGE UP (ref 0–0.2)
BASOPHILS # BLD AUTO: 0.03 K/UL — SIGNIFICANT CHANGE UP (ref 0–0.2)
BASOPHILS # BLD AUTO: 0.04 K/UL — SIGNIFICANT CHANGE UP (ref 0–0.2)
BASOPHILS # BLD AUTO: 0.05 K/UL — SIGNIFICANT CHANGE UP (ref 0–0.2)
BASOPHILS NFR BLD AUTO: 0 % — SIGNIFICANT CHANGE UP (ref 0–2)
BASOPHILS NFR BLD AUTO: 0.1 % — SIGNIFICANT CHANGE UP (ref 0–2)
BASOPHILS NFR BLD AUTO: 0.2 % — SIGNIFICANT CHANGE UP (ref 0–2)
BASOPHILS NFR BLD AUTO: 0.3 % — SIGNIFICANT CHANGE UP (ref 0–2)
BASOPHILS NFR BLD AUTO: 0.3 % — SIGNIFICANT CHANGE UP (ref 0–2)
BASOPHILS NFR BLD AUTO: 0.4 % — SIGNIFICANT CHANGE UP (ref 0–2)
BASOPHILS NFR BLD AUTO: 0.5 % — SIGNIFICANT CHANGE UP (ref 0–2)
BASOPHILS NFR BLD AUTO: 0.6 % — SIGNIFICANT CHANGE UP (ref 0–2)
BASOPHILS NFR BLD AUTO: 0.9 % — SIGNIFICANT CHANGE UP (ref 0–2)
BASOPHILS NFR BLD AUTO: 0.9 % — SIGNIFICANT CHANGE UP (ref 0–2)
BILIRUB SERPL-MCNC: 0.4 MG/DL — SIGNIFICANT CHANGE UP (ref 0.2–1.2)
BILIRUB SERPL-MCNC: 0.5 MG/DL
BILIRUB SERPL-MCNC: 0.5 MG/DL — SIGNIFICANT CHANGE UP (ref 0.2–1.2)
BILIRUB SERPL-MCNC: 0.6 MG/DL
BILIRUB SERPL-MCNC: 0.6 MG/DL — SIGNIFICANT CHANGE UP (ref 0.2–1.2)
BILIRUB SERPL-MCNC: 0.7 MG/DL — SIGNIFICANT CHANGE UP (ref 0.2–1.2)
BILIRUB SERPL-MCNC: 0.8 MG/DL — SIGNIFICANT CHANGE UP (ref 0.2–1.2)
BILIRUB SERPL-MCNC: 0.8 MG/DL — SIGNIFICANT CHANGE UP (ref 0.2–1.2)
BILIRUB SERPL-MCNC: 1.1 MG/DL
BILIRUB SERPL-MCNC: 1.1 MG/DL — SIGNIFICANT CHANGE UP (ref 0.2–1.2)
BILIRUB SERPL-MCNC: 1.1 MG/DL — SIGNIFICANT CHANGE UP (ref 0.2–1.2)
BILIRUB SERPL-MCNC: 1.5 MG/DL — HIGH (ref 0.2–1.2)
BILIRUB UR-MCNC: NEGATIVE — SIGNIFICANT CHANGE UP
BILIRUB UR-MCNC: NEGATIVE — SIGNIFICANT CHANGE UP
BILIRUBIN URINE: NEGATIVE
BLOOD GAS VENOUS - CREATININE: SIGNIFICANT CHANGE UP MG/DL (ref 0.5–1.3)
BLOOD URINE: ABNORMAL
BUN SERPL-MCNC: 10 MG/DL — SIGNIFICANT CHANGE UP (ref 7–23)
BUN SERPL-MCNC: 11 MG/DL — SIGNIFICANT CHANGE UP (ref 7–23)
BUN SERPL-MCNC: 12 MG/DL — SIGNIFICANT CHANGE UP (ref 7–23)
BUN SERPL-MCNC: 13 MG/DL — SIGNIFICANT CHANGE UP (ref 7–23)
BUN SERPL-MCNC: 13 MG/DL — SIGNIFICANT CHANGE UP (ref 7–23)
BUN SERPL-MCNC: 14 MG/DL — SIGNIFICANT CHANGE UP (ref 7–23)
BUN SERPL-MCNC: 14 MG/DL — SIGNIFICANT CHANGE UP (ref 7–23)
BUN SERPL-MCNC: 15 MG/DL — SIGNIFICANT CHANGE UP (ref 7–23)
BUN SERPL-MCNC: 15 MG/DL — SIGNIFICANT CHANGE UP (ref 7–23)
BUN SERPL-MCNC: 16 MG/DL
BUN SERPL-MCNC: 16 MG/DL — SIGNIFICANT CHANGE UP (ref 7–23)
BUN SERPL-MCNC: 16 MG/DL — SIGNIFICANT CHANGE UP (ref 7–23)
BUN SERPL-MCNC: 18 MG/DL — SIGNIFICANT CHANGE UP (ref 7–23)
BUN SERPL-MCNC: 19 MG/DL — SIGNIFICANT CHANGE UP (ref 7–23)
BUN SERPL-MCNC: 20 MG/DL — SIGNIFICANT CHANGE UP (ref 7–23)
BUN SERPL-MCNC: 21 MG/DL
BUN SERPL-MCNC: 23 MG/DL
BUN SERPL-MCNC: 23 MG/DL — SIGNIFICANT CHANGE UP (ref 7–23)
BUN SERPL-MCNC: 7 MG/DL — SIGNIFICANT CHANGE UP (ref 7–23)
BUN SERPL-MCNC: 8 MG/DL — SIGNIFICANT CHANGE UP (ref 7–23)
BUN SERPL-MCNC: 9 MG/DL — SIGNIFICANT CHANGE UP (ref 7–23)
BUN SERPL-MCNC: 9 MG/DL — SIGNIFICANT CHANGE UP (ref 7–23)
C DIFF GDH STL QL: NEGATIVE — SIGNIFICANT CHANGE UP
C DIFF GDH STL QL: SIGNIFICANT CHANGE UP
CA-I SERPL-SCNC: 1.15 MMOL/L — SIGNIFICANT CHANGE UP (ref 1.15–1.33)
CA-I SERPL-SCNC: 1.2 MMOL/L — SIGNIFICANT CHANGE UP (ref 1.15–1.33)
CA-I SERPL-SCNC: 1.21 MMOL/L — SIGNIFICANT CHANGE UP (ref 1.15–1.33)
CALCIUM SERPL-MCNC: 8.2 MG/DL — LOW (ref 8.4–10.5)
CALCIUM SERPL-MCNC: 8.4 MG/DL — SIGNIFICANT CHANGE UP (ref 8.4–10.5)
CALCIUM SERPL-MCNC: 8.6 MG/DL — SIGNIFICANT CHANGE UP (ref 8.4–10.5)
CALCIUM SERPL-MCNC: 8.7 MG/DL — SIGNIFICANT CHANGE UP (ref 8.4–10.5)
CALCIUM SERPL-MCNC: 8.8 MG/DL — SIGNIFICANT CHANGE UP (ref 8.4–10.5)
CALCIUM SERPL-MCNC: 8.9 MG/DL — SIGNIFICANT CHANGE UP (ref 8.4–10.5)
CALCIUM SERPL-MCNC: 9 MG/DL — SIGNIFICANT CHANGE UP (ref 8.4–10.5)
CALCIUM SERPL-MCNC: 9 MG/DL — SIGNIFICANT CHANGE UP (ref 8.4–10.5)
CALCIUM SERPL-MCNC: 9.3 MG/DL — SIGNIFICANT CHANGE UP (ref 8.4–10.5)
CALCIUM SERPL-MCNC: 9.3 MG/DL — SIGNIFICANT CHANGE UP (ref 8.4–10.5)
CALCIUM SERPL-MCNC: 9.4 MG/DL — SIGNIFICANT CHANGE UP (ref 8.4–10.5)
CALCIUM SERPL-MCNC: 9.5 MG/DL
CALCIUM SERPL-MCNC: 9.6 MG/DL
CALCIUM SERPL-MCNC: 9.6 MG/DL
CALCIUM SERPL-MCNC: 9.6 MG/DL — SIGNIFICANT CHANGE UP (ref 8.4–10.5)
CANCER AG19-9 SERPL-ACNC: 1202 U/ML — HIGH
CANCER AG19-9 SERPL-ACNC: 122 U/ML
CANCER AG19-9 SERPL-ACNC: 44 U/ML — HIGH
CANCER AG19-9 SERPL-ACNC: 45 U/ML — HIGH
CANCER AG19-9 SERPL-ACNC: 47 U/ML — HIGH
CANCER AG19-9 SERPL-ACNC: 48 U/ML — HIGH
CANCER AG19-9 SERPL-ACNC: 529 U/ML — HIGH
CANCER AG19-9 SERPL-ACNC: 55 U/ML — HIGH
CANCER AG19-9 SERPL-ACNC: 614 U/ML — HIGH
CANCER AG19-9 SERPL-ACNC: 726 U/ML — HIGH
CANCER AG19-9 SERPL-ACNC: 972 U/ML — HIGH
CEA SERPL-MCNC: 2.2 NG/ML — SIGNIFICANT CHANGE UP (ref 0–3.8)
CEA SERPL-MCNC: 2.3 NG/ML — SIGNIFICANT CHANGE UP (ref 0–3.8)
CEA SERPL-MCNC: 2.5 NG/ML — SIGNIFICANT CHANGE UP (ref 0–3.8)
CEA SERPL-MCNC: 2.6 NG/ML — SIGNIFICANT CHANGE UP (ref 0–3.8)
CEA SERPL-MCNC: 4.4 NG/ML — HIGH (ref 0–3.8)
CHLORIDE BLDV-SCNC: 103 MMOL/L — SIGNIFICANT CHANGE UP (ref 96–108)
CHLORIDE BLDV-SCNC: 104 MMOL/L — SIGNIFICANT CHANGE UP (ref 96–108)
CHLORIDE BLDV-SCNC: 104 MMOL/L — SIGNIFICANT CHANGE UP (ref 96–108)
CHLORIDE SERPL-SCNC: 100 MMOL/L — SIGNIFICANT CHANGE UP (ref 96–108)
CHLORIDE SERPL-SCNC: 101 MMOL/L — SIGNIFICANT CHANGE UP (ref 96–108)
CHLORIDE SERPL-SCNC: 102 MMOL/L
CHLORIDE SERPL-SCNC: 102 MMOL/L — SIGNIFICANT CHANGE UP (ref 96–108)
CHLORIDE SERPL-SCNC: 103 MMOL/L — SIGNIFICANT CHANGE UP (ref 96–108)
CHLORIDE SERPL-SCNC: 104 MMOL/L — SIGNIFICANT CHANGE UP (ref 96–108)
CHLORIDE SERPL-SCNC: 105 MMOL/L
CHLORIDE SERPL-SCNC: 105 MMOL/L — SIGNIFICANT CHANGE UP (ref 96–108)
CHLORIDE SERPL-SCNC: 106 MMOL/L — SIGNIFICANT CHANGE UP (ref 96–108)
CHLORIDE SERPL-SCNC: 106 MMOL/L — SIGNIFICANT CHANGE UP (ref 96–108)
CHLORIDE SERPL-SCNC: 107 MMOL/L — SIGNIFICANT CHANGE UP (ref 96–108)
CHLORIDE SERPL-SCNC: 107 MMOL/L — SIGNIFICANT CHANGE UP (ref 96–108)
CHLORIDE SERPL-SCNC: 108 MMOL/L — SIGNIFICANT CHANGE UP (ref 96–108)
CHLORIDE SERPL-SCNC: 108 MMOL/L — SIGNIFICANT CHANGE UP (ref 96–108)
CHLORIDE SERPL-SCNC: 99 MMOL/L
CHOLEST SERPL-MCNC: 149 MG/DL
CO2 BLDV-SCNC: 26 MMOL/L — SIGNIFICANT CHANGE UP (ref 22–26)
CO2 BLDV-SCNC: 28 MMOL/L — HIGH (ref 22–26)
CO2 BLDV-SCNC: 29 MMOL/L — HIGH (ref 22–26)
CO2 SERPL-SCNC: 21 MMOL/L — LOW (ref 22–31)
CO2 SERPL-SCNC: 21 MMOL/L — LOW (ref 22–31)
CO2 SERPL-SCNC: 22 MMOL/L — SIGNIFICANT CHANGE UP (ref 22–31)
CO2 SERPL-SCNC: 23 MMOL/L — SIGNIFICANT CHANGE UP (ref 22–31)
CO2 SERPL-SCNC: 24 MMOL/L
CO2 SERPL-SCNC: 24 MMOL/L
CO2 SERPL-SCNC: 24 MMOL/L — SIGNIFICANT CHANGE UP (ref 22–31)
CO2 SERPL-SCNC: 25 MMOL/L — SIGNIFICANT CHANGE UP (ref 22–31)
CO2 SERPL-SCNC: 26 MMOL/L
CO2 SERPL-SCNC: 26 MMOL/L — SIGNIFICANT CHANGE UP (ref 22–31)
CO2 SERPL-SCNC: 26 MMOL/L — SIGNIFICANT CHANGE UP (ref 22–31)
CO2 SERPL-SCNC: 27 MMOL/L — SIGNIFICANT CHANGE UP (ref 22–31)
CO2 SERPL-SCNC: 27 MMOL/L — SIGNIFICANT CHANGE UP (ref 22–31)
CO2 SERPL-SCNC: 28 MMOL/L — SIGNIFICANT CHANGE UP (ref 22–31)
CO2 SERPL-SCNC: 29 MMOL/L — SIGNIFICANT CHANGE UP (ref 22–31)
COLOR SPEC: ABNORMAL
COLOR SPEC: SIGNIFICANT CHANGE UP
COLOR: YELLOW
CREAT SERPL-MCNC: 0.6 MG/DL — SIGNIFICANT CHANGE UP (ref 0.5–1.3)
CREAT SERPL-MCNC: 0.64 MG/DL — SIGNIFICANT CHANGE UP (ref 0.5–1.3)
CREAT SERPL-MCNC: 0.64 MG/DL — SIGNIFICANT CHANGE UP (ref 0.5–1.3)
CREAT SERPL-MCNC: 0.67 MG/DL — SIGNIFICANT CHANGE UP (ref 0.5–1.3)
CREAT SERPL-MCNC: 0.68 MG/DL — SIGNIFICANT CHANGE UP (ref 0.5–1.3)
CREAT SERPL-MCNC: 0.68 MG/DL — SIGNIFICANT CHANGE UP (ref 0.5–1.3)
CREAT SERPL-MCNC: 0.69 MG/DL — SIGNIFICANT CHANGE UP (ref 0.5–1.3)
CREAT SERPL-MCNC: 0.69 MG/DL — SIGNIFICANT CHANGE UP (ref 0.5–1.3)
CREAT SERPL-MCNC: 0.7 MG/DL — SIGNIFICANT CHANGE UP (ref 0.5–1.3)
CREAT SERPL-MCNC: 0.71 MG/DL — SIGNIFICANT CHANGE UP (ref 0.5–1.3)
CREAT SERPL-MCNC: 0.71 MG/DL — SIGNIFICANT CHANGE UP (ref 0.5–1.3)
CREAT SERPL-MCNC: 0.72 MG/DL — SIGNIFICANT CHANGE UP (ref 0.5–1.3)
CREAT SERPL-MCNC: 0.73 MG/DL — SIGNIFICANT CHANGE UP (ref 0.5–1.3)
CREAT SERPL-MCNC: 0.74 MG/DL — SIGNIFICANT CHANGE UP (ref 0.5–1.3)
CREAT SERPL-MCNC: 0.75 MG/DL — SIGNIFICANT CHANGE UP (ref 0.5–1.3)
CREAT SERPL-MCNC: 0.75 MG/DL — SIGNIFICANT CHANGE UP (ref 0.5–1.3)
CREAT SERPL-MCNC: 0.76 MG/DL — SIGNIFICANT CHANGE UP (ref 0.5–1.3)
CREAT SERPL-MCNC: 0.78 MG/DL — SIGNIFICANT CHANGE UP (ref 0.5–1.3)
CREAT SERPL-MCNC: 0.79 MG/DL — SIGNIFICANT CHANGE UP (ref 0.5–1.3)
CREAT SERPL-MCNC: 0.85 MG/DL — SIGNIFICANT CHANGE UP (ref 0.5–1.3)
CREAT SERPL-MCNC: 0.86 MG/DL — SIGNIFICANT CHANGE UP (ref 0.5–1.3)
CREAT SERPL-MCNC: 0.89 MG/DL
CREAT SERPL-MCNC: 0.92 MG/DL
CREAT SERPL-MCNC: 0.93 MG/DL
CULTURE RESULTS: SIGNIFICANT CHANGE UP
DIFF PNL FLD: ABNORMAL
DIFF PNL FLD: ABNORMAL
EGFR: 63 ML/MIN/1.73M2
EGFR: 63 ML/MIN/1.73M2
EGFR: 66 ML/MIN/1.73M2
EGFR: 69 ML/MIN/1.73M2 — SIGNIFICANT CHANGE UP
EGFR: 70 ML/MIN/1.73M2 — SIGNIFICANT CHANGE UP
EGFR: 77 ML/MIN/1.73M2 — SIGNIFICANT CHANGE UP
EGFR: 78 ML/MIN/1.73M2 — SIGNIFICANT CHANGE UP
EGFR: 80 ML/MIN/1.73M2 — SIGNIFICANT CHANGE UP
EGFR: 81 ML/MIN/1.73M2 — SIGNIFICANT CHANGE UP
EGFR: 81 ML/MIN/1.73M2 — SIGNIFICANT CHANGE UP
EGFR: 83 ML/MIN/1.73M2 — SIGNIFICANT CHANGE UP
EGFR: 84 ML/MIN/1.73M2 — SIGNIFICANT CHANGE UP
EGFR: 85 ML/MIN/1.73M2 — SIGNIFICANT CHANGE UP
EGFR: 87 ML/MIN/1.73M2 — SIGNIFICANT CHANGE UP
EGFR: 87 ML/MIN/1.73M2 — SIGNIFICANT CHANGE UP
EGFR: 88 ML/MIN/1.73M2 — SIGNIFICANT CHANGE UP
EGFR: 89 ML/MIN/1.73M2 — SIGNIFICANT CHANGE UP
EGFR: 90 ML/MIN/1.73M2 — SIGNIFICANT CHANGE UP
EGFR: 90 ML/MIN/1.73M2 — SIGNIFICANT CHANGE UP
EGFR: 91 ML/MIN/1.73M2 — SIGNIFICANT CHANGE UP
EOSINOPHIL # BLD AUTO: 0 K/UL — SIGNIFICANT CHANGE UP (ref 0–0.5)
EOSINOPHIL # BLD AUTO: 0.01 K/UL — SIGNIFICANT CHANGE UP (ref 0–0.5)
EOSINOPHIL # BLD AUTO: 0.03 K/UL — SIGNIFICANT CHANGE UP (ref 0–0.5)
EOSINOPHIL # BLD AUTO: 0.08 K/UL — SIGNIFICANT CHANGE UP (ref 0–0.5)
EOSINOPHIL # BLD AUTO: 0.08 K/UL — SIGNIFICANT CHANGE UP (ref 0–0.5)
EOSINOPHIL # BLD AUTO: 0.09 K/UL — SIGNIFICANT CHANGE UP (ref 0–0.5)
EOSINOPHIL # BLD AUTO: 0.1 K/UL — SIGNIFICANT CHANGE UP (ref 0–0.5)
EOSINOPHIL # BLD AUTO: 0.11 K/UL — SIGNIFICANT CHANGE UP (ref 0–0.5)
EOSINOPHIL # BLD AUTO: 0.13 K/UL — SIGNIFICANT CHANGE UP (ref 0–0.5)
EOSINOPHIL # BLD AUTO: 0.14 K/UL — SIGNIFICANT CHANGE UP (ref 0–0.5)
EOSINOPHIL # BLD AUTO: 0.15 K/UL — SIGNIFICANT CHANGE UP (ref 0–0.5)
EOSINOPHIL # BLD AUTO: 0.16 K/UL — SIGNIFICANT CHANGE UP (ref 0–0.5)
EOSINOPHIL # BLD AUTO: 0.17 K/UL — SIGNIFICANT CHANGE UP (ref 0–0.5)
EOSINOPHIL # BLD AUTO: 0.19 K/UL — SIGNIFICANT CHANGE UP (ref 0–0.5)
EOSINOPHIL # BLD AUTO: 0.2 K/UL — SIGNIFICANT CHANGE UP (ref 0–0.5)
EOSINOPHIL # BLD AUTO: 0.21 K/UL — SIGNIFICANT CHANGE UP (ref 0–0.5)
EOSINOPHIL # BLD AUTO: 0.26 K/UL — SIGNIFICANT CHANGE UP (ref 0–0.5)
EOSINOPHIL NFR BLD AUTO: 0 % — SIGNIFICANT CHANGE UP (ref 0–6)
EOSINOPHIL NFR BLD AUTO: 0.1 % — SIGNIFICANT CHANGE UP (ref 0–6)
EOSINOPHIL NFR BLD AUTO: 0.9 % — SIGNIFICANT CHANGE UP (ref 0–6)
EOSINOPHIL NFR BLD AUTO: 1 % — SIGNIFICANT CHANGE UP (ref 0–6)
EOSINOPHIL NFR BLD AUTO: 1.4 % — SIGNIFICANT CHANGE UP (ref 0–6)
EOSINOPHIL NFR BLD AUTO: 1.5 % — SIGNIFICANT CHANGE UP (ref 0–6)
EOSINOPHIL NFR BLD AUTO: 1.5 % — SIGNIFICANT CHANGE UP (ref 0–6)
EOSINOPHIL NFR BLD AUTO: 1.7 % — SIGNIFICANT CHANGE UP (ref 0–6)
EOSINOPHIL NFR BLD AUTO: 1.7 % — SIGNIFICANT CHANGE UP (ref 0–6)
EOSINOPHIL NFR BLD AUTO: 2.1 % — SIGNIFICANT CHANGE UP (ref 0–6)
EOSINOPHIL NFR BLD AUTO: 2.3 % — SIGNIFICANT CHANGE UP (ref 0–6)
EOSINOPHIL NFR BLD AUTO: 2.4 % — SIGNIFICANT CHANGE UP (ref 0–6)
EOSINOPHIL NFR BLD AUTO: 2.6 % — SIGNIFICANT CHANGE UP (ref 0–6)
EOSINOPHIL NFR BLD AUTO: 2.6 % — SIGNIFICANT CHANGE UP (ref 0–6)
EOSINOPHIL NFR BLD AUTO: 2.7 % — SIGNIFICANT CHANGE UP (ref 0–6)
EOSINOPHIL NFR BLD AUTO: 2.8 % — SIGNIFICANT CHANGE UP (ref 0–6)
EOSINOPHIL NFR BLD AUTO: 3.2 % — SIGNIFICANT CHANGE UP (ref 0–6)
EOSINOPHIL NFR BLD AUTO: 3.4 % — SIGNIFICANT CHANGE UP (ref 0–6)
EOSINOPHIL NFR BLD AUTO: 4 % — SIGNIFICANT CHANGE UP (ref 0–6)
EPI CELLS # UR: 1 /HPF — SIGNIFICANT CHANGE UP (ref 0–5)
EPI CELLS # UR: 4 /HPF — SIGNIFICANT CHANGE UP
ESTIMATED AVERAGE GLUCOSE: 123 MG/DL
FLUAV AG NPH QL: SIGNIFICANT CHANGE UP
FLUBV AG NPH QL: SIGNIFICANT CHANGE UP
GAS PNL BLDV: 135 MMOL/L — LOW (ref 136–145)
GAS PNL BLDV: 136 MMOL/L — SIGNIFICANT CHANGE UP (ref 136–145)
GAS PNL BLDV: 137 MMOL/L — SIGNIFICANT CHANGE UP (ref 136–145)
GAS PNL BLDV: SIGNIFICANT CHANGE UP
GI PCR PANEL: SIGNIFICANT CHANGE UP
GLUCOSE BLDV-MCNC: 124 MG/DL — HIGH (ref 70–99)
GLUCOSE BLDV-MCNC: 130 MG/DL — HIGH (ref 70–99)
GLUCOSE BLDV-MCNC: 149 MG/DL — HIGH (ref 70–99)
GLUCOSE QUALITATIVE U: NEGATIVE
GLUCOSE SERPL-MCNC: 100 MG/DL — HIGH (ref 70–99)
GLUCOSE SERPL-MCNC: 101 MG/DL — HIGH (ref 70–99)
GLUCOSE SERPL-MCNC: 102 MG/DL — HIGH (ref 70–99)
GLUCOSE SERPL-MCNC: 103 MG/DL — HIGH (ref 70–99)
GLUCOSE SERPL-MCNC: 104 MG/DL — HIGH (ref 70–99)
GLUCOSE SERPL-MCNC: 106 MG/DL — HIGH (ref 70–99)
GLUCOSE SERPL-MCNC: 107 MG/DL — HIGH (ref 70–99)
GLUCOSE SERPL-MCNC: 109 MG/DL — HIGH (ref 70–99)
GLUCOSE SERPL-MCNC: 109 MG/DL — HIGH (ref 70–99)
GLUCOSE SERPL-MCNC: 110 MG/DL — HIGH (ref 70–99)
GLUCOSE SERPL-MCNC: 112 MG/DL — HIGH (ref 70–99)
GLUCOSE SERPL-MCNC: 113 MG/DL
GLUCOSE SERPL-MCNC: 114 MG/DL
GLUCOSE SERPL-MCNC: 114 MG/DL — HIGH (ref 70–99)
GLUCOSE SERPL-MCNC: 116 MG/DL
GLUCOSE SERPL-MCNC: 118 MG/DL — HIGH (ref 70–99)
GLUCOSE SERPL-MCNC: 119 MG/DL — HIGH (ref 70–99)
GLUCOSE SERPL-MCNC: 120 MG/DL — HIGH (ref 70–99)
GLUCOSE SERPL-MCNC: 121 MG/DL — HIGH (ref 70–99)
GLUCOSE SERPL-MCNC: 125 MG/DL — HIGH (ref 70–99)
GLUCOSE SERPL-MCNC: 130 MG/DL — HIGH (ref 70–99)
GLUCOSE SERPL-MCNC: 134 MG/DL — HIGH (ref 70–99)
GLUCOSE SERPL-MCNC: 134 MG/DL — HIGH (ref 70–99)
GLUCOSE SERPL-MCNC: 147 MG/DL — HIGH (ref 70–99)
GLUCOSE SERPL-MCNC: 148 MG/DL — HIGH (ref 70–99)
GLUCOSE SERPL-MCNC: 150 MG/DL — HIGH (ref 70–99)
GLUCOSE SERPL-MCNC: 158 MG/DL — HIGH (ref 70–99)
GLUCOSE SERPL-MCNC: 164 MG/DL — HIGH (ref 70–99)
GLUCOSE SERPL-MCNC: 92 MG/DL — SIGNIFICANT CHANGE UP (ref 70–99)
GLUCOSE UR QL: NEGATIVE — SIGNIFICANT CHANGE UP
GLUCOSE UR QL: NEGATIVE — SIGNIFICANT CHANGE UP
HBA1C MFR BLD HPLC: 5.9 %
HCO3 BLDV-SCNC: 24 MMOL/L — SIGNIFICANT CHANGE UP (ref 22–29)
HCO3 BLDV-SCNC: 26 MMOL/L — SIGNIFICANT CHANGE UP (ref 22–29)
HCO3 BLDV-SCNC: 27 MMOL/L — SIGNIFICANT CHANGE UP (ref 22–29)
HCT VFR BLD CALC: 27.8 % — LOW (ref 34.5–45)
HCT VFR BLD CALC: 28.8 % — LOW (ref 34.5–45)
HCT VFR BLD CALC: 29.1 % — LOW (ref 34.5–45)
HCT VFR BLD CALC: 29.5 % — LOW (ref 34.5–45)
HCT VFR BLD CALC: 29.5 % — LOW (ref 34.5–45)
HCT VFR BLD CALC: 29.6 % — LOW (ref 34.5–45)
HCT VFR BLD CALC: 30.6 % — LOW (ref 34.5–45)
HCT VFR BLD CALC: 31.3 % — LOW (ref 34.5–45)
HCT VFR BLD CALC: 31.3 % — LOW (ref 34.5–45)
HCT VFR BLD CALC: 32.3 % — LOW (ref 34.5–45)
HCT VFR BLD CALC: 32.5 % — LOW (ref 34.5–45)
HCT VFR BLD CALC: 33 % — LOW (ref 34.5–45)
HCT VFR BLD CALC: 33.1 % — LOW (ref 34.5–45)
HCT VFR BLD CALC: 33.4 % — LOW (ref 34.5–45)
HCT VFR BLD CALC: 34.1 % — LOW (ref 34.5–45)
HCT VFR BLD CALC: 34.1 % — LOW (ref 34.5–45)
HCT VFR BLD CALC: 34.3 % — LOW (ref 34.5–45)
HCT VFR BLD CALC: 34.6 % — SIGNIFICANT CHANGE UP (ref 34.5–45)
HCT VFR BLD CALC: 34.7 % — SIGNIFICANT CHANGE UP (ref 34.5–45)
HCT VFR BLD CALC: 34.9 % — SIGNIFICANT CHANGE UP (ref 34.5–45)
HCT VFR BLD CALC: 35.1 % — SIGNIFICANT CHANGE UP (ref 34.5–45)
HCT VFR BLD CALC: 35.3 % — SIGNIFICANT CHANGE UP (ref 34.5–45)
HCT VFR BLD CALC: 35.5 % — SIGNIFICANT CHANGE UP (ref 34.5–45)
HCT VFR BLD CALC: 35.9 % — SIGNIFICANT CHANGE UP (ref 34.5–45)
HCT VFR BLD CALC: 35.9 % — SIGNIFICANT CHANGE UP (ref 34.5–45)
HCT VFR BLD CALC: 36 % — SIGNIFICANT CHANGE UP (ref 34.5–45)
HCT VFR BLD CALC: 36.1 % — SIGNIFICANT CHANGE UP (ref 34.5–45)
HCT VFR BLD CALC: 36.3 % — SIGNIFICANT CHANGE UP (ref 34.5–45)
HCT VFR BLD CALC: 36.6 % — SIGNIFICANT CHANGE UP (ref 34.5–45)
HCT VFR BLDA CALC: 32 % — LOW (ref 34.5–46.5)
HCT VFR BLDA CALC: 34 % — LOW (ref 34.5–46.5)
HCT VFR BLDA CALC: 41 % — SIGNIFICANT CHANGE UP (ref 34.5–46.5)
HDLC SERPL-MCNC: 56 MG/DL
HGB BLD CALC-MCNC: 10.7 G/DL — LOW (ref 11.7–16.1)
HGB BLD CALC-MCNC: 11.2 G/DL — LOW (ref 11.7–16.1)
HGB BLD CALC-MCNC: 13.6 G/DL — SIGNIFICANT CHANGE UP (ref 11.7–16.1)
HGB BLD-MCNC: 10.1 G/DL — LOW (ref 11.5–15.5)
HGB BLD-MCNC: 10.2 G/DL — LOW (ref 11.5–15.5)
HGB BLD-MCNC: 10.3 G/DL — LOW (ref 11.5–15.5)
HGB BLD-MCNC: 10.6 G/DL — LOW (ref 11.5–15.5)
HGB BLD-MCNC: 10.7 G/DL — LOW (ref 11.5–15.5)
HGB BLD-MCNC: 10.7 G/DL — LOW (ref 11.5–15.5)
HGB BLD-MCNC: 10.9 G/DL — LOW (ref 11.5–15.5)
HGB BLD-MCNC: 10.9 G/DL — LOW (ref 11.5–15.5)
HGB BLD-MCNC: 11 G/DL — LOW (ref 11.5–15.5)
HGB BLD-MCNC: 11.1 G/DL — LOW (ref 11.5–15.5)
HGB BLD-MCNC: 11.2 G/DL — LOW (ref 11.5–15.5)
HGB BLD-MCNC: 11.4 G/DL — LOW (ref 11.5–15.5)
HGB BLD-MCNC: 11.5 G/DL — SIGNIFICANT CHANGE UP (ref 11.5–15.5)
HGB BLD-MCNC: 11.5 G/DL — SIGNIFICANT CHANGE UP (ref 11.5–15.5)
HGB BLD-MCNC: 11.6 G/DL — SIGNIFICANT CHANGE UP (ref 11.5–15.5)
HGB BLD-MCNC: 11.6 G/DL — SIGNIFICANT CHANGE UP (ref 11.5–15.5)
HGB BLD-MCNC: 11.7 G/DL — SIGNIFICANT CHANGE UP (ref 11.5–15.5)
HGB BLD-MCNC: 11.8 G/DL — SIGNIFICANT CHANGE UP (ref 11.5–15.5)
HGB BLD-MCNC: 8.8 G/DL — LOW (ref 11.5–15.5)
HGB BLD-MCNC: 9 G/DL — LOW (ref 11.5–15.5)
HGB BLD-MCNC: 9.1 G/DL — LOW (ref 11.5–15.5)
HGB BLD-MCNC: 9.1 G/DL — LOW (ref 11.5–15.5)
HGB BLD-MCNC: 9.3 G/DL — LOW (ref 11.5–15.5)
HGB BLD-MCNC: 9.5 G/DL — LOW (ref 11.5–15.5)
HGB BLD-MCNC: 9.7 G/DL — LOW (ref 11.5–15.5)
HYALINE CASTS # UR AUTO: 1 /LPF — SIGNIFICANT CHANGE UP (ref 0–2)
HYALINE CASTS # UR AUTO: 2 /LPF — SIGNIFICANT CHANGE UP (ref 0–7)
HYALINE CASTS: 0 /LPF
IMM GRANULOCYTES NFR BLD AUTO: 0.2 % — SIGNIFICANT CHANGE UP (ref 0–0.9)
IMM GRANULOCYTES NFR BLD AUTO: 0.3 % — SIGNIFICANT CHANGE UP (ref 0–0.9)
IMM GRANULOCYTES NFR BLD AUTO: 0.3 % — SIGNIFICANT CHANGE UP (ref 0–0.9)
IMM GRANULOCYTES NFR BLD AUTO: 0.3 % — SIGNIFICANT CHANGE UP (ref 0–1.5)
IMM GRANULOCYTES NFR BLD AUTO: 0.3 % — SIGNIFICANT CHANGE UP (ref 0–1.5)
IMM GRANULOCYTES NFR BLD AUTO: 0.4 % — SIGNIFICANT CHANGE UP (ref 0–1.5)
IMM GRANULOCYTES NFR BLD AUTO: 0.4 % — SIGNIFICANT CHANGE UP (ref 0–1.5)
IMM GRANULOCYTES NFR BLD AUTO: 0.5 % — SIGNIFICANT CHANGE UP (ref 0–0.9)
IMM GRANULOCYTES NFR BLD AUTO: 0.5 % — SIGNIFICANT CHANGE UP (ref 0–0.9)
IMM GRANULOCYTES NFR BLD AUTO: 0.5 % — SIGNIFICANT CHANGE UP (ref 0–1.5)
IMM GRANULOCYTES NFR BLD AUTO: 0.6 % — SIGNIFICANT CHANGE UP (ref 0–1.5)
IMM GRANULOCYTES NFR BLD AUTO: 0.6 % — SIGNIFICANT CHANGE UP (ref 0–1.5)
IMM GRANULOCYTES NFR BLD AUTO: 0.7 % — SIGNIFICANT CHANGE UP (ref 0–0.9)
IMM GRANULOCYTES NFR BLD AUTO: 0.7 % — SIGNIFICANT CHANGE UP (ref 0–1.5)
IMM GRANULOCYTES NFR BLD AUTO: 1 % — SIGNIFICANT CHANGE UP (ref 0–1.5)
IMM GRANULOCYTES NFR BLD AUTO: 1 % — SIGNIFICANT CHANGE UP (ref 0–1.5)
INR BLD: 1.79 RATIO — HIGH (ref 0.88–1.16)
KETONES UR-MCNC: NEGATIVE — SIGNIFICANT CHANGE UP
KETONES UR-MCNC: NEGATIVE — SIGNIFICANT CHANGE UP
KETONES URINE: NEGATIVE
LACTATE BLDV-MCNC: 1.1 MMOL/L — SIGNIFICANT CHANGE UP (ref 0.5–2)
LACTATE BLDV-MCNC: 1.4 MMOL/L — SIGNIFICANT CHANGE UP (ref 0.7–2)
LACTATE BLDV-MCNC: 1.6 MMOL/L — SIGNIFICANT CHANGE UP (ref 0.7–2)
LDLC SERPL CALC-MCNC: 73 MG/DL
LEGIONELLA AG UR QL: NEGATIVE — SIGNIFICANT CHANGE UP
LEUKOCYTE ESTERASE UR-ACNC: NEGATIVE — SIGNIFICANT CHANGE UP
LEUKOCYTE ESTERASE UR-ACNC: NEGATIVE — SIGNIFICANT CHANGE UP
LEUKOCYTE ESTERASE URINE: NEGATIVE
LIDOCAIN IGE QN: 13 U/L — SIGNIFICANT CHANGE UP (ref 7–60)
LYMPHOCYTES # BLD AUTO: 0.09 K/UL — LOW (ref 1–3.3)
LYMPHOCYTES # BLD AUTO: 0.43 K/UL — LOW (ref 1–3.3)
LYMPHOCYTES # BLD AUTO: 0.71 K/UL — LOW (ref 1–3.3)
LYMPHOCYTES # BLD AUTO: 0.77 K/UL — LOW (ref 1–3.3)
LYMPHOCYTES # BLD AUTO: 0.84 K/UL — LOW (ref 1–3.3)
LYMPHOCYTES # BLD AUTO: 0.89 K/UL — LOW (ref 1–3.3)
LYMPHOCYTES # BLD AUTO: 0.98 K/UL — LOW (ref 1–3.3)
LYMPHOCYTES # BLD AUTO: 1.05 K/UL — SIGNIFICANT CHANGE UP (ref 1–3.3)
LYMPHOCYTES # BLD AUTO: 1.08 K/UL — SIGNIFICANT CHANGE UP (ref 1–3.3)
LYMPHOCYTES # BLD AUTO: 1.1 K/UL — SIGNIFICANT CHANGE UP (ref 1–3.3)
LYMPHOCYTES # BLD AUTO: 1.11 K/UL — SIGNIFICANT CHANGE UP (ref 1–3.3)
LYMPHOCYTES # BLD AUTO: 1.13 K/UL — SIGNIFICANT CHANGE UP (ref 1–3.3)
LYMPHOCYTES # BLD AUTO: 1.18 K/UL — SIGNIFICANT CHANGE UP (ref 1–3.3)
LYMPHOCYTES # BLD AUTO: 1.2 K/UL — SIGNIFICANT CHANGE UP (ref 1–3.3)
LYMPHOCYTES # BLD AUTO: 1.22 K/UL — SIGNIFICANT CHANGE UP (ref 1–3.3)
LYMPHOCYTES # BLD AUTO: 1.23 K/UL — SIGNIFICANT CHANGE UP (ref 1–3.3)
LYMPHOCYTES # BLD AUTO: 1.32 K/UL — SIGNIFICANT CHANGE UP (ref 1–3.3)
LYMPHOCYTES # BLD AUTO: 1.43 K/UL — SIGNIFICANT CHANGE UP (ref 1–3.3)
LYMPHOCYTES # BLD AUTO: 1.53 K/UL — SIGNIFICANT CHANGE UP (ref 1–3.3)
LYMPHOCYTES # BLD AUTO: 1.62 K/UL — SIGNIFICANT CHANGE UP (ref 1–3.3)
LYMPHOCYTES # BLD AUTO: 10.6 % — LOW (ref 13–44)
LYMPHOCYTES # BLD AUTO: 13.1 % — SIGNIFICANT CHANGE UP (ref 13–44)
LYMPHOCYTES # BLD AUTO: 13.7 % — SIGNIFICANT CHANGE UP (ref 13–44)
LYMPHOCYTES # BLD AUTO: 15.2 % — SIGNIFICANT CHANGE UP (ref 13–44)
LYMPHOCYTES # BLD AUTO: 15.3 % — SIGNIFICANT CHANGE UP (ref 13–44)
LYMPHOCYTES # BLD AUTO: 15.8 % — SIGNIFICANT CHANGE UP (ref 13–44)
LYMPHOCYTES # BLD AUTO: 15.9 % — SIGNIFICANT CHANGE UP (ref 13–44)
LYMPHOCYTES # BLD AUTO: 17.4 % — SIGNIFICANT CHANGE UP (ref 13–44)
LYMPHOCYTES # BLD AUTO: 17.6 % — SIGNIFICANT CHANGE UP (ref 13–44)
LYMPHOCYTES # BLD AUTO: 17.6 % — SIGNIFICANT CHANGE UP (ref 13–44)
LYMPHOCYTES # BLD AUTO: 17.9 % — SIGNIFICANT CHANGE UP (ref 13–44)
LYMPHOCYTES # BLD AUTO: 17.9 % — SIGNIFICANT CHANGE UP (ref 13–44)
LYMPHOCYTES # BLD AUTO: 19.5 % — SIGNIFICANT CHANGE UP (ref 13–44)
LYMPHOCYTES # BLD AUTO: 2.6 % — LOW (ref 13–44)
LYMPHOCYTES # BLD AUTO: 20.5 % — SIGNIFICANT CHANGE UP (ref 13–44)
LYMPHOCYTES # BLD AUTO: 20.8 % — SIGNIFICANT CHANGE UP (ref 13–44)
LYMPHOCYTES # BLD AUTO: 21.4 % — SIGNIFICANT CHANGE UP (ref 13–44)
LYMPHOCYTES # BLD AUTO: 24.2 % — SIGNIFICANT CHANGE UP (ref 13–44)
LYMPHOCYTES # BLD AUTO: 28 % — SIGNIFICANT CHANGE UP (ref 13–44)
LYMPHOCYTES # BLD AUTO: 55 % — HIGH (ref 13–44)
LYMPHOCYTES # BLD AUTO: 6 % — LOW (ref 13–44)
LYMPHOCYTES # BLD AUTO: 7.1 % — LOW (ref 13–44)
MAGNESIUM SERPL-MCNC: 1.6 MG/DL — SIGNIFICANT CHANGE UP (ref 1.6–2.6)
MAGNESIUM SERPL-MCNC: 1.8 MG/DL — SIGNIFICANT CHANGE UP (ref 1.6–2.6)
MAGNESIUM SERPL-MCNC: 1.9 MG/DL — SIGNIFICANT CHANGE UP (ref 1.6–2.6)
MAGNESIUM SERPL-MCNC: 1.9 MG/DL — SIGNIFICANT CHANGE UP (ref 1.6–2.6)
MAGNESIUM SERPL-MCNC: 2 MG/DL
MAGNESIUM SERPL-MCNC: 2 MG/DL — SIGNIFICANT CHANGE UP (ref 1.6–2.6)
MAGNESIUM SERPL-MCNC: 2.1 MG/DL — SIGNIFICANT CHANGE UP (ref 1.6–2.6)
MAGNESIUM SERPL-MCNC: 2.1 MG/DL — SIGNIFICANT CHANGE UP (ref 1.6–2.6)
MANUAL SMEAR VERIFICATION: SIGNIFICANT CHANGE UP
MCHC RBC-ENTMCNC: 27.4 PG — SIGNIFICANT CHANGE UP (ref 27–34)
MCHC RBC-ENTMCNC: 27.5 PG — SIGNIFICANT CHANGE UP (ref 27–34)
MCHC RBC-ENTMCNC: 27.6 PG — SIGNIFICANT CHANGE UP (ref 27–34)
MCHC RBC-ENTMCNC: 27.7 PG — SIGNIFICANT CHANGE UP (ref 27–34)
MCHC RBC-ENTMCNC: 27.9 PG — SIGNIFICANT CHANGE UP (ref 27–34)
MCHC RBC-ENTMCNC: 28.1 PG — SIGNIFICANT CHANGE UP (ref 27–34)
MCHC RBC-ENTMCNC: 28.2 PG — SIGNIFICANT CHANGE UP (ref 27–34)
MCHC RBC-ENTMCNC: 28.2 PG — SIGNIFICANT CHANGE UP (ref 27–34)
MCHC RBC-ENTMCNC: 28.3 PG — SIGNIFICANT CHANGE UP (ref 27–34)
MCHC RBC-ENTMCNC: 28.4 PG — SIGNIFICANT CHANGE UP (ref 27–34)
MCHC RBC-ENTMCNC: 28.5 PG — SIGNIFICANT CHANGE UP (ref 27–34)
MCHC RBC-ENTMCNC: 28.5 PG — SIGNIFICANT CHANGE UP (ref 27–34)
MCHC RBC-ENTMCNC: 28.6 PG — SIGNIFICANT CHANGE UP (ref 27–34)
MCHC RBC-ENTMCNC: 28.7 PG — SIGNIFICANT CHANGE UP (ref 27–34)
MCHC RBC-ENTMCNC: 28.8 PG — SIGNIFICANT CHANGE UP (ref 27–34)
MCHC RBC-ENTMCNC: 30.8 GM/DL — LOW (ref 32–36)
MCHC RBC-ENTMCNC: 30.9 GM/DL — LOW (ref 32–36)
MCHC RBC-ENTMCNC: 31.2 G/DL — LOW (ref 32–36)
MCHC RBC-ENTMCNC: 31.3 GM/DL — LOW (ref 32–36)
MCHC RBC-ENTMCNC: 31.4 G/DL — LOW (ref 32–36)
MCHC RBC-ENTMCNC: 31.4 GM/DL — LOW (ref 32–36)
MCHC RBC-ENTMCNC: 31.4 GM/DL — LOW (ref 32–36)
MCHC RBC-ENTMCNC: 31.6 GM/DL — LOW (ref 32–36)
MCHC RBC-ENTMCNC: 31.7 G/DL — LOW (ref 32–36)
MCHC RBC-ENTMCNC: 31.7 GM/DL — LOW (ref 32–36)
MCHC RBC-ENTMCNC: 31.7 GM/DL — LOW (ref 32–36)
MCHC RBC-ENTMCNC: 31.8 G/DL — LOW (ref 32–36)
MCHC RBC-ENTMCNC: 32.1 G/DL — SIGNIFICANT CHANGE UP (ref 32–36)
MCHC RBC-ENTMCNC: 32.1 G/DL — SIGNIFICANT CHANGE UP (ref 32–36)
MCHC RBC-ENTMCNC: 32.2 GM/DL — SIGNIFICANT CHANGE UP (ref 32–36)
MCHC RBC-ENTMCNC: 32.3 G/DL — SIGNIFICANT CHANGE UP (ref 32–36)
MCHC RBC-ENTMCNC: 32.3 GM/DL — SIGNIFICANT CHANGE UP (ref 32–36)
MCHC RBC-ENTMCNC: 32.4 GM/DL — SIGNIFICANT CHANGE UP (ref 32–36)
MCHC RBC-ENTMCNC: 32.5 G/DL — SIGNIFICANT CHANGE UP (ref 32–36)
MCHC RBC-ENTMCNC: 32.5 G/DL — SIGNIFICANT CHANGE UP (ref 32–36)
MCHC RBC-ENTMCNC: 32.6 G/DL — SIGNIFICANT CHANGE UP (ref 32–36)
MCHC RBC-ENTMCNC: 32.6 GM/DL — SIGNIFICANT CHANGE UP (ref 32–36)
MCHC RBC-ENTMCNC: 32.6 GM/DL — SIGNIFICANT CHANGE UP (ref 32–36)
MCHC RBC-ENTMCNC: 32.7 G/DL — SIGNIFICANT CHANGE UP (ref 32–36)
MCHC RBC-ENTMCNC: 32.8 G/DL — SIGNIFICANT CHANGE UP (ref 32–36)
MCHC RBC-ENTMCNC: 32.9 G/DL — SIGNIFICANT CHANGE UP (ref 32–36)
MCHC RBC-ENTMCNC: 33 G/DL — SIGNIFICANT CHANGE UP (ref 32–36)
MCV RBC AUTO: 84.1 FL — SIGNIFICANT CHANGE UP (ref 80–100)
MCV RBC AUTO: 84.7 FL — SIGNIFICANT CHANGE UP (ref 80–100)
MCV RBC AUTO: 84.8 FL — SIGNIFICANT CHANGE UP (ref 80–100)
MCV RBC AUTO: 85.2 FL — SIGNIFICANT CHANGE UP (ref 80–100)
MCV RBC AUTO: 85.5 FL — SIGNIFICANT CHANGE UP (ref 80–100)
MCV RBC AUTO: 85.7 FL — SIGNIFICANT CHANGE UP (ref 80–100)
MCV RBC AUTO: 86 FL — SIGNIFICANT CHANGE UP (ref 80–100)
MCV RBC AUTO: 86.3 FL — SIGNIFICANT CHANGE UP (ref 80–100)
MCV RBC AUTO: 86.4 FL — SIGNIFICANT CHANGE UP (ref 80–100)
MCV RBC AUTO: 86.6 FL — SIGNIFICANT CHANGE UP (ref 80–100)
MCV RBC AUTO: 86.6 FL — SIGNIFICANT CHANGE UP (ref 80–100)
MCV RBC AUTO: 87 FL — SIGNIFICANT CHANGE UP (ref 80–100)
MCV RBC AUTO: 87.1 FL — SIGNIFICANT CHANGE UP (ref 80–100)
MCV RBC AUTO: 87.4 FL — SIGNIFICANT CHANGE UP (ref 80–100)
MCV RBC AUTO: 87.5 FL — SIGNIFICANT CHANGE UP (ref 80–100)
MCV RBC AUTO: 87.8 FL — SIGNIFICANT CHANGE UP (ref 80–100)
MCV RBC AUTO: 87.9 FL — SIGNIFICANT CHANGE UP (ref 80–100)
MCV RBC AUTO: 88 FL — SIGNIFICANT CHANGE UP (ref 80–100)
MCV RBC AUTO: 88.2 FL — SIGNIFICANT CHANGE UP (ref 80–100)
MCV RBC AUTO: 88.3 FL — SIGNIFICANT CHANGE UP (ref 80–100)
MCV RBC AUTO: 88.3 FL — SIGNIFICANT CHANGE UP (ref 80–100)
MCV RBC AUTO: 88.4 FL — SIGNIFICANT CHANGE UP (ref 80–100)
MCV RBC AUTO: 89 FL — SIGNIFICANT CHANGE UP (ref 80–100)
MCV RBC AUTO: 89.2 FL — SIGNIFICANT CHANGE UP (ref 80–100)
MCV RBC AUTO: 89.3 FL — SIGNIFICANT CHANGE UP (ref 80–100)
MCV RBC AUTO: 89.7 FL — SIGNIFICANT CHANGE UP (ref 80–100)
MCV RBC AUTO: 90.3 FL — SIGNIFICANT CHANGE UP (ref 80–100)
METHOD TYPE: SIGNIFICANT CHANGE UP
MICROCYTES BLD QL: SLIGHT — SIGNIFICANT CHANGE UP
MICROSCOPIC-UA: NORMAL
MONOCYTES # BLD AUTO: 0.09 K/UL — SIGNIFICANT CHANGE UP (ref 0–0.9)
MONOCYTES # BLD AUTO: 0.22 K/UL — SIGNIFICANT CHANGE UP (ref 0–0.9)
MONOCYTES # BLD AUTO: 0.24 K/UL — SIGNIFICANT CHANGE UP (ref 0–0.9)
MONOCYTES # BLD AUTO: 0.24 K/UL — SIGNIFICANT CHANGE UP (ref 0–0.9)
MONOCYTES # BLD AUTO: 0.26 K/UL — SIGNIFICANT CHANGE UP (ref 0–0.9)
MONOCYTES # BLD AUTO: 0.63 K/UL — SIGNIFICANT CHANGE UP (ref 0–0.9)
MONOCYTES # BLD AUTO: 0.71 K/UL — SIGNIFICANT CHANGE UP (ref 0–0.9)
MONOCYTES # BLD AUTO: 0.73 K/UL — SIGNIFICANT CHANGE UP (ref 0–0.9)
MONOCYTES # BLD AUTO: 0.79 K/UL — SIGNIFICANT CHANGE UP (ref 0–0.9)
MONOCYTES # BLD AUTO: 0.82 K/UL — SIGNIFICANT CHANGE UP (ref 0–0.9)
MONOCYTES # BLD AUTO: 0.84 K/UL — SIGNIFICANT CHANGE UP (ref 0–0.9)
MONOCYTES # BLD AUTO: 0.9 K/UL — SIGNIFICANT CHANGE UP (ref 0–0.9)
MONOCYTES # BLD AUTO: 0.93 K/UL — HIGH (ref 0–0.9)
MONOCYTES # BLD AUTO: 0.93 K/UL — HIGH (ref 0–0.9)
MONOCYTES # BLD AUTO: 0.99 K/UL — HIGH (ref 0–0.9)
MONOCYTES # BLD AUTO: 0.99 K/UL — HIGH (ref 0–0.9)
MONOCYTES # BLD AUTO: 1 K/UL — HIGH (ref 0–0.9)
MONOCYTES # BLD AUTO: 1 K/UL — HIGH (ref 0–0.9)
MONOCYTES # BLD AUTO: 1.06 K/UL — HIGH (ref 0–0.9)
MONOCYTES # BLD AUTO: 1.15 K/UL — HIGH (ref 0–0.9)
MONOCYTES # BLD AUTO: 1.22 K/UL — HIGH (ref 0–0.9)
MONOCYTES # BLD AUTO: 1.42 K/UL — HIGH (ref 0–0.9)
MONOCYTES NFR BLD AUTO: 10.2 % — SIGNIFICANT CHANGE UP (ref 2–14)
MONOCYTES NFR BLD AUTO: 11 % — SIGNIFICANT CHANGE UP (ref 2–14)
MONOCYTES NFR BLD AUTO: 11.5 % — SIGNIFICANT CHANGE UP (ref 2–14)
MONOCYTES NFR BLD AUTO: 12.1 % — SIGNIFICANT CHANGE UP (ref 2–14)
MONOCYTES NFR BLD AUTO: 12.5 % — SIGNIFICANT CHANGE UP (ref 2–14)
MONOCYTES NFR BLD AUTO: 12.8 % — SIGNIFICANT CHANGE UP (ref 2–14)
MONOCYTES NFR BLD AUTO: 13.6 % — SIGNIFICANT CHANGE UP (ref 2–14)
MONOCYTES NFR BLD AUTO: 14.1 % — HIGH (ref 2–14)
MONOCYTES NFR BLD AUTO: 14.4 % — HIGH (ref 2–14)
MONOCYTES NFR BLD AUTO: 14.5 % — HIGH (ref 2–14)
MONOCYTES NFR BLD AUTO: 17 % — HIGH (ref 2–14)
MONOCYTES NFR BLD AUTO: 17.1 % — HIGH (ref 2–14)
MONOCYTES NFR BLD AUTO: 17.7 % — HIGH (ref 2–14)
MONOCYTES NFR BLD AUTO: 18.5 % — HIGH (ref 2–14)
MONOCYTES NFR BLD AUTO: 19.7 % — HIGH (ref 2–14)
MONOCYTES NFR BLD AUTO: 2 % — SIGNIFICANT CHANGE UP (ref 2–14)
MONOCYTES NFR BLD AUTO: 2.6 % — SIGNIFICANT CHANGE UP (ref 2–14)
MONOCYTES NFR BLD AUTO: 6 % — SIGNIFICANT CHANGE UP (ref 2–14)
MONOCYTES NFR BLD AUTO: 8.2 % — SIGNIFICANT CHANGE UP (ref 2–14)
MONOCYTES NFR BLD AUTO: 8.7 % — SIGNIFICANT CHANGE UP (ref 2–14)
MONOCYTES NFR BLD AUTO: 9 % — SIGNIFICANT CHANGE UP (ref 2–14)
MONOCYTES NFR BLD AUTO: 9.1 % — SIGNIFICANT CHANGE UP (ref 2–14)
MRSA PCR RESULT.: SIGNIFICANT CHANGE UP
MRSA PCR RESULT.: SIGNIFICANT CHANGE UP
MYELOCYTES NFR BLD: 2 % — HIGH (ref 0–0)
NEUTROPHILS # BLD AUTO: 0.75 K/UL — LOW (ref 1.8–7.4)
NEUTROPHILS # BLD AUTO: 1.89 K/UL — SIGNIFICANT CHANGE UP (ref 1.8–7.4)
NEUTROPHILS # BLD AUTO: 2.73 K/UL — SIGNIFICANT CHANGE UP (ref 1.8–7.4)
NEUTROPHILS # BLD AUTO: 3.07 K/UL — SIGNIFICANT CHANGE UP (ref 1.8–7.4)
NEUTROPHILS # BLD AUTO: 3.19 K/UL — SIGNIFICANT CHANGE UP (ref 1.8–7.4)
NEUTROPHILS # BLD AUTO: 3.23 K/UL — SIGNIFICANT CHANGE UP (ref 1.8–7.4)
NEUTROPHILS # BLD AUTO: 3.47 K/UL — SIGNIFICANT CHANGE UP (ref 1.8–7.4)
NEUTROPHILS # BLD AUTO: 3.67 K/UL — SIGNIFICANT CHANGE UP (ref 1.8–7.4)
NEUTROPHILS # BLD AUTO: 3.68 K/UL — SIGNIFICANT CHANGE UP (ref 1.8–7.4)
NEUTROPHILS # BLD AUTO: 3.72 K/UL — SIGNIFICANT CHANGE UP (ref 1.8–7.4)
NEUTROPHILS # BLD AUTO: 4.01 K/UL — SIGNIFICANT CHANGE UP (ref 1.8–7.4)
NEUTROPHILS # BLD AUTO: 4.12 K/UL — SIGNIFICANT CHANGE UP (ref 1.8–7.4)
NEUTROPHILS # BLD AUTO: 4.43 K/UL — SIGNIFICANT CHANGE UP (ref 1.8–7.4)
NEUTROPHILS # BLD AUTO: 4.53 K/UL — SIGNIFICANT CHANGE UP (ref 1.8–7.4)
NEUTROPHILS # BLD AUTO: 4.74 K/UL — SIGNIFICANT CHANGE UP (ref 1.8–7.4)
NEUTROPHILS # BLD AUTO: 5.37 K/UL — SIGNIFICANT CHANGE UP (ref 1.8–7.4)
NEUTROPHILS # BLD AUTO: 5.67 K/UL — SIGNIFICANT CHANGE UP (ref 1.8–7.4)
NEUTROPHILS # BLD AUTO: 6.1 K/UL — SIGNIFICANT CHANGE UP (ref 1.8–7.4)
NEUTROPHILS # BLD AUTO: 6.39 K/UL — SIGNIFICANT CHANGE UP (ref 1.8–7.4)
NEUTROPHILS # BLD AUTO: 6.89 K/UL — SIGNIFICANT CHANGE UP (ref 1.8–7.4)
NEUTROPHILS # BLD AUTO: 8.09 K/UL — HIGH (ref 1.8–7.4)
NEUTROPHILS # BLD AUTO: 9.76 K/UL — HIGH (ref 1.8–7.4)
NEUTROPHILS NFR BLD AUTO: 25 % — LOW (ref 43–77)
NEUTROPHILS NFR BLD AUTO: 55.2 % — SIGNIFICANT CHANGE UP (ref 43–77)
NEUTROPHILS NFR BLD AUTO: 61.4 % — SIGNIFICANT CHANGE UP (ref 43–77)
NEUTROPHILS NFR BLD AUTO: 61.9 % — SIGNIFICANT CHANGE UP (ref 43–77)
NEUTROPHILS NFR BLD AUTO: 62.3 % — SIGNIFICANT CHANGE UP (ref 43–77)
NEUTROPHILS NFR BLD AUTO: 62.4 % — SIGNIFICANT CHANGE UP (ref 43–77)
NEUTROPHILS NFR BLD AUTO: 62.7 % — SIGNIFICANT CHANGE UP (ref 43–77)
NEUTROPHILS NFR BLD AUTO: 63.1 % — SIGNIFICANT CHANGE UP (ref 43–77)
NEUTROPHILS NFR BLD AUTO: 63.5 % — SIGNIFICANT CHANGE UP (ref 43–77)
NEUTROPHILS NFR BLD AUTO: 64 % — SIGNIFICANT CHANGE UP (ref 43–77)
NEUTROPHILS NFR BLD AUTO: 64.6 % — SIGNIFICANT CHANGE UP (ref 43–77)
NEUTROPHILS NFR BLD AUTO: 65.5 % — SIGNIFICANT CHANGE UP (ref 43–77)
NEUTROPHILS NFR BLD AUTO: 66.4 % — SIGNIFICANT CHANGE UP (ref 43–77)
NEUTROPHILS NFR BLD AUTO: 69 % — SIGNIFICANT CHANGE UP (ref 43–77)
NEUTROPHILS NFR BLD AUTO: 69.3 % — SIGNIFICANT CHANGE UP (ref 43–77)
NEUTROPHILS NFR BLD AUTO: 69.4 % — SIGNIFICANT CHANGE UP (ref 43–77)
NEUTROPHILS NFR BLD AUTO: 70.8 % — SIGNIFICANT CHANGE UP (ref 43–77)
NEUTROPHILS NFR BLD AUTO: 71.8 % — SIGNIFICANT CHANGE UP (ref 43–77)
NEUTROPHILS NFR BLD AUTO: 76 % — SIGNIFICANT CHANGE UP (ref 43–77)
NEUTROPHILS NFR BLD AUTO: 84.6 % — HIGH (ref 43–77)
NEUTROPHILS NFR BLD AUTO: 90 % — HIGH (ref 43–77)
NEUTROPHILS NFR BLD AUTO: 93 % — HIGH (ref 43–77)
NEUTS BAND # BLD: 3 % — SIGNIFICANT CHANGE UP (ref 0–8)
NITRITE UR-MCNC: NEGATIVE — SIGNIFICANT CHANGE UP
NITRITE UR-MCNC: NEGATIVE — SIGNIFICANT CHANGE UP
NITRITE URINE: NEGATIVE
NONHDLC SERPL-MCNC: 93 MG/DL
NRBC # BLD: 0 /100 WBCS — SIGNIFICANT CHANGE UP (ref 0–0)
NT-PROBNP SERPL-MCNC: 327 PG/ML
NT-PROBNP SERPL-MCNC: 405 PG/ML
NT-PROBNP SERPL-SCNC: 811 PG/ML — HIGH (ref 0–300)
ORGANISM # SPEC MICROSCOPIC CNT: SIGNIFICANT CHANGE UP
ORGANISM # SPEC MICROSCOPIC CNT: SIGNIFICANT CHANGE UP
OVALOCYTES BLD QL SMEAR: SLIGHT — SIGNIFICANT CHANGE UP
PCO2 BLDV: 44 MMHG — HIGH (ref 39–42)
PCO2 BLDV: 45 MMHG — HIGH (ref 39–42)
PCO2 BLDV: 51 MMHG — HIGH (ref 39–42)
PH BLDV: 7.32 — SIGNIFICANT CHANGE UP (ref 7.32–7.43)
PH BLDV: 7.35 — SIGNIFICANT CHANGE UP (ref 7.32–7.43)
PH BLDV: 7.39 — SIGNIFICANT CHANGE UP (ref 7.32–7.43)
PH UR: 6 — SIGNIFICANT CHANGE UP (ref 5–8)
PH UR: 6.5 — SIGNIFICANT CHANGE UP (ref 5–8)
PH URINE: 6
PHOSPHATE SERPL-MCNC: 2.6 MG/DL — SIGNIFICANT CHANGE UP (ref 2.5–4.5)
PHOSPHATE SERPL-MCNC: 2.8 MG/DL — SIGNIFICANT CHANGE UP (ref 2.5–4.5)
PHOSPHATE SERPL-MCNC: 2.9 MG/DL — SIGNIFICANT CHANGE UP (ref 2.5–4.5)
PHOSPHATE SERPL-MCNC: 2.9 MG/DL — SIGNIFICANT CHANGE UP (ref 2.5–4.5)
PHOSPHATE SERPL-MCNC: 3 MG/DL — SIGNIFICANT CHANGE UP (ref 2.5–4.5)
PHOSPHATE SERPL-MCNC: 3.2 MG/DL — SIGNIFICANT CHANGE UP (ref 2.5–4.5)
PHOSPHATE SERPL-MCNC: 3.3 MG/DL — SIGNIFICANT CHANGE UP (ref 2.5–4.5)
PHOSPHATE SERPL-MCNC: 3.3 MG/DL — SIGNIFICANT CHANGE UP (ref 2.5–4.5)
PHOSPHATE SERPL-MCNC: 3.6 MG/DL — SIGNIFICANT CHANGE UP (ref 2.5–4.5)
PHOSPHATE SERPL-MCNC: 4.3 MG/DL — SIGNIFICANT CHANGE UP (ref 2.5–4.5)
PLAT MORPH BLD: NORMAL — SIGNIFICANT CHANGE UP
PLAT MORPH BLD: NORMAL — SIGNIFICANT CHANGE UP
PLATELET # BLD AUTO: 220 K/UL — SIGNIFICANT CHANGE UP (ref 150–400)
PLATELET # BLD AUTO: 226 K/UL — SIGNIFICANT CHANGE UP (ref 150–400)
PLATELET # BLD AUTO: 229 K/UL — SIGNIFICANT CHANGE UP (ref 150–400)
PLATELET # BLD AUTO: 247 K/UL — SIGNIFICANT CHANGE UP (ref 150–400)
PLATELET # BLD AUTO: 285 K/UL — SIGNIFICANT CHANGE UP (ref 150–400)
PLATELET # BLD AUTO: 295 K/UL — SIGNIFICANT CHANGE UP (ref 150–400)
PLATELET # BLD AUTO: 295 K/UL — SIGNIFICANT CHANGE UP (ref 150–400)
PLATELET # BLD AUTO: 297 K/UL — SIGNIFICANT CHANGE UP (ref 150–400)
PLATELET # BLD AUTO: 300 K/UL — SIGNIFICANT CHANGE UP (ref 150–400)
PLATELET # BLD AUTO: 305 K/UL — SIGNIFICANT CHANGE UP (ref 150–400)
PLATELET # BLD AUTO: 315 K/UL — SIGNIFICANT CHANGE UP (ref 150–400)
PLATELET # BLD AUTO: 316 K/UL — SIGNIFICANT CHANGE UP (ref 150–400)
PLATELET # BLD AUTO: 329 K/UL — SIGNIFICANT CHANGE UP (ref 150–400)
PLATELET # BLD AUTO: 331 K/UL — SIGNIFICANT CHANGE UP (ref 150–400)
PLATELET # BLD AUTO: 338 K/UL — SIGNIFICANT CHANGE UP (ref 150–400)
PLATELET # BLD AUTO: 338 K/UL — SIGNIFICANT CHANGE UP (ref 150–400)
PLATELET # BLD AUTO: 352 K/UL — SIGNIFICANT CHANGE UP (ref 150–400)
PLATELET # BLD AUTO: 357 K/UL — SIGNIFICANT CHANGE UP (ref 150–400)
PLATELET # BLD AUTO: 362 K/UL — SIGNIFICANT CHANGE UP (ref 150–400)
PLATELET # BLD AUTO: 386 K/UL — SIGNIFICANT CHANGE UP (ref 150–400)
PLATELET # BLD AUTO: 387 K/UL — SIGNIFICANT CHANGE UP (ref 150–400)
PLATELET # BLD AUTO: 389 K/UL — SIGNIFICANT CHANGE UP (ref 150–400)
PLATELET # BLD AUTO: 410 K/UL — HIGH (ref 150–400)
PLATELET # BLD AUTO: 414 K/UL — HIGH (ref 150–400)
PLATELET # BLD AUTO: 434 K/UL — HIGH (ref 150–400)
PLATELET # BLD AUTO: 444 K/UL — HIGH (ref 150–400)
PLATELET # BLD AUTO: 452 K/UL — HIGH (ref 150–400)
PLATELET # BLD AUTO: 471 K/UL — HIGH (ref 150–400)
PLATELET # BLD AUTO: 480 K/UL — HIGH (ref 150–400)
PO2 BLDV: 21 MMHG — LOW (ref 25–45)
PO2 BLDV: 29 MMHG — SIGNIFICANT CHANGE UP (ref 25–45)
PO2 BLDV: 50 MMHG — HIGH (ref 25–45)
POIKILOCYTOSIS BLD QL AUTO: SLIGHT — SIGNIFICANT CHANGE UP
POLYCHROMASIA BLD QL SMEAR: SLIGHT — SIGNIFICANT CHANGE UP
POTASSIUM BLDV-SCNC: 3.9 MMOL/L — SIGNIFICANT CHANGE UP (ref 3.5–5.1)
POTASSIUM BLDV-SCNC: 4.1 MMOL/L — SIGNIFICANT CHANGE UP (ref 3.5–5.1)
POTASSIUM BLDV-SCNC: 4.1 MMOL/L — SIGNIFICANT CHANGE UP (ref 3.5–5.1)
POTASSIUM SERPL-MCNC: 3.5 MMOL/L — SIGNIFICANT CHANGE UP (ref 3.5–5.3)
POTASSIUM SERPL-MCNC: 3.6 MMOL/L — SIGNIFICANT CHANGE UP (ref 3.5–5.3)
POTASSIUM SERPL-MCNC: 3.7 MMOL/L — SIGNIFICANT CHANGE UP (ref 3.5–5.3)
POTASSIUM SERPL-MCNC: 3.7 MMOL/L — SIGNIFICANT CHANGE UP (ref 3.5–5.3)
POTASSIUM SERPL-MCNC: 3.8 MMOL/L — SIGNIFICANT CHANGE UP (ref 3.5–5.3)
POTASSIUM SERPL-MCNC: 3.8 MMOL/L — SIGNIFICANT CHANGE UP (ref 3.5–5.3)
POTASSIUM SERPL-MCNC: 3.9 MMOL/L — SIGNIFICANT CHANGE UP (ref 3.5–5.3)
POTASSIUM SERPL-MCNC: 4 MMOL/L — SIGNIFICANT CHANGE UP (ref 3.5–5.3)
POTASSIUM SERPL-MCNC: 4.1 MMOL/L — SIGNIFICANT CHANGE UP (ref 3.5–5.3)
POTASSIUM SERPL-MCNC: 4.2 MMOL/L — SIGNIFICANT CHANGE UP (ref 3.5–5.3)
POTASSIUM SERPL-MCNC: 4.3 MMOL/L — SIGNIFICANT CHANGE UP (ref 3.5–5.3)
POTASSIUM SERPL-MCNC: 4.3 MMOL/L — SIGNIFICANT CHANGE UP (ref 3.5–5.3)
POTASSIUM SERPL-MCNC: 4.4 MMOL/L — SIGNIFICANT CHANGE UP (ref 3.5–5.3)
POTASSIUM SERPL-MCNC: 4.6 MMOL/L — SIGNIFICANT CHANGE UP (ref 3.5–5.3)
POTASSIUM SERPL-SCNC: 3.5 MMOL/L — SIGNIFICANT CHANGE UP (ref 3.5–5.3)
POTASSIUM SERPL-SCNC: 3.6 MMOL/L — SIGNIFICANT CHANGE UP (ref 3.5–5.3)
POTASSIUM SERPL-SCNC: 3.7 MMOL/L — SIGNIFICANT CHANGE UP (ref 3.5–5.3)
POTASSIUM SERPL-SCNC: 3.7 MMOL/L — SIGNIFICANT CHANGE UP (ref 3.5–5.3)
POTASSIUM SERPL-SCNC: 3.8 MMOL/L — SIGNIFICANT CHANGE UP (ref 3.5–5.3)
POTASSIUM SERPL-SCNC: 3.8 MMOL/L — SIGNIFICANT CHANGE UP (ref 3.5–5.3)
POTASSIUM SERPL-SCNC: 3.9 MMOL/L — SIGNIFICANT CHANGE UP (ref 3.5–5.3)
POTASSIUM SERPL-SCNC: 4 MMOL/L — SIGNIFICANT CHANGE UP (ref 3.5–5.3)
POTASSIUM SERPL-SCNC: 4.1 MMOL/L — SIGNIFICANT CHANGE UP (ref 3.5–5.3)
POTASSIUM SERPL-SCNC: 4.2 MMOL/L — SIGNIFICANT CHANGE UP (ref 3.5–5.3)
POTASSIUM SERPL-SCNC: 4.3 MMOL/L — SIGNIFICANT CHANGE UP (ref 3.5–5.3)
POTASSIUM SERPL-SCNC: 4.3 MMOL/L — SIGNIFICANT CHANGE UP (ref 3.5–5.3)
POTASSIUM SERPL-SCNC: 4.4 MMOL/L — SIGNIFICANT CHANGE UP (ref 3.5–5.3)
POTASSIUM SERPL-SCNC: 4.6 MMOL/L — SIGNIFICANT CHANGE UP (ref 3.5–5.3)
POTASSIUM SERPL-SCNC: 4.9 MMOL/L
POTASSIUM SERPL-SCNC: 4.9 MMOL/L
POTASSIUM SERPL-SCNC: 5.3 MMOL/L
PROT SERPL-MCNC: 5.8 G/DL — LOW (ref 6–8.3)
PROT SERPL-MCNC: 6 G/DL — SIGNIFICANT CHANGE UP (ref 6–8.3)
PROT SERPL-MCNC: 6.2 G/DL — SIGNIFICANT CHANGE UP (ref 6–8.3)
PROT SERPL-MCNC: 6.3 G/DL — SIGNIFICANT CHANGE UP (ref 6–8.3)
PROT SERPL-MCNC: 6.4 G/DL — SIGNIFICANT CHANGE UP (ref 6–8.3)
PROT SERPL-MCNC: 6.5 G/DL — SIGNIFICANT CHANGE UP (ref 6–8.3)
PROT SERPL-MCNC: 6.7 G/DL — SIGNIFICANT CHANGE UP (ref 6–8.3)
PROT SERPL-MCNC: 6.7 G/DL — SIGNIFICANT CHANGE UP (ref 6–8.3)
PROT SERPL-MCNC: 6.8 G/DL
PROT SERPL-MCNC: 6.9 G/DL — SIGNIFICANT CHANGE UP (ref 6–8.3)
PROT SERPL-MCNC: 6.9 G/DL — SIGNIFICANT CHANGE UP (ref 6–8.3)
PROT SERPL-MCNC: 7 G/DL — SIGNIFICANT CHANGE UP (ref 6–8.3)
PROT SERPL-MCNC: 7.1 G/DL
PROT SERPL-MCNC: 7.4 G/DL
PROT SERPL-MCNC: 7.4 G/DL — SIGNIFICANT CHANGE UP (ref 6–8.3)
PROT UR-MCNC: ABNORMAL
PROT UR-MCNC: SIGNIFICANT CHANGE UP
PROTEIN URINE: ABNORMAL
PROTHROM AB SERPL-ACNC: 20.9 SEC — HIGH (ref 10.5–13.4)
RAPID RVP RESULT: SIGNIFICANT CHANGE UP
RAPID RVP RESULT: SIGNIFICANT CHANGE UP
RBC # BLD: 3.19 M/UL — LOW (ref 3.8–5.2)
RBC # BLD: 3.27 M/UL — LOW (ref 3.8–5.2)
RBC # BLD: 3.29 M/UL — LOW (ref 3.8–5.2)
RBC # BLD: 3.29 M/UL — LOW (ref 3.8–5.2)
RBC # BLD: 3.37 M/UL — LOW (ref 3.8–5.2)
RBC # BLD: 3.45 M/UL — LOW (ref 3.8–5.2)
RBC # BLD: 3.5 M/UL — LOW (ref 3.8–5.2)
RBC # BLD: 3.64 M/UL — LOW (ref 3.8–5.2)
RBC # BLD: 3.68 M/UL — LOW (ref 3.8–5.2)
RBC # BLD: 3.69 M/UL — LOW (ref 3.8–5.2)
RBC # BLD: 3.76 M/UL — LOW (ref 3.8–5.2)
RBC # BLD: 3.81 M/UL — SIGNIFICANT CHANGE UP (ref 3.8–5.2)
RBC # BLD: 3.82 M/UL — SIGNIFICANT CHANGE UP (ref 3.8–5.2)
RBC # BLD: 3.82 M/UL — SIGNIFICANT CHANGE UP (ref 3.8–5.2)
RBC # BLD: 3.88 M/UL — SIGNIFICANT CHANGE UP (ref 3.8–5.2)
RBC # BLD: 3.88 M/UL — SIGNIFICANT CHANGE UP (ref 3.8–5.2)
RBC # BLD: 3.91 M/UL — SIGNIFICANT CHANGE UP (ref 3.8–5.2)
RBC # BLD: 3.92 M/UL — SIGNIFICANT CHANGE UP (ref 3.8–5.2)
RBC # BLD: 3.97 M/UL — SIGNIFICANT CHANGE UP (ref 3.8–5.2)
RBC # BLD: 4.02 M/UL — SIGNIFICANT CHANGE UP (ref 3.8–5.2)
RBC # BLD: 4.04 M/UL — SIGNIFICANT CHANGE UP (ref 3.8–5.2)
RBC # BLD: 4.08 M/UL — SIGNIFICANT CHANGE UP (ref 3.8–5.2)
RBC # BLD: 4.09 M/UL — SIGNIFICANT CHANGE UP (ref 3.8–5.2)
RBC # BLD: 4.1 M/UL — SIGNIFICANT CHANGE UP (ref 3.8–5.2)
RBC # BLD: 4.11 M/UL — SIGNIFICANT CHANGE UP (ref 3.8–5.2)
RBC # BLD: 4.12 M/UL — SIGNIFICANT CHANGE UP (ref 3.8–5.2)
RBC # BLD: 4.12 M/UL — SIGNIFICANT CHANGE UP (ref 3.8–5.2)
RBC # BLD: 4.15 M/UL — SIGNIFICANT CHANGE UP (ref 3.8–5.2)
RBC # BLD: 4.19 M/UL — SIGNIFICANT CHANGE UP (ref 3.8–5.2)
RBC # FLD: 15.9 % — HIGH (ref 10.3–14.5)
RBC # FLD: 16.5 % — HIGH (ref 10.3–14.5)
RBC # FLD: 16.6 % — HIGH (ref 10.3–14.5)
RBC # FLD: 17.2 % — HIGH (ref 10.3–14.5)
RBC # FLD: 17.3 % — HIGH (ref 10.3–14.5)
RBC # FLD: 17.3 % — HIGH (ref 10.3–14.5)
RBC # FLD: 17.4 % — HIGH (ref 10.3–14.5)
RBC # FLD: 17.4 % — HIGH (ref 10.3–14.5)
RBC # FLD: 17.5 % — HIGH (ref 10.3–14.5)
RBC # FLD: 17.5 % — HIGH (ref 10.3–14.5)
RBC # FLD: 17.6 % — HIGH (ref 10.3–14.5)
RBC # FLD: 17.6 % — HIGH (ref 10.3–14.5)
RBC # FLD: 17.7 % — HIGH (ref 10.3–14.5)
RBC # FLD: 17.7 % — HIGH (ref 10.3–14.5)
RBC # FLD: 17.8 % — HIGH (ref 10.3–14.5)
RBC # FLD: 17.9 % — HIGH (ref 10.3–14.5)
RBC # FLD: 17.9 % — HIGH (ref 10.3–14.5)
RBC # FLD: 18 % — HIGH (ref 10.3–14.5)
RBC # FLD: 18.2 % — HIGH (ref 10.3–14.5)
RBC # FLD: 18.2 % — HIGH (ref 10.3–14.5)
RBC # FLD: 18.4 % — HIGH (ref 10.3–14.5)
RBC # FLD: 18.5 % — HIGH (ref 10.3–14.5)
RBC # FLD: 18.5 % — HIGH (ref 10.3–14.5)
RBC # FLD: 18.6 % — HIGH (ref 10.3–14.5)
RBC # FLD: 18.6 % — HIGH (ref 10.3–14.5)
RBC BLD AUTO: ABNORMAL
RBC BLD AUTO: SIGNIFICANT CHANGE UP
RBC CASTS # UR COMP ASSIST: 618 /HPF — HIGH (ref 0–4)
RBC CASTS # UR COMP ASSIST: 846 /HPF — HIGH (ref 0–4)
RED BLOOD CELLS URINE: >720 /HPF
RSV RNA NPH QL NAA+NON-PROBE: SIGNIFICANT CHANGE UP
S AUREUS DNA NOSE QL NAA+PROBE: DETECTED
S AUREUS DNA NOSE QL NAA+PROBE: SIGNIFICANT CHANGE UP
SAO2 % BLDV: 28.2 % — LOW (ref 67–88)
SAO2 % BLDV: 46 % — LOW (ref 67–88)
SAO2 % BLDV: 78.2 % — SIGNIFICANT CHANGE UP (ref 67–88)
SARS-COV-2 RNA SPEC QL NAA+PROBE: SIGNIFICANT CHANGE UP
SCHISTOCYTES BLD QL AUTO: SLIGHT — SIGNIFICANT CHANGE UP
SODIUM SERPL-SCNC: 135 MMOL/L — SIGNIFICANT CHANGE UP (ref 135–145)
SODIUM SERPL-SCNC: 136 MMOL/L — SIGNIFICANT CHANGE UP (ref 135–145)
SODIUM SERPL-SCNC: 136 MMOL/L — SIGNIFICANT CHANGE UP (ref 135–145)
SODIUM SERPL-SCNC: 137 MMOL/L
SODIUM SERPL-SCNC: 137 MMOL/L — SIGNIFICANT CHANGE UP (ref 135–145)
SODIUM SERPL-SCNC: 138 MMOL/L
SODIUM SERPL-SCNC: 138 MMOL/L — SIGNIFICANT CHANGE UP (ref 135–145)
SODIUM SERPL-SCNC: 139 MMOL/L
SODIUM SERPL-SCNC: 139 MMOL/L — SIGNIFICANT CHANGE UP (ref 135–145)
SODIUM SERPL-SCNC: 140 MMOL/L — SIGNIFICANT CHANGE UP (ref 135–145)
SODIUM SERPL-SCNC: 141 MMOL/L — SIGNIFICANT CHANGE UP (ref 135–145)
SODIUM SERPL-SCNC: 141 MMOL/L — SIGNIFICANT CHANGE UP (ref 135–145)
SP GR SPEC: 1.01 — LOW (ref 1.01–1.02)
SP GR SPEC: 1.01 — SIGNIFICANT CHANGE UP (ref 1.01–1.02)
SPECIFIC GRAVITY URINE: 1.02
SPECIMEN SOURCE: SIGNIFICANT CHANGE UP
SQUAMOUS EPITHELIAL CELLS: 9 /HPF
TRIGL SERPL-MCNC: 98 MG/DL
TROPONIN T, HIGH SENSITIVITY RESULT: 11 NG/L — SIGNIFICANT CHANGE UP (ref 0–51)
TROPONIN T, HIGH SENSITIVITY RESULT: 14 NG/L — SIGNIFICANT CHANGE UP (ref 0–51)
URINE CYTOLOGY: NORMAL
UROBILINOGEN FLD QL: NEGATIVE — SIGNIFICANT CHANGE UP
UROBILINOGEN FLD QL: SIGNIFICANT CHANGE UP
UROBILINOGEN URINE: NORMAL
VARIANT LYMPHS # BLD: 2 % — SIGNIFICANT CHANGE UP (ref 0–6)
WBC # BLD: 10.64 K/UL — HIGH (ref 3.8–10.5)
WBC # BLD: 10.85 K/UL — HIGH (ref 3.8–10.5)
WBC # BLD: 2.39 K/UL — LOW (ref 3.8–10.5)
WBC # BLD: 2.68 K/UL — LOW (ref 3.8–10.5)
WBC # BLD: 2.68 K/UL — LOW (ref 3.8–10.5)
WBC # BLD: 2.93 K/UL — LOW (ref 3.8–10.5)
WBC # BLD: 3.3 K/UL — LOW (ref 3.8–10.5)
WBC # BLD: 4.13 K/UL — SIGNIFICANT CHANGE UP (ref 3.8–10.5)
WBC # BLD: 4.35 K/UL — SIGNIFICANT CHANGE UP (ref 3.8–10.5)
WBC # BLD: 4.38 K/UL — SIGNIFICANT CHANGE UP (ref 3.8–10.5)
WBC # BLD: 5.19 K/UL — SIGNIFICANT CHANGE UP (ref 3.8–10.5)
WBC # BLD: 5.35 K/UL — SIGNIFICANT CHANGE UP (ref 3.8–10.5)
WBC # BLD: 5.58 K/UL — SIGNIFICANT CHANGE UP (ref 3.8–10.5)
WBC # BLD: 5.65 K/UL — SIGNIFICANT CHANGE UP (ref 3.8–10.5)
WBC # BLD: 5.84 K/UL — SIGNIFICANT CHANGE UP (ref 3.8–10.5)
WBC # BLD: 5.85 K/UL — SIGNIFICANT CHANGE UP (ref 3.8–10.5)
WBC # BLD: 5.86 K/UL — SIGNIFICANT CHANGE UP (ref 3.8–10.5)
WBC # BLD: 5.88 K/UL — SIGNIFICANT CHANGE UP (ref 3.8–10.5)
WBC # BLD: 6.01 K/UL — SIGNIFICANT CHANGE UP (ref 3.8–10.5)
WBC # BLD: 6.26 K/UL — SIGNIFICANT CHANGE UP (ref 3.8–10.5)
WBC # BLD: 6.3 K/UL — SIGNIFICANT CHANGE UP (ref 3.8–10.5)
WBC # BLD: 6.42 K/UL — SIGNIFICANT CHANGE UP (ref 3.8–10.5)
WBC # BLD: 6.83 K/UL — SIGNIFICANT CHANGE UP (ref 3.8–10.5)
WBC # BLD: 7.21 K/UL — SIGNIFICANT CHANGE UP (ref 3.8–10.5)
WBC # BLD: 7.67 K/UL — SIGNIFICANT CHANGE UP (ref 3.8–10.5)
WBC # BLD: 7.73 K/UL — SIGNIFICANT CHANGE UP (ref 3.8–10.5)
WBC # BLD: 8.2 K/UL — SIGNIFICANT CHANGE UP (ref 3.8–10.5)
WBC # BLD: 9.04 K/UL — SIGNIFICANT CHANGE UP (ref 3.8–10.5)
WBC # BLD: 9.61 K/UL — SIGNIFICANT CHANGE UP (ref 3.8–10.5)
WBC # FLD AUTO: 10.64 K/UL — HIGH (ref 3.8–10.5)
WBC # FLD AUTO: 10.85 K/UL — HIGH (ref 3.8–10.5)
WBC # FLD AUTO: 2.39 K/UL — LOW (ref 3.8–10.5)
WBC # FLD AUTO: 2.68 K/UL — LOW (ref 3.8–10.5)
WBC # FLD AUTO: 2.68 K/UL — LOW (ref 3.8–10.5)
WBC # FLD AUTO: 2.93 K/UL — LOW (ref 3.8–10.5)
WBC # FLD AUTO: 3.3 K/UL — LOW (ref 3.8–10.5)
WBC # FLD AUTO: 4.13 K/UL — SIGNIFICANT CHANGE UP (ref 3.8–10.5)
WBC # FLD AUTO: 4.35 K/UL — SIGNIFICANT CHANGE UP (ref 3.8–10.5)
WBC # FLD AUTO: 4.38 K/UL — SIGNIFICANT CHANGE UP (ref 3.8–10.5)
WBC # FLD AUTO: 5.19 K/UL — SIGNIFICANT CHANGE UP (ref 3.8–10.5)
WBC # FLD AUTO: 5.35 K/UL — SIGNIFICANT CHANGE UP (ref 3.8–10.5)
WBC # FLD AUTO: 5.58 K/UL — SIGNIFICANT CHANGE UP (ref 3.8–10.5)
WBC # FLD AUTO: 5.65 K/UL — SIGNIFICANT CHANGE UP (ref 3.8–10.5)
WBC # FLD AUTO: 5.84 K/UL — SIGNIFICANT CHANGE UP (ref 3.8–10.5)
WBC # FLD AUTO: 5.85 K/UL — SIGNIFICANT CHANGE UP (ref 3.8–10.5)
WBC # FLD AUTO: 5.86 K/UL — SIGNIFICANT CHANGE UP (ref 3.8–10.5)
WBC # FLD AUTO: 5.88 K/UL — SIGNIFICANT CHANGE UP (ref 3.8–10.5)
WBC # FLD AUTO: 6.01 K/UL — SIGNIFICANT CHANGE UP (ref 3.8–10.5)
WBC # FLD AUTO: 6.26 K/UL — SIGNIFICANT CHANGE UP (ref 3.8–10.5)
WBC # FLD AUTO: 6.3 K/UL — SIGNIFICANT CHANGE UP (ref 3.8–10.5)
WBC # FLD AUTO: 6.42 K/UL — SIGNIFICANT CHANGE UP (ref 3.8–10.5)
WBC # FLD AUTO: 6.83 K/UL — SIGNIFICANT CHANGE UP (ref 3.8–10.5)
WBC # FLD AUTO: 7.21 K/UL — SIGNIFICANT CHANGE UP (ref 3.8–10.5)
WBC # FLD AUTO: 7.67 K/UL — SIGNIFICANT CHANGE UP (ref 3.8–10.5)
WBC # FLD AUTO: 7.73 K/UL — SIGNIFICANT CHANGE UP (ref 3.8–10.5)
WBC # FLD AUTO: 8.2 K/UL — SIGNIFICANT CHANGE UP (ref 3.8–10.5)
WBC # FLD AUTO: 9.04 K/UL — SIGNIFICANT CHANGE UP (ref 3.8–10.5)
WBC # FLD AUTO: 9.61 K/UL — SIGNIFICANT CHANGE UP (ref 3.8–10.5)
WBC UR QL: 3 /HPF — SIGNIFICANT CHANGE UP (ref 0–5)
WBC UR QL: 6 /HPF — HIGH (ref 0–5)
WHITE BLOOD CELLS URINE: 12 /HPF

## 2022-01-01 PROCEDURE — 85025 COMPLETE CBC W/AUTO DIFF WBC: CPT

## 2022-01-01 PROCEDURE — 83605 ASSAY OF LACTIC ACID: CPT

## 2022-01-01 PROCEDURE — 94729 DIFFUSING CAPACITY: CPT

## 2022-01-01 PROCEDURE — 71275 CT ANGIOGRAPHY CHEST: CPT | Mod: MA

## 2022-01-01 PROCEDURE — 99223 1ST HOSP IP/OBS HIGH 75: CPT | Mod: GC

## 2022-01-01 PROCEDURE — 99233 SBSQ HOSP IP/OBS HIGH 50: CPT

## 2022-01-01 PROCEDURE — 82947 ASSAY GLUCOSE BLOOD QUANT: CPT

## 2022-01-01 PROCEDURE — U0005: CPT

## 2022-01-01 PROCEDURE — 87641 MR-STAPH DNA AMP PROBE: CPT

## 2022-01-01 PROCEDURE — 99214 OFFICE O/P EST MOD 30 MIN: CPT

## 2022-01-01 PROCEDURE — 99213 OFFICE O/P EST LOW 20 MIN: CPT

## 2022-01-01 PROCEDURE — 85018 HEMOGLOBIN: CPT

## 2022-01-01 PROCEDURE — 94618 PULMONARY STRESS TESTING: CPT

## 2022-01-01 PROCEDURE — 71260 CT THORAX DX C+: CPT | Mod: 26

## 2022-01-01 PROCEDURE — 99232 SBSQ HOSP IP/OBS MODERATE 35: CPT | Mod: GC

## 2022-01-01 PROCEDURE — 0225U NFCT DS DNA&RNA 21 SARSCOV2: CPT

## 2022-01-01 PROCEDURE — 93306 TTE W/DOPPLER COMPLETE: CPT | Mod: 26

## 2022-01-01 PROCEDURE — 99285 EMERGENCY DEPT VISIT HI MDM: CPT | Mod: 25

## 2022-01-01 PROCEDURE — 99215 OFFICE O/P EST HI 40 MIN: CPT

## 2022-01-01 PROCEDURE — 71045 X-RAY EXAM CHEST 1 VIEW: CPT | Mod: 26

## 2022-01-01 PROCEDURE — 74177 CT ABD & PELVIS W/CONTRAST: CPT

## 2022-01-01 PROCEDURE — 99233 SBSQ HOSP IP/OBS HIGH 50: CPT | Mod: GC

## 2022-01-01 PROCEDURE — 97161 PT EVAL LOW COMPLEX 20 MIN: CPT

## 2022-01-01 PROCEDURE — 99232 SBSQ HOSP IP/OBS MODERATE 35: CPT

## 2022-01-01 PROCEDURE — 85014 HEMATOCRIT: CPT

## 2022-01-01 PROCEDURE — 85610 PROTHROMBIN TIME: CPT

## 2022-01-01 PROCEDURE — 93320 DOPPLER ECHO COMPLETE: CPT | Mod: 26,GC

## 2022-01-01 PROCEDURE — 80053 COMPREHEN METABOLIC PANEL: CPT

## 2022-01-01 PROCEDURE — 71045 X-RAY EXAM CHEST 1 VIEW: CPT

## 2022-01-01 PROCEDURE — 93010 ELECTROCARDIOGRAM REPORT: CPT

## 2022-01-01 PROCEDURE — 83880 ASSAY OF NATRIURETIC PEPTIDE: CPT

## 2022-01-01 PROCEDURE — 99239 HOSP IP/OBS DSCHRG MGMT >30: CPT | Mod: GC

## 2022-01-01 PROCEDURE — 94010 BREATHING CAPACITY TEST: CPT

## 2022-01-01 PROCEDURE — 85027 COMPLETE CBC AUTOMATED: CPT

## 2022-01-01 PROCEDURE — 93005 ELECTROCARDIOGRAM TRACING: CPT

## 2022-01-01 PROCEDURE — 81001 URINALYSIS AUTO W/SCOPE: CPT

## 2022-01-01 PROCEDURE — 97116 GAIT TRAINING THERAPY: CPT

## 2022-01-01 PROCEDURE — 82565 ASSAY OF CREATININE: CPT

## 2022-01-01 PROCEDURE — 87086 URINE CULTURE/COLONY COUNT: CPT

## 2022-01-01 PROCEDURE — 87449 NOS EACH ORGANISM AG IA: CPT

## 2022-01-01 PROCEDURE — 84484 ASSAY OF TROPONIN QUANT: CPT

## 2022-01-01 PROCEDURE — 71260 CT THORAX DX C+: CPT

## 2022-01-01 PROCEDURE — 87640 STAPH A DNA AMP PROBE: CPT

## 2022-01-01 PROCEDURE — 99284 EMERGENCY DEPT VISIT MOD MDM: CPT

## 2022-01-01 PROCEDURE — 99285 EMERGENCY DEPT VISIT HI MDM: CPT

## 2022-01-01 PROCEDURE — 99448 NTRPROF PH1/NTRNET/EHR 21-30: CPT

## 2022-01-01 PROCEDURE — 97110 THERAPEUTIC EXERCISES: CPT

## 2022-01-01 PROCEDURE — 36415 COLL VENOUS BLD VENIPUNCTURE: CPT

## 2022-01-01 PROCEDURE — 96374 THER/PROPH/DIAG INJ IV PUSH: CPT

## 2022-01-01 PROCEDURE — 71046 X-RAY EXAM CHEST 2 VIEWS: CPT

## 2022-01-01 PROCEDURE — U0003: CPT

## 2022-01-01 PROCEDURE — 93325 DOPPLER ECHO COLOR FLOW MAPG: CPT | Mod: 26,GC

## 2022-01-01 PROCEDURE — G0378: CPT

## 2022-01-01 PROCEDURE — 99215 OFFICE O/P EST HI 40 MIN: CPT | Mod: 25

## 2022-01-01 PROCEDURE — 82330 ASSAY OF CALCIUM: CPT

## 2022-01-01 PROCEDURE — 99204 OFFICE O/P NEW MOD 45 MIN: CPT

## 2022-01-01 PROCEDURE — ZZZZZ: CPT

## 2022-01-01 PROCEDURE — 85730 THROMBOPLASTIN TIME PARTIAL: CPT

## 2022-01-01 PROCEDURE — 52000 CYSTOURETHROSCOPY: CPT

## 2022-01-01 PROCEDURE — 51701 INSERT BLADDER CATHETER: CPT

## 2022-01-01 PROCEDURE — 87040 BLOOD CULTURE FOR BACTERIA: CPT

## 2022-01-01 PROCEDURE — 83735 ASSAY OF MAGNESIUM: CPT

## 2022-01-01 PROCEDURE — 12345: CPT | Mod: NC,GC

## 2022-01-01 PROCEDURE — 82803 BLOOD GASES ANY COMBINATION: CPT

## 2022-01-01 PROCEDURE — 82435 ASSAY OF BLOOD CHLORIDE: CPT

## 2022-01-01 PROCEDURE — 84295 ASSAY OF SERUM SODIUM: CPT

## 2022-01-01 PROCEDURE — 71250 CT THORAX DX C-: CPT

## 2022-01-01 PROCEDURE — 94726 PLETHYSMOGRAPHY LUNG VOLUMES: CPT

## 2022-01-01 PROCEDURE — 74177 CT ABD & PELVIS W/CONTRAST: CPT | Mod: 26

## 2022-01-01 PROCEDURE — 99222 1ST HOSP IP/OBS MODERATE 55: CPT

## 2022-01-01 PROCEDURE — 84100 ASSAY OF PHOSPHORUS: CPT

## 2022-01-01 PROCEDURE — 71046 X-RAY EXAM CHEST 2 VIEWS: CPT | Mod: 26

## 2022-01-01 PROCEDURE — 93306 TTE W/DOPPLER COMPLETE: CPT

## 2022-01-01 PROCEDURE — 84132 ASSAY OF SERUM POTASSIUM: CPT

## 2022-01-01 PROCEDURE — 71250 CT THORAX DX C-: CPT | Mod: 26

## 2022-01-01 PROCEDURE — 99214 OFFICE O/P EST MOD 30 MIN: CPT | Mod: 95

## 2022-01-01 PROCEDURE — 87324 CLOSTRIDIUM AG IA: CPT

## 2022-01-01 PROCEDURE — 87077 CULTURE AEROBIC IDENTIFY: CPT

## 2022-01-01 PROCEDURE — 93000 ELECTROCARDIOGRAM COMPLETE: CPT

## 2022-01-01 PROCEDURE — 99223 1ST HOSP IP/OBS HIGH 75: CPT

## 2022-01-01 PROCEDURE — 83690 ASSAY OF LIPASE: CPT

## 2022-01-01 PROCEDURE — 71275 CT ANGIOGRAPHY CHEST: CPT | Mod: 26,MA

## 2022-01-01 PROCEDURE — 93350 STRESS TTE ONLY: CPT | Mod: 26

## 2022-01-01 PROCEDURE — 99205 OFFICE O/P NEW HI 60 MIN: CPT

## 2022-01-01 PROCEDURE — 99214 OFFICE O/P EST MOD 30 MIN: CPT | Mod: GC,95

## 2022-01-01 PROCEDURE — 87186 SC STD MICRODIL/AGAR DIL: CPT

## 2022-01-01 PROCEDURE — 87637 SARSCOV2&INF A&B&RSV AMP PRB: CPT

## 2022-01-01 PROCEDURE — 87507 IADNA-DNA/RNA PROBE TQ 12-25: CPT

## 2022-01-01 RX ORDER — FUROSEMIDE 40 MG/1
40 TABLET ORAL
Qty: 30 | Refills: 0 | Status: COMPLETED | COMMUNITY
Start: 2022-01-01 | End: 2022-01-01

## 2022-01-01 RX ORDER — COVID-19 ANTIGEN TEST
KIT MISCELLANEOUS
Qty: 8 | Refills: 0 | Status: DISCONTINUED | COMMUNITY
Start: 2022-01-01

## 2022-01-01 RX ORDER — FUROSEMIDE 40 MG
40 TABLET ORAL DAILY
Refills: 0 | Status: DISCONTINUED | OUTPATIENT
Start: 2022-01-01 | End: 2022-01-01

## 2022-01-01 RX ORDER — PROCHLORPERAZINE MALEATE 10 MG/1
10 TABLET ORAL EVERY 6 HOURS
Qty: 120 | Refills: 1 | Status: ACTIVE | COMMUNITY
Start: 2021-12-06 | End: 1900-01-01

## 2022-01-01 RX ORDER — CEFPODOXIME PROXETIL 100 MG
1 TABLET ORAL
Qty: 20 | Refills: 0
Start: 2022-01-01 | End: 2022-01-01

## 2022-01-01 RX ORDER — FUROSEMIDE 40 MG
20 TABLET ORAL ONCE
Refills: 0 | Status: COMPLETED | OUTPATIENT
Start: 2022-01-01 | End: 2022-01-01

## 2022-01-01 RX ORDER — ACETAMINOPHEN 500 MG
650 TABLET ORAL EVERY 6 HOURS
Refills: 0 | Status: DISCONTINUED | OUTPATIENT
Start: 2022-01-01 | End: 2022-01-01

## 2022-01-01 RX ORDER — INFLUENZA VIRUS VACCINE 15; 15; 15; 15 UG/.5ML; UG/.5ML; UG/.5ML; UG/.5ML
0.7 SUSPENSION INTRAMUSCULAR ONCE
Refills: 0 | Status: DISCONTINUED | OUTPATIENT
Start: 2022-01-01 | End: 2022-01-01

## 2022-01-01 RX ORDER — ONDANSETRON 8 MG/1
8 TABLET, FILM COATED ORAL EVERY 8 HOURS
Refills: 0 | Status: DISCONTINUED | OUTPATIENT
Start: 2022-01-01 | End: 2022-01-01

## 2022-01-01 RX ORDER — ATORVASTATIN CALCIUM 40 MG/1
40 TABLET, FILM COATED ORAL
Qty: 90 | Refills: 3 | Status: ACTIVE | COMMUNITY
Start: 2017-07-18 | End: 1900-01-01

## 2022-01-01 RX ORDER — ALPRAZOLAM 0.25 MG
0.25 TABLET ORAL DAILY
Refills: 0 | Status: DISCONTINUED | OUTPATIENT
Start: 2022-01-01 | End: 2022-01-01

## 2022-01-01 RX ORDER — SODIUM CHLORIDE 9 MG/ML
1000 INJECTION, SOLUTION INTRAVENOUS
Refills: 0 | Status: DISCONTINUED | OUTPATIENT
Start: 2022-01-01 | End: 2022-01-01

## 2022-01-01 RX ORDER — APIXABAN 5 MG/1
5 TABLET, FILM COATED ORAL
Qty: 60 | Refills: 3 | Status: ACTIVE | COMMUNITY
Start: 2017-11-08

## 2022-01-01 RX ORDER — APIXABAN 2.5 MG/1
1 TABLET, FILM COATED ORAL
Qty: 0 | Refills: 0 | DISCHARGE
Start: 2022-01-01

## 2022-01-01 RX ORDER — CEFTRIAXONE 500 MG/1
1000 INJECTION, POWDER, FOR SOLUTION INTRAMUSCULAR; INTRAVENOUS ONCE
Refills: 0 | Status: COMPLETED | OUTPATIENT
Start: 2022-01-01 | End: 2022-01-01

## 2022-01-01 RX ORDER — CEFPODOXIME PROXETIL 200 MG/1
200 TABLET, FILM COATED ORAL
Qty: 20 | Refills: 0 | Status: COMPLETED | COMMUNITY
Start: 2022-01-01

## 2022-01-01 RX ORDER — ESCITALOPRAM OXALATE 10 MG/1
5 TABLET, FILM COATED ORAL DAILY
Refills: 0 | Status: DISCONTINUED | OUTPATIENT
Start: 2022-01-01 | End: 2022-01-01

## 2022-01-01 RX ORDER — METOPROLOL TARTRATE 50 MG
2 TABLET ORAL
Qty: 0 | Refills: 0 | DISCHARGE

## 2022-01-01 RX ORDER — METOCLOPRAMIDE HCL 10 MG
5 TABLET ORAL THREE TIMES A DAY
Refills: 0 | Status: DISCONTINUED | OUTPATIENT
Start: 2022-01-01 | End: 2022-01-01

## 2022-01-01 RX ORDER — ASPIRIN/CALCIUM CARB/MAGNESIUM 324 MG
81 TABLET ORAL AT BEDTIME
Refills: 0 | Status: DISCONTINUED | OUTPATIENT
Start: 2022-01-01 | End: 2022-01-01

## 2022-01-01 RX ORDER — SODIUM CHLORIDE 9 MG/ML
1000 INJECTION INTRAMUSCULAR; INTRAVENOUS; SUBCUTANEOUS ONCE
Refills: 0 | Status: COMPLETED | OUTPATIENT
Start: 2022-01-01 | End: 2022-01-01

## 2022-01-01 RX ORDER — FUROSEMIDE 40 MG
20 TABLET ORAL DAILY
Refills: 0 | Status: DISCONTINUED | OUTPATIENT
Start: 2022-01-01 | End: 2022-01-01

## 2022-01-01 RX ORDER — ASPIRIN ENTERIC COATED TABLETS 81 MG 81 MG/1
81 TABLET, DELAYED RELEASE ORAL DAILY
Refills: 0 | Status: DISCONTINUED | COMMUNITY
End: 2022-01-01

## 2022-01-01 RX ORDER — CHLORHEXIDINE GLUCONATE 213 G/1000ML
1 SOLUTION TOPICAL DAILY
Refills: 0 | Status: DISCONTINUED | OUTPATIENT
Start: 2022-01-01 | End: 2022-01-01

## 2022-01-01 RX ORDER — SPIRONOLACTONE 25 MG/1
25 TABLET, FILM COATED ORAL DAILY
Refills: 0 | Status: DISCONTINUED | OUTPATIENT
Start: 2022-01-01 | End: 2022-01-01

## 2022-01-01 RX ORDER — APIXABAN 2.5 MG/1
5 TABLET, FILM COATED ORAL
Refills: 0 | Status: DISCONTINUED | OUTPATIENT
Start: 2022-01-01 | End: 2022-01-01

## 2022-01-01 RX ORDER — SPIRONOLACTONE 25 MG/1
25 TABLET ORAL DAILY
Refills: 0 | Status: ACTIVE | COMMUNITY

## 2022-01-01 RX ORDER — LACTOBACILLUS ACIDOPHILUS 100MM CELL
1 CAPSULE ORAL DAILY
Refills: 0 | Status: DISCONTINUED | OUTPATIENT
Start: 2022-01-01 | End: 2022-01-01

## 2022-01-01 RX ORDER — METOPROLOL TARTRATE 50 MG
100 TABLET ORAL
Refills: 0 | Status: DISCONTINUED | OUTPATIENT
Start: 2022-01-01 | End: 2022-01-01

## 2022-01-01 RX ORDER — ONDANSETRON 8 MG/1
1 TABLET, FILM COATED ORAL
Qty: 0 | Refills: 0 | DISCHARGE

## 2022-01-01 RX ORDER — PROCHLORPERAZINE MALEATE 5 MG
10 TABLET ORAL DAILY
Refills: 0 | Status: DISCONTINUED | OUTPATIENT
Start: 2022-01-01 | End: 2022-01-01

## 2022-01-01 RX ORDER — LIDOCAINE AND PRILOCAINE CREAM 25; 25 MG/G; MG/G
1 CREAM TOPICAL
Qty: 0 | Refills: 0 | DISCHARGE

## 2022-01-01 RX ORDER — POLYETHYLENE GLYCOL 3350 17 G/17G
17 POWDER, FOR SOLUTION ORAL DAILY
Refills: 0 | Status: DISCONTINUED | OUTPATIENT
Start: 2022-01-01 | End: 2022-01-01

## 2022-01-01 RX ORDER — LOPERAMIDE HCL 2 MG
2 TABLET ORAL THREE TIMES A DAY
Refills: 0 | Status: DISCONTINUED | OUTPATIENT
Start: 2022-01-01 | End: 2022-01-01

## 2022-01-01 RX ORDER — FUROSEMIDE 40 MG
1 TABLET ORAL
Qty: 0 | Refills: 0 | DISCHARGE
Start: 2022-01-01 | End: 2022-01-01

## 2022-01-01 RX ORDER — CEFEPIME 1 G/1
1000 INJECTION, POWDER, FOR SOLUTION INTRAMUSCULAR; INTRAVENOUS ONCE
Refills: 0 | Status: COMPLETED | OUTPATIENT
Start: 2022-01-01 | End: 2022-01-01

## 2022-01-01 RX ORDER — APIXABAN 2.5 MG/1
5 TABLET, FILM COATED ORAL EVERY 12 HOURS
Refills: 0 | Status: DISCONTINUED | OUTPATIENT
Start: 2022-01-01 | End: 2022-01-01

## 2022-01-01 RX ORDER — LACTOBACILLUS ACIDOPHILUS 100MM CELL
1 CAPSULE ORAL
Qty: 0 | Refills: 0 | DISCHARGE
Start: 2022-01-01

## 2022-01-01 RX ORDER — CLOTRIMAZOLE AND BETAMETHASONE DIPROPIONATE 10; .5 MG/G; MG/G
1-0.05 CREAM TOPICAL TWICE DAILY
Qty: 2 | Refills: 1 | Status: DISCONTINUED | COMMUNITY
Start: 2022-01-01 | End: 2022-01-01

## 2022-01-01 RX ORDER — SODIUM CHLORIDE 9 MG/ML
500 INJECTION INTRAMUSCULAR; INTRAVENOUS; SUBCUTANEOUS ONCE
Refills: 0 | Status: COMPLETED | OUTPATIENT
Start: 2022-01-01 | End: 2022-01-01

## 2022-01-01 RX ORDER — GEMCITABINE 38 MG/ML
0 INJECTION, SOLUTION INTRAVENOUS
Qty: 0 | Refills: 0 | DISCHARGE

## 2022-01-01 RX ORDER — DIPHENHYDRAMINE HYDROCHLORIDE AND LIDOCAINE HYDROCHLORIDE AND ALUMINUM HYDROXIDE AND MAGNESIUM HYDRO
KIT
Qty: 1 | Refills: 2 | Status: DISCONTINUED | COMMUNITY
Start: 2022-01-01 | End: 2022-01-01

## 2022-01-01 RX ORDER — ATORVASTATIN CALCIUM 80 MG/1
40 TABLET, FILM COATED ORAL AT BEDTIME
Refills: 0 | Status: DISCONTINUED | OUTPATIENT
Start: 2022-01-01 | End: 2022-01-01

## 2022-01-01 RX ORDER — FUROSEMIDE 40 MG
1 TABLET ORAL
Qty: 30 | Refills: 0
Start: 2022-01-01 | End: 2022-01-01

## 2022-01-01 RX ORDER — APIXABAN 2.5 MG/1
1 TABLET, FILM COATED ORAL
Qty: 0 | Refills: 0 | DISCHARGE

## 2022-01-01 RX ORDER — LOPERAMIDE HCL 2 MG
1 TABLET ORAL
Qty: 90 | Refills: 0
Start: 2022-01-01 | End: 2022-01-01

## 2022-01-01 RX ORDER — AMOXICILLIN 500 MG/1
500 CAPSULE ORAL TWICE DAILY
Qty: 14 | Refills: 0 | Status: DISCONTINUED | COMMUNITY
Start: 2022-01-01 | End: 2022-01-01

## 2022-01-01 RX ORDER — LOPERAMIDE HYDROCHLORIDE 2 MG/1
2 CAPSULE ORAL
Qty: 90 | Refills: 0 | Status: ACTIVE | COMMUNITY
Start: 2022-01-01

## 2022-01-01 RX ORDER — MAGNESIUM SULFATE 500 MG/ML
2 VIAL (ML) INJECTION ONCE
Refills: 0 | Status: COMPLETED | OUTPATIENT
Start: 2022-01-01 | End: 2022-01-01

## 2022-01-01 RX ORDER — FUROSEMIDE 40 MG
40 TABLET ORAL ONCE
Refills: 0 | Status: DISCONTINUED | OUTPATIENT
Start: 2022-01-01 | End: 2022-01-01

## 2022-01-01 RX ORDER — ONDANSETRON 8 MG/1
8 TABLET ORAL EVERY 8 HOURS
Qty: 30 | Refills: 3 | Status: ACTIVE | COMMUNITY
Start: 2021-12-06 | End: 1900-01-01

## 2022-01-01 RX ORDER — MAGNESIUM SULFATE 500 MG/ML
1 VIAL (ML) INJECTION ONCE
Refills: 0 | Status: COMPLETED | OUTPATIENT
Start: 2022-01-01 | End: 2022-01-01

## 2022-01-01 RX ORDER — LIDOCAINE AND PRILOCAINE CREAM 25; 25 MG/G; MG/G
1 CREAM TOPICAL ONCE
Refills: 0 | Status: DISCONTINUED | OUTPATIENT
Start: 2022-01-01 | End: 2022-01-01

## 2022-01-01 RX ORDER — SENNA PLUS 8.6 MG/1
2 TABLET ORAL AT BEDTIME
Refills: 0 | Status: DISCONTINUED | OUTPATIENT
Start: 2022-01-01 | End: 2022-01-01

## 2022-01-01 RX ORDER — PROCHLORPERAZINE MALEATE 5 MG
1 TABLET ORAL
Qty: 0 | Refills: 0 | DISCHARGE

## 2022-01-01 RX ORDER — PACLITAXEL 100 MG/20ML
0 INJECTION, POWDER, LYOPHILIZED, FOR SUSPENSION INTRAVENOUS
Qty: 0 | Refills: 0 | DISCHARGE

## 2022-01-01 RX ORDER — CLOTRIMAZOLE AND BETAMETHASONE DIPROPIONATE 10; .5 MG/G; MG/G
1 CREAM TOPICAL
Qty: 0 | Refills: 0 | DISCHARGE

## 2022-01-01 RX ORDER — FUROSEMIDE 40 MG
40 TABLET ORAL
Refills: 0 | Status: DISCONTINUED | OUTPATIENT
Start: 2022-01-01 | End: 2022-01-01

## 2022-01-01 RX ADMIN — Medication 20 MILLIGRAM(S): at 06:19

## 2022-01-01 RX ADMIN — ESCITALOPRAM OXALATE 5 MILLIGRAM(S): 10 TABLET, FILM COATED ORAL at 12:16

## 2022-01-01 RX ADMIN — Medication 20 MILLIGRAM(S): at 06:07

## 2022-01-01 RX ADMIN — SENNA PLUS 2 TABLET(S): 8.6 TABLET ORAL at 21:23

## 2022-01-01 RX ADMIN — ATORVASTATIN CALCIUM 40 MILLIGRAM(S): 80 TABLET, FILM COATED ORAL at 22:07

## 2022-01-01 RX ADMIN — SPIRONOLACTONE 25 MILLIGRAM(S): 25 TABLET, FILM COATED ORAL at 06:19

## 2022-01-01 RX ADMIN — Medication 2 MILLIGRAM(S): at 19:02

## 2022-01-01 RX ADMIN — APIXABAN 5 MILLIGRAM(S): 2.5 TABLET, FILM COATED ORAL at 17:19

## 2022-01-01 RX ADMIN — Medication 100 MILLIGRAM(S): at 08:54

## 2022-01-01 RX ADMIN — CHLORHEXIDINE GLUCONATE 1 APPLICATION(S): 213 SOLUTION TOPICAL at 12:33

## 2022-01-01 RX ADMIN — ATORVASTATIN CALCIUM 40 MILLIGRAM(S): 80 TABLET, FILM COATED ORAL at 22:43

## 2022-01-01 RX ADMIN — SPIRONOLACTONE 25 MILLIGRAM(S): 25 TABLET, FILM COATED ORAL at 05:09

## 2022-01-01 RX ADMIN — Medication 0.25 MILLIGRAM(S): at 23:13

## 2022-01-01 RX ADMIN — APIXABAN 5 MILLIGRAM(S): 2.5 TABLET, FILM COATED ORAL at 17:06

## 2022-01-01 RX ADMIN — Medication 100 MILLIGRAM(S): at 19:03

## 2022-01-01 RX ADMIN — SPIRONOLACTONE 25 MILLIGRAM(S): 25 TABLET, FILM COATED ORAL at 05:24

## 2022-01-01 RX ADMIN — Medication 100 MILLIGRAM(S): at 17:50

## 2022-01-01 RX ADMIN — CHLORHEXIDINE GLUCONATE 1 APPLICATION(S): 213 SOLUTION TOPICAL at 12:15

## 2022-01-01 RX ADMIN — CEFTRIAXONE 100 MILLIGRAM(S): 500 INJECTION, POWDER, FOR SOLUTION INTRAMUSCULAR; INTRAVENOUS at 01:59

## 2022-01-01 RX ADMIN — ESCITALOPRAM OXALATE 5 MILLIGRAM(S): 10 TABLET, FILM COATED ORAL at 11:15

## 2022-01-01 RX ADMIN — Medication 40 MILLIGRAM(S): at 05:42

## 2022-01-01 RX ADMIN — APIXABAN 5 MILLIGRAM(S): 2.5 TABLET, FILM COATED ORAL at 05:42

## 2022-01-01 RX ADMIN — APIXABAN 5 MILLIGRAM(S): 2.5 TABLET, FILM COATED ORAL at 05:08

## 2022-01-01 RX ADMIN — CHLORHEXIDINE GLUCONATE 1 APPLICATION(S): 213 SOLUTION TOPICAL at 11:36

## 2022-01-01 RX ADMIN — Medication 100 MILLIGRAM(S): at 17:45

## 2022-01-01 RX ADMIN — SENNA PLUS 2 TABLET(S): 8.6 TABLET ORAL at 22:43

## 2022-01-01 RX ADMIN — POLYETHYLENE GLYCOL 3350 17 GRAM(S): 17 POWDER, FOR SOLUTION ORAL at 17:35

## 2022-01-01 RX ADMIN — SODIUM CHLORIDE 100 MILLILITER(S): 9 INJECTION, SOLUTION INTRAVENOUS at 18:49

## 2022-01-01 RX ADMIN — Medication 100 GRAM(S): at 08:30

## 2022-01-01 RX ADMIN — Medication 100 MILLIGRAM(S): at 17:19

## 2022-01-01 RX ADMIN — SPIRONOLACTONE 25 MILLIGRAM(S): 25 TABLET, FILM COATED ORAL at 05:50

## 2022-01-01 RX ADMIN — ESCITALOPRAM OXALATE 5 MILLIGRAM(S): 10 TABLET, FILM COATED ORAL at 13:57

## 2022-01-01 RX ADMIN — Medication 100 MILLIGRAM(S): at 06:29

## 2022-01-01 RX ADMIN — Medication 50 MILLIGRAM(S): at 06:29

## 2022-01-01 RX ADMIN — Medication 20 MILLIGRAM(S): at 06:29

## 2022-01-01 RX ADMIN — Medication 2 MILLIGRAM(S): at 13:21

## 2022-01-01 RX ADMIN — CHLORHEXIDINE GLUCONATE 1 APPLICATION(S): 213 SOLUTION TOPICAL at 11:26

## 2022-01-01 RX ADMIN — APIXABAN 5 MILLIGRAM(S): 2.5 TABLET, FILM COATED ORAL at 17:35

## 2022-01-01 RX ADMIN — Medication 0.25 MILLIGRAM(S): at 23:14

## 2022-01-01 RX ADMIN — CHLORHEXIDINE GLUCONATE 1 APPLICATION(S): 213 SOLUTION TOPICAL at 12:30

## 2022-01-01 RX ADMIN — Medication 650 MILLIGRAM(S): at 23:51

## 2022-01-01 RX ADMIN — Medication 100 MILLIGRAM(S): at 05:42

## 2022-01-01 RX ADMIN — Medication 100 MILLIGRAM(S): at 06:19

## 2022-01-01 RX ADMIN — ATORVASTATIN CALCIUM 40 MILLIGRAM(S): 80 TABLET, FILM COATED ORAL at 22:49

## 2022-01-01 RX ADMIN — Medication 100 MILLIGRAM(S): at 05:53

## 2022-01-01 RX ADMIN — SODIUM CHLORIDE 1000 MILLILITER(S): 9 INJECTION INTRAMUSCULAR; INTRAVENOUS; SUBCUTANEOUS at 23:26

## 2022-01-01 RX ADMIN — Medication 100 MILLIGRAM(S): at 05:11

## 2022-01-01 RX ADMIN — SODIUM CHLORIDE 100 MILLILITER(S): 9 INJECTION, SOLUTION INTRAVENOUS at 05:27

## 2022-01-01 RX ADMIN — APIXABAN 5 MILLIGRAM(S): 2.5 TABLET, FILM COATED ORAL at 05:25

## 2022-01-01 RX ADMIN — SPIRONOLACTONE 25 MILLIGRAM(S): 25 TABLET, FILM COATED ORAL at 05:12

## 2022-01-01 RX ADMIN — Medication 100 MILLIGRAM(S): at 17:17

## 2022-01-01 RX ADMIN — ESCITALOPRAM OXALATE 5 MILLIGRAM(S): 10 TABLET, FILM COATED ORAL at 12:03

## 2022-01-01 RX ADMIN — Medication 20 MILLIGRAM(S): at 04:33

## 2022-01-01 RX ADMIN — CHLORHEXIDINE GLUCONATE 1 APPLICATION(S): 213 SOLUTION TOPICAL at 11:15

## 2022-01-01 RX ADMIN — APIXABAN 5 MILLIGRAM(S): 2.5 TABLET, FILM COATED ORAL at 19:03

## 2022-01-01 RX ADMIN — ESCITALOPRAM OXALATE 5 MILLIGRAM(S): 10 TABLET, FILM COATED ORAL at 11:27

## 2022-01-01 RX ADMIN — Medication 5 MILLIGRAM(S): at 17:45

## 2022-01-01 RX ADMIN — ATORVASTATIN CALCIUM 40 MILLIGRAM(S): 80 TABLET, FILM COATED ORAL at 21:23

## 2022-01-01 RX ADMIN — ATORVASTATIN CALCIUM 40 MILLIGRAM(S): 80 TABLET, FILM COATED ORAL at 21:16

## 2022-01-01 RX ADMIN — Medication 100 MILLIGRAM(S): at 05:12

## 2022-01-01 RX ADMIN — Medication 2 MILLIGRAM(S): at 12:22

## 2022-01-01 RX ADMIN — APIXABAN 5 MILLIGRAM(S): 2.5 TABLET, FILM COATED ORAL at 17:15

## 2022-01-01 RX ADMIN — SPIRONOLACTONE 25 MILLIGRAM(S): 25 TABLET, FILM COATED ORAL at 06:29

## 2022-01-01 RX ADMIN — ONDANSETRON 8 MILLIGRAM(S): 8 TABLET, FILM COATED ORAL at 18:17

## 2022-01-01 RX ADMIN — Medication 20 MILLIGRAM(S): at 05:12

## 2022-01-01 RX ADMIN — ATORVASTATIN CALCIUM 40 MILLIGRAM(S): 80 TABLET, FILM COATED ORAL at 21:38

## 2022-01-01 RX ADMIN — Medication 650 MILLIGRAM(S): at 23:31

## 2022-01-01 RX ADMIN — Medication 100 MILLIGRAM(S): at 17:06

## 2022-01-01 RX ADMIN — APIXABAN 5 MILLIGRAM(S): 2.5 TABLET, FILM COATED ORAL at 05:50

## 2022-01-01 RX ADMIN — APIXABAN 5 MILLIGRAM(S): 2.5 TABLET, FILM COATED ORAL at 05:12

## 2022-01-01 RX ADMIN — APIXABAN 5 MILLIGRAM(S): 2.5 TABLET, FILM COATED ORAL at 17:50

## 2022-01-01 RX ADMIN — POLYETHYLENE GLYCOL 3350 17 GRAM(S): 17 POWDER, FOR SOLUTION ORAL at 11:26

## 2022-01-01 RX ADMIN — Medication 25 GRAM(S): at 10:32

## 2022-01-01 RX ADMIN — CHLORHEXIDINE GLUCONATE 1 APPLICATION(S): 213 SOLUTION TOPICAL at 12:42

## 2022-01-01 RX ADMIN — SPIRONOLACTONE 25 MILLIGRAM(S): 25 TABLET, FILM COATED ORAL at 06:07

## 2022-01-01 RX ADMIN — Medication 100 MILLIGRAM(S): at 17:14

## 2022-01-01 RX ADMIN — APIXABAN 5 MILLIGRAM(S): 2.5 TABLET, FILM COATED ORAL at 06:07

## 2022-01-01 RX ADMIN — CHLORHEXIDINE GLUCONATE 1 APPLICATION(S): 213 SOLUTION TOPICAL at 12:02

## 2022-01-01 RX ADMIN — SODIUM CHLORIDE 500 MILLILITER(S): 9 INJECTION INTRAMUSCULAR; INTRAVENOUS; SUBCUTANEOUS at 16:15

## 2022-01-01 RX ADMIN — Medication 20 MILLIGRAM(S): at 05:09

## 2022-01-01 RX ADMIN — SENNA PLUS 2 TABLET(S): 8.6 TABLET ORAL at 22:50

## 2022-01-01 RX ADMIN — Medication 50 MILLIGRAM(S): at 12:32

## 2022-01-01 RX ADMIN — Medication 100 MILLIGRAM(S): at 05:51

## 2022-01-01 RX ADMIN — Medication 0.25 MILLIGRAM(S): at 23:06

## 2022-01-01 RX ADMIN — ATORVASTATIN CALCIUM 40 MILLIGRAM(S): 80 TABLET, FILM COATED ORAL at 00:13

## 2022-01-01 RX ADMIN — CHLORHEXIDINE GLUCONATE 1 APPLICATION(S): 213 SOLUTION TOPICAL at 11:58

## 2022-01-01 RX ADMIN — CHLORHEXIDINE GLUCONATE 1 APPLICATION(S): 213 SOLUTION TOPICAL at 13:07

## 2022-01-01 RX ADMIN — Medication 650 MILLIGRAM(S): at 02:14

## 2022-01-01 RX ADMIN — Medication 1 TABLET(S): at 11:36

## 2022-01-01 RX ADMIN — APIXABAN 5 MILLIGRAM(S): 2.5 TABLET, FILM COATED ORAL at 17:24

## 2022-01-01 RX ADMIN — Medication 100 MILLIGRAM(S): at 18:17

## 2022-01-01 RX ADMIN — Medication 0.25 MILLIGRAM(S): at 22:50

## 2022-01-01 RX ADMIN — ATORVASTATIN CALCIUM 40 MILLIGRAM(S): 80 TABLET, FILM COATED ORAL at 21:57

## 2022-01-01 RX ADMIN — APIXABAN 5 MILLIGRAM(S): 2.5 TABLET, FILM COATED ORAL at 17:18

## 2022-01-01 RX ADMIN — Medication 100 MILLIGRAM(S): at 06:07

## 2022-01-01 RX ADMIN — Medication 0.25 MILLIGRAM(S): at 23:23

## 2022-01-01 RX ADMIN — Medication 100 MILLIGRAM(S): at 06:01

## 2022-01-01 RX ADMIN — APIXABAN 5 MILLIGRAM(S): 2.5 TABLET, FILM COATED ORAL at 06:29

## 2022-01-01 RX ADMIN — SPIRONOLACTONE 25 MILLIGRAM(S): 25 TABLET, FILM COATED ORAL at 05:53

## 2022-01-01 RX ADMIN — ATORVASTATIN CALCIUM 40 MILLIGRAM(S): 80 TABLET, FILM COATED ORAL at 21:56

## 2022-01-01 RX ADMIN — APIXABAN 5 MILLIGRAM(S): 2.5 TABLET, FILM COATED ORAL at 06:19

## 2022-01-01 RX ADMIN — Medication 100 MILLIGRAM(S): at 17:18

## 2022-01-01 RX ADMIN — ESCITALOPRAM OXALATE 5 MILLIGRAM(S): 10 TABLET, FILM COATED ORAL at 11:58

## 2022-01-01 RX ADMIN — SPIRONOLACTONE 25 MILLIGRAM(S): 25 TABLET, FILM COATED ORAL at 06:01

## 2022-01-01 RX ADMIN — APIXABAN 5 MILLIGRAM(S): 2.5 TABLET, FILM COATED ORAL at 18:17

## 2022-01-01 RX ADMIN — SPIRONOLACTONE 25 MILLIGRAM(S): 25 TABLET, FILM COATED ORAL at 05:42

## 2022-01-01 RX ADMIN — Medication 100 MILLIGRAM(S): at 17:24

## 2022-01-01 RX ADMIN — APIXABAN 5 MILLIGRAM(S): 2.5 TABLET, FILM COATED ORAL at 06:01

## 2022-01-01 RX ADMIN — APIXABAN 5 MILLIGRAM(S): 2.5 TABLET, FILM COATED ORAL at 05:53

## 2022-01-01 RX ADMIN — Medication 0.25 MILLIGRAM(S): at 22:43

## 2022-01-01 RX ADMIN — ONDANSETRON 8 MILLIGRAM(S): 8 TABLET, FILM COATED ORAL at 16:26

## 2022-01-01 RX ADMIN — ESCITALOPRAM OXALATE 5 MILLIGRAM(S): 10 TABLET, FILM COATED ORAL at 12:32

## 2022-01-01 RX ADMIN — ESCITALOPRAM OXALATE 5 MILLIGRAM(S): 10 TABLET, FILM COATED ORAL at 12:42

## 2022-01-01 RX ADMIN — ESCITALOPRAM OXALATE 5 MILLIGRAM(S): 10 TABLET, FILM COATED ORAL at 16:04

## 2022-01-01 RX ADMIN — Medication 1 TABLET(S): at 19:41

## 2022-01-01 RX ADMIN — Medication 20 MILLIGRAM(S): at 05:53

## 2022-01-01 RX ADMIN — Medication 0.25 MILLIGRAM(S): at 23:01

## 2022-01-01 RX ADMIN — CEFEPIME 100 MILLIGRAM(S): 1 INJECTION, POWDER, FOR SOLUTION INTRAMUSCULAR; INTRAVENOUS at 20:31

## 2022-01-01 RX ADMIN — ESCITALOPRAM OXALATE 5 MILLIGRAM(S): 10 TABLET, FILM COATED ORAL at 11:36

## 2022-01-01 RX ADMIN — Medication 650 MILLIGRAM(S): at 03:13

## 2022-01-01 RX ADMIN — Medication 100 GRAM(S): at 09:26

## 2022-01-01 RX ADMIN — ESCITALOPRAM OXALATE 5 MILLIGRAM(S): 10 TABLET, FILM COATED ORAL at 13:07

## 2022-01-01 RX ADMIN — Medication 100 MILLIGRAM(S): at 17:35

## 2022-01-04 DIAGNOSIS — D70.1 AGRANULOCYTOSIS SECONDARY TO CANCER CHEMOTHERAPY: ICD-10-CM

## 2022-01-04 DIAGNOSIS — E55.9 VITAMIN D DEFICIENCY, UNSPECIFIED: ICD-10-CM

## 2022-01-04 DIAGNOSIS — Z92.21 PERSONAL HISTORY OF ANTINEOPLASTIC CHEMOTHERAPY: ICD-10-CM

## 2022-01-04 DIAGNOSIS — E78.5 HYPERLIPIDEMIA, UNSPECIFIED: ICD-10-CM

## 2022-01-04 DIAGNOSIS — C25.9 MALIGNANT NEOPLASM OF PANCREAS, UNSPECIFIED: ICD-10-CM

## 2022-01-04 DIAGNOSIS — J18.9 PNEUMONIA, UNSPECIFIED ORGANISM: ICD-10-CM

## 2022-01-04 DIAGNOSIS — I10 ESSENTIAL (PRIMARY) HYPERTENSION: ICD-10-CM

## 2022-01-04 DIAGNOSIS — F41.9 ANXIETY DISORDER, UNSPECIFIED: ICD-10-CM

## 2022-01-04 DIAGNOSIS — Z86.73 PERSONAL HISTORY OF TRANSIENT ISCHEMIC ATTACK (TIA), AND CEREBRAL INFARCTION WITHOUT RESIDUAL DEFICITS: ICD-10-CM

## 2022-01-04 DIAGNOSIS — D64.81 ANEMIA DUE TO ANTINEOPLASTIC CHEMOTHERAPY: ICD-10-CM

## 2022-01-04 DIAGNOSIS — I48.0 PAROXYSMAL ATRIAL FIBRILLATION: ICD-10-CM

## 2022-01-04 DIAGNOSIS — Z79.01 LONG TERM (CURRENT) USE OF ANTICOAGULANTS: ICD-10-CM

## 2022-01-31 RX ORDER — HALOBETASOL PROPIONATE 0.5 MG/G
0.05 CREAM TOPICAL
Refills: 0 | Status: DISCONTINUED | COMMUNITY
End: 2022-01-31

## 2022-01-31 RX ORDER — FLUTICASONE PROPIONATE 50 UG/1
50 SPRAY, METERED NASAL
Qty: 16 | Refills: 0 | Status: DISCONTINUED | COMMUNITY
Start: 2017-08-15 | End: 2022-01-31

## 2022-01-31 RX ORDER — IPRATROPIUM BROMIDE AND ALBUTEROL 20; 100 UG/1; UG/1
20-100 SPRAY, METERED RESPIRATORY (INHALATION)
Qty: 4 | Refills: 0 | Status: DISCONTINUED | COMMUNITY
Start: 2021-09-09 | End: 2022-01-31

## 2022-01-31 RX ORDER — VALSARTAN 40 MG/1
40 TABLET, COATED ORAL
Refills: 0 | Status: DISCONTINUED | COMMUNITY
Start: 2021-09-20 | End: 2022-01-31

## 2022-01-31 NOTE — HISTORY OF PRESENT ILLNESS
[de-identified] : This is a non-billable note*\par \par \par Ms. JULIANNA LOPEZ is a 76 year old female who presents for evaluation in our [facility] for second opinion, pancreatic cancer. Her PMH includes Afib (2 ablations at [facility], on Eliquis and aspirin), HTN, CVA (5 yrs ago) no deficits, CAD, MELISSA. PSH of hip replacement.  She was initially Dx with PDAC in 2021, after she presented to [place] with shortness of breath. CT chest performed at that time and she was diagnosed with pneumonia and lung nodules. Lung biopsy was negative for cancer at that time. Pancreas lesion was found as well and she went to [facility].  She was initially offerred surgery, however a repeat lung biopsy came back + for adenocarcinoma.  She then followed with oncologist Dr Carranza and started on chemotherapy with Gemcitabine+ Abraxane (started on 21, completed cycle 2 on 22.  (timing of chemo was switched to once every other week which she is tolerating better than weekly). Interval CT a/p planned on 22 at [place].   \par She has been referred by Dr Carranza for continuation of care more local to her. \par \par Patient reports decreased appetite but is overall tolerating PO.  Denies steatorrhea.  C/o constipation. \par ECO  (shops/ lives alone / capable of stairs, has some fatigue).     \par \par Family Ca hx –daughter with metastatic colon Ca (age 51).  Aunt- breast (age 89).   No PDAC in family. \par Pt reports negative genetic testing \par \par Imaging:\par MRI 21- segmental dilatation of the pancreatic duct with the distal body and tail up to 5mm associated pancreatic parenchymal atrophy. Multiple bibasal pulmonary nodules suspicious for pulmonary metastatic disease.  \par \par EUS w/FNA @[facility]-(10/12/21) showed adeno with necrosis \par \par PET scan 21- + mildly metabolic ill-defined pancreatic body mass + pancreatic malignancy , extensive pulmonary  carcinomatosis   \par \par Lung bx 21-mets pancreatic adeno \par \par CT A/P on 21  showed heterogeneously hypoenhancing pancreatic body mass 2.6 x 1.9 x 1.9cm  \par \par Baseline tumor markers (21) CEA 9.4     Ca19-9 8821    Ca 125 38.4\par 22   CA 19-9 584.8, CEA 3.1   \par  [EUS] : EUS [Biopsy] : biopsy performed [TextBox_4] : 9/2021 [TextBox_6] : lung lesions [TextBox_23] : 7133  [TextBox_21] : 9.4 [TextBox_39] : Outside path  [TextBox_41] : adenocarcinoma  [TextBox_43] : 10/12/21 [] : This is a second opinion [N/A] : N/A [Pancreatic ductal adenocarcinoma] : Pancreatic ductal adenocarcinoma [Metastatic] : metastatic [Nutrition] : Nutrition [Social Work] : Social Work [Restricted in physically strenuous activity but ambulatory and able to carry out work of a light or sedentary nature] : Status 1 - Restricted in physically strenuous activity but ambulatory and able to carry out work of a light or sedentary nature, e.g., light house work, office work [TextBox_5] : 2/1/22 [TextBox_38] : 185 [FreeTextEntry3] : 3.1 [TextBox_40] : 11/30/21 [History of Neoadjuvant Therapy] : History of Neoadjuvant Therapy: Yes [[place]-based] : Type of 1st Neoadjuvant chemotherapy: [place]-based [TextBox_16] : 12/16/21 [TextBox_20] : 2

## 2022-02-01 ENCOUNTER — NON-APPOINTMENT (OUTPATIENT)
Age: 77
End: 2022-02-01

## 2022-02-01 ENCOUNTER — OUTPATIENT (OUTPATIENT)
Dept: OUTPATIENT SERVICES | Facility: HOSPITAL | Age: 77
LOS: 1 days | Discharge: HOME | End: 2022-02-01
Payer: MEDICARE

## 2022-02-01 DIAGNOSIS — C25.9 MALIGNANT NEOPLASM OF PANCREAS, UNSPECIFIED: ICD-10-CM

## 2022-02-01 DIAGNOSIS — Z98.89 OTHER SPECIFIED POSTPROCEDURAL STATES: Chronic | ICD-10-CM

## 2022-02-03 ENCOUNTER — APPOINTMENT (OUTPATIENT)
Dept: MULTI SPECIALTY CLINIC | Facility: CLINIC | Age: 77
End: 2022-02-03

## 2022-02-04 ENCOUNTER — NON-APPOINTMENT (OUTPATIENT)
Age: 77
End: 2022-02-04

## 2022-02-05 ENCOUNTER — APPOINTMENT (OUTPATIENT)
Dept: INTERNAL MEDICINE | Facility: CLINIC | Age: 77
End: 2022-02-05

## 2022-02-08 ENCOUNTER — NON-APPOINTMENT (OUTPATIENT)
Age: 77
End: 2022-02-08

## 2022-02-08 ENCOUNTER — RESULT REVIEW (OUTPATIENT)
Age: 77
End: 2022-02-08

## 2022-02-08 LAB — NON-GYNECOLOGICAL CYTOLOGY STUDY: SIGNIFICANT CHANGE UP

## 2022-02-08 PROCEDURE — 88321 CONSLTJ&REPRT SLD PREP ELSWR: CPT

## 2022-02-14 ENCOUNTER — RESULT REVIEW (OUTPATIENT)
Age: 77
End: 2022-02-14

## 2022-02-14 ENCOUNTER — APPOINTMENT (OUTPATIENT)
Dept: HEMATOLOGY ONCOLOGY | Facility: CLINIC | Age: 77
End: 2022-02-14
Payer: MEDICARE

## 2022-02-14 VITALS
RESPIRATION RATE: 18 BRPM | TEMPERATURE: 96.6 F | BODY MASS INDEX: 37.38 KG/M2 | OXYGEN SATURATION: 99 % | DIASTOLIC BLOOD PRESSURE: 83 MMHG | HEART RATE: 72 BPM | WEIGHT: 208.31 LBS | SYSTOLIC BLOOD PRESSURE: 153 MMHG | HEIGHT: 62.6 IN

## 2022-02-14 LAB
ALBUMIN SERPL ELPH-MCNC: 4.5 G/DL
ALP BLD-CCNC: 97 U/L
ALT SERPL-CCNC: 54 U/L
ANION GAP SERPL CALC-SCNC: 16 MMOL/L
AST SERPL-CCNC: 34 U/L
BASOPHILS # BLD AUTO: 0.03 K/UL — SIGNIFICANT CHANGE UP (ref 0–0.2)
BASOPHILS NFR BLD AUTO: 0.4 % — SIGNIFICANT CHANGE UP (ref 0–2)
BILIRUB SERPL-MCNC: 0.5 MG/DL
BUN SERPL-MCNC: 13 MG/DL
CALCIUM SERPL-MCNC: 10 MG/DL
CANCER AG19-9 SERPL-ACNC: 164 U/ML
CHLORIDE SERPL-SCNC: 99 MMOL/L
CO2 SERPL-SCNC: 25 MMOL/L
CREAT SERPL-MCNC: 0.7 MG/DL
EOSINOPHIL # BLD AUTO: 0.1 K/UL — SIGNIFICANT CHANGE UP (ref 0–0.5)
EOSINOPHIL NFR BLD AUTO: 1.4 % — SIGNIFICANT CHANGE UP (ref 0–6)
GLUCOSE SERPL-MCNC: 98 MG/DL
HCT VFR BLD CALC: 35.9 % — SIGNIFICANT CHANGE UP (ref 34.5–45)
HGB BLD-MCNC: 11.3 G/DL — LOW (ref 11.5–15.5)
IMM GRANULOCYTES NFR BLD AUTO: 0.7 % — SIGNIFICANT CHANGE UP (ref 0–1.5)
LYMPHOCYTES # BLD AUTO: 1.95 K/UL — SIGNIFICANT CHANGE UP (ref 1–3.3)
LYMPHOCYTES # BLD AUTO: 27.5 % — SIGNIFICANT CHANGE UP (ref 13–44)
MCHC RBC-ENTMCNC: 28.7 PG — SIGNIFICANT CHANGE UP (ref 27–34)
MCHC RBC-ENTMCNC: 31.5 G/DL — LOW (ref 32–36)
MCV RBC AUTO: 91.1 FL — SIGNIFICANT CHANGE UP (ref 80–100)
MONOCYTES # BLD AUTO: 0.41 K/UL — SIGNIFICANT CHANGE UP (ref 0–0.9)
MONOCYTES NFR BLD AUTO: 5.8 % — SIGNIFICANT CHANGE UP (ref 2–14)
NEUTROPHILS # BLD AUTO: 4.54 K/UL — SIGNIFICANT CHANGE UP (ref 1.8–7.4)
NEUTROPHILS NFR BLD AUTO: 64.2 % — SIGNIFICANT CHANGE UP (ref 43–77)
NRBC # BLD: 0 /100 WBCS — SIGNIFICANT CHANGE UP (ref 0–0)
PLATELET # BLD AUTO: 434 K/UL — HIGH (ref 150–400)
POTASSIUM SERPL-SCNC: 5.2 MMOL/L
PROT SERPL-MCNC: 7.2 G/DL
RBC # BLD: 3.94 M/UL — SIGNIFICANT CHANGE UP (ref 3.8–5.2)
RBC # FLD: 17.7 % — HIGH (ref 10.3–14.5)
SODIUM SERPL-SCNC: 140 MMOL/L
WBC # BLD: 7.08 K/UL — SIGNIFICANT CHANGE UP (ref 3.8–10.5)
WBC # FLD AUTO: 7.08 K/UL — SIGNIFICANT CHANGE UP (ref 3.8–10.5)

## 2022-02-14 PROCEDURE — 99205 OFFICE O/P NEW HI 60 MIN: CPT

## 2022-02-15 ENCOUNTER — RESULT REVIEW (OUTPATIENT)
Age: 77
End: 2022-02-15

## 2022-02-15 LAB
HBV CORE IGG+IGM SER QL: NONREACTIVE
HBV SURFACE AB SER QL: NONREACTIVE
HBV SURFACE AG SER QL: NONREACTIVE
HCV AB SER QL: NONREACTIVE
HCV S/CO RATIO: 0.09 S/CO
SURGICAL PATHOLOGY STUDY: SIGNIFICANT CHANGE UP

## 2022-02-18 NOTE — REASON FOR VISIT
[Initial Consultation] : an initial consultation [Family Member] : family member [FreeTextEntry2] : Stage IV pancreatic cancer

## 2022-02-18 NOTE — HISTORY OF PRESENT ILLNESS
[Disease: _____________________] : Disease: [unfilled] [AJCC Stage: ____] : AJCC Stage: [unfilled] [Therapy: ___] : Therapy: [unfilled] [Cycle: ___] : Cycle: [unfilled] [Day: ___] : Day: [unfilled] [de-identified] : 76 F w/ h/o CVA, CAD, a fib, presents for further management of Stage IV pancreatic cancer. \par \giulia Peralta was admitted to [facility] for worsening cough and acute SOB in September 2021 and was found to have extensive ill defined pulmonary nodules on CT Chest suggestive of either a metastatic disease or infectious process. She was started on IV antibiotics. CT A/P on 9/4/21 showed mixed solid and cystic left ovarian mass measuring up to 4.6 cm suspicious for an ovarian cystic neoplasm, no pelvic or RP adenopathy, ill defined cystic changes within the pancreatic body and tail, possibly representing a pancreatic cystic neoplasm such as an IPMN. CTA on 9/4/21 showed extensive scattered ill defined pulmonary nodule/nodular infiltrates throughout the lungs, concerning for atypical infection/pneumonia. A bronchoscopy was done on 9/7/21 with BAL and RML x 3 of RLL posterior segment. Negative for malignancy. Pt was d/c from [facility] after improvement in symptoms with Ab. \par \giulia Peralta followed up with GI and underwent an MR Abd on 9/29/21 which showed segmental dilatation of the pancreatic duct within the distal body and tail, measuring up to 5 mm associated pancreatic parenchymal atrophy. Mass suspected in the mid body. No liver mass noted. 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 EUS at [place] on 10/12/21 showed an ill defined mass in the body involving the splenic vein. FNA c/w adenocarcinoma. CA 19-9 3164,  35. \par \par Referred to Dr. Barrow at [facility]. 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 Underwent a lung biopsy on 11/11/21 which confirmed metastatic pancreatic adenocarcinoma, MMR intact, NTRK negative. \par \par Restaging CT CAP oon 11/30/21 showed slight interval progression of lymphangitic spread of cancer, numerous b/l pulm nodules c/w known metastatic disease, 2.6 x 1.9 x 1.9 cm pancreatic body mass, diffuse mesenteric haziness, intra-abdominal LN, small pelvic ascites. \par \par 12/6/21: C1D1, 8, 15 Q 28 cycle Gemzar 800 mg/m2 + Abraxane 100 mg/m2 \par 12/24/21: admitted to [facility] for neutropenic fever after syncope and diarrhea at home. \par 1/10/22: C2D1 and D15 (alternating weeks) \par 2/1/22: CT CAP: decr in lymphangitis carcinomatosis and pulm nodules, 1.7 x 1.1 pancreatic body mass decr in size\par 2/7/22: C3D1\par \par Decided to transfer care to [facility] due to proximity to home.  [de-identified] : adenocarcinoma  [de-identified] : David testing: MSH3 VUS denoted [id number]  [de-identified] : overall has been tolerating chemo well. reports fatigue for 2-3 days post treatment but then recovers and performs ADLs. Appetite is overall low but weight has been recently stable.

## 2022-02-20 ENCOUNTER — RX RENEWAL (OUTPATIENT)
Age: 77
End: 2022-02-20

## 2022-02-22 ENCOUNTER — RESULT REVIEW (OUTPATIENT)
Age: 77
End: 2022-02-22

## 2022-02-22 ENCOUNTER — APPOINTMENT (OUTPATIENT)
Dept: HEMATOLOGY ONCOLOGY | Facility: CLINIC | Age: 77
End: 2022-02-22
Payer: MEDICARE

## 2022-02-22 ENCOUNTER — APPOINTMENT (OUTPATIENT)
Dept: INFUSION THERAPY | Facility: HOSPITAL | Age: 77
End: 2022-02-22

## 2022-02-22 DIAGNOSIS — R11.2 NAUSEA WITH VOMITING, UNSPECIFIED: ICD-10-CM

## 2022-02-22 DIAGNOSIS — Z51.11 ENCOUNTER FOR ANTINEOPLASTIC CHEMOTHERAPY: ICD-10-CM

## 2022-02-22 LAB
BASOPHILS # BLD AUTO: 0.02 K/UL — SIGNIFICANT CHANGE UP (ref 0–0.2)
BASOPHILS NFR BLD AUTO: 0.3 % — SIGNIFICANT CHANGE UP (ref 0–2)
EOSINOPHIL # BLD AUTO: 0.07 K/UL — SIGNIFICANT CHANGE UP (ref 0–0.5)
EOSINOPHIL NFR BLD AUTO: 1 % — SIGNIFICANT CHANGE UP (ref 0–6)
HCT VFR BLD CALC: 35.4 % — SIGNIFICANT CHANGE UP (ref 34.5–45)
HGB BLD-MCNC: 11.7 G/DL — SIGNIFICANT CHANGE UP (ref 11.5–15.5)
IMM GRANULOCYTES NFR BLD AUTO: 0.6 % — SIGNIFICANT CHANGE UP (ref 0–1.5)
LYMPHOCYTES # BLD AUTO: 1.18 K/UL — SIGNIFICANT CHANGE UP (ref 1–3.3)
LYMPHOCYTES # BLD AUTO: 16.8 % — SIGNIFICANT CHANGE UP (ref 13–44)
MCHC RBC-ENTMCNC: 29.3 PG — SIGNIFICANT CHANGE UP (ref 27–34)
MCHC RBC-ENTMCNC: 33.1 G/DL — SIGNIFICANT CHANGE UP (ref 32–36)
MCV RBC AUTO: 88.5 FL — SIGNIFICANT CHANGE UP (ref 80–100)
MONOCYTES # BLD AUTO: 0.95 K/UL — HIGH (ref 0–0.9)
MONOCYTES NFR BLD AUTO: 13.5 % — SIGNIFICANT CHANGE UP (ref 2–14)
NEUTROPHILS # BLD AUTO: 4.76 K/UL — SIGNIFICANT CHANGE UP (ref 1.8–7.4)
NEUTROPHILS NFR BLD AUTO: 67.8 % — SIGNIFICANT CHANGE UP (ref 43–77)
NRBC # BLD: 0 /100 WBCS — SIGNIFICANT CHANGE UP (ref 0–0)
PLATELET # BLD AUTO: 344 K/UL — SIGNIFICANT CHANGE UP (ref 150–400)
RBC # BLD: 4 M/UL — SIGNIFICANT CHANGE UP (ref 3.8–5.2)
RBC # FLD: 18.1 % — HIGH (ref 10.3–14.5)
WBC # BLD: 7.02 K/UL — SIGNIFICANT CHANGE UP (ref 3.8–10.5)
WBC # FLD AUTO: 7.02 K/UL — SIGNIFICANT CHANGE UP (ref 3.8–10.5)

## 2022-02-22 PROCEDURE — 99214 OFFICE O/P EST MOD 30 MIN: CPT

## 2022-02-22 RX ORDER — L. ACIDOPHILUS/PECTIN, CITRUS 25MM-100MG
TABLET ORAL
Qty: 20 | Refills: 0 | Status: COMPLETED | COMMUNITY
Start: 2021-12-27

## 2022-02-22 RX ORDER — ONDANSETRON 4 MG/1
4 TABLET, ORALLY DISINTEGRATING ORAL
Qty: 30 | Refills: 0 | Status: COMPLETED | COMMUNITY
Start: 2021-12-27

## 2022-02-22 RX ORDER — AZITHROMYCIN 500 MG/1
500 TABLET, FILM COATED ORAL
Qty: 2 | Refills: 0 | Status: COMPLETED | COMMUNITY
Start: 2021-12-27

## 2022-02-22 RX ORDER — ENOXAPARIN SODIUM 100 MG/ML
100 INJECTION SUBCUTANEOUS
Qty: 3 | Refills: 0 | Status: COMPLETED | COMMUNITY
Start: 2021-11-05

## 2022-02-22 RX ORDER — ENOXAPARIN SODIUM 100 MG/ML
30 INJECTION SUBCUTANEOUS
Qty: 6 | Refills: 0 | Status: COMPLETED | COMMUNITY
Start: 2021-11-08

## 2022-02-22 RX ORDER — LEVOFLOXACIN 500 MG/1
500 TABLET, FILM COATED ORAL
Qty: 5 | Refills: 0 | Status: COMPLETED | COMMUNITY
Start: 2021-10-12

## 2022-02-22 RX ORDER — CEFUROXIME AXETIL 500 MG/1
500 TABLET ORAL
Qty: 8 | Refills: 0 | Status: COMPLETED | COMMUNITY
Start: 2021-12-27

## 2022-02-22 RX ORDER — ETODOLAC 400 MG/1
400 TABLET, FILM COATED ORAL
Qty: 10 | Refills: 0 | Status: COMPLETED | COMMUNITY
Start: 2022-02-03

## 2022-02-22 RX ORDER — ERGOCALCIFEROL 1.25 MG/1
1.25 MG CAPSULE, LIQUID FILLED ORAL
Qty: 4 | Refills: 0 | Status: COMPLETED | COMMUNITY
Start: 2021-12-27

## 2022-02-23 ENCOUNTER — NON-APPOINTMENT (OUTPATIENT)
Age: 77
End: 2022-02-23

## 2022-02-25 ENCOUNTER — NON-APPOINTMENT (OUTPATIENT)
Age: 77
End: 2022-02-25

## 2022-02-28 NOTE — HISTORY OF PRESENT ILLNESS
[Disease: _____________________] : Disease: [unfilled] [AJCC Stage: ____] : AJCC Stage: [unfilled] [Therapy: ___] : Therapy: [unfilled] [Cycle: ___] : Cycle: [unfilled] [Day: ___] : Day: [unfilled] [Date: ____________] : Patient's last distress assessment performed on [unfilled]. [5 - Distress Level] : Distress Level: 5 [Patient given social work contact information and resource sheet] : Patient was given social work contact information and resource sheet [de-identified] : 76 F w/ h/o CVA, CAD, a fib, presents for further management of Stage IV pancreatic cancer. \par \giulia Peralta was admitted to [facility] for worsening cough and acute SOB in September 2021 and was found to have extensive ill defined pulmonary nodules on CT Chest suggestive of either a metastatic disease or infectious process. She was started on IV antibiotics. CT A/P on 9/4/21 showed mixed solid and cystic left ovarian mass measuring up to 4.6 cm suspicious for an ovarian cystic neoplasm, no pelvic or RP adenopathy, ill defined cystic changes within the pancreatic body and tail, possibly representing a pancreatic cystic neoplasm such as an IPMN. CTA on 9/4/21 showed extensive scattered ill defined pulmonary nodule/nodular infiltrates throughout the lungs, concerning for atypical infection/pneumonia. A bronchoscopy was done on 9/7/21 with BAL and RML x 3 of RLL posterior segment. Negative for malignancy. Pt was d/c from [facility] after improvement in symptoms with Ab. \par \giulia Peralta followed up with GI and underwent an MR Abd on 9/29/21 which showed segmental dilatation of the pancreatic duct within the distal body and tail, measuring up to 5 mm associated pancreatic parenchymal atrophy. Mass suspected in the mid body. No liver mass noted. 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 EUS at [place] on 10/12/21 showed an ill defined mass in the body involving the splenic vein. FNA c/w adenocarcinoma. CA 19-9 3164,  35. \par \par Referred to Dr. Barrow at [facility]. 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 Underwent a lung biopsy on 11/11/21 which confirmed metastatic pancreatic adenocarcinoma, MMR intact, NTRK negative. \par \par Restaging CT CAP oon 11/30/21 showed slight interval progression of lymphangitic spread of cancer, numerous b/l pulm nodules c/w known metastatic disease, 2.6 x 1.9 x 1.9 cm pancreatic body mass, diffuse mesenteric haziness, intra-abdominal LN, small pelvic ascites. \par \par 12/6/21: C1D1, 8, 15 Q 28 cycle Gemzar 800 mg/m2 + Abraxane 100 mg/m2 \par 12/24/21: admitted to [facility] for neutropenic fever after syncope and diarrhea at home. \par 1/10/22: C2D1 and D15 (alternating weeks) \par 2/1/22: CT CAP: decr in lymphangitis carcinomatosis and pulm nodules, 1.7 x 1.1 pancreatic body mass decr in size\par 2/7/22: C3D1\par \par Decided to transfer care to [facility] due to proximity to home. \par \par 2/22/22: C3D15 [de-identified] : adenocarcinoma  [de-identified] : David testing: MSH3 VUS denoted [id number]  [de-identified] : Patient was seen at the infusion room while receiving treatment; currently feels fine. She reported fatigue for 2-3 days post treatment but then recovers and performs ADLs. She admitted to making a conscious effort to maintain adequate appetite but weight has been stable. She uses prunes and Dulcolax laxative to address constipation and had 1 nausea episodes (used Compazine and it provided immediate relief).

## 2022-02-28 NOTE — REASON FOR VISIT
[Follow-Up Visit] : a follow-up [Family Member] : family member [FreeTextEntry2] : Stage IV pancreatic cancer

## 2022-03-03 ENCOUNTER — OUTPATIENT (OUTPATIENT)
Dept: OUTPATIENT SERVICES | Facility: HOSPITAL | Age: 77
LOS: 1 days | Discharge: HOME | End: 2022-03-03

## 2022-03-03 DIAGNOSIS — Z98.89 OTHER SPECIFIED POSTPROCEDURAL STATES: Chronic | ICD-10-CM

## 2022-03-03 DIAGNOSIS — C25.9 MALIGNANT NEOPLASM OF PANCREAS, UNSPECIFIED: ICD-10-CM

## 2022-03-07 ENCOUNTER — RESULT REVIEW (OUTPATIENT)
Age: 77
End: 2022-03-07

## 2022-03-07 ENCOUNTER — APPOINTMENT (OUTPATIENT)
Dept: HEMATOLOGY ONCOLOGY | Facility: CLINIC | Age: 77
End: 2022-03-07
Payer: MEDICARE

## 2022-03-07 ENCOUNTER — APPOINTMENT (OUTPATIENT)
Dept: INFUSION THERAPY | Facility: HOSPITAL | Age: 77
End: 2022-03-07

## 2022-03-07 DIAGNOSIS — R11.2 NAUSEA WITH VOMITING, UNSPECIFIED: ICD-10-CM

## 2022-03-07 DIAGNOSIS — Z51.11 ENCOUNTER FOR ANTINEOPLASTIC CHEMOTHERAPY: ICD-10-CM

## 2022-03-07 LAB
BASOPHILS # BLD AUTO: 0.03 K/UL — SIGNIFICANT CHANGE UP (ref 0–0.2)
BASOPHILS NFR BLD AUTO: 0.4 % — SIGNIFICANT CHANGE UP (ref 0–2)
EOSINOPHIL # BLD AUTO: 0.14 K/UL — SIGNIFICANT CHANGE UP (ref 0–0.5)
EOSINOPHIL NFR BLD AUTO: 2 % — SIGNIFICANT CHANGE UP (ref 0–6)
HCT VFR BLD CALC: 35.4 % — SIGNIFICANT CHANGE UP (ref 34.5–45)
HGB BLD-MCNC: 11.4 G/DL — LOW (ref 11.5–15.5)
IMM GRANULOCYTES NFR BLD AUTO: 0.7 % — SIGNIFICANT CHANGE UP (ref 0–1.5)
LYMPHOCYTES # BLD AUTO: 1.31 K/UL — SIGNIFICANT CHANGE UP (ref 1–3.3)
LYMPHOCYTES # BLD AUTO: 18.5 % — SIGNIFICANT CHANGE UP (ref 13–44)
MCHC RBC-ENTMCNC: 28.9 PG — SIGNIFICANT CHANGE UP (ref 27–34)
MCHC RBC-ENTMCNC: 32.2 G/DL — SIGNIFICANT CHANGE UP (ref 32–36)
MCV RBC AUTO: 89.8 FL — SIGNIFICANT CHANGE UP (ref 80–100)
MONOCYTES # BLD AUTO: 0.91 K/UL — HIGH (ref 0–0.9)
MONOCYTES NFR BLD AUTO: 12.9 % — SIGNIFICANT CHANGE UP (ref 2–14)
NEUTROPHILS # BLD AUTO: 4.63 K/UL — SIGNIFICANT CHANGE UP (ref 1.8–7.4)
NEUTROPHILS NFR BLD AUTO: 65.5 % — SIGNIFICANT CHANGE UP (ref 43–77)
NRBC # BLD: 0 /100 WBCS — SIGNIFICANT CHANGE UP (ref 0–0)
PLATELET # BLD AUTO: 292 K/UL — SIGNIFICANT CHANGE UP (ref 150–400)
RBC # BLD: 3.94 M/UL — SIGNIFICANT CHANGE UP (ref 3.8–5.2)
RBC # FLD: 17.4 % — HIGH (ref 10.3–14.5)
WBC # BLD: 7.07 K/UL — SIGNIFICANT CHANGE UP (ref 3.8–10.5)
WBC # FLD AUTO: 7.07 K/UL — SIGNIFICANT CHANGE UP (ref 3.8–10.5)

## 2022-03-07 PROCEDURE — 99215 OFFICE O/P EST HI 40 MIN: CPT

## 2022-03-08 NOTE — HISTORY OF PRESENT ILLNESS
[Disease: _____________________] : Disease: [unfilled] [AJCC Stage: ____] : AJCC Stage: [unfilled] [Therapy: ___] : Therapy: [unfilled] [Cycle: ___] : Cycle: [unfilled] [Day: ___] : Day: [unfilled] [Date: ____________] : Patient's last distress assessment performed on [unfilled]. [5 - Distress Level] : Distress Level: 5 [Patient given social work contact information and resource sheet] : Patient was given social work contact information and resource sheet [de-identified] : 76 F w/ h/o CVA, CAD, a fib, presents for further management of Stage IV pancreatic cancer. \par \giulia Peralta was admitted to [facility] for worsening cough and acute SOB in September 2021 and was found to have extensive ill defined pulmonary nodules on CT Chest suggestive of either a metastatic disease or infectious process. She was started on IV antibiotics. CT A/P on 9/4/21 showed mixed solid and cystic left ovarian mass measuring up to 4.6 cm suspicious for an ovarian cystic neoplasm, no pelvic or RP adenopathy, ill defined cystic changes within the pancreatic body and tail, possibly representing a pancreatic cystic neoplasm such as an IPMN. CTA on 9/4/21 showed extensive scattered ill defined pulmonary nodule/nodular infiltrates throughout the lungs, concerning for atypical infection/pneumonia. A bronchoscopy was done on 9/7/21 with BAL and RML x 3 of RLL posterior segment. Negative for malignancy. Pt was d[facility]c from [facility] after improvement in symptoms with Ab. \par \giulia Peralta followed up with GI and underwent an MR Abd on 9/29/21 which showed segmental dilatation of the pancreatic duct within the distal body and tail, measuring up to 5 mm associated pancreatic parenchymal atrophy. Mass suspected in the mid body. No liver mass noted. 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 EUS at [place] on 10/12/21 showed an ill defined mass in the body involving the splenic vein. FNA c/w adenocarcinoma. CA 19-9 3164,  35. \par \par Referred to Dr. Barrow at [facility]. 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 Underwent a lung biopsy on 11/11/21 which confirmed metastatic pancreatic adenocarcinoma, MMR intact, NTRK negative. \par \par Restaging CT CAP oon 11/30/21 showed slight interval progression of lymphangitic spread of cancer, numerous b/l pulm nodules c/w known metastatic disease, 2.6 x 1.9 x 1.9 cm pancreatic body mass, diffuse mesenteric haziness, intra-abdominal LN, small pelvic ascites. \par \par 12/6/21: C1D1, 8, 15 Q 28 cycle Gemzar 800 mg/m2 + Abraxane 100 mg/m2 \par 12/24/21: admitted to [facility] for [facility]ropenic fever after syncope and diarrhea at home. \par 1/10/22: C2D1 and D15 (alternating weeks) \par 2/1/22: CT CAP: decr in lymphangitis carcinomatosis and pulm nodules, 1.7 x 1.1 pancreatic body mass decr in size\par 2/7/22: C3D1\par \par Decided to transfer care to [facility] due to proximity to home. \par \par 2/22/22: C3D15\par 3/7/22: C4D1 [de-identified] : adenocarcinoma  [de-identified] : David testing: MSH3 VUS denoted [id number]  [de-identified] : Patient was seen at the infusion room while receiving treatment; currently feels fine. She reported fatigue, fever/chills, 1 dizziness episode (believed to be vasovagal), decreased appetite for 2-3 days post treatment but then recovered and returned to her baseline physical condition. She admitted to making a conscious effort to maintain adequate appetite and reported stable weight. She continues to use prunes daily and Dulcolax laxative on PRN basis to address constipation and reported intermittent nausea episodes (used antiemetic on PRN basis).

## 2022-03-21 ENCOUNTER — RESULT REVIEW (OUTPATIENT)
Age: 77
End: 2022-03-21

## 2022-03-21 ENCOUNTER — APPOINTMENT (OUTPATIENT)
Dept: HEMATOLOGY ONCOLOGY | Facility: CLINIC | Age: 77
End: 2022-03-21
Payer: MEDICARE

## 2022-03-21 ENCOUNTER — APPOINTMENT (OUTPATIENT)
Dept: INFUSION THERAPY | Facility: HOSPITAL | Age: 77
End: 2022-03-21

## 2022-03-21 LAB
BASOPHILS # BLD AUTO: 0.03 K/UL — SIGNIFICANT CHANGE UP (ref 0–0.2)
BASOPHILS NFR BLD AUTO: 0.4 % — SIGNIFICANT CHANGE UP (ref 0–2)
CANCER AG19-9 SERPL-ACNC: 67 U/ML — HIGH
EOSINOPHIL # BLD AUTO: 0.12 K/UL — SIGNIFICANT CHANGE UP (ref 0–0.5)
EOSINOPHIL NFR BLD AUTO: 1.6 % — SIGNIFICANT CHANGE UP (ref 0–6)
HCT VFR BLD CALC: 33.1 % — LOW (ref 34.5–45)
HGB BLD-MCNC: 10.7 G/DL — LOW (ref 11.5–15.5)
IMM GRANULOCYTES NFR BLD AUTO: 0.5 % — SIGNIFICANT CHANGE UP (ref 0–1.5)
LYMPHOCYTES # BLD AUTO: 1.23 K/UL — SIGNIFICANT CHANGE UP (ref 1–3.3)
LYMPHOCYTES # BLD AUTO: 16.4 % — SIGNIFICANT CHANGE UP (ref 13–44)
MCHC RBC-ENTMCNC: 29.2 PG — SIGNIFICANT CHANGE UP (ref 27–34)
MCHC RBC-ENTMCNC: 32.3 G/DL — SIGNIFICANT CHANGE UP (ref 32–36)
MCV RBC AUTO: 90.4 FL — SIGNIFICANT CHANGE UP (ref 80–100)
MONOCYTES # BLD AUTO: 0.79 K/UL — SIGNIFICANT CHANGE UP (ref 0–0.9)
MONOCYTES NFR BLD AUTO: 10.5 % — SIGNIFICANT CHANGE UP (ref 2–14)
NEUTROPHILS # BLD AUTO: 5.29 K/UL — SIGNIFICANT CHANGE UP (ref 1.8–7.4)
NEUTROPHILS NFR BLD AUTO: 70.6 % — SIGNIFICANT CHANGE UP (ref 43–77)
NRBC # BLD: 0 /100 WBCS — SIGNIFICANT CHANGE UP (ref 0–0)
PLATELET # BLD AUTO: 298 K/UL — SIGNIFICANT CHANGE UP (ref 150–400)
RBC # BLD: 3.66 M/UL — LOW (ref 3.8–5.2)
RBC # FLD: 17.3 % — HIGH (ref 10.3–14.5)
WBC # BLD: 7.5 K/UL — SIGNIFICANT CHANGE UP (ref 3.8–10.5)
WBC # FLD AUTO: 7.5 K/UL — SIGNIFICANT CHANGE UP (ref 3.8–10.5)

## 2022-03-21 PROCEDURE — 99213 OFFICE O/P EST LOW 20 MIN: CPT

## 2022-03-22 LAB
ALBUMIN SERPL ELPH-MCNC: 3.8 G/DL — SIGNIFICANT CHANGE UP (ref 3.3–5)
ALP SERPL-CCNC: 82 U/L — SIGNIFICANT CHANGE UP (ref 40–120)
ALT FLD-CCNC: 35 U/L — SIGNIFICANT CHANGE UP (ref 10–45)
ANION GAP SERPL CALC-SCNC: 16 MMOL/L — SIGNIFICANT CHANGE UP (ref 5–17)
AST SERPL-CCNC: 31 U/L — SIGNIFICANT CHANGE UP (ref 10–40)
BILIRUB SERPL-MCNC: 0.4 MG/DL — SIGNIFICANT CHANGE UP (ref 0.2–1.2)
BUN SERPL-MCNC: 10 MG/DL — SIGNIFICANT CHANGE UP (ref 7–23)
CALCIUM SERPL-MCNC: 9 MG/DL — SIGNIFICANT CHANGE UP (ref 8.4–10.5)
CHLORIDE SERPL-SCNC: 102 MMOL/L — SIGNIFICANT CHANGE UP (ref 96–108)
CO2 SERPL-SCNC: 22 MMOL/L — SIGNIFICANT CHANGE UP (ref 22–31)
CREAT SERPL-MCNC: 0.71 MG/DL — SIGNIFICANT CHANGE UP (ref 0.5–1.3)
EGFR: 87 ML/MIN/1.73M2 — SIGNIFICANT CHANGE UP
GLUCOSE SERPL-MCNC: 159 MG/DL — HIGH (ref 70–99)
POTASSIUM SERPL-MCNC: 4.3 MMOL/L — SIGNIFICANT CHANGE UP (ref 3.5–5.3)
POTASSIUM SERPL-SCNC: 4.3 MMOL/L — SIGNIFICANT CHANGE UP (ref 3.5–5.3)
PROT SERPL-MCNC: 6.3 G/DL — SIGNIFICANT CHANGE UP (ref 6–8.3)
SODIUM SERPL-SCNC: 140 MMOL/L — SIGNIFICANT CHANGE UP (ref 135–145)

## 2022-04-04 NOTE — HISTORY OF PRESENT ILLNESS
[Disease: _____________________] : Disease: [unfilled] [AJCC Stage: ____] : AJCC Stage: [unfilled] [Therapy: ___] : Therapy: [unfilled] [Cycle: ___] : Cycle: [unfilled] [Day: ___] : Day: [unfilled] [Date: ____________] : Patient's last distress assessment performed on [unfilled]. [5 - Distress Level] : Distress Level: 5 [Patient given social work contact information and resource sheet] : Patient was given social work contact information and resource sheet [de-identified] : 76 F w/ h/o CVA, CAD, a fib, presents for further management of Stage IV pancreatic cancer. \par \giulia Peralta was admitted to [facility] for worsening cough and acute SOB in September 2021 and was found to have extensive ill defined pulmonary nodules on CT Chest suggestive of either a metastatic disease or infectious process. She was started on IV antibiotics. CT A/P on 9/4/21 showed mixed solid and cystic left ovarian mass measuring up to 4.6 cm suspicious for an ovarian cystic neoplasm, no pelvic or RP adenopathy, ill defined cystic changes within the pancreatic body and tail, possibly representing a pancreatic cystic neoplasm such as an IPMN. CTA on 9/4/21 showed extensive scattered ill defined pulmonary nodule/nodular infiltrates throughout the lungs, concerning for atypical infection/pneumonia. A bronchoscopy was done on 9/7/21 with BAL and RML x 3 of RLL posterior segment. Negative for malignancy. Pt was [facility]c from [facility] after improvement in symptoms with Ab. \par \giulia Peralta followed up with GI and underwent an MR Abd on 9/29/21 which showed segmental dilatation of the pancreatic duct within the distal body and tail, measuring up to 5 mm associated pancreatic parenchymal atrophy. Mass suspected in the mid body. No liver mass noted. 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 EUS at [place] on 10/12/21 showed an ill defined mass in the body involving the splenic vein. FNA c/w adenocarcinoma. CA 19-9 3164,  35. \par \par Referred to Dr. Barrow at [facility]. 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 Underwent a lung biopsy on 11/11/21 which confirmed metastatic pancreatic adenocarcinoma, MMR intact, NTRK negative. \par \par Restaging CT CAP oon 11/30/21 showed slight interval progression of lymphangitic spread of cancer, numerous b/l pulm nodules c/w known metastatic disease, 2.6 x 1.9 x 1.9 cm pancreatic body mass, diffuse mesenteric haziness, intra-abdominal LN, small pelvic ascites. \par \par 12/6/21: C1D1, 8, 15 Q 28 cycle Gemzar 800 mg/m2 + Abraxane 100 mg/m2 \par 12/24/21: admitted to [facility] for [facility]ropenic fever after syncope and diarrhea at home. \par 1/10/22: C2D1 and D15 (alternating weeks) \par 2/1/22: CT CAP: decr in lymphangitis carcinomatosis and pulm nodules, 1.7 x 1.1 pancreatic body mass decr in size\par 2/7/22: C3D1\par \par Decided to transfer care to [facility] due to proximity to home. \par \par 2/22/22: C3D15\par 3/7/22: C4D1\par 3/21/22: C4D15\par 4/4/22: C5D1 [de-identified] : adenocarcinoma  [de-identified] : David testing: MSH3 VUS denoted [id number]  [de-identified] : Patient was seen at the infusion room while receiving treatment; currently feels fine. She noted significant improvement since including the steroid pre-medication with her last treatment. She experienced 1 lightheadedness episode (believed to be vasovagal) and mild fatigue but otherwise noted stable appetite (intermittent nausea episodes well controlled with antiemetic on PRN basis), weight gain and consistent bowel movements (continues to use prunes daily to address constipation, appears normal in color, consistency and frequency at this time).

## 2022-04-10 NOTE — HISTORY OF PRESENT ILLNESS
[Disease: _____________________] : Disease: [unfilled] [AJCC Stage: ____] : AJCC Stage: [unfilled] [Therapy: ___] : Therapy: [unfilled] [Cycle: ___] : Cycle: [unfilled] [Day: ___] : Day: [unfilled] [de-identified] : 76 F w/ h/o CVA, CAD, a fib, presents for further management of Stage IV pancreatic cancer. \par \giulia Peralta was admitted to [facility] for [facility] cough and acute SOB in September 2021 and was found to have extensive ill defined pulmonary nodules on CT Chest suggestive of either a metastatic disease or infectious process. She was started on IV antibiotics. CT A/P on 9/4/21 showed mixed solid and cystic left ovarian mass measuring up to 4.6 cm suspicious for an ovarian cystic neoplasm, no pelvic or RP adenopathy, ill defined cystic changes within the pancreatic body and tail, possibly representing a pancreatic cystic neoplasm such as an IPMN. CTA on 9/4/21 showed extensive scattered ill defined pulmonary nodule/nodular infiltrates throughout the lungs, concerning for atypical infection/pneumonia. A bronchoscopy was done on 9/7/21 with BAL and RML x 3 of RLL posterior segment. Negative for malignancy. Pt was [facility] after improvement in symptoms with Ab. \par \giulia Peralta followed up with GI and underwent an MR Abd on 9/29/21 which showed segmental dilatation of the pancreatic duct within the distal body and tail, measuring up to 5 mm associated pancreatic parenchymal atrophy. Mass suspected in the mid body. No liver mass noted. 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 EUS at [place] on 10/12/21 showed an ill defined mass in the body involving the splenic vein. FNA c/w adenocarcinoma. CA 19-9 3164,  35. \par \par Referred to Dr. Barrow at [facility]. 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 Underwent a lung biopsy on 11/11/21 which confirmed metastatic pancreatic adenocarcinoma, MMR intact, NTRK negative. \par \par Restaging CT CAP oon 11/30/21 showed slight interval progression of lymphangitic spread of cancer, numerous b/l pulm nodules c/w known metastatic disease, 2.6 x 1.9 x 1.9 cm pancreatic body mass, diffuse mesenteric haziness, intra-abdominal LN, small pelvic ascites. \par \par 12/6/21: C1D1, 8, 15 Q 28 cycle Gemzar 800 mg/m2 + Abraxane 100 mg/m2 \par 12/24/21: admitted to [facility] for [facility]ropenic fever after syncope and diarrhea at home. \par 1/10/22: C2D1 and D15 (alternating weeks) \par 2/1/22: CT CAP: decr in lymphangitis carcinomatosis and pulm nodules, 1.7 x 1.1 pancreatic body mass decr in size\par 2/7/22: C3D1\par \par Decided to transfer care to [facility] due to proximity to home. \par \par 2/22/22: C3D15\par 3/7/22: C4D1\par 3/21/22: C4D15\par  [de-identified] : adenocarcinoma  [de-identified] : David testing: MSH3 VUS denoted [id number]  [de-identified] : fatigue persists. worse in the morning, better as the day goes on. occasional nausea relieved by antiemetic. no new pain. No significant SOB/cough. Feels anxious about whether her cancer is responding or not. Symptoms improved Q 2 weeks chemo.

## 2022-04-27 NOTE — HISTORY OF PRESENT ILLNESS
[de-identified] : 76 F w/ h/o CVA, CAD, a fib, presents for further management of Stage IV pancreatic cancer. \par \giulia Peralta was admitted to [facility] for [facility] cough and acute SOB in September 2021 and was found to have extensive ill defined pulmonary nodules on CT Chest suggestive of either a metastatic disease or infectious process. She was started on IV antibiotics. CT A/P on 9/4/21 showed mixed solid and cystic left ovarian mass measuring up to 4.6 cm suspicious for an ovarian cystic neoplasm, no pelvic or RP adenopathy, ill defined cystic changes within the pancreatic body and tail, possibly representing a pancreatic cystic neoplasm such as an IPMN. CTA on 9/4/21 showed extensive scattered ill defined pulmonary nodule/nodular infiltrates throughout the lungs, concerning for atypical infection/pneumonia. A bronchoscopy was done on 9/7/21 with BAL and RML x 3 of RLL posterior segment. Negative for malignancy. Pt was [facility] after improvement in symptoms with Ab. \par \giulia Peralta followed up with GI and underwent an MR Abd on 9/29/21 which showed segmental dilatation of the pancreatic duct within the distal body and tail, measuring up to 5 mm associated pancreatic parenchymal atrophy. Mass suspected in the mid body. No liver mass noted. 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 EUS at [place] on 10/12/21 showed an ill defined mass in the body involving the splenic vein. FNA c/w adenocarcinoma. CA 19-9 3164,  35. \par \par Referred to Dr. Barrow at [facility]. 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 Underwent a lung biopsy on 11/11/21 which confirmed metastatic pancreatic adenocarcinoma, MMR intact, NTRK negative. \par \par Restaging CT CAP oon 11/30/21 showed slight interval progression of lymphangitic spread of cancer, numerous b/l pulm nodules c/w known metastatic disease, 2.6 x 1.9 x 1.9 cm pancreatic body mass, diffuse mesenteric haziness, intra-abdominal LN, small pelvic ascites. \par \par 12/6/21: C1D1, 8, 15 Q 28 cycle Gemzar 800 mg/m2 + Abraxane 100 mg/m2 \par 12/24/21: admitted to [facility] for [facility] fever after syncope and diarrhea at home. \par 1/10/22: C2D1 and D15 (alternating weeks) \par 2/1/22: CT CAP: decr in lymphangitis carcinomatosis and pulm nodules, 1.7 x 1.1 pancreatic body mass decr in size\par 2/7/22: C3D1\par \par Decided to transfer care to [facility] due to proximity to home. \par \par 2/22/22: C3D15\par 3/7/22: C4D1\par 3/21/22: C4D15\par 4/4/22: C5D1\par 4/18/22: C5D15 [de-identified] : adenocarcinoma  [de-identified] : David testing: MSH3 VUS denoted [id number]  [de-identified] : Patient was seen at the infusion room while receiving treatment; she reported treatment related fatigue,intermittent nausea episodes (well controlled with antiemetics), decreased appetite (5 lb weight loss noted today) and inconsistent bowel movements (recent development of constipation, uses laxatives for relief). She noted that the current toilet she uses in her residence discourages her from having a bowel movement. Patient also requested refill request on a Lotrisone cream to address a skin rash that developed underneath her breasts.

## 2022-05-08 NOTE — HISTORY OF PRESENT ILLNESS
[Disease: _____________________] : Disease: [unfilled] [AJCC Stage: ____] : AJCC Stage: [unfilled] [Therapy: ___] : Therapy: [unfilled] [Cycle: ___] : Cycle: [unfilled] [Day: ___] : Day: [unfilled] [de-identified] : 76 F w/ h/o CVA, CAD, a fib, presents for further management of Stage IV pancreatic cancer. \par \giulia Peralta was [facility] for [facility] cough and acute SOB in September 2021 and was found to have extensive ill defined pulmonary nodules on CT Chest suggestive of either a metastatic disease or infectious process. She was started on IV antibiotics. CT A/P on 9/4/21 showed mixed solid and cystic left ovarian mass measuring up to 4.6 cm suspicious for an ovarian cystic neoplasm, no pelvic or RP adenopathy, ill defined cystic changes within the pancreatic body and tail, possibly representing a pancreatic cystic neoplasm such as an IPMN. CTA on 9/4/21 showed extensive scattered ill defined pulmonary nodule/nodular infiltrates throughout the lungs, concerning for atypical infection/pneumonia. A bronchoscopy was done on 9/7/21 with BAL and RML x 3 of RLL posterior segment. Negative for malignancy. Pt was [facility] from [facility] after improvement in symptoms with Ab. \par \giulia Peralta followed up with GI and underwent an MR Abd on 9/29/21 which showed segmental dilatation of the pancreatic duct within the distal body and tail, measuring up to 5 mm associated pancreatic parenchymal atrophy. Mass suspected in the mid body. No liver mass noted. 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 EUS at [place] on 10/12/21 showed an ill defined mass in the body involving the splenic vein. FNA c/w adenocarcinoma. CA 19-9 3164,  35. \par \par Referred to Dr. Barrow at [facility]. 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 Underwent a lung biopsy on 11/11/21 which confirmed metastatic pancreatic adenocarcinoma, MMR intact, NTRK negative. \par \par Restaging CT CAP oon 11/30/21 showed slight interval progression of lymphangitic spread of cancer, numerous b/l pulm nodules c/w known metastatic disease, 2.6 x 1.9 x 1.9 cm pancreatic body mass, diffuse mesenteric haziness, intra-abdominal LN, small pelvic ascites. \par \par 12/6/21: C1D1, 8, 15 Q 28 cycle Gemzar 800 mg/m2 + Abraxane 100 mg/m2 \par 12/24/21: [facility] after syncope and diarrhea at home. \par 1/10/22: C2D1 and D15 (alternating weeks) \par 2/1/22: CT CAP: decr in lymphangitis carcinomatosis and pulm nodules, 1.7 x 1.1 pancreatic body mass decr in size\par 2/7/22: C3D1\par \par Decided to transfer care to [facility] due to proximity to home. \par \par 2/22-4/18/22: C3-5\par 4/25/22: CT CAP: improvement in pulm nodules, pancreatic mass is unchanged, L ovarian mass is stable \par 5/2/22: C6D1\par  [de-identified] : adenocarcinoma  [de-identified] : David testing: MSH3 VUS denoted [id number]  [de-identified] : has been feeling overall ok. still tired for 2-3 days per cycle but otherwise staying active, living a normal life. is wondering about the duration of chemo.

## 2022-05-24 PROBLEM — R21 SKIN RASH: Status: ACTIVE | Noted: 2022-01-01

## 2022-05-24 NOTE — HISTORY OF PRESENT ILLNESS
[Disease: _____________________] : Disease: [unfilled] [AJCC Stage: ____] : AJCC Stage: [unfilled] [Therapy: ___] : Therapy: [unfilled] [Cycle: ___] : Cycle: [unfilled] [Day: ___] : Day: [unfilled] [de-identified] : 76 F w/ h/o CVA, CAD, a fib, presents for further management of Stage IV pancreatic cancer. \par \giulia Peralta was [facility] and acute SOB in September 2021 and was found to have extensive ill defined pulmonary nodules on CT Chest suggestive of either a metastatic disease or infectious process. She was started on IV antibiotics. CT A/P on 9/4/21 showed mixed solid and cystic left ovarian mass measuring up to 4.6 cm suspicious for an ovarian cystic neoplasm, no pelvic or RP adenopathy, ill defined cystic changes within the pancreatic body and tail, possibly representing a pancreatic cystic neoplasm such as an IPMN. CTA on 9/4/21 showed extensive scattered ill defined pulmonary nodule/nodular infiltrates throughout the lungs, concerning for atypical infection/pneumonia. A bronchoscopy was done on 9/7/21 with BAL and RML x 3 of RLL posterior segment. Negative for malignancy. Pt [facility] after improvement in symptoms with Ab. \par \giulia Peralta followed up with GI and underwent an MR Abd on 9/29/21 which showed segmental dilatation of the pancreatic duct within the distal body and tail, measuring up to 5 mm associated pancreatic parenchymal atrophy. Mass suspected in the mid body. No liver mass noted. 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 EUS at [place] on 10/12/21 showed an ill defined mass in the body involving the splenic vein. FNA c/w adenocarcinoma. CA 19-9 3164,  35. \par \par Referred to Dr. Barrow at [facility]. 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 Underwent a lung biopsy on 11/11/21 which confirmed metastatic pancreatic adenocarcinoma, MMR intact, NTRK negative. \par \par Restaging CT CAP oon 11/30/21 showed slight interval progression of lymphangitic spread of cancer, numerous b/l pulm nodules c/w known metastatic disease, 2.6 x 1.9 x 1.9 cm pancreatic body mass, diffuse mesenteric haziness, intra-abdominal LN, small pelvic ascites. \par \par 12/6/21: C1D1, 8, 15 Q 28 cycle Gemzar 800 mg/m2 + Abraxane 100 mg/m2 \par 12/24/21: [facility] after syncope and diarrhea at home. \par 1/10/22: C2D1 and D15 (alternating weeks) \par 2/1/22: CT CAP: decr in lymphangitis carcinomatosis and pulm nodules, 1.7 x 1.1 pancreatic body mass decr in size\par 2/7/22: C3D1\par \par Decided to transfer care to [facility] due to proximity to home. \par \par 2/22/22: C3D15\par 3/7/22: C4D1\par 3/21/22: C4D15\par 4/4/22: C5D1\par 4/18/22: C5D15\par 4/25/22: CT CAP: interval improvement in diffuse interlobular septal thickening and multiple bilateral pulmonary nodules, stable pancreatic mass\par 5/2/22: C6D1\par 5/23/22: C6D15 (delayed) [de-identified] : adenocarcinoma  [de-identified] : David testing: MSH3 VUS denoted [id number]  [de-identified] : Patient was seen at the infusion room while receiving treatment; she currently feels fine and reported no acute complaints. She noted significant benefit with the extra 1 week delay from treatment with increased energy level and feeling fatigue free. She admitted mild nausea episodes (well controlled with antiemetics), adequate appetite (stable weight) and consistent bowel movements (intermittent constipation, eats prunes daily and uses laxatives on PRN basis for relief). She also admitted to unchanged skin rash underneath bilateral breasts, minimal use of Lotrisone cream despite being prescribed with it last month (believed to be exacerbated with heat, excess sweat, humid settings).

## 2022-05-24 NOTE — PHYSICAL EXAM
[Fully active, able to carry on all pre-disease performance without restriction] : Status 0 - Fully active, able to carry on all pre-disease performance without restriction [Normal] : affect appropriate [de-identified] : erythematous skin rash noted underneath bilateral breasts

## 2022-05-25 NOTE — ED CLERICAL - NS ED CLERK NOTE PRE-ARRIVAL INFORMATION; ADDITIONAL PRE-ARRIVAL INFORMATION
CC/Reason For referral: Fever 101.7, Pancreatic CA.  Requests CBC, Blood Culture, Urine Culture & Chest xray.  Preferred Consultant(if applicable):  Who admits for you (if needed):  Do you have documents you would like to fax over?  Would you still like to speak to an ED attending? Yes

## 2022-05-25 NOTE — ED ADULT TRIAGE NOTE - CHIEF COMPLAINT QUOTE
Fevers and chills x today (tmax 101.4F stated). History of pancreatic CA receiving chemo biweekly. Referred to ED by oncologist for evaluation.

## 2022-05-26 NOTE — ED PROVIDER NOTE - CLINICAL SUMMARY MEDICAL DECISION MAKING FREE TEXT BOX
77yo female pancreatic ca on chemo p/w fever at home, recent chemo. No focal findings on exam to explain fever. R/o neutropenia. Panculture, reassess. If not neutropenic will likely be able to go home with outpatient follow up.

## 2022-05-26 NOTE — ED PROVIDER NOTE - NSFOLLOWUPINSTRUCTIONS_ED_ALL_ED_FT
- Continue all regular medications  - Take entire course of cefpodoxime as prescribed  - For pain or fever, take tylenol as directed on the packaging  - Follow up with your oncologist in 1 week  - You were given copies of labs and/or imaging results if applicable, please take them to your follow up appointments  - Return to the ER for constant vomiting, persistent fevers despite antibiotics or any worsening symptoms or concerns

## 2022-05-26 NOTE — ED PROVIDER NOTE - OBJECTIVE STATEMENT
77yo female pancreatic ca on biweekly chemo (last on Monday) p/w fever (tmax 101.4) and chills this afternoon, referred to ED by on call oncologist for further workup. Did not take any anti-pyretics, no fever here. Denies cough, rhinorrhea, n/v/d, abd pain, sick contacts, rash. Feels well in ED.

## 2022-05-26 NOTE — ED PROVIDER NOTE - ATTENDING CONTRIBUTION TO CARE
I have personally seen and examined this patient.  I have fully participated in the care of this patient. I performed a substantive portion of the visit including all aspects of the medical decision making. I have reviewed all pertinent clinical information, including history, physical exam, plan and the Resident’s note and agree except as noted. - MD Ashok.    75 yo F, followed by nick oncologist, pancreatic cancer on chemo, p/w fever, but well appearing, self resolved, check neutropenic status, ua, labs, onc touch base, otherwise well appearing, pt agrees to follow up out pt manner, likely dc and strict return precaution.

## 2022-05-26 NOTE — ED PROVIDER NOTE - TEMPLATE, MLM
[address] [address]                 [address] [place] [id number]                        EMG/NERVE CONDUCTION STUDIES REPORT      PATIENT NAME: Ivette Iraheta                  :        1971  MED REC NO:   [id number]                             ROOM:  ACCOUNT NO:   [de-identified]                           ADMIT DATE: 2020  PROVIDER:     Yenifer Celis MD    DATE OF EM2020    REFERRING PROVIDER:  Yenifer Celis MD - Neurology        TECHNICAL SUMMARY:  The nature, purpose, goals, expectations and process  involved in the procedures of nerve conduction studies and needle  electromyography were reviewed, discussed, explained and verbal consent  was obtained from the patient. The patient's questions were answered  with reference to the above processes and procedures. There were no significant technical difficulties encountered during this  study and nerve conduction studies were performed under temperature  monitoring. CLINICAL DATA:        The patient is 46years old with a history of numbness,  tingling, paresthesias, cramps involving the hands, forearms. The  patient also has decreased handgrip, weakness, dropping objects from the  hands. The patient also had history of neck surgery in 2016. The  patient also complains of neck pain. The patient sometimes experience  shooting pains. The purpose of the study is to evaluate for mononeuropathy,  radiculopathy, plexopathy, peripheral polyneuropathy. SUMMARY:        The sensory nerve action potentials of the right and left  radial nerves shows normal amplitude, distal latencies bilaterally. Sensory nerve action potentials of the right and left median nerves  shows normal amplitudes and borderline to prolonged distal latencies.     Sensory nerve action potentials of the right and left ulnar nerves shows  normal amplitudes and borderline to prolonged distal There is electrodiagnostic evidence of mild ulnar motor neuropathy    At  Left   wrist.      3.  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 4.  There is electrodiagnostic evidence of chronic C5-C6 and C7    radiculopathy bilaterally, more so on the left side. No active denervation changes noted. Further clinical correlation and followup recommended.           Amparo Campos MD  Board Certified Neurologist    D: 08/18/2020 10:51:32       T: 08/18/2020 11:39:36     [id number]  Job#: [id number]     Doc#: [id number]    CC:  [facility]

## 2022-05-26 NOTE — ED PROVIDER NOTE - PROGRESS NOTE DETAILS
Veda PGY3: spoke to heme/onc fellow, discussed patient, ok to dc home. If ua positive will send antibiotics. No pulmonary symptoms, xray looks improved when compared to prior, not concerned for pna at this time. Veda PGY3: Patient reevaluated and feeling better. No urinary symptoms, ua (+). will cover, culture pending. VSS. Reviewed and discussed results with patient. Discussed importance of follow up and return precautions. Patient agrees with plan.

## 2022-05-26 NOTE — ED PROVIDER NOTE - PATIENT PORTAL LINK FT
You can access the FollowMyHealth Patient Portal offered by [facility] by registering at the following website: [email or web address]. By joining [facility]’s FollowMyHealth portal, you will also be able to view your health information using other applications (apps) compatible with our system.

## 2022-05-28 NOTE — ED POST DISCHARGE NOTE - RESULT SUMMARY
5/28/22: UCx final >100k K. pneumonia, sesitive to cephalosporins, pt DC'ed on cefpodoxime, appropriate care received in ED no need for further contact at this time. -Juancho Galeana PA-C 5/28/22: UCx final >100k K. pneumonia, sensitive to cephalosporins, pt DC'ed on cefpodoxime, appropriate care received in ED no need for further contact at this time. -Juancho Galeana PA-C

## 2022-06-06 PROBLEM — K59.00 CONSTIPATION: Status: ACTIVE | Noted: 2022-02-24

## 2022-06-06 NOTE — PHYSICAL EXAM
[Fully active, able to carry on all pre-disease performance without restriction] : Status 0 - Fully active, able to carry on all pre-disease performance without restriction [Normal] : affect appropriate [de-identified] : erythematous skin rash noted underneath bilateral breasts

## 2022-06-06 NOTE — HISTORY OF PRESENT ILLNESS
[Disease: _____________________] : Disease: [unfilled] [AJCC Stage: ____] : AJCC Stage: [unfilled] [Therapy: ___] : Therapy: [unfilled] [Cycle: ___] : Cycle: [unfilled] [Day: ___] : Day: [unfilled] [de-identified] : 76 F w/ h/o CVA, CAD, a fib, presents for further management of Stage IV pancreatic cancer. \par \giulia Peralta was admitted to [facility] for worsening cough and acute SOB in September 2021 and was found to have extensive ill defined pulmonary nodules on CT Chest suggestive of either a metastatic disease or infectious process. She was started on IV antibiotics. CT A/P on 9/4/21 showed mixed solid and cystic left ovarian mass measuring up to 4.6 cm suspicious for an ovarian cystic neoplasm, no pelvic or RP adenopathy, ill defined cystic changes within the pancreatic body and tail, possibly representing a pancreatic cystic neoplasm such as an IPMN. CTA on 9/4/21 showed extensive scattered ill defined pulmonary nodule/nodular infiltrates throughout the lungs, concerning for atypical infection/pneumonia. A bronchoscopy was done on 9/7/21 with BAL and RML x 3 of RLL posterior segment. Negative for malignancy. Pt was d/c from [facility] after improvement in symptoms with Ab. \par \giulia Peralta followed up with GI and underwent an MR Abd on 9/29/21 which showed segmental dilatation of the pancreatic duct within the distal body and tail, measuring up to 5 mm associated pancreatic parenchymal atrophy. Mass suspected in the mid body. No liver mass noted. 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 EUS at [place] on 10/12/21 showed an ill defined mass in the body involving the splenic vein. FNA c/w adenocarcinoma. CA 19-9 3164,  35. \par \par Referred to Dr. Barrow at [facility]. 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 Underwent a lung biopsy on 11/11/21 which confirmed metastatic pancreatic adenocarcinoma, MMR intact, NTRK negative. \par \par Restaging CT CAP oon 11/30/21 showed slight interval progression of lymphangitic spread of cancer, numerous b/l pulm nodules c/w known metastatic disease, 2.6 x 1.9 x 1.9 cm pancreatic body mass, diffuse mesenteric haziness, intra-abdominal LN, small pelvic ascites. \par \par 12/6/21: C1D1, 8, 15 Q 28 cycle Gemzar 800 mg/m2 + Abraxane 100 mg/m2 \par 12/24/21: admitted to [facility] for neutropenic fever after syncope and diarrhea at home. \par 1/10/22: C2D1 and D15 (alternating weeks) \par 2/1/22: CT CAP: decr in lymphangitis carcinomatosis and pulm nodules, 1.7 x 1.1 pancreatic body mass decr in size\par 2/7/22: C3D1\par \par Decided to transfer care to [facility] due to proximity to home. \par \par 2/22/22: C3D15\par 3/7/22: C4D1\par 3/21/22: C4D15\par 4/4/22: C5D1\par 4/18/22: C5D15\par 4/25/22: CT CAP: interval improvement in diffuse interlobular septal thickening and multiple bilateral pulmonary nodules, stable pancreatic mass\par 5/2/22: C6D1\par 5/23/22: C6D15 (delayed)\par 6/6/22: C7D1  [de-identified] : adenocarcinoma  [de-identified] : David testing: MSH3 VUS denoted [id number]  [de-identified] : Patient was seen at the infusion room while receiving treatment; she currently feels fine and reported no acute complaints. She went to [facility] ER on 5/25/22 due to chills + fever 101.6; additional work-up revealed UTI (she finished at least 1 week's worth of antibiotics but stopped due to diarrhea. She admitted mild nausea episodes (well controlled with antiemetics), adequate appetite (stable weight) and consistent bowel movements (intermittent constipation, eats prunes daily and uses laxatives on PRN basis for relief).

## 2022-06-29 NOTE — ASSESSMENT
[FreeTextEntry1] : patient with stage 4 pancreatic cancer \par on constant chemo \par hx of Kleb UTI ( no sxs ) last luis alberto - treated and after intermittent hematuria \par no clots, no fever or N/V\par no dyusuria \par c/o frequency, urgency and INC \par no tobacco use or exposer \par here for further eval :\par 1- check urine \par 2- CT scan done ( april 2022) with contrast reviewed with patient : normal upper tracts\par 3- recommend cysto to eval bladder but need to r/o UTI that could have developed after last + cx treated as on Chemo \par \par

## 2022-06-29 NOTE — REVIEW OF SYSTEMS
[Urine Infection (bladder/kidney)] : bladder/kidney infection [Urine retention] : urine retention [Strong urge to urinate] : strong urge to urinate [Feeling Tired] : feeling tired [Vomiting] : vomiting [Constipation] : constipation [see HPI] : see HPI [Limb Swelling] : limb swelling [Anxiety] : anxiety [Depression] : no depression [Feelings Of Weakness] : feelings of weakness [Negative] : Heme/Lymph

## 2022-06-29 NOTE — HISTORY OF PRESENT ILLNESS
[FreeTextEntry1] : patient with stage 4 pancreatic cancer \par on constant chemo \par hx of Kleb UTI ( no sxs ) last luis alberto - treated and after intermittent hematuria \par no clots, no fever or N/V\par no dyusuria \par c/o frequency, urgency and INC \par no tobacco use or exposer \par here for further eval :

## 2022-06-29 NOTE — PHYSICAL EXAM
[General Appearance - Well Developed] : well developed [General Appearance - Well Nourished] : well nourished [Normal Appearance] : normal appearance [Well Groomed] : well groomed [General Appearance - In No Acute Distress] : no acute distress [Edema] : no peripheral edema [Respiration, Rhythm And Depth] : normal respiratory rhythm and effort [Exaggerated Use Of Accessory Muscles For Inspiration] : no accessory muscle use [Abdomen Soft] : soft [Abdomen Tenderness] : non-tender [Abdomen Mass (___ Cm)] : no abdominal mass palpated [Abdomen Hernia] : no hernia was discovered [Costovertebral Angle Tenderness] : no ~M costovertebral angle tenderness [Urethral Meatus] : normal urethra [External Female Genitalia] : normal external genitalia [FreeTextEntry1] : soft urethra, urethra and blader not tender, + hard stool felt vag in rectal vault, SC after sterile prep wit 14 f cath andurne collected ( zakiya colored) and cath removed itnact [Vagina] : normal vaginal exam [Normal Station and Gait] : the gait and station were normal for the patient's age [] : no rash [No Focal Deficits] : no focal deficits [Oriented To Time, Place, And Person] : oriented to person, place, and time [Affect] : the affect was normal [Mood] : the mood was normal [Not Anxious] : not anxious [No Palpable Adenopathy] : no palpable adenopathy

## 2022-07-06 NOTE — HISTORY OF PRESENT ILLNESS
[Disease: _____________________] : Disease: [unfilled] [AJCC Stage: ____] : AJCC Stage: [unfilled] [Therapy: ___] : Therapy: [unfilled] [Cycle: ___] : Cycle: [unfilled] [Day: ___] : Day: [unfilled] [de-identified] : 76 F w/ h/o CVA, CAD, a fib, presents for further management of Stage IV pancreatic cancer. \par \giulia Peralta was admitted to [facility] for worsening cough and acute SOB in September 2021 and was found to have extensive ill defined pulmonary nodules on CT Chest suggestive of either a metastatic disease or infectious process. She was started on IV antibiotics. CT A/P on 9/4/21 showed mixed solid and cystic left ovarian mass measuring up to 4.6 cm suspicious for an ovarian cystic neoplasm, no pelvic or RP adenopathy, ill defined cystic changes within the pancreatic body and tail, possibly representing a pancreatic cystic neoplasm such as an IPMN. CTA on 9/4/21 showed extensive scattered ill defined pulmonary nodule/nodular infiltrates throughout the lungs, concerning for atypical infection/pneumonia. A bronchoscopy was done on 9/7/21 with BAL and RML x 3 of RLL posterior segment. Negative for malignancy. Pt was d[facility]c from [facility] after improvement in symptoms with Ab. \par \giulia Peralta followed up with GI and underwent an MR Abd on 9/29/21 which showed segmental dilatation of the pancreatic duct within the distal body and tail, measuring up to 5 mm associated pancreatic parenchymal atrophy. Mass suspected in the mid body. No liver mass noted. 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 EUS at [place] on 10/12/21 showed an ill defined mass in the body involving the splenic vein. FNA c/w adenocarcinoma. CA 19-9 3164,  35. \par \par Referred to Dr. Barrow at [facility]. 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 Underwent a lung biopsy on 11/11/21 which confirmed metastatic pancreatic adenocarcinoma, MMR intact, NTRK negative. \par \par Restaging CT CAP oon 11/30/21 showed slight interval progression of lymphangitic spread of cancer, numerous b/l pulm nodules c/w known metastatic disease, 2.6 x 1.9 x 1.9 cm pancreatic body mass, diffuse mesenteric haziness, intra-abdominal LN, small pelvic ascites. \par \par 12/6/21: C1D1, 8, 15 Q 28 cycle Gemzar 800 mg/m2 + Abraxane 100 mg/m2 \par 12/24/21: admitted to [facility] for [facility]ropenic fever after syncope and diarrhea at home. \par 1/10/22: C2D1 and D15 (alternating weeks) \par 2/1/22: CT CAP: decr in lymphangitis carcinomatosis and pulm nodules, 1.7 x 1.1 pancreatic body mass decr in size\par 2/7/22: C3D1\par \par Decided to transfer care to [facility] due to proximity to home. \par \par 2/22-6/20/22: C3-7 Gemzar/Abraxane\par 4/25/22: CT CAP: improvement in pulm nodules, pancreatic mass is unchanged, L ovarian mass is stable \par \par  [de-identified] : adenocarcinoma  [de-identified] : David testing: MSH3 VUS denoted [id number]  [de-identified] : has been having episodes of feeling anxious, down, sad, crying spells. other days is ok, gets outside, run errands. Feels tired. frustrated that has to take treatment ongoing basis. appetite is ok. weight is stable. cont to see intermittent blood in urine despite tx for UTI x 2. no dysuria or fevers/chills.

## 2022-07-06 NOTE — REVIEW OF SYSTEMS
[Negative] : Allergic/Immunologic [Fatigue] : fatigue [Anxiety] : anxiety [Depression] : depression [FreeTextEntry8] : + hematuria

## 2022-08-01 NOTE — ED ADULT NURSE NOTE - PAIN: PRESENCE, MLM
As certified below, I, or a nurse practitioner or physician assistant working with me, had a face-to-face encounter that meets the physician face-to-face encounter requirements.
denies pain/discomfort

## 2022-08-01 NOTE — HISTORY OF PRESENT ILLNESS
[Disease: _____________________] : Disease: [unfilled] [AJCC Stage: ____] : AJCC Stage: [unfilled] [Therapy: ___] : Therapy: [unfilled] [Cycle: ___] : Cycle: [unfilled] [Day: ___] : Day: [unfilled] [de-identified] : 76 F w/ h/o CVA, CAD, a fib, presents for further management of Stage IV pancreatic cancer. \par \giulia Peralta was admitted to [facility] for worsening cough and acute SOB in September 2021 and was found to have extensive ill defined pulmonary nodules on CT Chest suggestive of either a metastatic disease or infectious process. She was started on IV antibiotics. CT A/P on 9/4/21 showed mixed solid and cystic left ovarian mass measuring up to 4.6 cm suspicious for an ovarian cystic neoplasm, no pelvic or RP adenopathy, ill defined cystic changes within the pancreatic body and tail, possibly representing a pancreatic cystic neoplasm such as an IPMN. CTA on 9/4/21 showed extensive scattered ill defined pulmonary nodule/nodular infiltrates throughout the lungs, concerning for atypical infection/pneumonia. A bronchoscopy was done on 9/7/21 with BAL and RML x 3 of RLL posterior segment. Negative for malignancy. Pt was d[facility]c from [facility] after improvement in symptoms with Ab. \par \giulia Peralta followed up with GI and underwent an MR Abd on 9/29/21 which showed segmental dilatation of the pancreatic duct within the distal body and tail, measuring up to 5 mm associated pancreatic parenchymal atrophy. Mass suspected in the mid body. No liver mass noted. 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 EUS at [place] on 10/12/21 showed an ill defined mass in the body involving the splenic vein. FNA c/w adenocarcinoma. CA 19-9 3164,  35. \par \par Referred to Dr. Barrow at [facility]. 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 Underwent a lung biopsy on 11/11/21 which confirmed metastatic pancreatic adenocarcinoma, MMR intact, NTRK negative. \par \par Restaging CT CAP oon 11/30/21 showed slight interval progression of lymphangitic spread of cancer, numerous b/l pulm nodules c/w known metastatic disease, 2.6 x 1.9 x 1.9 cm pancreatic body mass, diffuse mesenteric haziness, intra-abdominal LN, small pelvic ascites. \par \par 12/6/21: C1D1, 8, 15 Q 28 cycle Gemzar 800 mg/m2 + Abraxane 100 mg/m2 \par 12/24/21: admitted to [facility] for [facility]ropenic fever after syncope and diarrhea at home. \par 1/10/22: C2D1 and D15 (alternating weeks) \par 2/1/22: CT CAP: decr in lymphangitis carcinomatosis and pulm nodules, 1.7 x 1.1 pancreatic body mass decr in size\par 2/7/22: C3D1\par \par Decided to transfer care to [facility] due to proximity to home. \par \par 2/22-7/18/22: C3-9 Gemzar/Abraxane\par 4/25/22: CT CAP: improvement in pulm nodules, pancreatic mass is unchanged, L ovarian mass is stable \par 8/1/22: C10\par 7/25/22: CT CAP: stable disease, new bladder wall thickening \par  [de-identified] : adenocarcinoma  [de-identified] : David testing: MSH3 VUS denoted [id number]  [de-identified] : c/o worsening SOB over the past few weeks. Worse with exertion. Has a pulse ox at home and has checked random readings which have been all above 92%. Denies any CP or cough. Able to vacuum at home without difficulty but when walks long distances can't catch breath. We reviewed recent scan which showed stable disease. Vitals in tx room on arrival were stable. \par \par After initial evaluation, pt finished chemo uneventfully but on her way out, she felt SOB while walking. Returned and vitals showed a pulse ox of 85%. Went up to 93% with 3 L. EKG without any changes.

## 2022-08-01 NOTE — PHYSICAL EXAM
[Fully active, able to carry on all pre-disease performance without restriction] : Status 0 - Fully active, able to carry on all pre-disease performance without restriction [Normal] : affect appropriate [de-identified] : normal respiratory pattern, decreases BS at the bases

## 2022-08-01 NOTE — REVIEW OF SYSTEMS
[Shortness Of Breath] : shortness of breath [SOB on Exertion] : shortness of breath during exertion [Negative] : Allergic/Immunologic [Wheezing] : no wheezing [Cough] : no cough

## 2022-08-01 NOTE — ED ADULT NURSE NOTE - NSICDXPASTSURGICALHX_GEN_ALL_CORE_FT
Pt presents today with mom for c/o a cough that she has been having trouble catching her breath with coughing and a burning feeling in chest, cough has been going on at least a week, yesterday started c/o some ear pain. She has been taking some OTC cough medication at  to try and help her sleep.   
PAST SURGICAL HISTORY:  S/P popliteal-tibial bypass     Status post ORIF of fracture of ankle s/p rght TR

## 2022-08-01 NOTE — ED ADULT NURSE REASSESSMENT NOTE - NS ED NURSE REASSESS COMMENT FT1
received report from ARTHUR Arceo. pt is comfortable resting in stretcher with side rails up for safety. pt is A&Ox3, vital signs stable, sating 96% on 2L NC, denying pain, NAD noted at this time. pt sent to CT scan.

## 2022-08-01 NOTE — ED PROVIDER NOTE - ATTENDING APP SHARED VISIT CONTRIBUTION OF CARE
75 y/o female, hx of CVA, a fib, on Eliquis, HTN, HLD, pancreatic CA-last chemo today, presents to the ER with EMS for SOB. denied uri sts, lungs with rales at bases, no new leg swelling, no fever or cough, cta r/o pe on chemo, r/o pna, no uri sts.

## 2022-08-01 NOTE — ED PROVIDER NOTE - PROGRESS NOTE DETAILS
Sheyla Menendez: cxr appreciated. will give cefepime for PNA. pending CTA at this time. patient non-toxic appearing. AOX3

## 2022-08-01 NOTE — ED ADULT NURSE NOTE - OBJECTIVE STATEMENT
Patient is a 76 year old female complaining of SOB. Pt was BIBA from chemotherapy transfusion for exertional SOB. Pt states she was walking to her car and felt very short of breath. Pt states she was brought back into the clinic and her oxygen levels were low. and EMS was called. Patient has history of pancreatic cancer, HTN, strokes. Pt reports previous hx of Afib with 3 ablations. Pt takes eliquis daily. Pt has R chest port accessed earlier today for chemotherapy. Port was not accessed in the ED. Pt reports last BM was this morning. Patient is A&O x4. Denies complaints of chest pain, fevers, chills, n/v/d, headache, syncope, burning urination, blood in urine, blood in stool. Skin is warm and dry. Color is consistent with ethnicity. VS documented. Safety and comfort maintained. EKG done. 20G placed RAC. Will continue to monitor.

## 2022-08-02 NOTE — H&P ADULT - NSHPPHYSICALEXAM_GEN_ALL_CORE
Vital Signs Last 24 Hrs  T(C): 36.7 (02 Aug 2022 03:05), Max: 36.8 (01 Aug 2022 23:05)  T(F): 98.1 (02 Aug 2022 03:05), Max: 98.2 (01 Aug 2022 23:05)  HR: 77 (02 Aug 2022 04:30) (61 - 88)  BP: 121/71 (02 Aug 2022 04:30) (121/71 - 184/71)  BP(mean): 88 (02 Aug 2022 04:30) (88 - 103)  RR: 18 (02 Aug 2022 04:30) (18 - 20)  SpO2: 96% (02 Aug 2022 04:30) (95% - 99%)    Parameters above as of 02 Aug 2022 04:30  Patient On (Oxygen Delivery Method): nasal cannula  O2 Flow (L/min): 2      CONSTITUTIONAL: NAD, well-developed, well-groomed, resting comfortably in stretcher  EYES: PERRL; conjunctiva and sclera clear  ENMT: Moist oral mucosa, no pharyngeal injection or exudates; O2 NC in place  NECK: Supple, no palpable masses  RESPIRATORY: Normal respiratory effort; bibasilar crackles lungs; No wheezes  CARDIOVASCULAR: Regular rate and rhythm; Normal S1 and S2; No murmurs, rubs, or gallops; trace RLE edema (chronic per pt); Peripheral pulses are 2+ bilaterally  ABDOMEN: Soft, Nontender, Nondistended; Bowel sounds present  MUSCULOSKELETAL: No clubbing or cyanosis of digits; No joint swelling or tenderness to palpation  PSYCH: AAOx3 (oriented to person, place, and time); affect appropriate  NEUROLOGY: CN II-XII are grossly intact and symmetric; no gross sensory deficits  SKIN: No rashes; No palpable lesions

## 2022-08-02 NOTE — H&P ADULT - PROBLEM SELECTOR PLAN 1
Pt presented with acute hypoxia to 85% on RA after chemo session on day of presentation. Pt also with worsening GALLARDO x2 weeks. No SOB at rest.  - CTA chest (8/1): negative for PE but notable for new diffuse b/l GGOs and interval increase in pleural effusions consistent with interstitial pulmonary edema.  - VBG (8/2): pH 7.35, pCO2 44, pO2 50, HCo3 24, lactate 1.6  - likely 2/2 interstitial pulmonary edema in setting of possible cardiac etiology vs adverse effect from chemo vs viral infection  - f/u RVP; COVID-19 PCR negative  - c/w home spironolactone 25mg daily  - s/p Lasix 20mg IV x1 in ED, consider additional doses PRN  - monitor respiratory status; currently on 2L O2NC, wean O2 as tolerated  - may consider pulm/cards consult pending further workup Pt presented with acute hypoxia to 85% on RA after chemo session on day of presentation. Pt also with worsening GALLARDO x2 weeks. No SOB at rest.  - CTA chest (8/1): negative for PE but notable for new diffuse b/l GGOs and interval increase in pleural effusions consistent with interstitial pulmonary edema.  - VBG (8/2): pH 7.35, pCO2 44, pO2 50, HCo3 24, lactate 1.6  - likely 2/2 interstitial pulmonary edema in setting of possible cardiac etiology vs adverse effect from chemo vs viral infection  - f/u RVP; COVID-19 PCR negative  - low suspicion for bacterial PNA given lack of fever or leukocytosis (initial mild leukopenia resolved on next CBC); s/p cefepime x1g in ED, monitor off further abx for now  - c/w home spironolactone 25mg daily  - s/p Lasix 20mg IV x1 in ED, consider additional doses PRN  - monitor respiratory status; currently on 2L O2NC, wean O2 as tolerated  - may consider pulm/cards consult pending further workup  - plan for GALLARDO as below

## 2022-08-02 NOTE — H&P ADULT - PROBLEM SELECTOR PLAN 4
Hx of Afib s/p ablation x3, remains on Eliquis at home. Also with hx of CVA in the past.  - EKG (8/1): SR w/ 1st degree AV block, no signs of acute ischemia; no significant change compared to prior EKG in May 2022  - CHADSVASC score of 6, high risk of stroke  - c/w Eliquis 5mg BID Pt with ongoing hematuria, being worked up outpatient.  - On admission, Hgb remains stable around baseline ~11  - UA notable for blood and RBCs; low suspicion for UTI but can f/u UCx  - Follows with Urologist, Dr. Nanci Carroll  - pending outpatient biopsy later this month  - continue to monitor CBC and urine

## 2022-08-02 NOTE — H&P ADULT - PROBLEM SELECTOR PLAN 2
Pt presented with worsening GALLARDO x2 weeks. No SOB at rest. Has trace RLE edema at baseline since RLE surgery. No CP.  - CTA chest (8/1): negative for PE but notable for new diffuse b/l GGOs and interval increase in pleural effusions consistent with interstitial pulmonary edema.  - EKG (8/1): SR w/ 1st degree AV block, no signs of acute ischemia; no significant change compared to prior EKG in May 2022  - Concern for possible cardiac etiology vs adverse effect from chemo vs viral infection  - serum pro-BNP (8/1): 811  - prior TTE (2/10/21): EF 60-65%; normal LV size and contractility; mild concentric LVH; mild-mod MR; trace pericardial effusion is present  - f/u TTE Pt presented with worsening GALLARDO x2 weeks. No SOB at rest. Has trace RLE edema at baseline since RLE surgery. No CP.  - CTA chest (8/1): negative for PE but notable for new diffuse b/l GGOs and interval increase in pleural effusions consistent with interstitial pulmonary edema.  - EKG (8/1): SR w/ 1st degree AV block, no signs of acute ischemia; no significant change compared to prior EKG in May 2022  - Concern for possible cardiac etiology vs adverse effect from chemo vs viral infection  - serum pro-BNP (8/1): 811  - prior TTE (2/10/21): EF 60-65%; normal LV size and contractility; mild concentric LVH; mild-mod MR; trace pericardial effusion is present  - f/u TTE  - strict I/Os  - plan for acute hypoxic respiratory as above

## 2022-08-02 NOTE — H&P ADULT - PROBLEM SELECTOR PLAN 7
- c/w home escitalopram 5mg daily and home Xanax 0.25mg daily PRN for anxiety  - ISTOP#: [id number] - c/w home atorvastatin 40mg qhs

## 2022-08-02 NOTE — CONSULT NOTE ADULT - SUBJECTIVE AND OBJECTIVE BOX
77 yo F with a PMH of atrial fibrillation (s/p ablation x 3, on Eliquis), ischemic CVA (due to subtherapeutic Coumadin levels), HTN, stage IV pancreatic cancer with lung (and ?L ovary) metastasis with lymphangitic spread (diagnosed 11/2021, currently on Gemzar/Abraxane) who presents with exacerbation of subacute SOB following her most recent chemotherapy dose. Her SOB started about 2 weeks ago but worsened 8/1 as she was walking out of chemotherapy treatment. She was brought back inside, treated, and brought to the ED. She was found to be hypoxic to 85% on RA outpatient, but oxygen quickly alleviated her symptoms. She is not on home oxygen. She felt a similar feeling of SOB in Sep/Oct 2021, prior to her cancer diagnosis. At that time, her symptoms were triggered by walking to her car. She was diagnosed with viral pneumonia, and her cancer was seen on imaging. Currently, she denies fever and chills but endorses a longstanding cough productive of clear phlegm that she attributes to allergies. She endorses some chronic fatigue as well. She denies N/V, sore throat, rhinorrhea, CP, abdominal pain, melena, headache, or dizziness. She has some slight LE swelling. She has had hematuria with new L bladder wall thickening on CT 7/25/22, and is pending a cystoscopy with biopsy next month.    In terms of her Oncological history, she felt SOB in 09/2021 and was found to have bilateral pulmonary opacities, with bronchoscopy at [facility] being negative. 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 She then had an MRI and PET/CT in 10/2021 at [facility] showing a pancreatic mass; she had a lung biopsy in 11/2021 showing metastatic pancreatic adenocarcinoma, MMR intact, NTRK negative. She was started on Gemzar/Abraxane (4 week cycles) on 12/6/21, and switched to [facility] during C3. She started C10 D1 yesterday 8/1/22.      Allergies    No Known Drug Allergies  Seasonal allergies - stuffy nose (Other)  shellfish (Other (Moderate))    Intolerances        MEDICATIONS  (STANDING):  apixaban 5 milliGRAM(s) Oral two times a day  atorvastatin 40 milliGRAM(s) Oral at bedtime  escitalopram 5 milliGRAM(s) Oral daily  metoprolol tartrate 100 milliGRAM(s) Oral two times a day  spironolactone 25 milliGRAM(s) Oral daily    MEDICATIONS  (PRN):  acetaminophen     Tablet .. 650 milliGRAM(s) Oral every 6 hours PRN Temp greater or equal to 38C (100.4F), Mild Pain (1 - 3)  ALPRAZolam 0.25 milliGRAM(s) Oral daily PRN Anxiety  ondansetron    Tablet 8 milliGRAM(s) Oral every 8 hours PRN Nausea and/or Vomiting      PAST MEDICAL & SURGICAL HISTORY:  HTN (hypertension)      Afib  s/p ablation x3, on Eliquis      HLD (hyperlipidemia)      CVA (cerebral vascular accident)      Anxiety and depression      S/P popliteal-tibial bypass      Status post ORIF of fracture of ankle  s/p rght TR          FAMILY HISTORY:  Glaucoma - mother  CVA - father, brother        SOCIAL HISTORY: No significant alcohol, tobacco, or drug use history. Lives alone, independent.      REVIEW OF SYSTEMS:  CONSTITUTIONAL: no fever  EYES/ENT: No visual changes; no throat pain  NECK: No pain or stiffness  RESPIRATORY: + SOB, cough  CARDIOVASCULAR: No chest pain or palpitations  GASTROINTESTINAL: No abdominal pain. No N/V/D/C  GENITOURINARY: + hematuria. No dysuria  NEUROLOGICAL: No numbness or focal weakness  SKIN: No itching, burning, rashes, or lesions  MSK: no joint pain  Allergy: no urticaria    Height (cm): 162.6 (08-01 @ 17:47)    T(F): 97.6 (08-02-22 @ 12:37), Max: 98.2 (08-01-22 @ 23:05)  HR: 65 (08-02-22 @ 12:37)  BP: 116/63 (08-02-22 @ 12:37)  RR: 18 (08-02-22 @ 12:37)  SpO2: 100% (08-02-22 @ 12:37)  Wt(kg): --    GENERAL: NAD. On 2L NC  HEENT: EOMI, MMM, no oropharyngeal lesions or erythema appreciated  Pulm: occasional R sided end-inspiratory rales. No rhonchi or wheezes  CV: RRR, S1, S2, no m/g/r. No JVP or HJR  ABDOMEN: soft, NT, ND, no masses felt, no HSM  MSK: nl ROM  EXTREMITIES: trace b/l LE edema  Neuro: A&Ox3, no focal deficits  SKIN: warm and dry, no visible rash                          10.7   7.21  )-----------( 480      ( 02 Aug 2022 05:52 )             33.0       08-02    139  |  104  |  11  ----------------------------<  148<H>  4.0   |  22  |  0.69    Ca    8.6      02 Aug 2022 05:52  Phos  4.3     08-02  Mg     1.8     08-02    TPro  6.9  /  Alb  3.6  /  TBili  1.1  /  DBili  x   /  AST  22  /  ALT  18  /  AlkPhos  82  08-02      Magnesium, Serum: 1.8 mg/dL (08-02 @ 05:52)  Phosphorus Level, Serum: 4.3 mg/dL (08-02 @ 05:52)

## 2022-08-02 NOTE — H&P ADULT - PROBLEM SELECTOR PLAN 5
- c/w home escitalopram 5mg daily and home Xanax 0.25mg daily PRN for anxiety - c/w home Metoprolol tartrate 100mg BID and home spironolactone 25mg daily  - was hypertensive to systolic 180s in ED but since improved to 120s without additional hypertensive meds; possibly 2/2 anxiety?  - continue to monitor BP and adjust antihypertensive regimen PRN Hx of Afib s/p ablation x3, remains on Eliquis at home. Also with hx of CVA in the past.  - EKG (8/1): SR w/ 1st degree AV block, no signs of acute ischemia; no significant change compared to prior EKG in May 2022  - CHADSVASC score of 6, high risk of stroke  - c/w Eliquis 5mg BID

## 2022-08-02 NOTE — CHART NOTE - NSCHARTNOTEFT_GEN_A_CORE
Confidential Drug Utilization Report  Search Terms: Kathryn Steward, 1945 Search Date: 08/02/2022 07:02:39 AM  Searching on behalf of: [id number]  The Drug Utilization Report below displays all of the controlled substance prescriptions, if any, that your patient has filled in the last twelve months. The information displayed on this report is compiled from pharmacy submissions to the Department, and accurately reflects the information as submitted by the pharmacies.    This report was requested by: Aureliano Pro | Reference #: [id number]    Others' Prescriptions  Patient Name: Kathryn StewardBirth Date: 1945  Address: [address] [place], [place] [id number]: Female  Rx Written	Rx Dispensed	Drug	Quantity	Days Supply	Prescriber Name	Prescriber Eneida #	Payment Method	Dispenser  07/05/2022	07/05/2022	alprazolam 0.5 mg tablet	30	30	Megan Morales	[id number]	Medicare	[id number]  05/02/2022	05/05/2022	alprazolam 0.5 mg tablet	30	30	Tata Nielson	[id number]	Medicare	[id number]  03/23/2022	03/24/2022	alprazolam 0.5 mg tablet	30	30	Leoncio Lyon (MS)	[id number]	Medicare	[id number]  02/04/2022	02/07/2022	alprazolam 0.5 mg tablet	30	30	John Riley MD	[id number]	Medicare	[id number]  12/21/2021	12/28/2021	alprazolam 0.5 mg tablet	30	30	John Riley MD	[id number]	Medicare	[id number]  11/11/2021	11/11/2021	alprazolam 0.5 mg tablet	30	30	John Riley MD	[id number]	Medicare	[id number]  09/10/2021	09/14/2021	alprazolam 0.5 mg tablet	30	30	John Riley MD	[id number]	Medicare	[id number]  * - Drugs marked with an asterisk are compound drugs. If the compound drug is made up of more than one controlled substance, then each controlled substance will be a separate row in the table.          †Click the ‘Report Suspicious Activity’ button to report information related to controlled substance suspicious activity to the [place] [employer].    ††Click the ‘Send Questions/Comments’ button to send questions about this report to the [place] [employer], or call [phone].    †††Click the ‘Substance Use Disorder Treatment’ button to go to the Office of Addiction Services and Supports website, www.[place].gov or call 1-210.473.1480.    © 2017 [facility] Department of Health -[place] [employer]otic Ysqlzdbmeno61/02/2022 07:02  .
76 F w/ Stage IV pancreatic cancer, known lymphangitic spread in lung, on Gemzar/Abraxane Q 2 weeks (last dose today) with stable disease since Dec 2021. Pt sent in to the ER with hypoxia, 85% at rest on RA. Pt reports worsening SOB over the past 2 weeks. Clinically well appearing and still able to perform ADLs, but symptoms progressive. On Eliquis for A fib. EKG checked at [facility], no acute changes. Stable Hb. Also has ongoing hematuria which is being managed by urology - pending cystoscopy at the end of the month.     Recommendation   - CTA   - echo  - admission to hospitalist   - pulm consult?  - house onc will follow

## 2022-08-02 NOTE — PROGRESS NOTE ADULT - SUBJECTIVE AND OBJECTIVE BOX
Anshu Longoria  PGY-1 Resident Physician     Patient is a 76y old  Female who presents with a chief complaint of hypoxia, worsening GALLARDO (02 Aug 2022 13:27)      SUBJECTIVE / OVERNIGHT EVENTS:  No complaints. NAEON.     MEDICATIONS  (STANDING):  apixaban 5 milliGRAM(s) Oral two times a day  atorvastatin 40 milliGRAM(s) Oral at bedtime  escitalopram 5 milliGRAM(s) Oral daily  furosemide   Injectable 20 milliGRAM(s) IV Push daily  metoprolol tartrate 100 milliGRAM(s) Oral two times a day  spironolactone 25 milliGRAM(s) Oral daily    MEDICATIONS  (PRN):  acetaminophen     Tablet .. 650 milliGRAM(s) Oral every 6 hours PRN Temp greater or equal to 38C (100.4F), Mild Pain (1 - 3)  ALPRAZolam 0.25 milliGRAM(s) Oral daily PRN Anxiety  ondansetron    Tablet 8 milliGRAM(s) Oral every 8 hours PRN Nausea and/or Vomiting    Allergies    No Known Drug Allergies  Seasonal allergies - stuffy nose (Other)  shellfish (Other (Moderate))    Intolerances        Vital Signs Last 24 Hrs  T(C): 36.4 (02 Aug 2022 12:37), Max: 36.8 (01 Aug 2022 23:05)  T(F): 97.6 (02 Aug 2022 12:37), Max: 98.2 (01 Aug 2022 23:05)  HR: 65 (02 Aug 2022 12:37) (61 - 88)  BP: 116/63 (02 Aug 2022 12:37) (116/63 - 184/71)  BP(mean): 81 (02 Aug 2022 12:37) (81 - 103)  RR: 18 (02 Aug 2022 12:37) (18 - 20)  SpO2: 100% (02 Aug 2022 12:37) (95% - 100%)    Parameters below as of 02 Aug 2022 12:37  Patient On (Oxygen Delivery Method): nasal cannula  O2 Flow (L/min): 2    Daily Height in cm: 162.56 (01 Aug 2022 17:47)    Daily   I&O's Summary      PHYSICAL EXAM:  GENERAL: NAD, well-developed  HEAD:  Atraumatic, Normocephalic  EYES: EOMI, PERRLA, conjunctiva and sclera clear  NECK: Supple, No JVD  CHEST/LUNG: Clear to auscultation bilaterally; No wheeze  HEART: Regular rate and rhythm; Normal S1 S2, No murmurs, rubs, or gallops  ABDOMEN: Soft, Nontender, Nondistended; Bowel sounds present  EXTREMITIES:  2+ Peripheral Pulses, No clubbing, cyanosis, or edema  PSYCH: AAOx3  NEUROLOGY: non-focal  SKIN: No rashes or lesions    DIAGNOSTICS:                         10.7   7.21  )-----------( 480      ( 02 Aug 2022 05:52 )             33.0     Hgb Trend: 10.7<--, 11.5<--, 10.3<--  08-02    139  |  104  |  11  ----------------------------<  148<H>  4.0   |  22  |  0.69    Ca    8.6      02 Aug 2022 05:52  Phos  4.3     08-02  Mg     1.8     08-02    TPro  6.9  /  Alb  3.6  /  TBili  1.1  /  DBili  x   /  AST  22  /  ALT  18  /  AlkPhos  82  08-02    CAPILLARY BLOOD GLUCOSE        Creatinine Trend: 0.69<--, 0.68<--, 0.70<--, 0.67<--, 0.79<--  LIVER FUNCTIONS - ( 02 Aug 2022 05:52 )  Alb: 3.6 g/dL / Pro: 6.9 g/dL / ALK PHOS: 82 U/L / ALT: 18 U/L / AST: 22 U/L / GGT: x                 Urinalysis Basic - ( 01 Aug 2022 20:23 )    Color: LIGHT BROWN / Appearance: Slightly Turbid / S.006 / pH: x  Gluc: x / Ketone: Negative  / Bili: Negative / Urobili: Negative   Blood: x / Protein: Trace / Nitrite: Negative   Leuk Esterase: Negative / RBC: 846 /hpf / WBC 3 /HPF   Sq Epi: x / Non Sq Epi: 4 /hpf / Bacteria: Negative

## 2022-08-02 NOTE — PATIENT PROFILE ADULT - FALL HARM RISK - HARM RISK INTERVENTIONS

## 2022-08-02 NOTE — H&P ADULT - ASSESSMENT
76F w/ hx of Stage IV pancreatic cancer c/b lymphangitic spread to lung (on Gemzar/Abraxane q2 weekly, most recent dose 8/1/22), CVA, Afib s/p ablation, on Eliquis, HTN, HLD, presenting with worsening SOB x2 weeks and acute hypoxia (85% on RA) after receiving chemo. CTA chest negative for PE but notable for new diffuse b/l GGOs and interval increase in pleural effusions consistent with interstitial pulmonary edema. Concern for possible cardiac etiology vs adverse effect from chemo vs viral infection.   76F w/ hx of Stage IV pancreatic cancer c/b lymphangitic spread to lung (on Gemzar/Abraxane q2 weekly, most recent dose 8/1/22), CVA, Afib s/p ablation, on Eliquis, HTN, HLD, presenting with worsening GALLARDO x2 weeks and acute hypoxia (85% on RA) after receiving chemo. CTA chest negative for PE but notable for new diffuse b/l GGOs and interval increase in pleural effusions consistent with interstitial pulmonary edema. Concern for possible cardiac etiology vs adverse effect from chemo vs viral infection.

## 2022-08-02 NOTE — H&P ADULT - NSHPLABSRESULTS_GEN_ALL_CORE
LABS:                        11.5   3.30  )-----------( 471      ( 01 Aug 2022 18:41 )             36.6     08    140  |  105  |  9   ----------------------------<  150<H>  4.1   |  25  |  0.68    Ca    9.3      01 Aug 2022 18:41    TPro  7.4  /  Alb  4.0  /  TBili  1.1  /  DBili  x   /  AST  24  /  ALT  19  /  AlkPhos  98  08-          Urinalysis Basic - ( 01 Aug 2022 20:23 )    Color: LIGHT BROWN / Appearance: Slightly Turbid / S.006 / pH: x  Gluc: x / Ketone: Negative  / Bili: Negative / Urobili: Negative   Blood: x / Protein: Trace / Nitrite: Negative   Leuk Esterase: Negative / RBC: 846 /hpf / WBC 3 /HPF   Sq Epi: x / Non Sq Epi: 4 /hpf / Bacteria: Negative        COVID-19 PCR: NotDetec (01 Aug 2022 18:41)  SARS-CoV-2: NotDetec (25 May 2022 23:55)    EKG () personally reviewed: SR w/ 1st degree AV block, HR 65, QTc 420, no signs of acute ischemia; no significant change compared to prior EKG in May 2022    CXR () personally reviewed: b/l patchy opacities and b/l pleural effusions LABS:                        11.5   3.30  )-----------( 471      ( 01 Aug 2022 18:41 )             36.6         140  |  105  |  9   ----------------------------<  150<H>  4.1   |  25  |  0.68    Ca    9.3      01 Aug 2022 18:41    TPro  7.4  /  Alb  4.0  /  TBili  1.1  /  DBili  x   /  AST  24  /  ALT  19  /  AlkPhos  98            Urinalysis Basic - ( 01 Aug 2022 20:23 )    Color: LIGHT BROWN / Appearance: Slightly Turbid / S.006 / pH: x  Gluc: x / Ketone: Negative  / Bili: Negative / Urobili: Negative   Blood: x / Protein: Trace / Nitrite: Negative   Leuk Esterase: Negative / RBC: 846 /hpf / WBC 3 /HPF   Sq Epi: x / Non Sq Epi: 4 /hpf / Bacteria: Negative        COVID-19 PCR: NotDetec (01 Aug 2022 18:41)  SARS-CoV-2: NotDetec (25 May 2022 23:55)    EKG () personally reviewed: SR w/ 1st degree AV block, HR 65, QTc 420, no signs of acute ischemia; no significant change compared to prior EKG in May 2022    CXR () personally reviewed: b/l patchy opacities and b/l pleural effusions    < from: CT Angio Chest PE Protocol w/ IV Cont (22 @ 21:06) >  IMPRESSION:  No evidence of pulmonary embolus within the the main, right/left main,   nor lobar branches of the pulmonary artery; more distal assessment is   prohibited by the limitations of study.    Interval development of diffuse bilateral groundglass opacities and   interval increase in pleural effusions, favoring interstitial pulmonary   edema.  The differential diagnosis includes infection or post-therapeutic   change.  Stable mild interlobular septal thickening in the setting of   known metastases/lymphangitic spread of tumor.    Stable diffuse parenchymal nodules. Redemonstrated pancreatic body mass.  --- End of Report ---  < end of copied text >

## 2022-08-02 NOTE — PATIENT PROFILE ADULT - NSPROEXTENSIONSOFSELF_GEN_A_NUR
71FRU), FG , Min:133 , IZQ:434   ; Diastolic (68ACJ), UBA:08, 2 eye glassess, ring/dentures/eyeglasses

## 2022-08-02 NOTE — PROGRESS NOTE ADULT - PROBLEM SELECTOR PLAN 6
c/w home Metoprolol tartrate 100mg BID and home spironolactone 25mg daily. was hypertensive to systolic 180s in ED but since improved to 120s without additional hypertensive meds; possibly 2/2 anxiety?  - continue to monitor BP and adjust antihypertensive regimen PRN

## 2022-08-02 NOTE — H&P ADULT - PROBLEM SELECTOR PLAN 6
- DVT ppx: SCDs for now (has active hematuria)  - Diet: DASH  - Dispo: TBD - c/w home atorvastatin 40mg qhs - c/w home Metoprolol tartrate 100mg BID and home spironolactone 25mg daily  - was hypertensive to systolic 180s in ED but since improved to 120s without additional hypertensive meds; possibly 2/2 anxiety?  - continue to monitor BP and adjust antihypertensive regimen PRN

## 2022-08-02 NOTE — H&P ADULT - PROBLEM SELECTOR PLAN 8
- DVT ppx: SCDs for now (has active hematuria)  - Diet: DASH  - Dispo: TBD - c/w home escitalopram 5mg daily and home Xanax 0.25mg daily PRN for anxiety  - ISTOP#: [id number]

## 2022-08-02 NOTE — ED ADULT NURSE REASSESSMENT NOTE - NS ED NURSE REASSESS COMMENT FT1
Patient admitted to Medicine. Admission band applied to patient utilizing two patient identifiers. Patient notified and updated on plan of care. Pending bed.

## 2022-08-02 NOTE — PROGRESS NOTE ADULT - PROBLEM SELECTOR PLAN 1
Pt presented with acute hypoxia to 85% on RA after chemo session on day of presentation. Pt also with worsening GALLARDO x2 weeks. No SOB at rest. CTA chest (8/1): (-) for PE but notable b/l GGOs and interval increase in pleural effusions consistent with interstitial pulmonary edema. COVID (-). s/p lasix, ceftriaxone. Currently on NC 3L. Diff likely 2/2 interstitial pulmonary edema in setting of possible cardiac etiology vs adverse effect from chemo vs viral infection  - f/u RVP  - c/w home spironolactone 25mg daily

## 2022-08-02 NOTE — PROGRESS NOTE ADULT - PROBLEM SELECTOR PLAN 4
Pt with ongoing hematuria, being worked up outpatient. On admission, Hgb remains stable around baseline ~11.  Follows with Urologist, Dr. Nanci Darden outpatient biopsy later this month  - continue to monitor CBC and urine

## 2022-08-02 NOTE — CONSULT NOTE ADULT - ASSESSMENT
****INCOMPLETE****    75 yo F with a PMH of atrial fibrillation (s/p ablation x 3, on Eliquis), ischemic CVA (due to subtherapeutic Coumadin levels), HTN, stage IV pancreatic cancer with lung (and ?L ovary) metastasis with lymphangitic spread (diagnosed 09/2021, currently on Gemzar/Abraxane) who presents with exacerbation of subacute SOB following her most recent chemotherapy dose.      #Subacute Dyspnea/Pulmonary Opacities  - Gradually worsening dyspnea for 2 weeks prior, and acutely worsened after C10 of chemotherapy  - New pulmonary GGOs and worsening pleural effusions from prior CT 7/25/22  - No PE  - Ddx includes pneumonitis, which may be due to Gemcitabine vs viral pneumonia (less likely bacterial) vs progression of disease vs pulmonary edema from cardiac source (less likely)  - Recommend Pulmonology consult, consider biopsy  - F/u TTE  - COVID-19 negative but would check RVP  - _________    #Metastatic Pancreatic Cancer  - Diagnosed 11/2021 with lung biopsy; metastatic pancreatic cancer to the lung with lymphangitic spread. 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  - Started C1D1 Gemzar/Abraxane 12/6/21 at [facility], transferred care to [facility] during C3  - Most recent cycle 10 day 1 on 8/1/22 prior to admission  - Has had stable disease during treatment thus far  - However, cannot r/o progression as noted above, which would   - No plans for inpatient chemotherapy  - ____________ ****INCOMPLETE****    75 yo F with a PMH of atrial fibrillation (s/p ablation x 3, on Eliquis), ischemic CVA (due to subtherapeutic Coumadin levels), HTN, stage IV pancreatic cancer with lung (and ?L ovary) metastasis with lymphangitic spread (diagnosed 11/2021, currently on Gemzar/Abraxane) who presents with exacerbation of subacute SOB following her most recent chemotherapy dose.      #Subacute Dyspnea/Pulmonary Opacities  - Gradually worsening dyspnea for 2 weeks prior, and acutely worsened after C10 of chemotherapy  - New pulmonary GGOs and worsening pleural effusions from prior CT 7/25/22  - No PE  - Ddx includes pneumonitis, which may be due to Gemcitabine vs viral pneumonia (less likely bacterial) vs progression of disease vs pulmonary edema from cardiac source (less likely)  - Recommend Pulmonology consult, consider biopsy  - F/u TTE  - COVID-19 negative but would check RVP  - _________    #Metastatic Pancreatic Cancer  - Diagnosed 11/2021 with lung biopsy; metastatic pancreatic cancer to the lung with lymphangitic spread. 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  - Started C1D1 Gemzar/Abraxane 12/6/21 at [facility], transferred care to [facility] during C3  - Most recent cycle 10 day 1 on 8/1/22 prior to admission  - Has had stable disease during treatment thus far  - However, cannot r/o progression as noted above, which would   - No plans for inpatient chemotherapy  - ____________ ****INCOMPLETE****    77 yo F with a PMH of atrial fibrillation (s/p ablation x 3, on Eliquis), ischemic CVA (due to subtherapeutic Coumadin levels), HTN, stage IV pancreatic cancer with lung (and ?L ovary) metastasis with lymphangitic spread (diagnosed 11/2021, currently on Gemzar/Abraxane) who presents with exacerbation of subacute SOB following her most recent chemotherapy dose.      #Subacute Dyspnea/Pulmonary Opacities  - Gradually worsening dyspnea for 2 weeks prior, and acutely worsened after C10 of chemotherapy  - New pulmonary GGOs and worsening pleural effusions from prior CT 7/25/22  - No PE  - Ddx includes pneumonitis, which may be due to Gemcitabine vs progression of disease vs pulmonary edema from cardiac source (less likely)  - Unlikely bacterial pneumonia, not on abx  - RVP/COVID negative  - Recommend Pulmonology consult, consider biopsy  - F/u TTE  - _________    #Metastatic Pancreatic Cancer  - Diagnosed 11/2021 with lung biopsy; metastatic pancreatic cancer to the lung with lymphangitic spread. 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  - Started C1D1 Gemzar/Abraxane 12/6/21 at [facility], transferred care to [facility] during C3  - Most recent cycle 10 day 1 on 8/1/22 prior to admission  - Has had stable disease during treatment thus far  - However, cannot r/o progression as noted above, which would   - No plans for inpatient chemotherapy  - ____________ ****INCOMPLETE****    75 yo F with a PMH of atrial fibrillation (s/p ablation x 3, on Eliquis), ischemic CVA (due to subtherapeutic Coumadin levels), HTN, stage IV pancreatic cancer with lung (and ?L ovary) metastasis with lymphangitic spread (diagnosed 11/2021, currently on Gemzar/Abraxane) who presents with exacerbation of subacute SOB following her most recent chemotherapy dose.      #Subacute Dyspnea/Pulmonary Opacities  - Gradually worsening dyspnea for 2 weeks prior, and acutely worsened after C10 of chemotherapy  - New pulmonary GGOs and worsening pleural effusions from prior CT 7/25/22  - No PE  - Ddx includes pneumonitis, which may be due to Gemcitabine (vs [place]/Abraxane combination) vs progression of disease vs pulmonary edema from cardiac source (less likely)  - Unlikely bacterial pneumonia, not on abx  - RVP/COVID negative  - Recommend Pulmonology consult, consider biopsy  - F/u TTE  - _________    #Metastatic Pancreatic Cancer  - Diagnosed 11/2021 with lung biopsy; metastatic pancreatic cancer to the lung with lymphangitic spread. 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  - Started C1D1 Gemzar/Abraxane 12/6/21 at [facility], transferred care to [facility] during C3  - Most recent cycle 10 day 1 on 8/1/22 prior to admission  - Has had stable disease during treatment thus far  - However, cannot r/o progression as noted above, which would   - No plans for inpatient chemotherapy  - ____________ ****INCOMPLETE****    77 yo F with a PMH of atrial fibrillation (s/p ablation x 3, on Eliquis), ischemic CVA (due to subtherapeutic Coumadin levels), HTN, stage IV pancreatic cancer with lung (and ?L ovary) metastasis with lymphangitic spread (diagnosed 11/2021, currently on Gemzar/Abraxane) who presents with exacerbation of subacute SOB following her most recent chemotherapy dose.      #Subacute Dyspnea/Pulmonary Opacities  - Gradually worsening dyspnea for 2 weeks prior, and acutely worsened after C10 of chemotherapy  - New pulmonary GGOs and worsening pleural effusions from prior CT 7/25/22  - No PE  - Ddx includes pneumonitis, which may be due to Gemcitabine (vs [place]/Abraxane combination) vs progression of disease vs pulmonary edema from cardiac source (less likely)  - Unlikely bacterial pneumonia, not on abx  - RVP/COVID negative  - Recommend Pulmonology consult, consider biopsy vs empiric steroid use  - F/u TTE    #Metastatic Pancreatic Cancer  - Diagnosed 11/2021 with lung biopsy; metastatic pancreatic cancer to the lung with lymphangitic spread. 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 ****INCOMPLETE****    77 yo F with a PMH of atrial fibrillation (s/p ablation x 3, on Eliquis), ischemic CVA (due to subtherapeutic Coumadin levels), HTN, stage IV pancreatic cancer with lung (and ?L ovary) metastasis with lymphangitic spread (diagnosed 11/2021, currently on Gemzar/Abraxane) who presents with exacerbation of subacute SOB following her most recent chemotherapy dose.    #Subacute Dyspnea/Pulmonary Opacities  - Gradually worsening dyspnea for 2 weeks prior, and acutely worsened after C10 of chemotherapy  - New pulmonary GGOs and worsening pleural effusions from prior CT 7/25/22  - No PE  - Ddx includes pneumonitis, which may be due to Gemcitabine (vs [place]/Abraxane combination) vs progression of disease vs pulmonary edema from cardiac source (less likely)  - Of note, CA 19-9 has been decreasing to 45 on 8/1, POD less likely but not excluded  - Unlikely bacterial pneumonia, not on abx  - RVP/COVID negative  - Recommend Pulmonology consult, consider biopsy vs empiric steroid use  - F/u TTE    #Metastatic Pancreatic Cancer  - Diagnosed 11/2021 with lung biopsy; metastatic pancreatic cancer to the lung with lymphangitic spread. 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     75 yo F with a PMH of atrial fibrillation (s/p ablation x 3, on Eliquis), ischemic CVA (due to subtherapeutic Coumadin levels), HTN, stage IV pancreatic cancer with lung (and ?L ovary) metastasis with lymphangitic spread (diagnosed 11/2021, currently on Gemzar/Abraxane) who presents with exacerbation of subacute SOB following her most recent chemotherapy dose.    #Subacute Dyspnea/Pulmonary Opacities  - Gradually worsening dyspnea for 2 weeks prior, and acutely worsened after C10 of chemotherapy  - New pulmonary GGOs and worsening pleural effusions from prior CT 7/25/22  - No PE  - Ddx includes pneumonitis, which may be due to Gemcitabine (vs [place]/Abraxane combination) vs progression of disease vs pulmonary edema from cardiac source (less likely)  - Of note, CA 19-9 has been decreasing to 45 on 8/1, POD less likely but not excluded  - Unlikely bacterial pneumonia, not on abx  - RVP/COVID negative  - Recommend Pulmonology consult, consider biopsy vs empiric steroid use  - F/u TTE    #Metastatic Pancreatic Cancer  - Diagnosed 11/2021 with lung biopsy; metastatic pancreatic cancer to the lung with lymphangitic spread. 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

## 2022-08-02 NOTE — H&P ADULT - HISTORY OF PRESENT ILLNESS
76F w/ hx of Stage IV pancreatic cancer c/b lymphangitic spread to lung (on Gemzar/Abraxane q2 weekly, most recent dose 8/1/22), CVA, Afib s/p ablation, on Eliquis, HTN, HLD, presenting with worsening SOB x2 weeks and acute hypoxia (85% on RA) after receiving chemo.  76F w/ hx of Stage IV pancreatic cancer c/b lymphangitic spread to lung (on Gemzar/Abraxane q2 weekly, most recent dose 8/1/22), CVA, Afib s/p ablation, on Eliquis, HTN, HLD, presenting with worsening SOB x2 weeks and acute hypoxia (85% on RA) after receiving chemo.     In ED: Tmax 98.1F oral, HR 60s-80s, SBP 130s-180s, RR 18-20, sating 95-99% on 2L O2NC. CXR with b/l patchy opacities and pleural effusions. Admitted to Medicine for further management. 76F w/ hx of Stage IV pancreatic cancer c/b lymphangitic spread to lung (on Gemzar/Abraxane q2 weekly, most recent dose 8/1/22), CVA, Afib s/p ablation, on Eliquis, HTN, HLD, presenting with worsening SOB x2 weeks and acute hypoxia (85% on RA) after receiving chemo. Pt described the SOB occurs more so with exertion and denies SOB at rest. Pt reported being on this chemo regimen since Dec 2021 and has not had similar symptoms, only chronic fatigue and nausea at baseline. Only other time she remembers having some SOB was when she was found to have a viral PNA in the past. Has a stuffy nose, but stated she often has it due to her seasonal allergies. Denied f/c/v, cough, sore throat, runny nose, CP, abdominal pain, dysuria, diarrhea, hematochezia, melena, headache, dizziness. No new leg swelling, but has some trace RLE swelling since her R hip surgery. Has hematuria which she has been seeing Urology for, presumed to be effect from her chemo, but is pending a biopsy later this month.    In ED: Tmax 98.1F oral, HR 60s-80s, SBP 130s-180s, RR 18-20, sating 95-99% on 2L O2NC. COVID-19 PCR negative. CXR with b/l patchy opacities and pleural effusions. Received cefepime 1g x1 and Lasix 20mg IV x1. Admitted to Medicine for further management. 76F w/ hx of Stage IV pancreatic cancer c/b lymphangitic spread to lung (on Gemzar/Abraxane q2 weekly, most recent dose 8/1/22), CVA, Afib s/p ablation, on Eliquis, HTN, HLD, presenting with worsening SOB x2 weeks and acute hypoxia (85% on RA) after receiving chemo. Pt described the SOB occurs more so with exertion and denies SOB at rest. Pt reported being on this chemo regimen since Dec 2021 and has not had similar symptoms, only chronic fatigue and nausea at baseline. Only other time she remembers having some SOB was when she was found to have a viral PNA in the past. Has a stuffy nose, but stated she often has it due to her seasonal allergies. Denied f/c/v, cough, sore throat, runny nose, CP, abdominal pain, dysuria, diarrhea, hematochezia, melena, headache, dizziness. No new leg swelling, but has some trace RLE swelling since her R hip surgery. Has hematuria which she has been seeing Urology for, presumed to be effect from her chemo, but is pending a biopsy later this month.    In ED: Tmax 98.1F oral, HR 60s-80s, SBP 130s-180s, RR 18-20, sating 95-99% on 2L O2NC. COVID-19 PCR negative. CXR with b/l patchy opacities and pleural effusions. CTA chest was negative for PE, notable for diffuse bilateral GGOs and interval increase in pleural effusions, favoring interstitial pulmonary edema. Received cefepime 1g x1 and Lasix 20mg IV x1. Admitted to Medicine for further management. 76F w/ hx of Stage IV pancreatic cancer c/b lymphangitic spread to lung (on Gemzar/Abraxane q2 weekly, most recent dose 8/1/22), CVA, Afib s/p ablation, on Eliquis, HTN, HLD, presenting with worsening GALLARDO x2 weeks and acute hypoxia (85% on RA) after receiving chemo. Pt denied SOB at rest. Pt reported being on this chemo regimen since Dec 2021 and has not had similar symptoms, only chronic fatigue and nausea at baseline. Only other time she remembers having some SOB was when she was found to have a viral PNA in the past. Has a stuffy nose, but stated she often has it due to her seasonal allergies. Denied f/c/v, cough, sore throat, runny nose, CP, abdominal pain, dysuria, diarrhea, hematochezia, melena, headache, dizziness. No new leg swelling, but has some trace RLE swelling since her R hip surgery. Has hematuria which she has been seeing Urology for, presumed to be effect from her chemo, but is pending a biopsy later this month.    In ED: Tmax 98.1F oral, HR 60s-80s, SBP 130s-180s, RR 18-20, sating 95-99% on 2L O2NC. COVID-19 PCR negative. CXR with b/l patchy opacities and pleural effusions. CTA chest was negative for PE, notable for diffuse bilateral GGOs and interval increase in pleural effusions, favoring interstitial pulmonary edema. Received cefepime 1g x1 and Lasix 20mg IV x1. Admitted to Medicine for further management.

## 2022-08-02 NOTE — H&P ADULT - NSICDXPASTMEDICALHX_GEN_ALL_CORE_FT
PAST MEDICAL HISTORY:  Afib s/p ablation x3, on Eliquis    Anxiety and depression     CVA (cerebral vascular accident)     HLD (hyperlipidemia)     HTN (hypertension)

## 2022-08-02 NOTE — H&P ADULT - NSHPREVIEWOFSYSTEMS_GEN_ALL_CORE
REVIEW OF SYSTEMS:    CONSTITUTIONAL: +fatigue (chronic); No fevers or chills  EYES:  No visual changes or eye pain  ENMT: No hearing loss or sore throat  RESPIRATORY: +GALLARDO; No SOB at rest; No cough, wheezing, hemoptysis  CARDIOVASCULAR: No chest pain or palpitations  GASTROINTESTINAL: +nausea (chronic); +constipation (chronic); No abdominal or epigastric pain; No vomiting, or hematemesis; No diarrhea; No melena or hematochezia  GENITOURINARY: No dysuria; +intermittent hematuria  MUSCULOSKELETAL: No joint pain or swelling; No back pain  NEUROLOGICAL: No headaches; No numbness or loss of sensation; No loss of strength in extremities  PSYCHIATRIC: +anxiety, +depression  SKIN: +occasional itching/irritation under breast; No new rashes or lesions

## 2022-08-02 NOTE — PATIENT PROFILE ADULT - NSPROPTRIGHTBILLOFRIGHTS_GEN_A_NUR
Patient:  Mary Jolly Date:  2021   :  1970    50 year old female         HEPATOLOGY CONSULT: Elevated liver enzymes.    Chief complaint. Elevated liver enzymes.    History of Present Illness:   [place] language interpretor services were utilized.   50 year old female with hx of RA on Methotrexate until 2020 came for consultation regarding elevation inliver enzymes  Pt recently began to notice elevation in liver enzymes (peak 2020);AST in 60s; T bilirubin, ALP were unremarkable.  Reg Mtx pt was on Mtx for two months and was discontinued on 2020. Pt then saw GI. Chronic liver dx work up was pertinent for MARIO elevation and unremarkable ASMA, ceruloplasmin.    Risk factors for chronic liver disease  Alcohol. Rare alcohol consumption. No DUI.No in patient detox.  Current weight 255 lb,  heaviest wt 272 lb   Drugs/long term steroids,  Anti epilepsy medications,   Herbal supplements/ Protein supplements/Green tea extracts. None  Recently ordered \" liver fix\" dietary supplement.  IV recreational drugs. None  Tattoo from non professional places/skilled nursing. None   Blood transfusion prior to    MetS risk factors   Hypertension.   Hyperlipidemia   DM   Abdominal obesity. Yes.      Past Medical History:    Thyroid condition                                             Migraines                                                     Arthritis                                                     Fatty liver disease, nonalcoholic               12/15/2020      Comment: Dr Alexander    Past Surgical History:   Procedure Laterality Date   • Cholecystectomy     • Gallbladder surgery     • Oophorectomy      right side   • Service to gastroenterology         Family History   Problem Relation Age of Onset   • Cancer Mother         cervical cancer   • Cancer Father         prostate       ALLERGIES:   Allergen Reactions   • Morphine SWELLING     \"some type of swelling in the throat region\"       Social  History:  Social History     Tobacco Use   • Smoking status: Never Smoker   • Smokeless tobacco: Never Used   Substance Use Topics   • Alcohol use: Yes     Frequency: Never     Drinks per session: 1 or 2     Binge frequency: Never     Comment: very rarely wine or beer       Current Medications    ACETAMINOPHEN (TYLENOL) 325 MG TABLET    Take 2 tablets by mouth every 4 hours as needed for Pain.    DICLOFENAC (VOLTAREN) 1 % GEL    Apply 4 g topically 4 times daily. Apply to the skin, shoulder. Azeri instructions    ERGOCALCIFEROL (DRISDOL) 47676 UNITS CAPSULE    Take 50,000 Units by mouth once a week. on Sunday    FAMOTIDINE (PEPCID PO)    Take 10 mg by mouth daily.     HYDROXYCHLOROQUINE (PLAQUENIL) 200 MG TABLET    Take 1 tab daily for a week and then increase to twice a day    HYDROXYZINE (ATARAX) 25 MG TABLET    Take 1 tablet by mouth every 8 hours as needed for Itching.    LEVOTHYROXINE (SYNTHROID, LEVOTHROID) 50 MCG TABLET    Take 1 tablet by mouth daily.    SUMATRIPTAN (IMITREX) 50 MG TABLET    Take 1 tablet by mouth at onset of migraine. May repeat after 2 hours if needed.    TIZANIDINE (ZANAFLEX) 2 MG TABLET    Take 1-2 tablets by mouth every 6 hours as needed (Muscle spasm or pain).    TOPIRAMATE (TOPAMAX) 50 MG TABLET    Take 1 tablet by mouth nightly.    TRAMADOL (ULTRAM) 50 MG TABLET    Take one tablet every 12 hrs as needed. Script to last 30 days.    TRIAMCINOLONE (ARISTOCORT) 0.1 % CREAM    Apply to rash 2-3 times daily prn itching.         Review of Systems:   Constitutional:  Negative for fever.  No activity change and fatigue.   HENT:  Negative for congestion and sneezing.   Eyes:  Negative for discharge and redness.   Respiratory:  Negative for cough, shortness of breath and wheezing.   Cardiovascular:  Negative for chest pain and palpitations.   Gastrointestinal:  Negative for nausea, vomiting, abdominal pain, diarrhea, constipation and abdominal distention.   Genitourinary:  Negative for  dysuria and hematuria.   Musculoskeletal:  Negative for back pain and joint swelling.   Skin:  Negative for color change, pallor and rash.   Neurological:  Negative for tremors and headaches.   Psychiatric/Behavioral:  Negative for suicidal ideas and confusion.     Physical Examination:   Vitals:    01/08/21 0945   BP: 123/73   Pulse: 68   Temp: 98.1 °F (36.7 °C)       HEENT:  Normocephalic, atraumatic.  No scleral icterus.  Pupils equal and reactive to light and accommodation.  Normal conjunctivae.  External ears normal.  Oropharynx clear and moist.  No exudate.  Nose normal.   NECK:  Full range of movement, supple.   No thyromegaly or masses. No jugular venous distention.  No cervical or supraclavicular adenopathy.   CHEST:  Bilateral air entry +, no wheeze or crackles.  CARDIOVASCULAR:  S1S2 heard no murmurs  ABDOMEN:  Soft, nontender, obese.  No hepatosplenomegaly.  No umbilical or inguinal hernias.  No masses.  Bowel sounds normal.  No ascites.   NEUROLOGIC:  Alert and oriented x3.  No cranial nerve deficit.  No musculoskeletal deficit.  Normal ROM (range of motion) in all extremities.  No asterixis.  SKIN:  Warm to touch.  No rash.  No spider nevi.  PSYCHIATRIC:  Mood and affect are normal.   EXTREMITIES:  No edema.  No cyanosis.  No digital clubbing.        Labs:  Reviewed In [employer].  CMP:  Lab Results   Component Value Date    FSTS1 12 11/27/2020    FSTS1 12 08/08/2018    GLUCOSE 91 11/27/2020    GLUCOSE 87 08/08/2018    SODIUM 141 11/27/2020    SODIUM 138 08/08/2018    POTASSIUM 4.6 11/27/2020    POTASSIUM 4.5 08/08/2018    CHLORIDE 111 (H) 11/27/2020    CHLORIDE 104 08/08/2018    CO2 26 11/27/2020    CO2 28 08/08/2018    ANIONGAP 9 (L) 11/27/2020    ANIONGAP 10 08/08/2018    BUN 21 (H) 11/27/2020    BUN 11 08/08/2018    CREATININE 0.79 11/27/2020    CREATININE 0.56 08/08/2018    GFRNA >90 08/08/2018    GFRA >90 08/08/2018    BCRAT 27 (H) 11/27/2020    BCRAT 20 08/08/2018    CALCIUM 8.6 11/27/2020    CALCIUM  8.5 08/08/2018    BILIRUBIN 0.5 11/27/2020    BILIRUBIN 0.8 08/08/2018    AST 64 (H) 11/27/2020    AST 30 08/08/2018     (H) 11/27/2020    GPT 36 08/08/2018    ALKPT 80 11/27/2020    ALKPT 57 08/08/2018    TOTPROTEIN 7.6 11/27/2020    TOTPROTEIN 7.8 08/08/2018    ALBUMIN 3.7 11/27/2020    ALBUMIN 3.8 08/08/2018    AGR 0.9 (L) 11/27/2020    AGR 1.0 08/08/2018     PT:  No results found for: PT  INR:  No results found for: INR  CBC:  Lab Results   Component Value Date    WBC 4.7 11/27/2020    WBC 5.2 04/19/2019    RBC 4.30 11/27/2020    RBC 4.09 04/19/2019    HGB 13.1 11/27/2020    HGB 12.4 04/19/2019    HCT 41.8 11/27/2020    HCT 39.2 04/19/2019    MCV 97.2 11/27/2020    MCV 95.8 04/19/2019    MCH 30.5 11/27/2020    MCH 30.3 04/19/2019    MCHC 31.3 (L) 11/27/2020    MCHC 31.6 (L) 04/19/2019    RDWCV 14.5 11/27/2020    RDWCV 12.8 04/19/2019     11/27/2020     04/19/2019    SEG 57 11/27/2020    SEG 45 04/19/2019    TLYMPH 26 11/27/2020    TLYMPH 43 04/19/2019    PMON 12 11/27/2020    PMON 9 04/19/2019    PEOS 5 11/27/2020    PEOS 3 04/19/2019    PBASO 0 11/27/2020    PBASO 0 04/19/2019    ANEUT 2.7 11/27/2020    ANEUT 2.3 04/19/2019    ALYMS 1.2 11/27/2020    ALYMS 2.2 04/19/2019    ANNELISE 0.6 11/27/2020    ANNELISE 0.5 04/19/2019    AEOS 0.2 11/27/2020    AEOS 0.2 04/19/2019    ABASO 0.0 11/27/2020    ABASO 0.0 04/19/2019       Radiology Findings:  available imaging reviewed  US Liver 12/14/2020. Hepatic steatosis.    Assessment and Plan:  This is a 50 year old  female  with a history of  RA, Hypothyroidism, obesity class III who presents for consultation regarding elevation in liver enzymes.    Liver Disease:    Elevated liver enzymes predominantly hepatocellular pattern with non specific elevation in MARIO  Likely related to SUAREZ/Methotreated induced liver injury/less likely autoimmune hepatitis.  We will check additional autoimmune hepatitis serologies like Anti LKM,Repeat ASMA, Serum protein  electrophoresis.  We will also rule out cholestatic and , metabolic liver diseases.  If additional AIH serologies are also elevated then consider liver biopsy.  Agree with holding Methotrexated while liver enzymes remain mildly elevated.  Pt already takes Vitamin E prescribed by GI and I have explained in general we like to have histological e/o steato hepatitis before consideration of vitamin E.  Counseled to avoid dietary supplements/roots/vitamins    Other Medical Issues:  Rheumatoid arthritis. On Hydroxychloroquine  Hypothyroidism. On synthroid.  Obesity class III. Counseled for life style changes to achieve 5-7%     Follow up appointment: 12 months CMP every six months.    Rl Allen MD  Hepatology    Total time 60 minutes, more than half spent counseling.                   patient

## 2022-08-02 NOTE — H&P ADULT - PROBLEM SELECTOR PLAN 3
Hx of Stage IV pancreatic cancer c/b lymphangitic spread to lung  - on Gemzar/Abraxane q2 weekly outpatient, most recent dose on 8/1/22  - CTA chest (8/1): Stable mild interlobular septal thickening in the setting of known metastases/lymphangitic spread of tumor. Stable diffuse parenchymal nodules.  - Follows with Oncologist, Dr. Tata Nielson  - f/u Onc recs

## 2022-08-02 NOTE — CONSULT NOTE ADULT - ATTENDING COMMENTS
Pt with Stage IV pancreatic cancer s/p C10 of gemcitabine/ nab paclitaxel presenting with increasing SOB symptom, chronic dry cough from allergies. Reviewed CTA chest showing new ground glass opacities. Pt had covid and RVP in ED which was negative and denies any other URI symptoms. On exam, sitting up in bed, NAD, normal respiratory effort on 3 L NC, HR RRR, no rash, RLE pitting edema 1+. She has history of lymphangitic spread from her pancreatic cancer to her lungs however, current ground glass opacity that are new makes us also consider new infectious or chemotherapy induced pneumonitis. Will see if pulmonary will recommend any work up and steroids for treatment.

## 2022-08-02 NOTE — PROGRESS NOTE ADULT - PROBLEM SELECTOR PLAN 5
Hx of Afib s/p ablation x3, remains on Eliquis at home. Also with hx of CVA in the past. EKG (8/1): SR w/ 1st degree AV block, no signs of acute ischemia; no significant change compared to prior EKG in May 2022. CHADSVASC score of 6, high risk of stroke  - c/w Eliquis 5mg BID

## 2022-08-02 NOTE — PROGRESS NOTE ADULT - PROBLEM SELECTOR PLAN 3
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 Gemzar/Abraxane q2 weekly outpatient, most recent dose on 8/1/22. CTA chest (8/1): Stable mild interlobular septal thickening in the setting of known metastases/lymphangitic spread of tumor. Follows with Oncologist, Dr. Tata Nielson  - f/u Onc recs

## 2022-08-03 NOTE — DISCHARGE NOTE PROVIDER - NSFOLLOWUPCLINICS_GEN_ALL_ED_FT
[facility]  Hematology/Oncology  [address] [id number]  Phone: [phone]  Fax:

## 2022-08-03 NOTE — PHARMACOTHERAPY INTERVENTION NOTE - COMMENTS
Medication reconciliation completed. Please refer to specifics in home medication list (outpatient medication review). Medications verified with patient and pharmacy records [surescripts: [facility] #[id number] [phone]].    -------------------------------------------------------------------------------------------  Home Medications: (confirmed)   aspirin 81 mg oral delayed release tablet: 1 tab(s) orally once a day (at bedtime)  atorvastatin 40 mg oral tablet: 1 tab(s) orally once a day (at bedtime)   clotrimazole-betamethasone dipropionate 1%-0.05% topical cream: Apply topically to affected area 2 times a day, As Needed for itching/rash under breast   Eliquis 5 mg oral tablet: 1 tab(s) orally 2 times a day   escitalopram 5 mg oral tablet: 1 tab(s) orally once a day   lidocaine-prilocaine 2.5%-2.5% topical cream: Apply topically to affected area once, As Needed prior to mediport access for chemo  Metoprolol Tartrate 100 mg oral tablet: 1 tab(s) orally 2 times a day   ondansetron 8 mg oral tablet: 1 tab(s) orally every 8 hours, As Needed -for nausea    prochlorperazine 10 mg oral tablet: 1 tab(s) orally once a day, As Needed for nausea   spironolactone 25 mg oral tablet: 1 tab(s) orally once a day   Xanax 0.25 mg oral tablet: 1 tab(s) orally once a day, As Needed       Added:  Afrin 0.05% nasal spray: 2 spray(s) nasal 2 times a day, As Needed   Melatonin 5 mg oral tablet: 1 tab(s) orally once a day (at bedtime)   Vitamin D3 25 mcg (1000 intl units) oral tablet: 1 tab(s) orally once a day   -------------------------------------------------------------------------------------------    Time spent: 30 minutes    Rose Marie Willett PharmD  PGY-1 Resident   Spectralink [id number]     Medication reconciliation completed. Please refer to specifics in home medication list (outpatient medication review). Medications verified with patient and pharmacy records [surescripts: [facility] #[id number] [phone]].    -------------------------------------------------------------------------------------------  Home Medications: (confirmed)   aspirin 81 mg oral delayed release tablet: 1 tab(s) orally once a day (at bedtime)  atorvastatin 40 mg oral tablet: 1 tab(s) orally once a day (at bedtime)   clotrimazole-betamethasone dipropionate 1%-0.05% topical cream: Apply topically to affected area 2 times a day, As Needed for itching/rash under breast   Eliquis 5 mg oral tablet: 1 tab(s) orally 2 times a day   escitalopram 5 mg oral tablet: 1 tab(s) orally once a day   lidocaine-prilocaine 2.5%-2.5% topical cream: Apply topically to affected area once, As Needed prior to mediport access for chemo  Metoprolol Tartrate 100 mg oral tablet: 1 tab(s) orally 2 times a day   ondansetron 8 mg oral tablet: 1 tab(s) orally every 8 hours, As Needed -for nausea    prochlorperazine 10 mg oral tablet: 1 tab(s) orally once a day, As Needed for nausea   spironolactone 25 mg oral tablet: 1 tab(s) orally once a day   Xanax 0.25 mg oral tablet: 1 tab(s) orally once a day, As Needed       Added:  Afrin 0.05% nasal spray: 2 spray(s) nasal 2 times a day, As Needed   Melatonin 5 mg oral tablet: 1 tab(s) orally once a day (at bedtime)   Vitamin D3 25 mcg (1000 intl units) oral tablet: 1 tab(s) orally once a day     Note: Pt receives Gemzar/Abraxane outpatient: most recent dose was on 8/1/22.   -------------------------------------------------------------------------------------------    Time spent: 30 minutes    Rose Marie Willett PharmD  PGY-1 Resident   Spectralink [id number]

## 2022-08-03 NOTE — CONSULT NOTE ADULT - SUBJECTIVE AND OBJECTIVE BOX
Patient is a 76y old  Female who presents with a chief complaint of hypoxia, worsening GALLARDO (02 Aug 2022 13:27)      HPI:  76F w/ hx of Stage IV pancreatic cancer c/b lymphangitic spread to lung (on Gemzar/Abraxane q2 weekly, most recent dose 22), CVA, Afib s/p ablation, on Eliquis, HTN, HLD, presenting with worsening GALLARDO x2 weeks and acute hypoxia (85% on RA) after receiving chemo. Pt denied SOB at rest. Pt reported being on this chemo regimen since Dec 2021 and has not had similar symptoms, only chronic fatigue and nausea at baseline. Only other time she remembers having some SOB was when she was found to have a viral PNA in the past. Has a stuffy nose, but stated she often has it due to her seasonal allergies. Denied f/c/v, cough, sore throat, runny nose, CP, abdominal pain, dysuria, diarrhea, hematochezia, melena, headache, dizziness. No new leg swelling, but has some trace RLE swelling since her R hip surgery. Has hematuria which she has been seeing Urology for, presumed to be effect from her chemo, but is pending a biopsy later this month.    In ED: Tmax 98.1F oral, HR 60s-80s, SBP 130s-180s, RR 18-20, sating 95-99% on 2L O2NC. COVID-19 PCR negative. CXR with b/l patchy opacities and pleural effusions. CTA chest was negative for PE, notable for diffuse bilateral GGOs and interval increase in pleural effusions, favoring interstitial pulmonary edema. Received cefepime 1g x1 and Lasix 20mg IV x1. Admitted to Medicine for further management. (02 Aug 2022 05:19)      Pulmonology HPI:     PAST MEDICAL & SURGICAL HISTORY:  HTN (hypertension)      Afib  s/p ablation x3, on Eliquis      HLD (hyperlipidemia)      CVA (cerebral vascular accident)      Anxiety and depression      S/P popliteal-tibial bypass      Status post ORIF of fracture of ankle  s/p rght TR          MEDICATIONS  (STANDING):  apixaban 5 milliGRAM(s) Oral two times a day  atorvastatin 40 milliGRAM(s) Oral at bedtime  escitalopram 5 milliGRAM(s) Oral daily  furosemide   Injectable 20 milliGRAM(s) IV Push daily  metoprolol tartrate 100 milliGRAM(s) Oral two times a day  spironolactone 25 milliGRAM(s) Oral daily    MEDICATIONS  (PRN):  acetaminophen     Tablet .. 650 milliGRAM(s) Oral every 6 hours PRN Temp greater or equal to 38C (100.4F), Mild Pain (1 - 3)  ALPRAZolam 0.25 milliGRAM(s) Oral daily PRN Anxiety  ondansetron    Tablet 8 milliGRAM(s) Oral every 8 hours PRN Nausea and/or Vomiting      Social History:  Never cigarette smoker, EtOH drinker, or illicit/recreational drug user. (02 Aug 2022 05:19)        Vital Signs Last 24 Hrs  T(C): 36.6 (03 Aug 2022 04:12), Max: 36.8 (02 Aug 2022 16:05)  T(F): 97.8 (03 Aug 2022 04:12), Max: 98.2 (02 Aug 2022 16:05)  HR: 71 (03 Aug 2022 04:12) (65 - 82)  BP: 151/60 (03 Aug 2022 04:12) (101/63 - 151/60)  BP(mean): 82 (02 Aug 2022 16:05) (81 - 82)  RR: 17 (03 Aug 2022 04:12) (17 - 18)  SpO2: 95% (03 Aug 2022 04:12) (95% - 100%)    Parameters below as of 03 Aug 2022 04:12  Patient On (Oxygen Delivery Method): nasal cannula  O2 Flow (L/min): 3    General: WN/WD NAD  Neurology: A&Ox3, nonfocal, PAGAN x 4  Eyes: PERRLA/ EOMI, Gross vision intact  ENT/Neck: Neck supple, trachea midline, No JVD, Gross hearing intact  Respiratory: CTA B/L, No wheezing, rales, rhonchi  CV: RRR, +S1/S2, -S3/S4, no murmurs, rubs or gallops  Abdominal: Soft, NT, ND +BS, No HSM  MSK: 5/5 strength UE/LE bilaterally  Extremities: No edema, 2+ peripheral pulses  Skin: No Rashes, Hematoma, Ecchymosis  Incisions:   Tubes:        08-03    140  |  104  |  16  ----------------------------<  100<H>  3.7   |  23  |  0.67    Ca    8.4      03 Aug 2022 07:18  Phos  3.0     08-03  Mg     1.9     08-03    TPro  6.2  /  Alb  3.3  /  TBili  0.8  /  DBili  x   /  AST  37  /  ALT  26  /  AlkPhos  67  08-03                            9.1    4.13  )-----------( 386      ( 03 Aug 2022 07:16 )             29.5     Urinalysis Basic - ( 01 Aug 2022 20:23 )    Color: LIGHT BROWN / Appearance: Slightly Turbid / S.006 / pH: x  Gluc: x / Ketone: Negative  / Bili: Negative / Urobili: Negative   Blood: x / Protein: Trace / Nitrite: Negative   Leuk Esterase: Negative / RBC: 846 /hpf / WBC 3 /HPF   Sq Epi: x / Non Sq Epi: 4 /hpf / Bacteria: Negative        Culture - Urine (collected 01 Aug 2022 20:23)  Source: Clean Catch Clean Catch (Midstream)  Final Report (02 Aug 2022 23:33):    <10,000 CFU/mL Normal Urogenital Gisselle      SARS-CoV-2: NotDetec (02 Aug 2022 07:50)  COVID-19 PCR: NotDetec (01 Aug 2022 18:41)  SARS-CoV-2: NotDetec (25 May 2022 23:55)     Patient is a 76y old  Female who presents with a chief complaint of hypoxia, worsening GALLARDO (02 Aug 2022 13:27)      HPI:  76F w/ hx of Stage IV pancreatic cancer c/b lymphangitic spread to lung (on Gemzar/Abraxane q2 weekly, most recent dose 22), CVA, Afib s/p ablation, on Eliquis, HTN, HLD, presenting with worsening GALLARDO x2 weeks and acute hypoxia (85% on RA) after receiving chemo. Pt denied SOB at rest. Pt reported being on this chemo regimen since Dec 2021 and has not had similar symptoms, only chronic fatigue and nausea at baseline. Only other time she remembers having some SOB was when she was found to have a viral PNA in the past. Has a stuffy nose, but stated she often has it due to her seasonal allergies. Denied f/c/v, cough, sore throat, runny nose, CP, abdominal pain, dysuria, diarrhea, hematochezia, melena, headache, dizziness. No new leg swelling, but has some trace RLE swelling since her R hip surgery. Has hematuria which she has been seeing Urology for, presumed to be effect from her chemo, but is pending a biopsy later this month.    In ED: Tmax 98.1F oral, HR 60s-80s, SBP 130s-180s, RR 18-20, sating 95-99% on 2L O2NC. COVID-19 PCR negative. CXR with b/l patchy opacities and pleural effusions. CTA chest was negative for PE, notable for diffuse bilateral GGOs and interval increase in pleural effusions, favoring interstitial pulmonary edema. Received cefepime 1g x1 and Lasix 20mg IV x1. Admitted to Medicine for further management. (02 Aug 2022 05:19)      Pulmonology HPI:     PAST MEDICAL & SURGICAL HISTORY:  HTN (hypertension)      Afib  s/p ablation x3, on Eliquis      HLD (hyperlipidemia)      CVA (cerebral vascular accident)      Anxiety and depression      S/P popliteal-tibial bypass      Status post ORIF of fracture of ankle  s/p rght TR          MEDICATIONS  (STANDING):  apixaban 5 milliGRAM(s) Oral two times a day  atorvastatin 40 milliGRAM(s) Oral at bedtime  escitalopram 5 milliGRAM(s) Oral daily  furosemide   Injectable 20 milliGRAM(s) IV Push daily  metoprolol tartrate 100 milliGRAM(s) Oral two times a day  spironolactone 25 milliGRAM(s) Oral daily    MEDICATIONS  (PRN):  acetaminophen     Tablet .. 650 milliGRAM(s) Oral every 6 hours PRN Temp greater or equal to 38C (100.4F), Mild Pain (1 - 3)  ALPRAZolam 0.25 milliGRAM(s) Oral daily PRN Anxiety  ondansetron    Tablet 8 milliGRAM(s) Oral every 8 hours PRN Nausea and/or Vomiting      Social History:  Never cigarette smoker, EtOH drinker, or illicit/recreational drug user. (02 Aug 2022 05:19)        Vital Signs Last 24 Hrs  T(C): 36.6 (03 Aug 2022 04:12), Max: 36.8 (02 Aug 2022 16:05)  T(F): 97.8 (03 Aug 2022 04:12), Max: 98.2 (02 Aug 2022 16:05)  HR: 71 (03 Aug 2022 04:12) (65 - 82)  BP: 151/60 (03 Aug 2022 04:12) (101/63 - 151/60)  BP(mean): 82 (02 Aug 2022 16:05) (81 - 82)  RR: 17 (03 Aug 2022 04:12) (17 - 18)  SpO2: 95% (03 Aug 2022 04:12) (95% - 100%)    Parameters below as of 03 Aug 2022 04:12  Patient On (Oxygen Delivery Method): nasal cannula  O2 Flow (L/min): 3    General: WN/WD NAD, resting comfortably in chair  Neurology: A&Ox3, nonfocal, PAGAN x 4  Eyes: PERRLA/ EOMI, Gross vision intact  ENT/Neck: Neck supple, trachea midline, No JVD, Gross hearing intact  Respiratory: CTA throughout right lung fields anteriorly and posteriorly., inspiratory crackles in posterior left lung base, CTA in anterior lung fields and posterior apices.  No wheezing, rales, rhonchi  CV: RRR, +S1/S2, -S3/S4, no murmurs, rubs or gallops  Abdominal: Soft, NT, ND +BS, No HSM  MSK: 5/5 strength UE/LE bilaterally  Extremities:  Chronic/Minimal RLE PItting edema, 2+ peripheral pulses  Skin: No Rashes, Hematoma, Ecchymosis  Incisions:   Tubes:            140  |  104  |  16  ----------------------------<  100<H>  3.7   |  23  |  0.67    Ca    8.4      03 Aug 2022 07:18  Phos  3.0     08-  Mg     1.9     08-    TPro  6.2  /  Alb  3.3  /  TBili  0.8  /  DBili  x   /  AST  37  /  ALT  26  /  AlkPhos  67  08-03                            9.1    4.13  )-----------( 386      ( 03 Aug 2022 07:16 )             29.5     Urinalysis Basic - ( 01 Aug 2022 20:23 )    Color: LIGHT BROWN / Appearance: Slightly Turbid / S.006 / pH: x  Gluc: x / Ketone: Negative  / Bili: Negative / Urobili: Negative   Blood: x / Protein: Trace / Nitrite: Negative   Leuk Esterase: Negative / RBC: 846 /hpf / WBC 3 /HPF   Sq Epi: x / Non Sq Epi: 4 /hpf / Bacteria: Negative        Culture - Urine (collected 01 Aug 2022 20:23)  Source: Clean Catch Clean Catch (Midstream)  Final Report (02 Aug 2022 23:33):    <10,000 CFU/mL Normal Urogenital Gisselle      SARS-CoV-2: NotDetec (02 Aug 2022 07:50)  COVID-19 PCR: NotDetec (01 Aug 2022 18:41)  SARS-CoV-2: NotDetec (25 May 2022 23:55)     Patient is a 76y old  Female who presents with a chief complaint of hypoxia, worsening GALLARDO (02 Aug 2022 13:27)      HPI:  76F w/ hx of Stage IV pancreatic cancer c/b lymphangitic spread to lung (on Gemzar/Abraxane q2 weekly, most recent dose 22), CVA, Afib s/p ablation, on Eliquis, HTN, HLD, presenting with worsening GALLARDO x2 weeks and acute hypoxia (85% on RA) after receiving chemo. Pt denied SOB at rest. Pt reported being on this chemo regimen since Dec 2021 and has not had similar symptoms, only chronic fatigue and nausea at baseline. Only other time she remembers having some SOB was when she was found to have a viral PNA in the past. Has a stuffy nose, but stated she often has it due to her seasonal allergies. Denied f/c/v, cough, sore throat, runny nose, CP, abdominal pain, dysuria, diarrhea, hematochezia, melena, headache, dizziness. No new leg swelling, but has some trace RLE swelling since her R hip surgery. Has hematuria which she has been seeing Urology for, presumed to be effect from her chemo, but is pending a biopsy later this month.    In ED: Tmax 98.1F oral, HR 60s-80s, SBP 130s-180s, RR 18-20, sating 95-99% on 2L O2NC. COVID-19 PCR negative. CXR with b/l patchy opacities and pleural effusions. CTA chest was negative for PE, notable for diffuse bilateral GGOs and interval increase in pleural effusions, favoring interstitial pulmonary edema. Received cefepime 1g x1 and Lasix 20mg IV x1. Admitted to Medicine for further management. (02 Aug 2022 05:19)      Pulmonology HPI:     REVIEW OF SYSTEMS  General: no sweating; fever; chills; anorexia; weight loss: malaise  Skin/Breast: no rash; no itching; no dryness	  Ophthalmologic: no diplopia; no photophobia; no lacrimation L; no lacrimation R; No eye pain, no visual changes	  ENMT: no hearing difficulty; no ear pain; no tinnitus; no vertigo; no sinus symptoms: no throat pain  oral pain with lesions.  Respiratory and Thorax: + GALLARDO/SOB, increased wob, no fevers, chills cough, wheezing, or hemoptysis.   Cardiovascular: no chest pain; no palpitations;; no orthopnea, no pedal edema  Gastrointestinal: no nausea; no vomiting; no melena; no hematochezia; no jaundice; no diarrhea, no abminal pain.   Genitourinary: no hematuria; no renal colic; no flank pain L; no flank pain R; no urine discoloration; no dysuria  Musculoskeletal: no arthralgia; no arthritis; no joint swelling; no myalgia  Neurological: no weakness; no headache; no loss of consciousness; no confusion  Psychiatric: no suicidal ideation; no depression; no anxiety; no insomnia  Hematology/Lymphatics: no gum bleeding; no nose bleeding; no skin lumps  Endocrine No heat/cold intolerence, no polydipsia, or polyuria.  PAST MEDICAL & SURGICAL HISTORY:  HTN (hypertension)      Afib  s/p ablation x3, on Eliquis      HLD (hyperlipidemia)      CVA (cerebral vascular accident)      Anxiety and depression      S/P popliteal-tibial bypass      Status post ORIF of fracture of ankle  s/p rght TR          MEDICATIONS  (STANDING):  apixaban 5 milliGRAM(s) Oral two times a day  atorvastatin 40 milliGRAM(s) Oral at bedtime  escitalopram 5 milliGRAM(s) Oral daily  furosemide   Injectable 20 milliGRAM(s) IV Push daily  metoprolol tartrate 100 milliGRAM(s) Oral two times a day  spironolactone 25 milliGRAM(s) Oral daily    MEDICATIONS  (PRN):  acetaminophen     Tablet .. 650 milliGRAM(s) Oral every 6 hours PRN Temp greater or equal to 38C (100.4F), Mild Pain (1 - 3)  ALPRAZolam 0.25 milliGRAM(s) Oral daily PRN Anxiety  ondansetron    Tablet 8 milliGRAM(s) Oral every 8 hours PRN Nausea and/or Vomiting      Social History:  Never cigarette smoker, EtOH drinker, or illicit/recreational drug user    FAMILY HISTORY:  No pertinent family history in first degree relatives. No pertinent family history of: asthma.    Vital Signs Last 24 Hrs  T(C): 36.6 (03 Aug 2022 04:12), Max: 36.8 (02 Aug 2022 16:05)  T(F): 97.8 (03 Aug 2022 04:12), Max: 98.2 (02 Aug 2022 16:05)  HR: 71 (03 Aug 2022 04:12) (65 - 82)  BP: 151/60 (03 Aug 2022 04:12) (101/63 - 151/60)  BP(mean): 82 (02 Aug 2022 16:05) (81 - 82)  RR: 17 (03 Aug 2022 04:12) (17 - 18)  SpO2: 95% (03 Aug 2022 04:12) (95% - 100%)    Parameters below as of 03 Aug 2022 04:12  Patient On (Oxygen Delivery Method): nasal cannula  O2 Flow (L/min): 3    General: WN/WD NAD, resting comfortably in chair  Neurology: A&Ox3, nonfocal, PAGAN x 4  Eyes: PERRLA/ EOMI, Gross vision intact  ENT/Neck: Neck supple, trachea midline, No JVD, Gross hearing intact  Respiratory: CTA throughout right lung fields anteriorly and posteriorly., inspiratory crackles in posterior left lung base, CTA in anterior lung fields and posterior apices.  No wheezing, rales, rhonchi  CV: RRR, +S1/S2, -S3/S4, no murmurs, rubs or gallops  Abdominal: Soft, NT, ND +BS, No HSM  MSK: 5/5 strength UE/LE bilaterally  Extremities:  Chronic/Minimal RLE PItting edema, 2+ peripheral pulses  Skin: No Rashes, Hematoma, Ecchymosis  Incisions:   Tubes:        08-03    140  |  104  |  16  ----------------------------<  100<H>  3.7   |  23  |  0.67    Ca    8.4      03 Aug 2022 07:18  Phos  3.0     08-03  Mg     1.9     08-03    TPro  6.2  /  Alb  3.3  /  TBili  0.8  /  DBili  x   /  AST  37  /  ALT  26  /  AlkPhos  67  08-03                            9.1    4.13  )-----------( 386      ( 03 Aug 2022 07:16 )             29.5     Urinalysis Basic - ( 01 Aug 2022 20:23 )    Color: LIGHT BROWN / Appearance: Slightly Turbid / S.006 / pH: x  Gluc: x / Ketone: Negative  / Bili: Negative / Urobili: Negative   Blood: x / Protein: Trace / Nitrite: Negative   Leuk Esterase: Negative / RBC: 846 /hpf / WBC 3 /HPF   Sq Epi: x / Non Sq Epi: 4 /hpf / Bacteria: Negative        Culture - Urine (collected 01 Aug 2022 20:23)  Source: Clean Catch Clean Catch (Midstream)  Final Report (02 Aug 2022 23:33):    <10,000 CFU/mL Normal Urogenital Gisselle      SARS-CoV-2: NotDetec (02 Aug 2022 07:50)  COVID-19 PCR: NotDetec (01 Aug 2022 18:41)  SARS-CoV-2: NotDetec (25 May 2022 23:55)

## 2022-08-03 NOTE — DISCHARGE NOTE PROVIDER - HOSPITAL COURSE
76F w/ hx of Stage IV pancreatic cancer c/b lymphangitic spread to lung (on Gemzar/Abraxane q2 weekly, most recent dose 8/1/22), CVA, Afib s/p ablation, on Eliquis, HTN, HLD, presenting with worsening GALLARDO x2 weeks and acute hypoxia (85% on RA) after receiving chemo. Pt reported being on this chemo regimen since Dec 2021 and has not had similar symptoms, Remaining ROS (-). No new leg swelling, but has some trace RLE swelling since her R hip surgery. Has hematuria which she has been seeing Urology for, presumed to be effect from her chemo, but is pending a biopsy later this month.    In ED: Tmax 98.1F oral, HR 60s-80s, SBP 130s-180s, RR 18-20, sating 95-99% on 2L O2NC. COVID-19 PCR negative. CXR with b/l patchy opacities and pleural effusions. CTA chest was negative for PE, notable for diffuse bilateral GGOs and interval increase in pleural effusions, favoring interstitial pulmonary edema. Received cefepime 1g x1 and Lasix 20mg IV x1. (-) RVP, COVID. . Diff: chemo side effects vs cardiac vs infection vs cancer spread.    Pulmonology following during medicine admission. Started Prednisone 50 mg for suspected pneumonitis. 76F w/ hx of Stage IV pancreatic cancer c/b lymphangitic spread to lung (on Gemzar/Abraxane q2 weekly, most recent dose 8/1/22), CVA, Afib s/p ablation, on Eliquis, HTN, HLD, presenting with worsening GALLARDO x2 weeks and acute hypoxia (85% on RA) after receiving chemo. Pt reported being on this chemo regimen since Dec 2021 and has not had similar symptoms, Remaining ROS (-). No new leg swelling, but has some trace RLE swelling since her R hip surgery. Has hematuria which she has been seeing Urology for, presumed to be effect from her chemo, but is pending a biopsy later this month.    In ED: Tmax 98.1F oral, HR 60s-80s, SBP 130s-180s, RR 18-20, sating 95-99% on 2L O2NC. COVID-19 PCR negative. CXR with b/l patchy opacities and pleural effusions. CTA chest was negative for PE, notable for diffuse bilateral GGOs and interval increase in pleural effusions, favoring interstitial pulmonary edema. Received cefepime 1g x1 and Lasix 20mg IV x1. (-) RVP, COVID. . Diff: chemo side effects vs cardiac vs infection vs cancer spread.    Pulmonology following during medicine admission. Started Prednisone 50 mg for two doses for suspected pneumonitis. Continued to diuresis. Stable per heme/onc perspective. CT after 1 week diuresis showed interval improvement in right pleural effusion. 76F w/ hx of Stage IV pancreatic cancer c/b lymphangitic spread to lung (on Gemzar/Abraxane q2 weekly, most recent dose 8/1/22), CVA, Afib s/p ablation, on Eliquis, HTN, HLD, presenting with worsening GALLARDO x2 weeks and acute hypoxia (85% on RA) after receiving chemo. Pt reported being on this chemo regimen since Dec 2021 and has not had similar symptoms, Remaining ROS (-). No new leg swelling, but has some trace RLE swelling since her R hip surgery. Has hematuria which she has been seeing Urology for, presumed to be effect from her chemo, but is pending a biopsy later this month. In ED: Tmax 98.1F oral, HR 60s-80s, SBP 130s-180s, RR 18-20, sating 95-99% on 2L O2NC. COVID-19 PCR negative. CXR with b/l patchy opacities and pleural effusions. CTA chest was negative for PE, notable for diffuse bilateral GGOs and interval increase in pleural effusions, favoring interstitial pulmonary edema. Received cefepime 1g x1 and Lasix 20mg IV x1. (-) RVP, COVID. . Diff: chemo side effects vs cardiac vs infection vs cancer spread.    Pulmonology following during medicine admission. Started Prednisone 50 mg for two doses for suspected pneumonitis. Continued to diuresis. CT after 1 week diuresis showed interval improvement w/ decreased ggc. Pt was ambulated and ambulation sat improved from 85% to >90% on day of d/c. Stable per heme/onc perspective. No need for inpt chemo. Pt vital, labs stable (stable cr/bun on lasix) on day of d/c. Pt will continue w/ diuresis out-pt and will f/u w/ Dr. Nielson (heme-onc) out-pt. Etiology for SOb determined to be non-cardiogenic pulm edema 2/2 to Gemcitabine chemo.

## 2022-08-03 NOTE — CONSULT NOTE ADULT - ASSESSMENT
76F w/ hx of Stage IV pancreatic cancer c/b lymphangitic spread to lung (on Gemzar/Abraxane q2 weekly, most recent dose 8/1/22), CVA, Afib s/p ablation, on Eliquis, HTN, HLD, presenting with worsening GALLARDO x2 weeks and acute hypoxia (85% on RA) after receiving chemo. CTA chest negative for PE but notable for new diffuse b/l GGOs and interval increase in pleural effusions consistent with interstitial pulmonary edema. Concern for possible cardiac etiology vs adverse effect from chemo vs viral infection.    INCOMPLETE: Pending discussion with attending 76F w/ hx of Stage IV pancreatic cancer c/b lymphangitic spread to lung (on Gemzar/Abraxane q2 weekly, most recent dose 8/1/22), CVA, Afib s/p ablation, on Eliquis, HTN, HLD, presenting with worsening GALLARDO x2 weeks and during most recent chemo session found to be acutely hypoxic to 85% on RA. CTA chest negative for PE but notable for new diffuse b/l GGOs and interval increase in pleural effusions consistent with interstitial pulmonary edema. TTE w/ EF of 75% 8/2/22. Also, she is tolerating titration down of nasal cannula oxygenation.     c/f progression of metastatic disease (given known lymphangitic spread, however less likely given relatively acute/subacute development of GGOs between CT scans between 7/25 and 8/1) vs pneumonitis (multiple case reports depicting development of steroid responsive pneumonitis seen in the literature ie gemcitabine induced pneumonitis: a case report and review of the literature) vs less-likely an infectious etiology given negative RVP/Covid and not otherwise meeting SIRS criteria.     INCOMPLETE: Pending discussion with attending 76F w/ hx of Stage IV pancreatic cancer c/b lymphangitic spread to lung (on Gemzar/Abraxane q2 weekly, most recent dose 8/1/22), CVA, Afib s/p ablation, on Eliquis, HTN, HLD, presenting with worsening GALLARDO x2 weeks and during most recent chemo session found to be acutely hypoxic to 85% on RA. CTA chest negative for PE but notable for new diffuse b/l GGOs and interval increase in pleural effusions consistent with interstitial pulmonary edema. TTE w/ EF of 75% 8/2/22. Also, she is tolerating titration down of nasal cannula oxygenation.     c/f progression of metastatic disease (given known lymphangitic spread, however less likely given relatively acute/subacute development of GGOs between CT scans between 7/25 and 8/1) vs pneumonitis (multiple case reports depicting development of steroid responsive pneumonitis seen in the literature ie gemcitabine induced pneumonitis: a case report and review of the literature) vs less-likely an infectious etiology given negative RVP/Covid and not otherwise meeting SIRS criteria.     Pulmonology Recs  [ ] 50mg prednisone PO daily, will titrate dose and duration per symptom relief  [ ] diurese as per primary team      Pulmonology will continue to follow.  Case d/w attending physician. 76F w/ hx of Stage IV pancreatic cancer c/b lymphangitic spread to lung (on Gemzar/Abraxane q2 weekly, most recent dose 8/1/22), CVA, Afib s/p ablation, on Eliquis, HTN, HLD, presenting with worsening GALLARDO x2 weeks and during most recent chemo session found to be acutely hypoxic to 85% on RA. CTA chest negative for PE but notable for new diffuse b/l GGOs and interval increase in pleural effusions consistent with interstitial pulmonary edema. TTE w/ EF of 75% 8/2/22. Also, she is tolerating titration down of nasal cannula oxygenation.     c/f progression of metastatic disease (given known lymphangitic spread, however less likely given relatively acute/subacute development of GGOs between CT scans between 7/25 and 8/1) vs pneumonitis (multiple case reports depicting development of steroid responsive pneumonitis seen in the literature ie gemcitabine induced pneumonitis: a case report and review of the literature) vs less-likely an infectious etiology given negative RVP/Covid and not otherwise meeting SIRS criteria.     Pulmonology Recs  [ ] 50mg prednisone PO daily for treatment of suspected mild pneumonitis, will titrate dose and duration per symptom relief  [ ] diurese as per primary team      Pulmonology will continue to follow.  Case d/w attending physician. 76F w/ hx of Stage IV pancreatic cancer c/b lymphangitic spread to lung (on Gemzar/Abraxane q2 weekly, most recent dose 8/1/22), CVA, Afib s/p ablation, on Eliquis, HTN, HLD, presenting with worsening GALLARDO x2 weeks and during most recent chemo session found to be acutely hypoxic to 85% on RA. CTA chest negative for PE but notable for new diffuse b/l GGOs and interval increase in pleural effusions consistent with interstitial pulmonary edema. TTE w/ EF of 75% 8/2/22. Also, she is tolerating titration down of nasal cannula oxygenation.     c/f progression of metastatic disease (given known lymphangitic spread, however less likely given relatively acute/subacute development of GGOs between CT scans between 7/25 and 8/1) vs pneumonitis (multiple case reports depicting development of steroid responsive pneumonitis seen in the literature ie gemcitabine induced pneumonitis: a case report and review of the literature as well as an increased risk of interstitial pneumonitis on combined paclitaxel and gemcitabine therapy seen in other forms of cancer Phase II trial of weekly paclitaxel and gemcitabine for previously untreated, stage IIIB-IV nonsmall cell lung cancer) vs less-likely an infectious etiology given negative RVP/Covid and not otherwise meeting SIRS criteria.     Pulmonology Recs  [ ] 50mg prednisone PO daily for treatment of suspected mild pneumonitis, will titrate dose and duration per symptom relief  [ ] diurese as per primary team    Pulmonology will continue to follow.  Case d/w attending physician.

## 2022-08-03 NOTE — DISCHARGE NOTE PROVIDER - NSDCFUADDAPPT_GEN_ALL_CORE_FT
Please follow up with your preferred Oncologist (Dr. Nielson) for your normal scheduled appointments. Also follow up with your primary care within one week of discharge from hospital. Return to ED if your initial symptoms worsen.

## 2022-08-03 NOTE — DISCHARGE NOTE PROVIDER - NSDCMRMEDTOKEN_GEN_ALL_CORE_FT
Afrin 0.05% nasal spray: 2 spray(s) nasal 2 times a day, As Needed  aspirin 81 mg oral delayed release tablet: 1 tab(s) orally once a day (at bedtime)  atorvastatin 40 mg oral tablet: 1 tab(s) orally once a day (at bedtime)  clotrimazole-betamethasone dipropionate 1%-0.05% topical cream: Apply topically to affected area 2 times a day, As Needed for itching/rash under breast  Eliquis 5 mg oral tablet: 1 tab(s) orally 2 times a day  escitalopram 5 mg oral tablet: 1 tab(s) orally once a day  lidocaine-prilocaine 2.5%-2.5% topical cream: Apply topically to affected area once, As Needed prior to mediport access for chemo  Melatonin 5 mg oral tablet: 1 tab(s) orally once a day (at bedtime)  Metoprolol Tartrate 100 mg oral tablet: 1 tab(s) orally 2 times a day  ondansetron 8 mg oral tablet: 1 tab(s) orally every 8 hours, As Needed -for nausea   prochlorperazine 10 mg oral tablet: 1 tab(s) orally once a day, As Needed for nausea  spironolactone 25 mg oral tablet: 1 tab(s) orally once a day  Vitamin D3 25 mcg (1000 intl units) oral tablet: 1 tab(s) orally once a day  Xanax 0.25 mg oral tablet: 1 tab(s) orally once a day, As Needed   Afrin 0.05% nasal spray: 2 spray(s) nasal 2 times a day, As Needed  aspirin 81 mg oral delayed release tablet: 1 tab(s) orally once a day (at bedtime)  atorvastatin 40 mg oral tablet: 1 tab(s) orally once a day (at bedtime)  clotrimazole-betamethasone dipropionate 1%-0.05% topical cream: Apply topically to affected area 2 times a day, As Needed for itching/rash under breast  Eliquis 5 mg oral tablet: 1 tab(s) orally 2 times a day  escitalopram 5 mg oral tablet: 1 tab(s) orally once a day  Lasix 40 mg oral tablet: 1 tab(s) orally once a day  lidocaine-prilocaine 2.5%-2.5% topical cream: Apply topically to affected area once, As Needed prior to mediport access for chemo  Melatonin 5 mg oral tablet: 1 tab(s) orally once a day (at bedtime)  Metoprolol Tartrate 100 mg oral tablet: 1 tab(s) orally 2 times a day  ondansetron 8 mg oral tablet: 1 tab(s) orally every 8 hours, As Needed -for nausea   prochlorperazine 10 mg oral tablet: 1 tab(s) orally once a day, As Needed for nausea  spironolactone 25 mg oral tablet: 1 tab(s) orally once a day  Vitamin D3 25 mcg (1000 intl units) oral tablet: 1 tab(s) orally once a day  Xanax 0.25 mg oral tablet: 1 tab(s) orally once a day, As Needed

## 2022-08-03 NOTE — PROGRESS NOTE ADULT - PROBLEM SELECTOR PLAN 1
Hypoxia to 85% on RA after chemo session & GALLARDO x2 weeks. No SOB at rest. CTA chest (8/1): (-) for PE but notable b/l GGOs and interval increase in pleural effusions consistent with interstitial pulmonary edema. COVID (-). s/p lasix, ceftriaxone. EKG (8/1): SR w/ 1st degree AV block. Serum pro-BNP (8/1): 811. (-) RVP. TTE UR. Diff likely 2/2 interstitial pulmonary edema in setting of adverse effect from chemo vs cardiac etiology  - c/w home spironolactone 25mg daily  - c/w daily lasix  - Per Pulm, etiology likely 2/2 chemo; Pred 50 mg & will taper based on clinical improvement  - strict I/Os

## 2022-08-03 NOTE — DISCHARGE NOTE PROVIDER - CARE PROVIDER_API CALL
HAMMAD CORTES  Internal Medicine  [address] [place] [address]  Phone: [phone]  Fax: [phone]  Established Patient  Follow Up Time:

## 2022-08-03 NOTE — PROGRESS NOTE ADULT - SUBJECTIVE AND OBJECTIVE BOX
Anshu Longoria  PGY-1 Resident Physician     Patient is a 76y old  Female who presents with a chief complaint of hypoxia, worsening GALLARDO (03 Aug 2022 10:38)      SUBJECTIVE / OVERNIGHT EVENTS:  NAEON. Sating well on NC. States she does not need oxygen when using restroom. Otherwise denies remaining ROS.     MEDICATIONS  (STANDING):  apixaban 5 milliGRAM(s) Oral two times a day  atorvastatin 40 milliGRAM(s) Oral at bedtime  chlorhexidine 2% Cloths 1 Application(s) Topical daily  escitalopram 5 milliGRAM(s) Oral daily  furosemide   Injectable 20 milliGRAM(s) IV Push daily  metoprolol tartrate 100 milliGRAM(s) Oral two times a day  predniSONE   Tablet 50 milliGRAM(s) Oral daily  spironolactone 25 milliGRAM(s) Oral daily    MEDICATIONS  (PRN):  acetaminophen     Tablet .. 650 milliGRAM(s) Oral every 6 hours PRN Temp greater or equal to 38C (100.4F), Mild Pain (1 - 3)  ALPRAZolam 0.25 milliGRAM(s) Oral daily PRN Anxiety  ondansetron    Tablet 8 milliGRAM(s) Oral every 8 hours PRN Nausea and/or Vomiting    Allergies    No Known Drug Allergies  Seasonal allergies - stuffy nose (Other)  shellfish (Other (Moderate))    Intolerances        Vital Signs Last 24 Hrs  T(C): 36.6 (03 Aug 2022 04:12), Max: 36.8 (02 Aug 2022 16:05)  T(F): 97.8 (03 Aug 2022 04:12), Max: 98.2 (02 Aug 2022 16:05)  HR: 71 (03 Aug 2022 04:12) (65 - 82)  BP: 151/60 (03 Aug 2022 04:12) (101/63 - 151/60)  BP(mean): 82 (02 Aug 2022 16:05) (81 - 82)  RR: 17 (03 Aug 2022 04:12) (17 - 18)  SpO2: 95% (03 Aug 2022 04:12) (95% - 100%)    Parameters below as of 03 Aug 2022 04:12  Patient On (Oxygen Delivery Method): nasal cannula  O2 Flow (L/min): 3    Daily     Daily Weight in k.3 (03 Aug 2022 05:52)  I&O's Summary    03 Aug 2022 07:01  -  03 Aug 2022 11:27  --------------------------------------------------------  IN: 240 mL / OUT: 0 mL / NET: 240 mL        PHYSICAL EXAM:  GENERAL: NAD, well-developed  HEAD:  Atraumatic, Normocephalic  EYES: EOMI, PERRLA, conjunctiva and sclera clear  NECK: Supple, No JVD  CHEST/LUNG: Clear to auscultation bilaterally; No wheeze  HEART: Regular rate and rhythm; Normal S1 S2, No murmurs, rubs, or gallops  ABDOMEN: Soft, Nontender, Nondistended; Bowel sounds present  EXTREMITIES:  2+ Peripheral Pulses, No clubbing, cyanosis, or edema  PSYCH: AAOx3  NEUROLOGY: non-focal  SKIN: No rashes or lesions    DIAGNOSTICS:                         9.1    4.13  )-----------( 386      ( 03 Aug 2022 07:16 )             29.5     Hgb Trend: 9.1<--, 10.7<--, 11.5<--, 10.3<--  08-03    140  |  104  |  16  ----------------------------<  100<H>  3.7   |  23  |  0.67    Ca    8.4      03 Aug 2022 07:18  Phos  3.0     08-03  Mg     1.9     08-03    TPro  6.2  /  Alb  3.3  /  TBili  0.8  /  DBili  x   /  AST  37  /  ALT  26  /  AlkPhos  67  08-03    CAPILLARY BLOOD GLUCOSE        Creatinine Trend: 0.67<--, 0.69<--, 0.68<--, 0.70<--, 0.67<--, 0.79<--  LIVER FUNCTIONS - ( 03 Aug 2022 07:18 )  Alb: 3.3 g/dL / Pro: 6.2 g/dL / ALK PHOS: 67 U/L / ALT: 26 U/L / AST: 37 U/L / GGT: x                 Urinalysis Basic - ( 01 Aug 2022 20:23 )    Color: LIGHT BROWN / Appearance: Slightly Turbid / S.006 / pH: x  Gluc: x / Ketone: Negative  / Bili: Negative / Urobili: Negative   Blood: x / Protein: Trace / Nitrite: Negative   Leuk Esterase: Negative / RBC: 846 /hpf / WBC 3 /HPF   Sq Epi: x / Non Sq Epi: 4 /hpf / Bacteria: Negative

## 2022-08-03 NOTE — DISCHARGE NOTE PROVIDER - NSDCFUSCHEDAPPT_GEN_ALL_CORE_FT
Alexandre Pete  [facility]  [facility] Pre [facility]  Scheduled Appointment: 08/09/2022    John Riley  [facility] Physician [facility]  [facility] [address] [facility] [address]  Scheduled Appointment: 08/12/2022    [facility] Physician [facility]  [facility]  Scheduled Appointment: 08/15/2022    [facility]  [facility]  Scheduled Appointment: 08/15/2022    Alexandre Pete  [facility]  Scheduled Appointment: 08/20/2022    Alexandre Pete  [place] Physician [facility]  UROLOGY [address]  Scheduled Appointment: 08/23/2022    Alexandre Pete  [facility]  Scheduled Appointment: 08/23/2022    [facility]  [facility]  Scheduled Appointment: 08/29/2022    [facility]  [facility]  Scheduled Appointment: 09/12/2022    [facility]  [facility]  Scheduled Appointment: 09/26/2022    [facility]  [facility]  Scheduled Appointment: 10/10/2022     [facility] Pete  [facility]  Scheduled Appointment: 08/09/2022    John Riley  [facility] Physician [facility]  [facility] [address]  Scheduled Appointment: 08/12/2022    [facility]  [facility]  Scheduled Appointment: 08/15/2022    [facility]  Scheduled Appointment: 08/15/2022    Alexandre Pete  [facility]  Scheduled Appointment: 08/20/2022    Yanci Alexandre  [facility]  Scheduled Appointment: 08/23/2022    [facility]  [facility]  Scheduled Appointment: 08/29/2022    [facility]  [facility]  Scheduled Appointment: 09/12/2022    [facility]  [facility]  Scheduled Appointment: 09/26/2022    [facility]  Scheduled Appointment: 10/10/2022

## 2022-08-03 NOTE — CONSULT NOTE ADULT - ATTENDING COMMENTS
Patient seen and examined with the resident at bedside. Agree with above.     76F w/ hx of Stage IV pancreatic cancer c/b lymphangitic spread to lung (on Gemzar/Abraxane q2 weekly, most recent dose 8/1/22), CVA, Afib s/p ablation, on Eliquis, HTN, HLD, presenting with worsening GALLARDO x2 weeks and during most recent chemo session found to be acutely hypoxic to 85% on RA.    Patient had outpatient CT showing areas of pulmonary mets and interlobular septal thickening in the last two CT scans. CT here showing worsening GGO and b/l effusions although CT was expiratory. Has been on Gemcitabine without issues in the past. No signs of infection or atypical infections, BNP in the 800s, not overloaded on exam, known metastatic disease with likely metastatic pleural effusions too small for sample. CT concerning for progression of disease with mets/ lymphangitic spread vs pneumonitis. Less likely infectious and cardiogenic. Currently on minimal NC at rest.     # Abnormal CT scan  # metastatic pancreatic cancer with pulmonary meds and lymphangitic spread.  # Acute hypoxemic RF on O2  # GALLARDO/SOB  - Less likely infectious or cardiogenic, mets/lymphagtic spread, b/l effusions seen on previous CT scan.   - It is reasonable to trial on prednisone for chemo induced pneumonitis given low suspicion for infection.  - Should get repeat CT scan in 4-6 weeks. Was expiratory CT on this admission.   - Start prednisone .5mg/kg (50mg) prednisone daily. Monitor FSG  - Duration and taper based on clinical improvement.   - C/W lasix, will continue to monitor effusion, too small to tap    D/W with Pulm fellow. Patient seen and examined with the resident at bedside. Agree with above.     76F w/ hx of Stage IV pancreatic cancer c/b lymphangitic spread to lung (on Gemzar/Abraxane q2 weekly, most recent dose 8/1/22), CVA, Afib s/p ablation, on Eliquis, HTN, HLD, presenting with worsening GALLARDO x2 weeks and during most recent chemo session found to be acutely hypoxic to 85% on RA.    Patient had outpatient CT showing areas of pulmonary mets and interlobular septal thickening in the last two CT scans. CT here showing worsening GGO and b/l effusions although CT was expiratory. Has been on Gemcitabine without issues in the past. No signs of infection or atypical infections, BNP in the 800s, not overloaded on exam, known metastatic disease with likely metastatic pleural effusions too small for sample. CT concerning for progression of disease with mets/ lymphangitic spread vs pneumonitis. Less likely infectious and cardiogenic. Currently on minimal NC at rest.     # Abnormal CT scan  # metastatic pancreatic cancer with pulmonary meds and lymphangitic spread.   # Acute hypoxemic RF on O2  # GALLARDO/SOB  - Less likely infectious or cardiogenic, mets/lymphagtic spread, b/l effusions seen on previous CT scan.   - It is reasonable to trial on prednisone for chemo induced pneumonitis given low suspicion for infection.  - Should get repeat CT scan in 4-6 weeks. Was expiratory CT on this admission.   - Start prednisone .5mg/kg (50mg) prednisone daily. Monitor FSG  - Duration and taper based on clinical improvement.   - C/W lasix, will continue to monitor effusion, too small to tap    D/W with Pulm fellow.

## 2022-08-03 NOTE — DISCHARGE NOTE PROVIDER - NSDCCPCAREPLAN_GEN_ALL_CORE_FT
PRINCIPAL DISCHARGE DIAGNOSIS  Diagnosis: Shortness of breath  Assessment and Plan of Treatment: You were noted to have some shortness of breath when you came into the ED. Our exam was reflective of some mild wheezing. Our labs showed you had elevated white cells and radiology showed that you had some findings that were indicative of lung disase. We provided steroids & fluids pills to decrease the pulmonary damage. Repeat of the CT showed improvements of our findings.       PRINCIPAL DISCHARGE DIAGNOSIS  Diagnosis: Shortness of breath  Assessment and Plan of Treatment: You were noted to have some shortness of breath when you came into the ED. Our exam was reflective of some mild wheezing. Our labs showed you had elevated white cells and radiology showed that you had some findings that were indicative of lung disase. We provided steroids & fluids pills to decrease the pulmonary damage. Repeat of the CT showed improvements of our findings. You will continue taking lasix pill and will follow up with your oncologist out patient.

## 2022-08-04 NOTE — PROGRESS NOTE ADULT - PROBLEM SELECTOR PLAN 1
Hypoxia to 85% on RA after chemo session & GALLARDO x2 weeks. No SOB at rest. CTA chest (8/1): (-) for PE but notable b/l GGOs and interval increase in pleural effusions consistent with interstitial pulmonary edema. COVID (-). s/p lasix, ceftriaxone. EKG (8/1): SR w/ 1st degree AV block. Serum pro-BNP (8/1): 811. (-) RVP. TTE UR. Diff likely 2/2 interstitial pulmonary edema in setting of adverse effect from chemo vs cardiac etiology  - c/w home spironolactone 25mg daily  - c/w daily lasix; will monitor fluids status via POCUS  - c/w Pred 50 mg & will taper based on clinical improvement  - strict I/Os

## 2022-08-04 NOTE — PHYSICAL THERAPY INITIAL EVALUATION ADULT - PERTINENT HX OF CURRENT PROBLEM, REHAB EVAL
Continues neurology follow-up and cardiology follow-up given history of subdural hematoma and frontotemporal dementia.    His wife continues to be excellent caregiver at home and we discussed her need to get out and have some time for herself to help prevent caregiver fatigue.    Good home blood pressure checks with current dosing of enalapril and carvedilol.  His wife wants him to continue with lipid treatment with simvastatin.    Continue Zoloft for anxiety.    He does have his son now living at home to help his wife with his care.  
76F w/ hx of Stage IV pancreatic cancer c/b lymphangitic spread to lung (on Gemzar/Abraxane q2 weekly, most recent dose 8/1/22), CVA, Afib s/p ablation, on Eliquis, HTN, HLD, presenting with worsening GALLARDO x2 weeks and acute hypoxia (85% on RA) after receiving chemo. CTA chest negative for PE but notable for new diffuse b/l GGOs and interval increase in pleural effusions consistent with interstitial pulmonary edema.

## 2022-08-04 NOTE — GOALS OF CARE CONVERSATION - ADVANCED CARE PLANNING - SURROGATE NAME
Norma Daley  (daughter) [phone]  alternates Josh Steward (son) [phone] Alejandro Steward (son) [phone]

## 2022-08-04 NOTE — PROGRESS NOTE ADULT - SUBJECTIVE AND OBJECTIVE BOX
Anshu Longoria  PGY-1 Resident Physician     Patient is a 76y old  Female who presents with a chief complaint of hypoxia, worsening GALLARDO (04 Aug 2022 10:28)      SUBJECTIVE / OVERNIGHT EVENTS:  NAEON. States she is feeling better since admission. Noted desat during PT walk test to 85%; recovered to 92% w/ 2 min rest.     MEDICATIONS  (STANDING):  apixaban 5 milliGRAM(s) Oral two times a day  atorvastatin 40 milliGRAM(s) Oral at bedtime  chlorhexidine 2% Cloths 1 Application(s) Topical daily  escitalopram 5 milliGRAM(s) Oral daily  furosemide   Injectable 20 milliGRAM(s) IV Push daily  metoprolol tartrate 100 milliGRAM(s) Oral two times a day  spironolactone 25 milliGRAM(s) Oral daily    MEDICATIONS  (PRN):  acetaminophen     Tablet .. 650 milliGRAM(s) Oral every 6 hours PRN Temp greater or equal to 38C (100.4F), Mild Pain (1 - 3)  ALPRAZolam 0.25 milliGRAM(s) Oral daily PRN Anxiety  benzonatate 100 milliGRAM(s) Oral three times a day PRN Cough  ondansetron    Tablet 8 milliGRAM(s) Oral every 8 hours PRN Nausea and/or Vomiting    Allergies    No Known Drug Allergies  Seasonal allergies - stuffy nose (Other)  shellfish (Other (Moderate))    Intolerances        Vital Signs Last 24 Hrs  T(C): 36.6 (04 Aug 2022 04:31), Max: 37 (03 Aug 2022 20:16)  T(F): 97.9 (04 Aug 2022 04:31), Max: 98.6 (03 Aug 2022 20:16)  HR: 61 (04 Aug 2022 10:06) (61 - 83)  BP: 144/72 (04 Aug 2022 10:06) (104/62 - 144/72)  BP(mean): --  RR: 17 (04 Aug 2022 09:15) (17 - 19)  SpO2: 94% (04 Aug 2022 10:06) (85% - 96%)    Parameters below as of 04 Aug 2022 10:06  Patient On (Oxygen Delivery Method): room air      Daily     Daily Weight in k.5 (04 Aug 2022 06:27)  I&O's Summary    03 Aug 2022 07:01  -  04 Aug 2022 07:00  --------------------------------------------------------  IN: 840 mL / OUT: 0 mL / NET: 840 mL    04 Aug 2022 07:01  -  04 Aug 2022 11:34  --------------------------------------------------------  IN: 240 mL / OUT: 0 mL / NET: 240 mL        PHYSICAL EXAM:  GENERAL: NAD, well-developed  HEAD:  Atraumatic, Normocephalic  EYES: EOMI, PERRLA, conjunctiva and sclera clear  NECK: Supple, No JVD  CHEST/LUNG: Clear to auscultation bilaterally; No wheeze  HEART: Regular rate and rhythm; Normal S1 S2, No murmurs, rubs, or gallops  ABDOMEN: Soft, Nontender, Nondistended; Bowel sounds present  EXTREMITIES:  2+ Peripheral Pulses, No clubbing, cyanosis, or edema  PSYCH: AAOx3  NEUROLOGY: non-focal  SKIN: No rashes or lesions    DIAGNOSTICS:                         8.8    5.65  )-----------( 389      ( 04 Aug 2022 07:20 )             27.8     Hgb Trend: 8.8<--, 9.1<--, 10.7<--, 11.5<--, 10.3<--  08-04    137  |  103  |  18  ----------------------------<  130<H>  3.9   |  25  |  0.67    Ca    8.6      04 Aug 2022 07:18  Phos  2.9     08-04  Mg     2.1     08-04    TPro  6.4  /  Alb  3.4  /  TBili  0.7  /  DBili  x   /  AST  35  /  ALT  30  /  AlkPhos  70  08-04    CAPILLARY BLOOD GLUCOSE        Creatinine Trend: 0.67<--, 0.67<--, 0.69<--, 0.68<--, 0.70<--, 0.67<--  LIVER FUNCTIONS - ( 04 Aug 2022 07:18 )  Alb: 3.4 g/dL / Pro: 6.4 g/dL / ALK PHOS: 70 U/L / ALT: 30 U/L / AST: 35 U/L / GGT: x

## 2022-08-04 NOTE — PHYSICAL THERAPY INITIAL EVALUATION ADULT - ADDITIONAL COMMENTS
Prior to admission pt reports being independent of all ADL's & functional mobility without AD. Pt resides in studio apt, ground level, ramp entrance. (+) driving. (+) tub with grab bars.

## 2022-08-04 NOTE — PROGRESS NOTE ADULT - SUBJECTIVE AND OBJECTIVE BOX
CHIEF COMPLAINT:Patient is a 76y old  Female who presents with a chief complaint of hypoxia, worsening GALLARDO (04 Aug 2022 10:28)      Interval Events:  Pt reports improvement in dyspnea this morning, especially with ambulation. Was downtitrated on O2 this AM as well. Subjectively has good UOP with lasix although recorded positive fluid balance. She is wondering if she should delay her next cycle of tx for her malignancy.     REVIEW OF SYSTEMS:  [x] All other systems negative except per HPI   [ ] Unable to assess ROS because ________    OBJECTIVE:  ICU Vital Signs Last 24 Hrs  T(C): 36.6 (04 Aug 2022 04:31), Max: 37 (03 Aug 2022 20:16)  T(F): 97.9 (04 Aug 2022 04:31), Max: 98.6 (03 Aug 2022 20:16)  HR: 61 (04 Aug 2022 10:06) (61 - 83)  BP: 144/72 (04 Aug 2022 10:06) (104/62 - 144/72)  BP(mean): --  ABP: --  ABP(mean): --  RR: 17 (04 Aug 2022 09:15) (17 - 19)  SpO2: 94% (04 Aug 2022 10:06) (85% - 96%)    O2 Parameters below as of 04 Aug 2022 10:06  Patient On (Oxygen Delivery Method): room air              08-03 @ 07:01  -  08-04 @ 07:00  --------------------------------------------------------  IN: 840 mL / OUT: 0 mL / NET: 840 mL    08-04 @ 07:01  -  08-04 @ 11:36  --------------------------------------------------------  IN: 240 mL / OUT: 0 mL / NET: 240 mL        PHYSICAL EXAM:  General: WN/WD NAD, resting comfortably in chair  Neurology: A&Ox3, nonfocal, PAGAN x 4  Eyes: PERRLA/ EOMI, Gross vision intact  ENT/Neck: Neck supple, trachea midline, No JVD, Gross hearing intact  Respiratory: CTA throughout right lung fields anteriorly and posteriorly., inspiratory crackles in posterior left lung base, CTA in anterior lung fields and posterior apices.  No wheezing, rales, rhonchi  CV: RRR, +S1/S2, -S3/S4, no murmurs, rubs or gallops  Abdominal: Soft, NT, ND +BS, No HSM  MSK: 5/5 strength UE/LE bilaterally  Extremities:  1+ BLE edema, 2+ peripheral pulses  Skin: No Rashes, Hematoma, Ecchymosis    HOSPITAL MEDICATIONS:  MEDICATIONS  (STANDING):  apixaban 5 milliGRAM(s) Oral two times a day  atorvastatin 40 milliGRAM(s) Oral at bedtime  chlorhexidine 2% Cloths 1 Application(s) Topical daily  escitalopram 5 milliGRAM(s) Oral daily  furosemide   Injectable 20 milliGRAM(s) IV Push daily  metoprolol tartrate 100 milliGRAM(s) Oral two times a day  spironolactone 25 milliGRAM(s) Oral daily    MEDICATIONS  (PRN):  acetaminophen     Tablet .. 650 milliGRAM(s) Oral every 6 hours PRN Temp greater or equal to 38C (100.4F), Mild Pain (1 - 3)  ALPRAZolam 0.25 milliGRAM(s) Oral daily PRN Anxiety  benzonatate 100 milliGRAM(s) Oral three times a day PRN Cough  ondansetron    Tablet 8 milliGRAM(s) Oral every 8 hours PRN Nausea and/or Vomiting      LABS:    The Labs were reviewed by me   The Radiology was reviewed by me    EKG tracing reviewed by me    08-04    137  |  103  |  18  ----------------------------<  130<H>  3.9   |  25  |  0.67  08-03    140  |  104  |  16  ----------------------------<  100<H>  3.7   |  23  |  0.67  08-02    139  |  104  |  11  ----------------------------<  148<H>  4.0   |  22  |  0.69    Ca    8.6      04 Aug 2022 07:18  Ca    8.4      03 Aug 2022 07:18  Ca    8.6      02 Aug 2022 05:52  Phos  2.9     08-04  Mg     2.1     08-04    TPro  6.4  /  Alb  3.4  /  TBili  0.7  /  DBili  x   /  AST  35  /  ALT  30  /  AlkPhos  70  08-04  TPro  6.2  /  Alb  3.3  /  TBili  0.8  /  DBili  x   /  AST  37  /  ALT  26  /  AlkPhos  67  08-03  TPro  6.9  /  Alb  3.6  /  TBili  1.1  /  DBili  x   /  AST  22  /  ALT  18  /  AlkPhos  82  08-02    Magnesium, Serum: 2.1 mg/dL (08-04-22 @ 07:18)  Magnesium, Serum: 1.9 mg/dL (08-03-22 @ 07:18)  Magnesium, Serum: 1.8 mg/dL (08-02-22 @ 05:52)    Phosphorus Level, Serum: 2.9 mg/dL (08-04-22 @ 07:18)  Phosphorus Level, Serum: 3.0 mg/dL (08-03-22 @ 07:18)  Phosphorus Level, Serum: 4.3 mg/dL (08-02-22 @ 05:52)                                              8.8    5.65  )-----------( 389      ( 04 Aug 2022 07:20 )             27.8                         9.1    4.13  )-----------( 386      ( 03 Aug 2022 07:16 )             29.5                         10.7   7.21  )-----------( 480      ( 02 Aug 2022 05:52 )             33.0     CAPILLARY BLOOD GLUCOSE        Blood Gas Source Venous: Venous (08-02-22 @ 05:40)      MICROBIOLOGY:     RADIOLOGY:  [ ] Reviewed and interpreted by me    Point of Care Ultrasound Findings:    PFT:    EKG:

## 2022-08-04 NOTE — PHYSICAL THERAPY INITIAL EVALUATION ADULT - FOLLOWS COMMANDS/ANSWERS QUESTIONS, REHAB EVAL
Patient smokes approximately 1 pack/day.  Strongly advised the patient is important to quit.  She is requesting Chantix   100% of the time

## 2022-08-04 NOTE — PHYSICAL THERAPY INITIAL EVALUATION ADULT - PRECAUTIONS/LIMITATIONS, REHAB EVAL
CONT: Concern for possible cardiac etiology vs adverse effect from chemo vs viral infection. CT angio chest: No evidence of pulmonary embolus within the the main, right/left main, nor lobar branches of the pulmonary artery. Stable diffuse parenchymal nodules. Redemonstrated pancreatic body mass. CXR: Bilateral perihilar and basilar opacities with small bilateral pleural effusions. This may be secondary to pulmonary edema. Underlying infection is not totally excluded./fall precautions

## 2022-08-04 NOTE — GOALS OF CARE CONVERSATION - ADVANCED CARE PLANNING - AGENT'S NAME
Norma Ball (daughter) [phone]  alternates Josh Steward (son) [phone] Alejandro Steward (son) [phone]

## 2022-08-05 NOTE — MEDICAL STUDENT PROGRESS NOTE(EDUCATION) - NS MD HP STUD ASPLAN ASSES FT
Patient is a 76y old Woman who presented with SOB. Oncology is following her stage IV pancreatic cancer w/lymphangitic spread to lungs. Per Pulm, workup of GALLARDO for infectious vs malignant etiology.

## 2022-08-05 NOTE — PROGRESS NOTE ADULT - PROBLEM SELECTOR PLAN 1
Hypoxia to 85% on RA after chemo session & GALLARDO x2 weeks. No SOB at rest. CTA chest (8/1): (-) for PE but notable b/l GGOs and interval increase in pleural effusions consistent with interstitial pulmonary edema. COVID (-). s/p lasix, ceftriaxone. EKG (8/1): SR w/ 1st degree AV block. Serum pro-BNP (8/1): 811. (-) RVP. TTE UR. S/p Pred 50 mg x 2 & will taper based on clinical improvement.  - Diff: interstitial pulmonary edema in setting of adverse effect from chemo vs cardiac etiology  - c/w home spironolactone 25mg daily  - c/w daily lasix; will monitor fluids status via POCUS  - Pulm debating utility of bronch to r/o infectious disease  - strict I/Os

## 2022-08-05 NOTE — PROGRESS NOTE ADULT - SUBJECTIVE AND OBJECTIVE BOX
CHIEF COMPLAINT:Patient is a 76y old  Female who presents with a chief complaint of hypoxia, worsening GALLARDO (05 Aug 2022 07:14)      Interval Events:  This AM on room air. Outs not recorded.    REVIEW OF SYSTEMS:  [x] All other systems negative except per HPI   [ ] Unable to assess ROS because ________    OBJECTIVE:  ICU Vital Signs Last 24 Hrs  T(C): 36.6 (05 Aug 2022 05:06), Max: 36.8 (04 Aug 2022 22:06)  T(F): 97.9 (05 Aug 2022 05:06), Max: 98.2 (04 Aug 2022 22:06)  HR: 74 (05 Aug 2022 05:06) (61 - 77)  BP: 135/69 (05 Aug 2022 05:06) (101/62 - 144/72)  BP(mean): --  ABP: --  ABP(mean): --  RR: 18 (05 Aug 2022 05:06) (17 - 19)  SpO2: 92% (05 Aug 2022 05:06) (85% - 96%)    O2 Parameters below as of 05 Aug 2022 05:06  Patient On (Oxygen Delivery Method): room air              08-04 @ 07:01  -  08-05 @ 07:00  --------------------------------------------------------  IN: 480 mL / OUT: 0 mL / NET: 480 mL        PHYSICAL EXAM:      HOSPITAL MEDICATIONS:  MEDICATIONS  (STANDING):  apixaban 5 milliGRAM(s) Oral two times a day  atorvastatin 40 milliGRAM(s) Oral at bedtime  chlorhexidine 2% Cloths 1 Application(s) Topical daily  escitalopram 5 milliGRAM(s) Oral daily  furosemide   Injectable 20 milliGRAM(s) IV Push daily  metoprolol tartrate 100 milliGRAM(s) Oral two times a day  spironolactone 25 milliGRAM(s) Oral daily    MEDICATIONS  (PRN):  acetaminophen     Tablet .. 650 milliGRAM(s) Oral every 6 hours PRN Temp greater or equal to 38C (100.4F), Mild Pain (1 - 3)  ALPRAZolam 0.25 milliGRAM(s) Oral daily PRN Anxiety  benzonatate 100 milliGRAM(s) Oral three times a day PRN Cough  ondansetron    Tablet 8 milliGRAM(s) Oral every 8 hours PRN Nausea and/or Vomiting      LABS:    The Labs were reviewed by me   The Radiology was reviewed by me    EKG tracing reviewed by me    08-04    137  |  103  |  18  ----------------------------<  130<H>  3.9   |  25  |  0.67  08-03    140  |  104  |  16  ----------------------------<  100<H>  3.7   |  23  |  0.67    Ca    8.6      04 Aug 2022 07:18  Ca    8.4      03 Aug 2022 07:18  Phos  2.9     08-04  Mg     2.1     08-04    TPro  6.4  /  Alb  3.4  /  TBili  0.7  /  DBili  x   /  AST  35  /  ALT  30  /  AlkPhos  70  08-04  TPro  6.2  /  Alb  3.3  /  TBili  0.8  /  DBili  x   /  AST  37  /  ALT  26  /  AlkPhos  67  08-03    Magnesium, Serum: 2.1 mg/dL (08-04-22 @ 07:18)  Magnesium, Serum: 1.9 mg/dL (08-03-22 @ 07:18)    Phosphorus Level, Serum: 2.9 mg/dL (08-04-22 @ 07:18)  Phosphorus Level, Serum: 3.0 mg/dL (08-03-22 @ 07:18)                                              9.1    6.01  )-----------( 414      ( 05 Aug 2022 07:07 )             28.8                         8.8    5.65  )-----------( 389      ( 04 Aug 2022 07:20 )             27.8                         9.1    4.13  )-----------( 386      ( 03 Aug 2022 07:16 )             29.5     CAPILLARY BLOOD GLUCOSE            MICROBIOLOGY:     RADIOLOGY:  [ ] Reviewed and interpreted by me    Point of Care Ultrasound Findings:    PFT:    EKG: CHIEF COMPLAINT:Patient is a 76y old  Female who presents with a chief complaint of hypoxia, worsening GALLARDO (05 Aug 2022 07:14)      Interval Events:  This AM on room air. Outs not recorded. Ambulating with brief desaturation to mid 80s with rapid recovery with rest.    REVIEW OF SYSTEMS:  [x] All other systems negative except per HPI   [ ] Unable to assess ROS because ________    OBJECTIVE:  ICU Vital Signs Last 24 Hrs  T(C): 36.6 (05 Aug 2022 05:06), Max: 36.8 (04 Aug 2022 22:06)  T(F): 97.9 (05 Aug 2022 05:06), Max: 98.2 (04 Aug 2022 22:06)  HR: 74 (05 Aug 2022 05:06) (61 - 77)  BP: 135/69 (05 Aug 2022 05:06) (101/62 - 144/72)  BP(mean): --  ABP: --  ABP(mean): --  RR: 18 (05 Aug 2022 05:06) (17 - 19)  SpO2: 92% (05 Aug 2022 05:06) (85% - 96%)    O2 Parameters below as of 05 Aug 2022 05:06  Patient On (Oxygen Delivery Method): room air              08-04 @ 07:01  -  08-05 @ 07:00  --------------------------------------------------------  IN: 480 mL / OUT: 0 mL / NET: 480 mL        PHYSICAL EXAM:  General: WN/WD NAD, resting comfortably in chair  Neurology: A&Ox3, nonfocal, PAGAN x 4  Eyes: PERRLA/ EOMI, Gross vision intact  ENT/Neck: Neck supple, trachea midline, No JVD, Gross hearing intact  Respiratory: bibasilar rales  CV: RRR, +S1/S2, -S3/S4, no murmurs, rubs or gallops  Abdominal: Soft, NT, ND +BS, No HSM  MSK: 5/5 strength UE/LE bilaterally  Extremities:  trace BLE edema, 2+ peripheral pulses  Skin: No Rashes, Hematoma, Ecchymosis    HOSPITAL MEDICATIONS:  MEDICATIONS  (STANDING):  apixaban 5 milliGRAM(s) Oral two times a day  atorvastatin 40 milliGRAM(s) Oral at bedtime  chlorhexidine 2% Cloths 1 Application(s) Topical daily  escitalopram 5 milliGRAM(s) Oral daily  furosemide   Injectable 20 milliGRAM(s) IV Push daily  metoprolol tartrate 100 milliGRAM(s) Oral two times a day  spironolactone 25 milliGRAM(s) Oral daily    MEDICATIONS  (PRN):  acetaminophen     Tablet .. 650 milliGRAM(s) Oral every 6 hours PRN Temp greater or equal to 38C (100.4F), Mild Pain (1 - 3)  ALPRAZolam 0.25 milliGRAM(s) Oral daily PRN Anxiety  benzonatate 100 milliGRAM(s) Oral three times a day PRN Cough  ondansetron    Tablet 8 milliGRAM(s) Oral every 8 hours PRN Nausea and/or Vomiting      LABS:    The Labs were reviewed by me   The Radiology was reviewed by me    EKG tracing reviewed by me    08-04    137  |  103  |  18  ----------------------------<  130<H>  3.9   |  25  |  0.67  08-03    140  |  104  |  16  ----------------------------<  100<H>  3.7   |  23  |  0.67    Ca    8.6      04 Aug 2022 07:18  Ca    8.4      03 Aug 2022 07:18  Phos  2.9     08-04  Mg     2.1     08-04    TPro  6.4  /  Alb  3.4  /  TBili  0.7  /  DBili  x   /  AST  35  /  ALT  30  /  AlkPhos  70  08-04  TPro  6.2  /  Alb  3.3  /  TBili  0.8  /  DBili  x   /  AST  37  /  ALT  26  /  AlkPhos  67  08-03    Magnesium, Serum: 2.1 mg/dL (08-04-22 @ 07:18)  Magnesium, Serum: 1.9 mg/dL (08-03-22 @ 07:18)    Phosphorus Level, Serum: 2.9 mg/dL (08-04-22 @ 07:18)  Phosphorus Level, Serum: 3.0 mg/dL (08-03-22 @ 07:18)                                              9.1    6.01  )-----------( 414      ( 05 Aug 2022 07:07 )             28.8                         8.8    5.65  )-----------( 389      ( 04 Aug 2022 07:20 )             27.8                         9.1    4.13  )-----------( 386      ( 03 Aug 2022 07:16 )             29.5     CAPILLARY BLOOD GLUCOSE            MICROBIOLOGY:     RADIOLOGY:  [ ] Reviewed and interpreted by me    Point of Care Ultrasound Findings:    PFT:    EKG:

## 2022-08-05 NOTE — PROGRESS NOTE ADULT - SUBJECTIVE AND OBJECTIVE BOX
INTERVAL HPI/OVERNIGHT EVENTS:  Patient S&E at bedside. No complaints at this time. When she walks to the bathroom, her O2 saturation sometimes drops and she feels fatigued but then it comes back up after a while. She is now off O2. No F/C, CP, abdominal pain, N/V, or rash. Her edema is improving with diuresis.      VITAL SIGNS:  T(F): 97.3 (08-05-22 @ 12:39)  HR: 77 (08-05-22 @ 17:17)  BP: 145/77 (08-05-22 @ 17:17)  RR: 18 (08-05-22 @ 12:39)  SpO2: 95% (08-05-22 @ 12:39)  Wt(kg): --    PHYSICAL EXAM:    Constitutional: NAD  Eyes: EOMI, sclera non-icteric  Neck: supple  Respiratory: no increased WOB, CTAB/L  Cardiovascular: RRR, S1, S2, no m/g/r  Gastrointestinal: soft, NTND, no masses palpable, no HSM  Extremities: Trace edema in b/l LEs  Neurological: AAOx3    MEDICATIONS  (STANDING):  apixaban 5 milliGRAM(s) Oral two times a day  atorvastatin 40 milliGRAM(s) Oral at bedtime  chlorhexidine 2% Cloths 1 Application(s) Topical daily  escitalopram 5 milliGRAM(s) Oral daily  furosemide   Injectable 20 milliGRAM(s) IV Push daily  metoprolol tartrate 100 milliGRAM(s) Oral two times a day  spironolactone 25 milliGRAM(s) Oral daily    MEDICATIONS  (PRN):  acetaminophen     Tablet .. 650 milliGRAM(s) Oral every 6 hours PRN Temp greater or equal to 38C (100.4F), Mild Pain (1 - 3)  ALPRAZolam 0.25 milliGRAM(s) Oral daily PRN Anxiety  benzonatate 100 milliGRAM(s) Oral three times a day PRN Cough  metoclopramide 5 milliGRAM(s) Oral three times a day PRN Nausea  ondansetron    Tablet 8 milliGRAM(s) Oral every 8 hours PRN Nausea and/or Vomiting      LABS:                        9.1    6.01  )-----------( 414      ( 05 Aug 2022 07:07 )             28.8     08-05    140  |  103  |  20  ----------------------------<  164<H>  4.3   |  26  |  0.78    Ca    9.0      05 Aug 2022 07:05  Phos  2.6     08-05  Mg     2.1     08-05    TPro  6.5  /  Alb  3.5  /  TBili  0.4  /  DBili  x   /  AST  51<H>  /  ALT  49<H>  /  AlkPhos  67  08-05          RADIOLOGY & ADDITIONAL TESTS:

## 2022-08-05 NOTE — PROGRESS NOTE ADULT - SUBJECTIVE AND OBJECTIVE BOX
Anshu Longoria  PGY-1 Resident Physician     Patient is a 76y old  Female who presents with a chief complaint of hypoxia, worsening GALLARDO (05 Aug 2022 07:47)      SUBJECTIVE / OVERNIGHT EVENTS:  NAEON. No SOB or CP. Pt not using O2 this AM.     MEDICATIONS  (STANDING):  apixaban 5 milliGRAM(s) Oral two times a day  atorvastatin 40 milliGRAM(s) Oral at bedtime  chlorhexidine 2% Cloths 1 Application(s) Topical daily  escitalopram 5 milliGRAM(s) Oral daily  furosemide   Injectable 20 milliGRAM(s) IV Push daily  metoprolol tartrate 100 milliGRAM(s) Oral two times a day  spironolactone 25 milliGRAM(s) Oral daily    MEDICATIONS  (PRN):  acetaminophen     Tablet .. 650 milliGRAM(s) Oral every 6 hours PRN Temp greater or equal to 38C (100.4F), Mild Pain (1 - 3)  ALPRAZolam 0.25 milliGRAM(s) Oral daily PRN Anxiety  benzonatate 100 milliGRAM(s) Oral three times a day PRN Cough  ondansetron    Tablet 8 milliGRAM(s) Oral every 8 hours PRN Nausea and/or Vomiting    Allergies    No Known Drug Allergies  Seasonal allergies - stuffy nose (Other)  shellfish (Other (Moderate))    Intolerances        Vital Signs Last 24 Hrs  T(C): 36.6 (05 Aug 2022 05:06), Max: 36.8 (04 Aug 2022 22:06)  T(F): 97.9 (05 Aug 2022 05:06), Max: 98.2 (04 Aug 2022 22:06)  HR: 74 (05 Aug 2022 05:06) (61 - 77)  BP: 135/69 (05 Aug 2022 05:06) (101/62 - 144/72)  BP(mean): --  RR: 18 (05 Aug 2022 05:06) (17 - 19)  SpO2: 92% (05 Aug 2022 05:06) (85% - 96%)    Parameters below as of 05 Aug 2022 05:06  Patient On (Oxygen Delivery Method): room air      Daily     Daily   I&O's Summary    04 Aug 2022 07:01  -  05 Aug 2022 07:00  --------------------------------------------------------  IN: 480 mL / OUT: 0 mL / NET: 480 mL        PHYSICAL EXAM:  GENERAL: NAD, well-developed  HEAD:  Atraumatic, Normocephalic  EYES: EOMI, PERRLA, conjunctiva and sclera clear  NECK: Supple, No JVD  CHEST/LUNG: Noted RLL crackle  HEART: Regular rate and rhythm; Normal S1 S2, No murmurs, rubs, or gallops  ABDOMEN: Soft, Nontender, Nondistended; Bowel sounds present  EXTREMITIES:  2+ Peripheral Pulses, No clubbing, cyanosis, or edema  PSYCH: AAOx3  NEUROLOGY: non-focal  SKIN: No rashes or lesions    DIAGNOSTICS:                         9.1    6.01  )-----------( 414      ( 05 Aug 2022 07:07 )             28.8     Hgb Trend: 9.1<--, 8.8<--, 9.1<--, 10.7<--, 11.5<--  08-05    140  |  103  |  20  ----------------------------<  164<H>  4.3   |  26  |  0.78    Ca    9.0      05 Aug 2022 07:05  Phos  2.6     08-05  Mg     2.1     08-05    TPro  6.5  /  Alb  3.5  /  TBili  0.4  /  DBili  x   /  AST  51<H>  /  ALT  49<H>  /  AlkPhos  67  08-05    CAPILLARY BLOOD GLUCOSE        Creatinine Trend: 0.78<--, 0.67<--, 0.67<--, 0.69<--, 0.68<--, 0.70<--  LIVER FUNCTIONS - ( 05 Aug 2022 07:05 )  Alb: 3.5 g/dL / Pro: 6.5 g/dL / ALK PHOS: 67 U/L / ALT: 49 U/L / AST: 51 U/L / GGT: x

## 2022-08-05 NOTE — MEDICAL STUDENT PROGRESS NOTE(EDUCATION) - SUBJECTIVE AND OBJECTIVE BOX
Parag Zambrano, MS3  ---------------------------------------------------------------------------------------------  Patient is a 76y old Woman who presented with SOB following chemotherapy tx. Oncology is following her stage IV pancreatic cancer w/lymphangitic spread to lungs.    SUBJECTIVE / OVERNIGHT EVENTS: Pt able to sleep but still tired. Breathing ok, up and walking. Recognizes that oxygen dropped while walking. Cough improving since last night.    ADDITIONAL REVIEW OF SYSTEMS: No pain. Mentioned prior R sided tender "gland" pain that resolved w/Tylenol.    MEDICATIONS  (STANDING):  apixaban 5 milliGRAM(s) Oral two times a day  atorvastatin 40 milliGRAM(s) Oral at bedtime  chlorhexidine 2% Cloths 1 Application(s) Topical daily  escitalopram 5 milliGRAM(s) Oral daily  furosemide   Injectable 20 milliGRAM(s) IV Push daily  metoprolol tartrate 100 milliGRAM(s) Oral two times a day  spironolactone 25 milliGRAM(s) Oral daily    MEDICATIONS  (PRN):  acetaminophen     Tablet .. 650 milliGRAM(s) Oral every 6 hours PRN Temp greater or equal to 38C (100.4F), Mild Pain (1 - 3)  ALPRAZolam 0.25 milliGRAM(s) Oral daily PRN Anxiety  benzonatate 100 milliGRAM(s) Oral three times a day PRN Cough  ondansetron    Tablet 8 milliGRAM(s) Oral every 8 hours PRN Nausea and/or Vomiting    PHYSICAL EXAM:  Vital Signs Last 24 Hrs  T(C): 36.6 (05 Aug 2022 05:06), Max: 36.8 (04 Aug 2022 22:06)  T(F): 97.9 (05 Aug 2022 05:06), Max: 98.2 (04 Aug 2022 22:06)  HR: 74 (05 Aug 2022 05:06) (70 - 74)  BP: 135/69 (05 Aug 2022 05:06) (112/67 - 135/69)  BP(mean): --  RR: 18 (05 Aug 2022 10:52) (17 - 20)  SpO2: 95% (05 Aug 2022 10:52) (88% - 96%)    Parameters below as of 05 Aug 2022 10:52  Patient On (Oxygen Delivery Method): room air    General: NAD  Neck: no cervical LAD  Lungs: CTA   Heart: auscultated, indeterminate ectopic beats/sound from multiple valves  Edema: improved LE edema    LABS:                        9.1    6.01  )-----------( 414      ( 05 Aug 2022 07:07 )             28.8     08-05    140  |  103  |  20  ----------------------------<  164<H>  4.3   |  26  |  0.78    Ca    9.0      05 Aug 2022 07:05  Phos  2.6     08-05  Mg     2.1     08-05    TPro  6.5  /  Alb  3.5  /  TBili  0.4  /  DBili  x   /  AST  51<H>  /  ALT  49<H>  /  AlkPhos  67  08-05

## 2022-08-06 NOTE — PROGRESS NOTE ADULT - PROBLEM SELECTOR PLAN 1
Hypoxia to 85% on RA after chemo session & GALLARDO x2 weeks. No SOB at rest. CTA chest (8/1): (-) for PE but notable b/l GGOs and interval increase in pleural effusions consistent with interstitial pulmonary edema. COVID (-). s/p lasix, ceftriaxone. EKG (8/1): SR w/ 1st degree AV block. Serum pro-BNP (8/1): 811. (-) RVP. TTE UR. S/p Pred 50 mg x 2 & will taper based on clinical improvement.  - Diff: interstitial pulmonary edema in setting of adverse effect from chemo vs cardiac etiology  - c/w home spironolactone 25mg daily  - c/w daily lasix; will monitor fluids status via POCUS  - Pulm debating utility of bronch to r/o infectious disease  - strict I/Os Hypoxia to 85% on RA after chemo session & GALLARDO x2 weeks. No SOB at rest. CTA chest (8/1): (-) for PE but notable b/l GGOs and interval increase in pleural effusions consistent with interstitial pulmonary edema. COVID (-). s/p lasix, ceftriaxone. EKG (8/1): SR w/ 1st degree AV block. Serum pro-BNP (8/1): 811. (-) RVP. TTE UR. S/p Pred 50 mg x 2 & will taper based on clinical improvement.  - Diff: interstitial pulmonary edema in setting of adverse effect from chemo vs cardiac etiology  - c/w home spironolactone 25mg daily  - c/w daily lasix; will monitor fluids status via POCUS  - Pulm rec repeat CT  - strict I/Os

## 2022-08-06 NOTE — PROGRESS NOTE ADULT - SUBJECTIVE AND OBJECTIVE BOX
Anshu Longoria  PGY-1 Resident Physician     Patient is a 76y old  Female who presents with a chief complaint of hypoxia, worsening GALLARDO (06 Aug 2022 08:57)      SUBJECTIVE / OVERNIGHT EVENTS:  NAEON. Expresses improvement w/ breathing. Not using NC.     MEDICATIONS  (STANDING):  apixaban 5 milliGRAM(s) Oral two times a day  atorvastatin 40 milliGRAM(s) Oral at bedtime  chlorhexidine 2% Cloths 1 Application(s) Topical daily  escitalopram 5 milliGRAM(s) Oral daily  furosemide   Injectable 20 milliGRAM(s) IV Push daily  metoprolol tartrate 100 milliGRAM(s) Oral two times a day  spironolactone 25 milliGRAM(s) Oral daily    MEDICATIONS  (PRN):  acetaminophen     Tablet .. 650 milliGRAM(s) Oral every 6 hours PRN Temp greater or equal to 38C (100.4F), Mild Pain (1 - 3)  ALPRAZolam 0.25 milliGRAM(s) Oral daily PRN Anxiety  benzonatate 100 milliGRAM(s) Oral three times a day PRN Cough  metoclopramide 5 milliGRAM(s) Oral three times a day PRN Nausea  ondansetron    Tablet 8 milliGRAM(s) Oral every 8 hours PRN Nausea and/or Vomiting    Allergies    No Known Drug Allergies  Seasonal allergies - stuffy nose (Other)  shellfish (Other (Moderate))    Intolerances        Vital Signs Last 24 Hrs  T(C): 36.7 (06 Aug 2022 04:45), Max: 36.8 (05 Aug 2022 21:27)  T(F): 98 (06 Aug 2022 04:45), Max: 98.2 (05 Aug 2022 21:27)  HR: 67 (06 Aug 2022 04:45) (65 - 77)  BP: 114/63 (06 Aug 2022 04:45) (106/58 - 147/72)  BP(mean): --  RR: 17 (06 Aug 2022 04:45) (17 - 20)  SpO2: 93% (06 Aug 2022 04:45) (88% - 95%)    Parameters below as of 06 Aug 2022 04:45  Patient On (Oxygen Delivery Method): room air      Daily     Daily   I&O's Summary    05 Aug 2022 07:01  -  06 Aug 2022 07:00  --------------------------------------------------------  IN: 930 mL / OUT: 0 mL / NET: 930 mL        PHYSICAL EXAM:  GENERAL: NAD, well-developed  HEAD:  Atraumatic, Normocephalic  EYES: EOMI, PERRLA, conjunctiva and sclera clear  NECK: Supple, No JVD  CHEST/LUNG: Clear to auscultation bilaterally; No wheeze  HEART: Regular rate and rhythm; Normal S1 S2, No murmurs, rubs, or gallops  ABDOMEN: Soft, Nontender, Nondistended; Bowel sounds present  EXTREMITIES:  2+ Peripheral Pulses, No clubbing, cyanosis, or edema  PSYCH: AAOx3  NEUROLOGY: non-focal  SKIN: No rashes or lesions    DIAGNOSTICS:                         9.0    4.38  )-----------( 444      ( 06 Aug 2022 06:58 )             29.1     Hgb Trend: 9.0<--, 9.1<--, 8.8<--, 9.1<--, 10.7<--  08-06    141  |  103  |  23  ----------------------------<  92  3.6   |  27  |  0.69    Ca    8.6      06 Aug 2022 06:58  Phos  3.2     08-06  Mg     1.9     08-06    TPro  6.2  /  Alb  3.2<L>  /  TBili  0.5  /  DBili  x   /  AST  36  /  ALT  46<H>  /  AlkPhos  68  08-06    CAPILLARY BLOOD GLUCOSE        Creatinine Trend: 0.69<--, 0.78<--, 0.67<--, 0.67<--, 0.69<--, 0.68<--  LIVER FUNCTIONS - ( 06 Aug 2022 06:58 )  Alb: 3.2 g/dL / Pro: 6.2 g/dL / ALK PHOS: 68 U/L / ALT: 46 U/L / AST: 36 U/L / GGT: x                        Anshu Longoria  PGY-1 Resident Physician     Patient is a 76y old  Female who presents with a chief complaint of hypoxia, worsening GALLARDO (06 Aug 2022 08:57)      SUBJECTIVE / OVERNIGHT EVENTS:  No acute overnight events. Expresses improvement w/ breathing. Not using NC.     MEDICATIONS  (STANDING):  apixaban 5 milliGRAM(s) Oral two times a day  atorvastatin 40 milliGRAM(s) Oral at bedtime  chlorhexidine 2% Cloths 1 Application(s) Topical daily  escitalopram 5 milliGRAM(s) Oral daily  furosemide   Injectable 20 milliGRAM(s) IV Push daily  metoprolol tartrate 100 milliGRAM(s) Oral two times a day  spironolactone 25 milliGRAM(s) Oral daily    MEDICATIONS  (PRN):  acetaminophen     Tablet .. 650 milliGRAM(s) Oral every 6 hours PRN Temp greater or equal to 38C (100.4F), Mild Pain (1 - 3)  ALPRAZolam 0.25 milliGRAM(s) Oral daily PRN Anxiety  benzonatate 100 milliGRAM(s) Oral three times a day PRN Cough  metoclopramide 5 milliGRAM(s) Oral three times a day PRN Nausea  ondansetron    Tablet 8 milliGRAM(s) Oral every 8 hours PRN Nausea and/or Vomiting    Allergies    No Known Drug Allergies  Seasonal allergies - stuffy nose (Other)  shellfish (Other (Moderate))    Intolerances        Vital Signs Last 24 Hrs  T(C): 36.7 (06 Aug 2022 04:45), Max: 36.8 (05 Aug 2022 21:27)  T(F): 98 (06 Aug 2022 04:45), Max: 98.2 (05 Aug 2022 21:27)  HR: 67 (06 Aug 2022 04:45) (65 - 77)  BP: 114/63 (06 Aug 2022 04:45) (106/58 - 147/72)  BP(mean): --  RR: 17 (06 Aug 2022 04:45) (17 - 20)  SpO2: 93% (06 Aug 2022 04:45) (88% - 95%)    Parameters below as of 06 Aug 2022 04:45  Patient On (Oxygen Delivery Method): room air      Daily     Daily   I&O's Summary    05 Aug 2022 07:01  -  06 Aug 2022 07:00  --------------------------------------------------------  IN: 930 mL / OUT: 0 mL / NET: 930 mL        PHYSICAL EXAM:  GENERAL: NAD, well-developed  HEAD:  Atraumatic, Normocephalic  EYES: EOMI, PERRLA, conjunctiva and sclera clear  NECK: Supple, No JVD  CHEST/LUNG: Clear to auscultation bilaterally; No wheeze  HEART: Regular rate and rhythm; Normal S1 S2, No murmurs, rubs, or gallops  ABDOMEN: Soft, Nontender, Nondistended; Bowel sounds present  EXTREMITIES:  2+ Peripheral Pulses, No clubbing, cyanosis, or edema  PSYCH: AAOx3  NEUROLOGY: non-focal  SKIN: No rashes or lesions    DIAGNOSTICS:                         9.0    4.38  )-----------( 444      ( 06 Aug 2022 06:58 )             29.1     Hgb Trend: 9.0<--, 9.1<--, 8.8<--, 9.1<--, 10.7<--  08-06    141  |  103  |  23  ----------------------------<  92  3.6   |  27  |  0.69    Ca    8.6      06 Aug 2022 06:58  Phos  3.2     08-06  Mg     1.9     08-06    TPro  6.2  /  Alb  3.2<L>  /  TBili  0.5  /  DBili  x   /  AST  36  /  ALT  46<H>  /  AlkPhos  68  08-06    CAPILLARY BLOOD GLUCOSE        Creatinine Trend: 0.69<--, 0.78<--, 0.67<--, 0.67<--, 0.69<--, 0.68<--  LIVER FUNCTIONS - ( 06 Aug 2022 06:58 )  Alb: 3.2 g/dL / Pro: 6.2 g/dL / ALK PHOS: 68 U/L / ALT: 46 U/L / AST: 36 U/L / GGT: x

## 2022-08-06 NOTE — PROGRESS NOTE ADULT - PROBLEM SELECTOR PLAN 3
Pt with ongoing hematuria, being worked up outpatient. On admission, Hgb remains stable around baseline ~11.  Follows with Urologist, Dr. Nanci Darden outpatient biopsy later this month  - continue to monitor CBC and urine Pt with ongoing hematuria, being worked up outpatient. On admission, Hgb remains stable around baseline ~11.  Follows with Urologist, Dr. Nanci Carroll pending outpatient biopsy later this month  - continue to monitor CBC and urine

## 2022-08-07 NOTE — PROGRESS NOTE ADULT - SUBJECTIVE AND OBJECTIVE BOX
JULIANNA LOPEZ  76y  Female      Patient is a 76y old  Female who presents with a chief complaint of hypoxia, worsening GALLARDO (06 Aug 2022 08:57)      INTERVAL HPI      OVERNIGHT EVENTS:        REVIEW OF SYSTEMS:  CONSTITUTIONAL: No fever, weight loss, or fatigue  EYES: No eye pain, visual disturbances, or discharge  ENMT:  No difficulty hearing, tinnitus, vertigo; No sinus or throat pain  NECK: No pain or stiffness  BREASTS: No pain, masses, or nipple discharge  RESPIRATORY: No cough, wheezing, chills or hemoptysis; No shortness of breath  CARDIOVASCULAR: No chest pain, palpitations, dizziness, or leg swelling  GASTROINTESTINAL: No abdominal or epigastric pain. No nausea, vomiting, or hematemesis; No diarrhea or constipation. No melena or hematochezia.  GENITOURINARY: No dysuria, frequency, hematuria, or incontinence  NEUROLOGICAL: No headaches, memory loss, loss of strength, numbness, or tremors  SKIN: No itching, burning, rashes, or lesions   LYMPH NODES: No enlarged glands  ENDOCRINE: No heat or cold intolerance; No hair loss  MUSCULOSKELETAL: No joint pain or swelling; No muscle, back, or extremity pain  PSYCHIATRIC: No depression, anxiety, mood swings, or difficulty sleeping  HEME/LYMPH: No easy bruising, or bleeding gums  ALLERY AND IMMUNOLOGIC: No hives or eczema    T(C): 36.8 (08-07-22 @ 05:49), Max: 37.1 (08-06-22 @ 17:38)  HR: 74 (08-07-22 @ 05:49) (70 - 78)  BP: 136/69 (08-07-22 @ 05:49) (97/61 - 136/69)  RR: 16 (08-07-22 @ 05:49) (16 - 19)  SpO2: 93% (08-07-22 @ 05:49) (86% - 97%)  Wt(kg): --Vital Signs Last 24 Hrs  T(C): 36.8 (07 Aug 2022 05:49), Max: 37.1 (06 Aug 2022 17:38)  T(F): 98.3 (07 Aug 2022 05:49), Max: 98.8 (06 Aug 2022 17:38)  HR: 74 (07 Aug 2022 05:49) (70 - 78)  BP: 136/69 (07 Aug 2022 05:49) (97/61 - 136/69)  BP(mean): --  RR: 16 (07 Aug 2022 05:49) (16 - 19)  SpO2: 93% (07 Aug 2022 05:49) (86% - 97%)    Parameters below as of 07 Aug 2022 05:49  Patient On (Oxygen Delivery Method): room air        PHYSICAL EXAM:  GENERAL: NAD, well-groomed, well-developed  HEAD:  Atraumatic, Normocephalic  EYES: EOMI, PERRLA, conjunctiva and sclera clear  ENMT: No tonsillar erythema, exudates, or enlargement; Moist mucous membranes, Good dentition, No lesions  NECK: Supple, No JVD, Normal thyroid  NERVOUS SYSTEM:  Alert & Oriented X3, Good concentration; Motor Strength 5/5 B/L upper and lower extremities; DTRs 2+ intact and symmetric  CHEST/LUNG: Clear to percussion bilaterally; No rales, rhonchi, wheezing, or rubs  HEART: Regular rate and rhythm; No murmurs, rubs, or gallops  ABDOMEN: Soft, Nontender, Nondistended; Bowel sounds present  EXTREMITIES:  2+ Peripheral Pulses, No clubbing, cyanosis, or edema  LYMPH: No lymphadenopathy noted  SKIN: No rashes or lesions    Consultant(s) Notes Reviewed:  [x ] YES  [ ] NO  Care Discussed with Consultants/Other Providers [ x] YES  [ ] NO    LABS:                        9.0    4.38  )-----------( 444      ( 06 Aug 2022 06:58 )             29.1     08-06    141  |  103  |  23  ----------------------------<  92  3.6   |  27  |  0.69    Ca    8.6      06 Aug 2022 06:58  Phos  3.2     08-06  Mg     1.9     08-06    TPro  6.2  /  Alb  3.2<L>  /  TBili  0.5  /  DBili  x   /  AST  36  /  ALT  46<H>  /  AlkPhos  68  08-06        CAPILLARY BLOOD GLUCOSE                RADIOLOGY & ADDITIONAL TESTS:    Imaging Personally Reviewed:  [ ] YES  [ ] NO    HEALTH ISSUES - PROBLEM Dx:  HTN (hypertension)    Anxiety and depression    HLD (hyperlipidemia)    GALLARDO (dyspnea on exertion)    Acute respiratory failure with hypoxia    Prophylactic measure    Pancreatic cancer metastasized to lung    Atrial fibrillation    Hematuria         JULIANNA LOPEZ  76y  Female      Patient is a 76y old  Female who presents with a chief complaint of hypoxia, worsening GALLARDO (06 Aug 2022 08:57)      INTERVAL HPI      OVERNIGHT EVENTS:  - No acute overnight events   - Feeling well without acute complaints       REVIEW OF SYSTEMS:  CONSTITUTIONAL: No fever, weight loss, or fatigue  EYES: No eye pain, visual disturbances, or discharge  ENMT:  No difficulty hearing, tinnitus, vertigo; No sinus or throat pain  NECK: No pain or stiffness  BREASTS: No pain, masses, or nipple discharge  RESPIRATORY: No cough, wheezing, chills or hemoptysis; No shortness of breath  CARDIOVASCULAR: No chest pain, palpitations, dizziness, or leg swelling  GASTROINTESTINAL: No abdominal or epigastric pain. No nausea, vomiting, or hematemesis; No diarrhea or constipation. No melena or hematochezia.  GENITOURINARY: No dysuria, frequency, hematuria, or incontinence  NEUROLOGICAL: No headaches, memory loss, loss of strength, numbness, or tremors  SKIN: No itching, burning, rashes, or lesions   LYMPH NODES: No enlarged glands  ENDOCRINE: No heat or cold intolerance; No hair loss  MUSCULOSKELETAL: No joint pain or swelling; No muscle, back, or extremity pain  PSYCHIATRIC: No depression, anxiety, mood swings, or difficulty sleeping  HEME/LYMPH: No easy bruising, or bleeding gums  ALLERY AND IMMUNOLOGIC: No hives or eczema    T(C): 36.8 (08-07-22 @ 05:49), Max: 37.1 (08-06-22 @ 17:38)  HR: 74 (08-07-22 @ 05:49) (70 - 78)  BP: 136/69 (08-07-22 @ 05:49) (97/61 - 136/69)  RR: 16 (08-07-22 @ 05:49) (16 - 19)  SpO2: 93% (08-07-22 @ 05:49) (86% - 97%)  Wt(kg): --Vital Signs Last 24 Hrs  T(C): 36.8 (07 Aug 2022 05:49), Max: 37.1 (06 Aug 2022 17:38)  T(F): 98.3 (07 Aug 2022 05:49), Max: 98.8 (06 Aug 2022 17:38)  HR: 74 (07 Aug 2022 05:49) (70 - 78)  BP: 136/69 (07 Aug 2022 05:49) (97/61 - 136/69)  BP(mean): --  RR: 16 (07 Aug 2022 05:49) (16 - 19)  SpO2: 93% (07 Aug 2022 05:49) (86% - 97%)    Parameters below as of 07 Aug 2022 05:49  Patient On (Oxygen Delivery Method): room air        PHYSICAL EXAM:  GENERAL: NAD, well-groomed, well-developed  HEAD:  Atraumatic, Normocephalic  EYES: EOMI, PERRLA, conjunctiva and sclera clear  ENMT: No tonsillar erythema, exudates, or enlargement; Moist mucous membranes, Good dentition, No lesions  NECK: Supple, No JVD, Normal thyroid  NERVOUS SYSTEM:  Alert & Oriented X3, Good concentration; Motor Strength 5/5 B/L upper and lower extremities; CHEST/LUNG: Clear to percussion bilaterally; No rales, rhonchi, wheezing, or rubs  HEART: Regular rate and rhythm; No murmurs, rubs, or gallops  ABDOMEN: Soft, Nontender, Nondistended; Bowel sounds present  EXTREMITIES:  2+ Peripheral Pulses, No clubbing, cyanosis, trace edema  LYMPH: No lymphadenopathy noted  SKIN: No rashes or lesions    Consultant(s) Notes Reviewed:  [x ] YES  [ ] NO  Care Discussed with Consultants/Other Providers [ x] YES  [ ] NO    LABS:                        9.0    4.38  )-----------( 444      ( 06 Aug 2022 06:58 )             29.1     08-06    141  |  103  |  23  ----------------------------<  92  3.6   |  27  |  0.69    Ca    8.6      06 Aug 2022 06:58  Phos  3.2     08-06  Mg     1.9     08-06    TPro  6.2  /  Alb  3.2<L>  /  TBili  0.5  /  DBili  x   /  AST  36  /  ALT  46<H>  /  AlkPhos  68  08-06        CAPILLARY BLOOD GLUCOSE                RADIOLOGY & ADDITIONAL TESTS:    Imaging Personally Reviewed:  [ ] YES  [ ] NO    HEALTH ISSUES - PROBLEM Dx:  HTN (hypertension)    Anxiety and depression    HLD (hyperlipidemia)    GALLARDO (dyspnea on exertion)    Acute respiratory failure with hypoxia    Prophylactic measure    Pancreatic cancer metastasized to lung    Atrial fibrillation    Hematuria

## 2022-08-07 NOTE — PROGRESS NOTE ADULT - PROBLEM SELECTOR PLAN 5
c/w home Metoprolol tartrate 100mg BID and home spironolactone 25mg daily. was hypertensive to systolic 180s in ED but since improved to 120s without additional hypertensive meds; possibly 2/2 anxiety?  - continue to monitor BP and adjust antihypertensive regimen PRN Hx of Afib s/p ablation x3, remains on Eliquis at home. Also with hx of CVA in the past. EKG (8/1): SR w/ 1st degree AV block, no signs of acute ischemia; no significant change compared to prior EKG in May 2022. CHADSVASC score of 6, high risk of stroke  - c/w Eliquis 5mg BID

## 2022-08-07 NOTE — PROGRESS NOTE ADULT - PROBLEM SELECTOR PLAN 6
- c/w home atorvastatin 40mg qhs c/w home Metoprolol tartrate 100mg BID and home spironolactone 25mg daily. was hypertensive to systolic 180s in ED but since improved to 120s without additional hypertensive meds; possibly 2/2 anxiety?  - continue to monitor BP and adjust antihypertensive regimen PRN

## 2022-08-07 NOTE — PROGRESS NOTE ADULT - PROBLEM SELECTOR PLAN 8
- DVT ppx: on Eliquis for Afib  - Diet: DASH  - Dispo: TBD - c/w home escitalopram 5mg daily and home Xanax 0.25mg daily PRN for anxiety  - ISTOP#: [id number]

## 2022-08-07 NOTE — PROGRESS NOTE ADULT - PROBLEM SELECTOR PLAN 3
Pt with ongoing hematuria, being worked up outpatient. On admission, Hgb remains stable around baseline ~11.  Follows with Urologist, Dr. Nanci Carroll pending outpatient biopsy later this month  - continue to monitor CBC and urine - CTM CBC with differential daily

## 2022-08-07 NOTE — PROGRESS NOTE ADULT - PROBLEM SELECTOR PLAN 1
Hypoxia to 85% on RA after chemo session & GALLARDO x2 weeks. No SOB at rest. CTA chest (8/1): (-) for PE but notable b/l GGOs and interval increase in pleural effusions consistent with interstitial pulmonary edema. COVID (-). s/p lasix, ceftriaxone. EKG (8/1): SR w/ 1st degree AV block. Serum pro-BNP (8/1): 811. (-) RVP. TTE UR. S/p Pred 50 mg x 2 & will taper based on clinical improvement.  - Diff: interstitial pulmonary edema in setting of adverse effect from chemo vs cardiac etiology  - c/w home spironolactone 25mg daily  - c/w daily lasix; will monitor fluids status via POCUS  - Pulm rec repeat CT  - strict I/Os Hypoxia to 85% on RA after chemo session & GALLARDO x2 weeks. No SOB at rest. CTA chest (8/1): (-) for PE but notable b/l GGOs and interval increase in pleural effusions consistent with interstitial pulmonary edema. COVID (-). s/p lasix, ceftriaxone. EKG (8/1): SR w/ 1st degree AV block. Serum pro-BNP (8/1): 811. (-) RVP. TTE UR. S/p Pred 50 mg x 2 & will taper based on clinical improvement.  - Diff: interstitial pulmonary edema in setting of adverse effect from chemo vs cardiac etiology  - c/w home spironolactone 25mg daily  - c/w daily lasix; will monitor fluids status via POCUS  - Pulm rec repeat CT early this coming week   - strict I/Os  - RA at rest, desaturates on ambulation, will continue to attempt ambulation   - No steroids at this time, will discuss further

## 2022-08-07 NOTE — PROGRESS NOTE ADULT - PROBLEM SELECTOR PLAN 4
Hx of Afib s/p ablation x3, remains on Eliquis at home. Also with hx of CVA in the past. EKG (8/1): SR w/ 1st degree AV block, no signs of acute ischemia; no significant change compared to prior EKG in May 2022. CHADSVASC score of 6, high risk of stroke  - c/w Eliquis 5mg BID Pt with ongoing hematuria, being worked up outpatient. On admission, Hgb remains stable around baseline ~11.  Follows with Urologist, Dr. Nanci Carroll pending outpatient biopsy later this month  - continue to monitor CBC and urine

## 2022-08-07 NOTE — PROGRESS NOTE ADULT - PROBLEM SELECTOR PLAN 7
- c/w home escitalopram 5mg daily and home Xanax 0.25mg daily PRN for anxiety  - ISTOP#: [id number] - c/w home atorvastatin 40mg qhs

## 2022-08-08 NOTE — PROGRESS NOTE ADULT - SUBJECTIVE AND OBJECTIVE BOX
INTERVAL HPI/OVERNIGHT EVENTS:  Patient S&E at bedside. She feels better, with some dyspnea after exertion but improves quickly. No CP, abdominal pain, N/V, or rash. Edema has improved.      VITAL SIGNS:  T(F): 98.3 (08-08-22 @ 11:55)  HR: 71 (08-08-22 @ 11:55)  BP: 109/70 (08-08-22 @ 11:55)  RR: 18 (08-08-22 @ 11:55)  SpO2: 96% (08-08-22 @ 11:55)  Wt(kg): --    PHYSICAL EXAM:    Constitutional: NAD  Eyes: EOMI, sclera non-icteric  Neck: supple  Respiratory: rhonchi in LLL. No wheezes  Cardiovascular: RRR, S1, S2, no m/g/r  Gastrointestinal: soft, NTND, no masses palpable, no HSM  Extremities: trace b/l LE edema, slightly improved from prior  Neurological: AAOx3    MEDICATIONS  (STANDING):  apixaban 5 milliGRAM(s) Oral two times a day  atorvastatin 40 milliGRAM(s) Oral at bedtime  chlorhexidine 2% Cloths 1 Application(s) Topical daily  escitalopram 5 milliGRAM(s) Oral daily  furosemide    Tablet 40 milliGRAM(s) Oral daily  metoprolol tartrate 100 milliGRAM(s) Oral two times a day  polyethylene glycol 3350 17 Gram(s) Oral daily  senna 2 Tablet(s) Oral at bedtime  spironolactone 25 milliGRAM(s) Oral daily    MEDICATIONS  (PRN):  acetaminophen     Tablet .. 650 milliGRAM(s) Oral every 6 hours PRN Temp greater or equal to 38C (100.4F), Mild Pain (1 - 3)  ALPRAZolam 0.25 milliGRAM(s) Oral daily PRN Anxiety  benzonatate 100 milliGRAM(s) Oral three times a day PRN Cough  metoclopramide 5 milliGRAM(s) Oral three times a day PRN Nausea  ondansetron    Tablet 8 milliGRAM(s) Oral every 8 hours PRN Nausea and/or Vomiting      LABS:                        9.3    4.35  )-----------( 352      ( 08 Aug 2022 07:43 )             29.6     08-08    136  |  100  |  18  ----------------------------<  112<H>  3.9   |  25  |  0.76    Ca    8.9      08 Aug 2022 07:49  Phos  2.8     08-08  Mg     1.8     08-08    TPro  6.5  /  Alb  3.4  /  TBili  0.6  /  DBili  x   /  AST  22  /  ALT  35  /  AlkPhos  70  08-08          RADIOLOGY & ADDITIONAL TESTS:

## 2022-08-08 NOTE — PROGRESS NOTE ADULT - PROBLEM SELECTOR PLAN 4
Pt with ongoing hematuria, being worked up outpatient. On admission, Hgb remains stable around baseline ~11.  Follows with Urologist, Dr. Nanci Carroll pending outpatient biopsy later this month  - continue to monitor CBC

## 2022-08-08 NOTE — PROGRESS NOTE ADULT - PROBLEM SELECTOR PLAN 1
Hypoxia to 85% on RA after chemo session & GALLARDO x2 weeks. No SOB at rest. CTA chest (8/1): (-) for PE but notable b/l GGOs and interval increase in pleural effusions consistent with interstitial pulmonary edema. COVID (-). s/p lasix, ceftriaxone. EKG (8/1): SR w/ 1st degree AV block. Serum pro-BNP (8/1): 811. (-) RVP. TTE UR. S/p Pred 50 mg x 2 & will taper based on clinical improvement.  - Diff: interstitial pulmonary edema in setting of adverse effect from chemo vs cardiac etiology  - c/w daily lasix; will monitor fluids status via POCUS  - Per pulm, no steroids; rec repeat CT early this coming week   - strict I/Os  - RA at rest, desaturates on ambulation, will continue to attempt ambulation Hypoxia to 85% on RA after chemo session & GALLARDO x2 weeks. No SOB at rest. CTA chest (8/1): (-) for PE but notable b/l GGOs and interval increase in pleural effusions consistent with interstitial pulmonary edema. COVID (-). s/p lasix, ceftriaxone. EKG (8/1): SR w/ 1st degree AV block. Serum pro-BNP (8/1): 811. (-) RVP. TTE UR. S/p Pred 50 mg x 2 & will taper based on clinical improvement.  - Diff: interstitial pulmonary edema in setting of adverse effect from chemo vs cardiac etiology  - c/w daily lasix; will monitor fluids status via POCUS  - Per pulm, no steroids; rec repeat CT early today   - strict I/Os  - RA at rest, desaturates on ambulation, will continue to attempt ambulation

## 2022-08-08 NOTE — PROGRESS NOTE ADULT - SUBJECTIVE AND OBJECTIVE BOX
Anshu Longoria  PGY-1 Resident Physician     Patient is a 77y old  Female who presents with a chief complaint of hypoxia, worsening GALLARDO (07 Aug 2022 07:24)      SUBJECTIVE / OVERNIGHT EVENTS:    MEDICATIONS  (STANDING):  apixaban 5 milliGRAM(s) Oral two times a day  atorvastatin 40 milliGRAM(s) Oral at bedtime  chlorhexidine 2% Cloths 1 Application(s) Topical daily  escitalopram 5 milliGRAM(s) Oral daily  furosemide   Injectable 20 milliGRAM(s) IV Push daily  metoprolol tartrate 100 milliGRAM(s) Oral two times a day  polyethylene glycol 3350 17 Gram(s) Oral daily  senna 2 Tablet(s) Oral at bedtime  spironolactone 25 milliGRAM(s) Oral daily    MEDICATIONS  (PRN):  acetaminophen     Tablet .. 650 milliGRAM(s) Oral every 6 hours PRN Temp greater or equal to 38C (100.4F), Mild Pain (1 - 3)  ALPRAZolam 0.25 milliGRAM(s) Oral daily PRN Anxiety  benzonatate 100 milliGRAM(s) Oral three times a day PRN Cough  metoclopramide 5 milliGRAM(s) Oral three times a day PRN Nausea  ondansetron    Tablet 8 milliGRAM(s) Oral every 8 hours PRN Nausea and/or Vomiting    Allergies    No Known Drug Allergies  Seasonal allergies - stuffy nose (Other)  shellfish (Other (Moderate))    Intolerances        Vital Signs Last 24 Hrs  T(C): 36.5 (08 Aug 2022 05:30), Max: 36.7 (07 Aug 2022 15:42)  T(F): 97.7 (08 Aug 2022 05:30), Max: 98.1 (07 Aug 2022 15:42)  HR: 71 (08 Aug 2022 05:30) (67 - 73)  BP: 130/77 (08 Aug 2022 05:30) (111/62 - 136/70)  BP(mean): --  RR: 16 (08 Aug 2022 05:30) (16 - 18)  SpO2: 94% (08 Aug 2022 05:30) (92% - 95%)    Parameters below as of 08 Aug 2022 05:30  Patient On (Oxygen Delivery Method): room air      Daily     Daily Weight in k.5 (08 Aug 2022 06:15)  I&O's Summary    07 Aug 2022 07:01  -  08 Aug 2022 07:00  --------------------------------------------------------  IN: 600 mL / OUT: 0 mL / NET: 600 mL        PHYSICAL EXAM:  GENERAL: NAD, well-developed  HEAD:  Atraumatic, Normocephalic  EYES: EOMI, PERRLA, conjunctiva and sclera clear  NECK: Supple, No JVD  CHEST/LUNG: Clear to auscultation bilaterally; No wheeze  HEART: Regular rate and rhythm; Normal S1 S2, No murmurs, rubs, or gallops  ABDOMEN: Soft, Nontender, Nondistended; Bowel sounds present  EXTREMITIES:  2+ Peripheral Pulses, No clubbing, cyanosis, or edema  PSYCH: AAOx3  NEUROLOGY: non-focal  SKIN: No rashes or lesions    DIAGNOSTICS:                         10.6   2.39  )-----------( 452      ( 07 Aug 2022 07:20 )             32.5     Hgb Trend: 10.6<--, 9.0<--, 9.1<--, 8.8<--, 9.1<--  08-07    138  |  100  |  19  ----------------------------<  114<H>  4.1   |  29  |  0.71    Ca    9.3      07 Aug 2022 07:20  Phos  2.9     08-07  Mg     2.0     08-07    TPro  6.9  /  Alb  3.7  /  TBili  0.7  /  DBili  x   /  AST  32  /  ALT  48<H>  /  AlkPhos  76  08-07    CAPILLARY BLOOD GLUCOSE        Creatinine Trend: 0.71<--, 0.69<--, 0.78<--, 0.67<--, 0.67<--, 0.69<--  LIVER FUNCTIONS - ( 07 Aug 2022 07:20 )  Alb: 3.7 g/dL / Pro: 6.9 g/dL / ALK PHOS: 76 U/L / ALT: 48 U/L / AST: 32 U/L / GGT: x                        Anshu Longoria  PGY-1 Resident Physician     Patient is a 77y old  Female who presents with a chief complaint of hypoxia, worsening GALLARDO (07 Aug 2022 07:24)      SUBJECTIVE / OVERNIGHT EVENTS:  NAEON. Pt states she only needs to use oxygen during exertion. Otherwise denies ROS.       MEDICATIONS  (STANDING):  apixaban 5 milliGRAM(s) Oral two times a day  atorvastatin 40 milliGRAM(s) Oral at bedtime  chlorhexidine 2% Cloths 1 Application(s) Topical daily  escitalopram 5 milliGRAM(s) Oral daily  furosemide   Injectable 20 milliGRAM(s) IV Push daily  metoprolol tartrate 100 milliGRAM(s) Oral two times a day  polyethylene glycol 3350 17 Gram(s) Oral daily  senna 2 Tablet(s) Oral at bedtime  spironolactone 25 milliGRAM(s) Oral daily    MEDICATIONS  (PRN):  acetaminophen     Tablet .. 650 milliGRAM(s) Oral every 6 hours PRN Temp greater or equal to 38C (100.4F), Mild Pain (1 - 3)  ALPRAZolam 0.25 milliGRAM(s) Oral daily PRN Anxiety  benzonatate 100 milliGRAM(s) Oral three times a day PRN Cough  metoclopramide 5 milliGRAM(s) Oral three times a day PRN Nausea  ondansetron    Tablet 8 milliGRAM(s) Oral every 8 hours PRN Nausea and/or Vomiting    Allergies    No Known Drug Allergies  Seasonal allergies - stuffy nose (Other)  shellfish (Other (Moderate))    Intolerances        Vital Signs Last 24 Hrs  T(C): 36.5 (08 Aug 2022 05:30), Max: 36.7 (07 Aug 2022 15:42)  T(F): 97.7 (08 Aug 2022 05:30), Max: 98.1 (07 Aug 2022 15:42)  HR: 71 (08 Aug 2022 05:30) (67 - 73)  BP: 130/77 (08 Aug 2022 05:30) (111/62 - 136/70)  BP(mean): --  RR: 16 (08 Aug 2022 05:30) (16 - 18)  SpO2: 94% (08 Aug 2022 05:30) (92% - 95%)    Parameters below as of 08 Aug 2022 05:30  Patient On (Oxygen Delivery Method): room air      Daily     Daily Weight in k.5 (08 Aug 2022 06:15)  I&O's Summary    07 Aug 2022 07:01  -  08 Aug 2022 07:00  --------------------------------------------------------  IN: 600 mL / OUT: 0 mL / NET: 600 mL        PHYSICAL EXAM:  GENERAL: NAD, well-developed  HEAD:  Atraumatic, Normocephalic  EYES: EOMI, PERRLA, conjunctiva and sclera clear  NECK: Supple, No JVD  CHEST/LUNG: Clear to auscultation bilaterally; No wheeze  HEART: Regular rate and rhythm; Normal S1 S2, No murmurs, rubs, or gallops  ABDOMEN: Soft, Nontender, Nondistended; Bowel sounds present  EXTREMITIES:  2+ Peripheral Pulses, No clubbing, cyanosis, or edema  PSYCH: AAOx3  NEUROLOGY: non-focal  SKIN: No rashes or lesions    DIAGNOSTICS:                         10.6   2.39  )-----------( 452      ( 07 Aug 2022 07:20 )             32.5     Hgb Trend: 10.6<--, 9.0<--, 9.1<--, 8.8<--, 9.1<--  08-07    138  |  100  |  19  ----------------------------<  114<H>  4.1   |  29  |  0.71    Ca    9.3      07 Aug 2022 07:20  Phos  2.9     08-07  Mg     2.0     08-07    TPro  6.9  /  Alb  3.7  /  TBili  0.7  /  DBili  x   /  AST  32  /  ALT  48<H>  /  AlkPhos  76  08-07    CAPILLARY BLOOD GLUCOSE        Creatinine Trend: 0.71<--, 0.69<--, 0.78<--, 0.67<--, 0.67<--, 0.69<--  LIVER FUNCTIONS - ( 07 Aug 2022 07:20 )  Alb: 3.7 g/dL / Pro: 6.9 g/dL / ALK PHOS: 76 U/L / ALT: 48 U/L / AST: 32 U/L / GGT: x

## 2022-08-08 NOTE — PROGRESS NOTE ADULT - TIME BILLING
Personal review of data, imaging and discussion with medical team.
Personal review of data, imaging and discussion with medical team.
Review of patient records, including history, laboratory data, and imaging. Patient assessment and evaluation. Coordination of care.

## 2022-08-08 NOTE — PROGRESS NOTE ADULT - SUBJECTIVE AND OBJECTIVE BOX
CHIEF COMPLAINT:Patient is a 77y old  Female who presents with a chief complaint of hypoxia, worsening GALLARDO (08 Aug 2022 17:11)      Interval Events:  Undergoing repeat CT chest today following diuresis, ambulated today with O2 sat ~91%.    REVIEW OF SYSTEMS:  [x] All other systems negative except per HPI   [ ] Unable to assess ROS because ________    OBJECTIVE:  ICU Vital Signs Last 24 Hrs  T(C): 36.8 (08 Aug 2022 11:55), Max: 36.8 (08 Aug 2022 11:55)  T(F): 98.3 (08 Aug 2022 11:55), Max: 98.3 (08 Aug 2022 11:55)  HR: 71 (08 Aug 2022 11:55) (67 - 71)  BP: 109/70 (08 Aug 2022 11:55) (109/70 - 130/77)  BP(mean): --  ABP: --  ABP(mean): --  RR: 18 (08 Aug 2022 11:55) (16 - 18)  SpO2: 96% (08 Aug 2022 11:55) (94% - 96%)    O2 Parameters below as of 08 Aug 2022 11:55  Patient On (Oxygen Delivery Method): room air              08-07 @ 07:01  -  08-08 @ 07:00  --------------------------------------------------------  IN: 600 mL / OUT: 0 mL / NET: 600 mL    08-08 @ 07:01  -  08-08 @ 18:19  --------------------------------------------------------  IN: 480 mL / OUT: 0 mL / NET: 480 mL        PHYSICAL EXAM:  General: WN/WD NAD, resting comfortably in chair  Neurology: A&Ox3, nonfocal, PAGAN x 4  Eyes: PERRLA/ EOMI, Gross vision intact  ENT/Neck: Neck supple, trachea midline, No JVD, Gross hearing intact  Respiratory: CTAB  CV: RRR, +S1/S2, -S3/S4, no murmurs, rubs or gallops  Abdominal: Soft, NT, ND +BS, No HSM  MSK: 5/5 strength UE/LE bilaterally  Extremities:  trace BLE edema, 2+ peripheral pulses  Skin: No Rashes, Hematoma, Ecchymosis    HOSPITAL MEDICATIONS:  MEDICATIONS  (STANDING):  apixaban 5 milliGRAM(s) Oral two times a day  atorvastatin 40 milliGRAM(s) Oral at bedtime  chlorhexidine 2% Cloths 1 Application(s) Topical daily  escitalopram 5 milliGRAM(s) Oral daily  furosemide    Tablet 40 milliGRAM(s) Oral daily  metoprolol tartrate 100 milliGRAM(s) Oral two times a day  polyethylene glycol 3350 17 Gram(s) Oral daily  senna 2 Tablet(s) Oral at bedtime  spironolactone 25 milliGRAM(s) Oral daily    MEDICATIONS  (PRN):  acetaminophen     Tablet .. 650 milliGRAM(s) Oral every 6 hours PRN Temp greater or equal to 38C (100.4F), Mild Pain (1 - 3)  ALPRAZolam 0.25 milliGRAM(s) Oral daily PRN Anxiety  benzonatate 100 milliGRAM(s) Oral three times a day PRN Cough  metoclopramide 5 milliGRAM(s) Oral three times a day PRN Nausea  ondansetron    Tablet 8 milliGRAM(s) Oral every 8 hours PRN Nausea and/or Vomiting      LABS:    The Labs were reviewed by me   The Radiology was reviewed by me    EKG tracing reviewed by me    08-08    136  |  100  |  18  ----------------------------<  112<H>  3.9   |  25  |  0.76  08-07    138  |  100  |  19  ----------------------------<  114<H>  4.1   |  29  |  0.71  08-06    141  |  103  |  23  ----------------------------<  92  3.6   |  27  |  0.69    Ca    8.9      08 Aug 2022 07:49  Ca    9.3      07 Aug 2022 07:20  Ca    8.6      06 Aug 2022 06:58  Phos  2.8     08-08  Mg     1.8     08-08    TPro  6.5  /  Alb  3.4  /  TBili  0.6  /  DBili  x   /  AST  22  /  ALT  35  /  AlkPhos  70  08-08  TPro  6.9  /  Alb  3.7  /  TBili  0.7  /  DBili  x   /  AST  32  /  ALT  48<H>  /  AlkPhos  76  08-07  TPro  6.2  /  Alb  3.2<L>  /  TBili  0.5  /  DBili  x   /  AST  36  /  ALT  46<H>  /  AlkPhos  68  08-06    Magnesium, Serum: 1.8 mg/dL (08-08-22 @ 07:49)  Magnesium, Serum: 2.0 mg/dL (08-07-22 @ 07:20)  Magnesium, Serum: 1.9 mg/dL (08-06-22 @ 06:58)    Phosphorus Level, Serum: 2.8 mg/dL (08-08-22 @ 07:49)  Phosphorus Level, Serum: 2.9 mg/dL (08-07-22 @ 07:20)  Phosphorus Level, Serum: 3.2 mg/dL (08-06-22 @ 06:58)                                              9.3    4.35  )-----------( 352      ( 08 Aug 2022 07:43 )             29.6                         10.6   2.39  )-----------( 452      ( 07 Aug 2022 07:20 )             32.5                         9.0    4.38  )-----------( 444      ( 06 Aug 2022 06:58 )             29.1     CAPILLARY BLOOD GLUCOSE            MICROBIOLOGY:     RADIOLOGY:  [ ] Reviewed and interpreted by me    Point of Care Ultrasound Findings:    PFT:    EKG:

## 2022-08-09 PROBLEM — F41.9 ANXIETY DISORDER, UNSPECIFIED: Chronic | Status: ACTIVE | Noted: 2022-01-01

## 2022-08-09 NOTE — PROGRESS NOTE ADULT - SUBJECTIVE AND OBJECTIVE BOX
Anshu Longoria  PGY-1 Resident Physician     Patient is a 77y old  Female who presents with a chief complaint of hypoxia, worsening GALLARDO (08 Aug 2022 18:27)      SUBJECTIVE / OVERNIGHT EVENTS:    MEDICATIONS  (STANDING):  apixaban 5 milliGRAM(s) Oral two times a day  atorvastatin 40 milliGRAM(s) Oral at bedtime  chlorhexidine 2% Cloths 1 Application(s) Topical daily  escitalopram 5 milliGRAM(s) Oral daily  furosemide    Tablet 40 milliGRAM(s) Oral daily  metoprolol tartrate 100 milliGRAM(s) Oral two times a day  polyethylene glycol 3350 17 Gram(s) Oral daily  senna 2 Tablet(s) Oral at bedtime  spironolactone 25 milliGRAM(s) Oral daily    MEDICATIONS  (PRN):  acetaminophen     Tablet .. 650 milliGRAM(s) Oral every 6 hours PRN Temp greater or equal to 38C (100.4F), Mild Pain (1 - 3)  ALPRAZolam 0.25 milliGRAM(s) Oral daily PRN Anxiety  benzonatate 100 milliGRAM(s) Oral three times a day PRN Cough  metoclopramide 5 milliGRAM(s) Oral three times a day PRN Nausea  ondansetron    Tablet 8 milliGRAM(s) Oral every 8 hours PRN Nausea and/or Vomiting    Allergies    No Known Drug Allergies  Seasonal allergies - stuffy nose (Other)  shellfish (Other (Moderate))    Intolerances        Vital Signs Last 24 Hrs  T(C): 36.8 (09 Aug 2022 04:16), Max: 36.8 (08 Aug 2022 11:55)  T(F): 98.2 (09 Aug 2022 04:16), Max: 98.3 (08 Aug 2022 11:55)  HR: 66 (09 Aug 2022 04:16) (66 - 71)  BP: 109/65 (09 Aug 2022 04:16) (109/65 - 134/77)  BP(mean): --  RR: 18 (09 Aug 2022 04:16) (18 - 18)  SpO2: 95% (09 Aug 2022 05:44) (92% - 96%)    Parameters below as of 09 Aug 2022 05:44  Patient On (Oxygen Delivery Method): room air      Daily     Daily   I&O's Summary    08 Aug 2022 07:01  -  09 Aug 2022 07:00  --------------------------------------------------------  IN: 1070 mL / OUT: 0 mL / NET: 1070 mL        PHYSICAL EXAM:  GENERAL: NAD, well-developed  HEAD:  Atraumatic, Normocephalic  EYES: EOMI, PERRLA, conjunctiva and sclera clear  NECK: Supple, No JVD  CHEST/LUNG: Clear to auscultation bilaterally; No wheeze  HEART: Regular rate and rhythm; Normal S1 S2, No murmurs, rubs, or gallops  ABDOMEN: Soft, Nontender, Nondistended; Bowel sounds present  EXTREMITIES:  2+ Peripheral Pulses, No clubbing, cyanosis, or edema  PSYCH: AAOx3  NEUROLOGY: non-focal  SKIN: No rashes or lesions    DIAGNOSTICS:                         9.3    4.35  )-----------( 352      ( 08 Aug 2022 07:43 )             29.6     Hgb Trend: 9.3<--, 10.6<--, 9.0<--, 9.1<--, 8.8<--  08-08    136  |  100  |  18  ----------------------------<  112<H>  3.9   |  25  |  0.76    Ca    8.9      08 Aug 2022 07:49  Phos  2.8     08-08  Mg     1.8     08-08    TPro  6.5  /  Alb  3.4  /  TBili  0.6  /  DBili  x   /  AST  22  /  ALT  35  /  AlkPhos  70  08-08    CAPILLARY BLOOD GLUCOSE        Creatinine Trend: 0.76<--, 0.71<--, 0.69<--, 0.78<--, 0.67<--, 0.67<--  LIVER FUNCTIONS - ( 08 Aug 2022 07:49 )  Alb: 3.4 g/dL / Pro: 6.5 g/dL / ALK PHOS: 70 U/L / ALT: 35 U/L / AST: 22 U/L / GGT: x                        Anshu Longoria  PGY-1 Resident Physician     Patient is a 77y old  Female who presents with a chief complaint of hypoxia, worsening GALLARDO (08 Aug 2022 18:27)      SUBJECTIVE / OVERNIGHT EVENTS:  NAEON. Pt is ambulating well w/o oxygen. No SOB, CP. Not on nasal cannula.     MEDICATIONS  (STANDING):  apixaban 5 milliGRAM(s) Oral two times a day  atorvastatin 40 milliGRAM(s) Oral at bedtime  chlorhexidine 2% Cloths 1 Application(s) Topical daily  escitalopram 5 milliGRAM(s) Oral daily  furosemide    Tablet 40 milliGRAM(s) Oral daily  metoprolol tartrate 100 milliGRAM(s) Oral two times a day  polyethylene glycol 3350 17 Gram(s) Oral daily  senna 2 Tablet(s) Oral at bedtime  spironolactone 25 milliGRAM(s) Oral daily    MEDICATIONS  (PRN):  acetaminophen     Tablet .. 650 milliGRAM(s) Oral every 6 hours PRN Temp greater or equal to 38C (100.4F), Mild Pain (1 - 3)  ALPRAZolam 0.25 milliGRAM(s) Oral daily PRN Anxiety  benzonatate 100 milliGRAM(s) Oral three times a day PRN Cough  metoclopramide 5 milliGRAM(s) Oral three times a day PRN Nausea  ondansetron    Tablet 8 milliGRAM(s) Oral every 8 hours PRN Nausea and/or Vomiting    Allergies    No Known Drug Allergies  Seasonal allergies - stuffy nose (Other)  shellfish (Other (Moderate))    Intolerances        Vital Signs Last 24 Hrs  T(C): 36.8 (09 Aug 2022 04:16), Max: 36.8 (08 Aug 2022 11:55)  T(F): 98.2 (09 Aug 2022 04:16), Max: 98.3 (08 Aug 2022 11:55)  HR: 66 (09 Aug 2022 04:16) (66 - 71)  BP: 109/65 (09 Aug 2022 04:16) (109/65 - 134/77)  BP(mean): --  RR: 18 (09 Aug 2022 04:16) (18 - 18)  SpO2: 95% (09 Aug 2022 05:44) (92% - 96%)    Parameters below as of 09 Aug 2022 05:44  Patient On (Oxygen Delivery Method): room air      Daily     Daily   I&O's Summary    08 Aug 2022 07:01  -  09 Aug 2022 07:00  --------------------------------------------------------  IN: 1070 mL / OUT: 0 mL / NET: 1070 mL        PHYSICAL EXAM:  GENERAL: NAD, well-developed  HEAD:  Atraumatic, Normocephalic  EYES: EOMI, PERRLA, conjunctiva and sclera clear  NECK: Supple, No JVD  CHEST/LUNG: Clear to auscultation bilaterally; No wheeze  HEART: Regular rate and rhythm; Normal S1 S2, No murmurs, rubs, or gallops  ABDOMEN: Soft, Nontender, Nondistended; Bowel sounds present  EXTREMITIES:  2+ Peripheral Pulses, No clubbing, cyanosis, or edema  PSYCH: AAOx3  NEUROLOGY: non-focal  SKIN: No rashes or lesions    DIAGNOSTICS:                         9.3    4.35  )-----------( 352      ( 08 Aug 2022 07:43 )             29.6     Hgb Trend: 9.3<--, 10.6<--, 9.0<--, 9.1<--, 8.8<--  08-08    136  |  100  |  18  ----------------------------<  112<H>  3.9   |  25  |  0.76    Ca    8.9      08 Aug 2022 07:49  Phos  2.8     08-08  Mg     1.8     08-08    TPro  6.5  /  Alb  3.4  /  TBili  0.6  /  DBili  x   /  AST  22  /  ALT  35  /  AlkPhos  70  08-08    CAPILLARY BLOOD GLUCOSE        Creatinine Trend: 0.76<--, 0.71<--, 0.69<--, 0.78<--, 0.67<--, 0.67<--  LIVER FUNCTIONS - ( 08 Aug 2022 07:49 )  Alb: 3.4 g/dL / Pro: 6.5 g/dL / ALK PHOS: 70 U/L / ALT: 35 U/L / AST: 22 U/L / GGT: x

## 2022-08-09 NOTE — PROGRESS NOTE ADULT - PROBLEM SELECTOR PLAN 2
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 Gemzar/Abraxane q2 weekly outpatient, most recent dose on 8/1/22. CTA chest (8/1): Stable mild interlobular septal thickening in the setting of known metastases/lymphangitic spread of tumor. Follows with Oncologist, Dr. Tata Nielson  - per onc, stable in-pt
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 Gemzar/Abraxane q2 weekly outpatient, most recent dose on 8/1/22. CTA chest (8/1): Stable mild interlobular septal thickening in the setting of known metastases/lymphangitic spread of tumor. Follows with Oncologist, Dr. Tata Nielson  - f/u Onc recs
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 Gemzar/Abraxane q2 weekly outpatient, most recent dose on 8/1/22. CTA chest (8/1): Stable mild interlobular septal thickening in the setting of known metastases/lymphangitic spread of tumor. Follows with Oncologist, Dr. Tata Nielson  - per onc, stable in-pt
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 Gemzar/Abraxane q2 weekly outpatient, most recent dose on 8/1/22. CTA chest (8/1): Stable mild interlobular septal thickening in the setting of known metastases/lymphangitic spread of tumor. Follows with Oncologist, Dr. Tata Nielson  - f/u Onc recs
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 Gemzar/Abraxane q2 weekly outpatient, most recent dose on 8/1/22. CTA chest (8/1): Stable mild interlobular septal thickening in the setting of known metastases/lymphangitic spread of tumor. Follows with Oncologist, Dr. Tata Nielson  - f/u Onc recs
Pt presented with worsening GALLARDO x2 weeks. No SOB at rest. Has trace RLE edema at baseline since RLE surgery. No CP. CTA chest (8/1): (-) for PE but notable b/l GGOs and interval increase in pleural effusions consistent with interstitial pulmonary edema. EKG (8/1): SR w/ 1st degree AV block. Serum pro-BNP (8/1): 811  - Concern for possible cardiac etiology vs adverse effect from chemo vs viral infection  - f/u TTE  - strict I/Os  - consider lasix daily

## 2022-08-09 NOTE — DISCHARGE NOTE NURSING/CASE MANAGEMENT/SOCIAL WORK - NSDCPEFALRISK_GEN_ALL_CORE
For information on Fall & Injury Prevention, visit: [email or web address] OR  [email or web address] OR  https://www.cdc.gov/steadi/patient.html

## 2022-08-09 NOTE — DISCHARGE NOTE NURSING/CASE MANAGEMENT/SOCIAL WORK - PATIENT PORTAL LINK FT
You can access the FollowMyHealth Patient Portal offered by [facility] by registering at the following website: [email or web address]. By joining [employer]’s FollowMyHealth portal, you will also be able to view your health information using other applications (apps) compatible with our system.

## 2022-08-09 NOTE — PROGRESS NOTE ADULT - ASSESSMENT
75 yo F with a PMH of atrial fibrillation (s/p ablation x 3, on Eliquis), ischemic CVA (due to subtherapeutic Coumadin levels), HTN, stage IV pancreatic cancer with lung (and ?L ovary) metastasis with lymphangitic spread (diagnosed 11/2021, currently on Gemzar/Abraxane) who presents with exacerbation of subacute SOB following her most recent chemotherapy dose.    #Subacute Dyspnea/Pulmonary Edema  - Gradually worsening dyspnea for 2 weeks prior, and acutely worsened after C10 of chemotherapy  - New pulmonary GGOs and worsening pleural effusions from prior CT 7/25/22  - No PE, no evidence of infection  - TTE unremarkable  - Ddx includes pneumonitis, which may be due to Gemcitabine (vs Maurepas/Abraxane combination) vs progression of disease vs pulmonary edema  - Pulmonology consulted; patient has been diuresed for several days with improvement clinically and in CT findings (GGOs and effusions have improved). Therefore, increased on the ddx also includes pulmonary edema which may be from Gemcitabine (less likely cardiac given normal TTE). F/u pulmonology to see if steroids would be indicated, but unlikely given it is unlikely a pneumonitis  - Of note, CA 19-9 has been decreasing to 45 on 8/1, and given improvement on diuretics, POD is less likely  - Otherwise, no Oncological contra-indication to discharge    #Metastatic Pancreatic Cancer  - Diagnosed 11/2021 with lung biopsy; metastatic pancreatic cancer to the lung with lymphangitic spread. 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
76F w/ hx of Stage IV pancreatic cancer c/b lymphangitic spread to lung (on Gemzar/Abraxane q2 weekly, most recent dose 8/1/22), CVA, Afib s/p ablation, on Eliquis, HTN, HLD, presenting with worsening GALLARDO x2 weeks and acute hypoxia (85% on RA) after receiving chemo. CTA chest negative for PE but notable for new diffuse b/l GGOs and interval increase in pleural effusions consistent with interstitial pulmonary edema. Concern for possible cardiac etiology vs adverse effect from chemo vs viral infection.  
76F w/ hx of Stage IV pancreatic cancer c/b lymphangitic spread to lung (on Gemzar/Abraxane q2 weekly, most recent dose 8/1/22), CVA, Afib s/p ablation, on Eliquis, HTN, HLD, presenting with worsening GALLARDO x2 weeks and during most recent chemo session found to be acutely hypoxic to 85% on RA. CTA chest negative for PE but notable for new diffuse b/l GGOs and interval increase in pleural effusions consistent with interstitial pulmonary edema. TTE w/ EF of 75% 8/2/22. Also, she is tolerating titration down of nasal cannula oxygenation.     c/f progression of metastatic disease (given known lymphangitic spread, however less likely given relatively acute/subacute development of GGOs between CT scans between 7/25 and 8/1) vs pneumonitis vs infectious etiology     Recommendations  - Discussing utility of bronchoscopy w BAL to evaluate for infectious etiologies  - c/w diuresis  - Hold further steroids for now
    77 yo F with a PMH of atrial fibrillation (s/p ablation x 3, on Eliquis), ischemic CVA (due to subtherapeutic Coumadin levels), HTN, stage IV pancreatic cancer with lung (and ?L ovary) metastasis with lymphangitic spread (diagnosed 11/2021, currently on Gemzar/Abraxane) who presents with exacerbation of subacute SOB following her most recent chemotherapy dose.    #Subacute Dyspnea/Pulmonary Opacities  - Gradually worsening dyspnea for 2 weeks prior, and acutely worsened after C10 of chemotherapy  - New pulmonary GGOs and worsening pleural effusions from prior CT 7/25/22  - No PE  - Ddx includes pneumonitis, which may be due to Gemcitabine (vs Gage/Abraxane combination) vs progression of disease vs pulmonary edema from cardiac source (less likely)  - Of note, CA 19-9 has been decreasing to 45 on 8/1, POD less likely but not excluded  - Unlikely bacterial pneumonia, not on abx, and RVP/COVID negative  - TTE unremarkable  - Pulmonology consulted, appreciate recs. Currently on diuresis, which may also help with any capillary-leak if caused by Gemcitabine as well. Would recommend monitoring over the weekend inpatient and re-evaluate early next week with repeat Chest CT. If not improved, may consider trial of steroids for Gemcitabine-induced pneumonitis  - Also discussed with Pulmonology, bronchoscopy utility for finding diagnosis would not be high    #Metastatic Pancreatic Cancer  - Diagnosed 11/2021 with lung biopsy; metastatic pancreatic cancer to the lung with lymphangitic spread. 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 Levy Aryles Hedjar, MD, PGY-5  Hematology/Oncology Fellow  [facility]  Pager: [phone]  After 5PM and on weekends and holidays, please call the inpatient fellow on call.
76F w/ hx of Stage IV pancreatic cancer c/b lymphangitic spread to lung (on Gemzar/Abraxane q2 weekly, most recent dose 8/1/22), CVA, Afib s/p ablation, on Eliquis, HTN, HLD, presenting with worsening GALLARDO x2 weeks and acute hypoxia (85% on RA) after receiving chemo. CTA chest negative for PE but notable for new diffuse b/l GGOs and interval increase in pleural effusions consistent with interstitial pulmonary edema. Concern for possible cardiac etiology vs adverse effect from chemo vs viral infection.  
76F w/ hx of Stage IV pancreatic cancer c/b lymphangitic spread to lung (on Gemzar/Abraxane q2 weekly, most recent dose 8/1/22), CVA, Afib s/p ablation, on Eliquis, HTN, HLD, presenting with worsening GALLARDO x2 weeks and during most recent chemo session found to be acutely hypoxic to 85% on RA. CTA chest negative for PE but notable for new diffuse b/l GGOs and interval increase in pleural effusions consistent with interstitial pulmonary edema. TTE w/ EF of 75% 8/2/22. Also, she is tolerating titration down of nasal cannula oxygenation.     #Pleural effusions  #GGOs on CT chest  c/f progression of metastatic disease (given known lymphangitic spread, however less likely given relatively acute/subacute development of GGOs between CT scans between 7/25 and 8/1) vs pneumonitis, noncardiogenic pulm edema 2/2 gemcitabine, CHF, infectious etiology. Noted improvement in O2 requirement with diuresis, pt did receive 1 dose of steroids initially. CT chest was repeated with resolution of GGOs and improvement in pleural effusions, suspect pulm edema as the etiology given these results.  - Do not suspect pneumonitis as cause of symptoms and CT findings at this time  - Pt can follow up with pulmonology outpatient [phone]     
76F w/ hx of Stage IV pancreatic cancer c/b lymphangitic spread to lung (on Gemzar/Abraxane q2 weekly, most recent dose 8/1/22), CVA, Afib s/p ablation, on Eliquis, HTN, HLD, presenting with worsening GALLARDO x2 weeks and acute hypoxia (85% on RA) after receiving chemo. CTA chest negative for PE but notable for new diffuse b/l GGOs and interval increase in pleural effusions consistent with interstitial pulmonary edema. Concern for possible cardiac etiology vs adverse effect from chemo vs viral infection.  
76F w/ hx of Stage IV pancreatic cancer c/b lymphangitic spread to lung (on Gemzar/Abraxane q2 weekly, most recent dose 8/1/22), CVA, Afib s/p ablation, on Eliquis, HTN, HLD, presenting with worsening GALLARDO x2 weeks and during most recent chemo session found to be acutely hypoxic to 85% on RA. CTA chest negative for PE but notable for new diffuse b/l GGOs and interval increase in pleural effusions consistent with interstitial pulmonary edema. TTE w/ EF of 75% 8/2/22. Also, she is tolerating titration down of nasal cannula oxygenation.     c/f progression of metastatic disease (given known lymphangitic spread, however less likely given relatively acute/subacute development of GGOs between CT scans between 7/25 and 8/1) vs pneumonitis, noncardiogenic pulm edema 2/2 gemcitabine, CHF, infectious etiology. Noted improvement in O2 requirement.    Recommendations  - Repeat CT chest early next week to evaluate  - c/w diuresis with goal slight negative fluid balance  - Hold further steroids for now
76F w/ hx of Stage IV pancreatic cancer c/b lymphangitic spread to lung (on Gemzar/Abraxane q2 weekly, most recent dose 8/1/22), CVA, Afib s/p ablation, on Eliquis, HTN, HLD, presenting with worsening GALLARDO x2 weeks and acute hypoxia (85% on RA) after receiving chemo. CTA chest negative for PE but notable for new diffuse b/l GGOs and interval increase in pleural effusions consistent with interstitial pulmonary edema. Concern for possible cardiac etiology vs adverse effect from chemo vs viral infection.  

## 2022-08-09 NOTE — PROGRESS NOTE ADULT - PROVIDER SPECIALTY LIST ADULT
Internal Medicine
Internal Medicine
Pulmonology
Heme/Onc
Internal Medicine
Pulmonology
Internal Medicine
Pulmonology
Heme/Onc
Internal Medicine

## 2022-08-09 NOTE — PROGRESS NOTE ADULT - PROBLEM SELECTOR PROBLEM 2
Pancreatic cancer metastasized to lung
GALLARDO (dyspnea on exertion)

## 2022-08-09 NOTE — PROGRESS NOTE ADULT - PROBLEM SELECTOR PLAN 9
- DVT ppx: on Eliquis for Afib  - Diet: DASH  - Dispo: TBD

## 2022-08-09 NOTE — PROGRESS NOTE ADULT - REASON FOR ADMISSION
hypoxia, worsening GALLARDO

## 2022-08-09 NOTE — PROGRESS NOTE ADULT - PROBLEM SELECTOR PLAN 1
Hypoxia to 85% on RA after chemo session & GALLARDO x2 weeks. No SOB at rest. CTA chest (8/1): (-) for PE but notable b/l GGOs and interval increase in pleural effusions consistent with interstitial pulmonary edema. COVID (-). s/p lasix, ceftriaxone. EKG (8/1): SR w/ 1st degree AV block. Serum pro-BNP (8/1): 811. (-) RVP. TTE UR. S/p Pred 50 mg x 2 & will taper based on clinical improvement.  - Diff: interstitial pulmonary edema in setting of adverse effect from chemo vs cardiac etiology  - c/w daily lasix; will monitor fluids status via POCUS  - Per pulm, no steroids; rec repeat CT early today   - strict I/Os  - RA at rest, desaturates on ambulation, will continue to attempt ambulation Hypoxia to 85% on RA after chemo session & GALLARDO x2 weeks. No SOB at rest. CTA chest (8/1): (-) for PE but notable b/l GGOs and interval increase in pleural effusions consistent with interstitial pulmonary edema. COVID (-). s/p lasix, ceftriaxone. EKG (8/1): SR w/ 1st degree AV block. Serum pro-BNP (8/1): 811. (-) RVP. TTE UR. S/p Pred 50 mg x 2 & will taper based on clinical improvement. Repeat CT (8/8) shows interval improvement in ggo.   - Diff: interstitial pulmonary edema in setting of adverse effect from chemo vs cardiac etiology  - c/w daily lasix; will monitor fluids status via POCUS  - strict I/Os  - RA at rest, desaturates on ambulation, will continue to attempt ambulation

## 2022-08-09 NOTE — PROGRESS NOTE ADULT - ATTENDING SUPERVISION STATEMENT
Resident
Fellow
Resident
Fellow
Resident

## 2022-08-09 NOTE — PROGRESS NOTE ADULT - ATTENDING COMMENTS
Patient seen and examined at bedside. No acute events overnight. Symptomatically improving, though still hypoxic to 85% on ambulation. Unsure if she is diuresing well objectively, though patient tells me she is urinating a lot. Will touch base with pulm for further optimization, but may need to consider home oxygen if not entirely reversible.
Patient seen and examined at bedside. No acute events overnight. Patient receiving TTE. She has acute hypoxic respiratory failure of unknown etiology with GGO bilateral on CTA Chest. Will trial diuresis and monitor creatinine. If unable to wean oxygen will consult pulmonary. Differential per onc includes progression of malignancy or possibly pneumonitis. UA with hematuria which is known. Monitor Hg and continue with DOAC.
76F w/ hx of Stage IV pancreatic cancer c/b lymphangitic spread to lung (on Gemzar/Abraxane q2 weekly, most recent dose 8/1/22), CVA, Afib s/p ablation, on Eliquis, HTN, HLD, presenting with worsening GALLARDO x2 weeks and during most recent chemo session found to be acutely hypoxic to 85% on RA. CTA chest negative for PE but notable for new diffuse b/l GGOs and interval increase in pleural effusions consistent with interstitial pulmonary edema. TTE w/ EF of 75% 8/2/22.   she has received furosemide 20mg daily since 8/2 and received prednisone 50 mg daily for one dose and is feeling much better.  Today she is at RA although reportedly desaturated into the 80s while ambulating.  CT personally reviewed and shows pulmonary edema pattern superimposed on previous lung nodules.  Bilateral effusions have increased in size.  Will discuss with Dr. Nielson plan regarding diagnosis and management.  Options include empiric diuretics with repeat CT scan in one week to look for improvement while holding steroids or proceeding with BAL now to rule out infectious etiologies.  If negative and not improved with diuresis would point more likely to drug induced pneumonitis.
Pt with metastatic pancreatic cancer on nab paclitaxel/ gemcitabine who was admitted with GALLARDO and CT showing ground glass opacities. She had repeat CT imaging today showing improvement of ground glass opacities with diuresis. Clinically remains improved; off NC oxygen and comfortable respiratory effort at rest, BLE improved, no rash, A and O x 3. She will follow up with Dr Nielson as outpatient and will monitor symptoms on continued palliative treatment.
Pt with pancreatic cancer on nab paclitaxel and gemcitabine a/w GALLARDO with CT chest showing ground glass opacities. Initially on 3L NC on admission but now off oxygen after diuresis. On exam, sitting up in chair NAD, normal respiratory effort, pitting edema BLE improved, no rash. She has been able to ambulate with desaturation of Pox but able to recover with rest. Pulmonary input appreciated. Will follow pt's clinical progress and will follow up outpatient with Dr Nielson.
76F w/ hx of Stage IV pancreatic cancer c/b lymphangitic spread to lung (on Gemzar/Abraxane q2 weekly, most recent dose 8/1/22), CVA, Afib s/p ablation, on Eliquis, HTN, HLD, presenting with worsening GALLARDO x2 weeks and during most recent chemo session found to be acutely hypoxic to 85% on RA. CTA chest negative for PE but notable for new diffuse b/l GGOs and interval increase in pleural effusions consistent with interstitial pulmonary edema. TTE w/ EF of 75% 8/2/22.   She is receiving furosemide daily and is no longer on steroids.  Remains on RA, ambulating with brief desaturation and feels well.  Differential includes pneumonitis/noncardiogenic pulmonary edema related to gemcitabine, CHF, infection.  Will repeat CT Chest next week and evaluate for improvement.  Agree with plan as outlined above.
Pt is a 76F with PMHx AfIb s/p ablation onf NOAC, HTN/HLD, and metastatic pancreatic adenocarcinoma (dz'ed 11/2021 by lung bx stable since 12/2021 with known lymphangitic spread to the lung on Gemzar/Abraxane 12/6/21-8/2/22) presenting to [facility] on 8/2/22 with subacute worsening of dyspnea found to have acute hypoxemic respiratory distress with initial CT evidence of new diffuse GGO and interval increase in b/l PLEFs c/w interstitial pulmonary edema, likely non-cardiogenic in etiology. Gemcitabine well-known to be associated with non-cardiogenic pulmonary edema. Pt was initiated on diuresis regimen with clinical improvement over the weekend and decrease in O2 requirements. Repeat CT imaging performed today also shows significant improvement in GGOs, pneumonitis or infection much less likely.
Patient seen and examined at bedside. No acute events overnight. No symptoms and not hypoxic at rest. Will ambulate patient and see how she does. Pulm considering bronchoscopy after discussion with oncology? Will follow up with them.
Patient seen and examined. Plan as discussed w/ Dr. Gonzalez: patient remains hypoxic on exertion to 84% as noted during PT session yesterday; f/u pulm re: plan for further imaging / bronch next week; monitor leukopenia.
Patient seen and examined at bedside. No acute events overnight. Seems to be clinically improving and titrating O2 with diuresis. TTE grossly normal, has pulmonary hypertension. Pulm consulted & rec steroid trial. Possible pneumonitis vs. worsening of lymphangitic spread. Final duration of steroids to be determined.
Agree with above, patient with dyspnea on exertion, ongoing workup planned next week, including CT, ? bronch, pulm following. Patient comfortbale today, saturating well at rest on RA.  ______________  Eleazar Wood MD  [facility]  [phone]
Patient seen and examined at bedside. No acute events overnight. Patient no longer hypoxic with ambulation & CT Chest with improved GGO on diuretics. Per oncology Gemcitabine may cause pulmonary edema. TTE with preserved heart function. Will discharge on Lasix 40 mg PO daily and have patient follow up with oncology.    Discharge Time: 40 minutes
Patient seen and examined at bedside. No acute events overnight. Pending repeat CT Chest & now on oral Lasix. Check ambulatory saturation. Follow up with pulm regarding need for bronchoscopy. Outpatient follow up with [facility].

## 2022-08-17 NOTE — HISTORY OF PRESENT ILLNESS
[Disease: _____________________] : Disease: [unfilled] [AJCC Stage: ____] : AJCC Stage: [unfilled] [de-identified] : 77 F w/ h/o CVA, CAD, a fib, presents for further management of Stage IV pancreatic cancer. \par \giulia Peralta was admitted to [facility] for worsening cough and acute SOB in September 2021 and was found to have extensive ill defined pulmonary nodules on CT Chest suggestive of either a metastatic disease or infectious process. She was started on IV antibiotics. CT A/P on 9/4/21 showed mixed solid and cystic left ovarian mass measuring up to 4.6 cm suspicious for an ovarian cystic neoplasm, no pelvic or RP adenopathy, ill defined cystic changes within the pancreatic body and tail, possibly representing a pancreatic cystic neoplasm such as an IPMN. CTA on 9/4/21 showed extensive scattered ill defined pulmonary nodule/nodular infiltrates throughout the lungs, concerning for atypical infection/pneumonia. A bronchoscopy was done on 9/7/21 with BAL and RML x 3 of RLL posterior segment. Negative for malignancy. Pt was d/c from [facility] after improvement in symptoms with Ab. \par \giulia Peralta followed up with GI and underwent an MR Abd on 9/29/21 which showed segmental dilatation of the pancreatic duct within the distal body and tail, measuring up to 5 mm associated pancreatic parenchymal atrophy. Mass suspected in the mid body. No liver mass noted. 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 EUS at [facility] on 10/12/21 showed an ill defined mass in the body involving the splenic vein. FNA c/w adenocarcinoma. CA 19-9 3164,  35. \par \par Referred to Dr. Barrow at [facility]. 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 Underwent a lung biopsy on 11/11/21 which confirmed metastatic pancreatic adenocarcinoma, MMR intact, NTRK negative. \par \par Restaging CT CAP oon 11/30/21 showed slight interval progression of lymphangitic spread of cancer, numerous b/l pulm nodules c/w known metastatic disease, 2.6 x 1.9 x 1.9 cm pancreatic body mass, diffuse mesenteric haziness, intra-abdominal LN, small pelvic ascites. \par \par 12/6/21: C1D1, 8, 15 Q 28 cycle Gemzar 800 mg/m2 + Abraxane 100 mg/m2 \par 12/24/21: admitted to [facility] for neutropenic fever after syncope and diarrhea at home. \par 1/10/22: C2D1 and D15 (alternating weeks) \par 2/1/22: CT CAP: decr in lymphangitis carcinomatosis and pulm nodules, 1.7 x 1.1 pancreatic body mass decr in size\par 2/7/22: C3D1\par \par Decided to transfer care to [facility] due to proximity to home. \par \par 2/22-4/18/22: [id number]\par 4/25/22: CT CAP: interval improvement in diffuse interlobular septal thickening and multiple bilateral pulmonary nodules, stable pancreatic mass\par 5/2-7/18/22: [id number]\par 7/25/22: CT CAP: irregular thickening along left bladder wall; otherwise stable disease\par 8/2-8/9/22: [facility] admission due to hypoxia/worsening GALLARDO [de-identified] : adenocarcinoma  [de-identified] : David testing: MSH3 VUS denoted [id number]  [de-identified] : Patient continues to experience SOB (worse with exertion) despite using various treatments but they have been ineffective (currently on Lasix therapy). She is capable of ambulating her home but she requires portability; her O2 sat while ambulating was 86% RA but it improved to 91% when she came to rest. Patient is scheduled to follow up with pulm tomorrow AM.

## 2022-08-17 NOTE — PHYSICAL EXAM
[Ambulatory and capable of all self care but unable to carry out any work activities] : Status 2- Ambulatory and capable of all self care but unable to carry out any work activities. Up and about more than 50% of waking hours [Normal] : affect appropriate [de-identified] : erythematous skin rash noted underneath bilateral breasts

## 2022-08-18 NOTE — HISTORY OF PRESENT ILLNESS
[Disease: _____________________] : Disease: [unfilled] [AJCC Stage: ____] : AJCC Stage: [unfilled] [Therapy: ___] : Therapy: [unfilled] [Cycle: ___] : Cycle: [unfilled] [Day: ___] : Day: [unfilled] [de-identified] : 76 F w/ h/o CVA, CAD, a fib, presents for further management of Stage IV pancreatic cancer. \par \giulia Peralta was admitted to [facility] for worsening cough and acute SOB in September 2021 and was found to have extensive ill defined pulmonary nodules on CT Chest suggestive of either a metastatic disease or infectious process. She was started on IV antibiotics. CT A/P on 9/4/21 showed mixed solid and cystic left ovarian mass measuring up to 4.6 cm suspicious for an ovarian cystic neoplasm, no pelvic or RP adenopathy, ill defined cystic changes within the pancreatic body and tail, possibly representing a pancreatic cystic neoplasm such as an IPMN. CTA on 9/4/21 showed extensive scattered ill defined pulmonary nodule/nodular infiltrates throughout the lungs, concerning for atypical infection/pneumonia. A bronchoscopy was done on 9/7/21 with BAL and RML x 3 of RLL posterior segment. Negative for malignancy. Pt was d/c from [facility] after improvement in symptoms with Ab. \par \giulia Peralta followed up with GI and underwent an MR Abd on 9/29/21 which showed segmental dilatation of the pancreatic duct within the distal body and tail, measuring up to 5 mm associated pancreatic parenchymal atrophy. Mass suspected in the mid body. No liver mass noted. 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 EUS at [facility] on 10/12/21 showed an ill defined mass in the body involving the splenic vein. FNA c/w adenocarcinoma. CA 19-9 3164,  35. \par \par Referred to Dr. Barrow at [facility]. 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 Underwent a lung biopsy on 11/11/21 which confirmed metastatic pancreatic adenocarcinoma, MMR intact, NTRK negative. \par \par Restaging CT CAP oon 11/30/21 showed slight interval progression of lymphangitic spread of cancer, numerous b/l pulm nodules c/w known metastatic disease, 2.6 x 1.9 x 1.9 cm pancreatic body mass, diffuse mesenteric haziness, intra-abdominal LN, small pelvic ascites. \par \par 12/6/21: C1D1, 8, 15 Q 28 cycle Gemzar 800 mg/m2 + Abraxane 100 mg/m2 \par 12/24/21: admitted to [facility] for neutropenic fever after syncope and diarrhea at home. \par 1/10/22: C2D1 and D15 (alternating weeks) \par 2/1/22: CT CAP: decr in lymphangitis carcinomatosis and pulm nodules, 1.7 x 1.1 pancreatic body mass decr in size\par 2/7/22: C3D1\par \par Decided to transfer care to [facility] due to proximity to home. \par \par 2/22-7/18/22: C3-9 Gemzar/Abraxane\par 4/25/22: CT CAP: improvement in pulm nodules, pancreatic mass is unchanged, L ovarian mass is stable \par 7/11/22: Cystoscopy( done for hematuria): oozing from mucosa on left, no gross tumors, urine cyto neg for high grade cancer  [de-identified] : adenocarcinoma  [de-identified] : David testing: MSH3 VUS denoted [id number]  [de-identified] : overall feeling ok, tolerating chemo well. Feels tired post treatment but able to recover after several days. Hematuria is being worked up, pending cystoscopy in Aug. UA cyto neg for malignancy but there was an area of erythema along the bladder wall, oozing noted.

## 2022-08-22 NOTE — ASSESSMENT
[FreeTextEntry1] : Ms. 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  BNP mildly elevated at 327.  PE significant for basilar rales and trace pitting edema over LEs. CXR shows persistent blunting of the CP angles suggestive of effusions.  Suspect exertional hypoxia is due to underlying metastatic disease burden with some degree of pulmonary edema. Pneumonitis felt to be less likely given significant improvement in her Chest CT post diuresis. Echo during that admission (8/2/22) showed EF 75%, PASP 39, small pericardial effusion, no mention of diastolic dysfunction but LVH previously mentioned on prior echos).  Her BP has room for optimization and me be contributing to acuite on chronic HFpEF exacerbation.\par \par Recommend:\par - Increase Lasix to 40mg BID x 3 days then resume 40mg daily\par - Obtain PFTs and 6MWT for objective evaluation of underlying lung function and to titrate oxygen during exertion\par - SW a/w with Dr. Nielson/Leoncio's office is already working on home O2 delivery.\par - Discussed preference for portable O2 concentrator, order placed\par - Next chemo delayed until end of August to monitor patient's respiratory status\par - Needs BP/HR optimization with goal SBP < 120 and HR 60-80\par \par RTC in 6 weeeks with above tests completed.\par \par Lali Wilson MD

## 2022-08-22 NOTE — PHYSICAL EXAM
[No Acute Distress] : no acute distress [Well Nourished] : well nourished [Well Groomed] : well groomed [Well Developed] : well developed [Normal Oropharynx] : normal oropharynx [Normal Appearance] : normal appearance [Supple] : supple [No Neck Mass] : no neck mass [No JVD] : no jvd [Normal Rate/Rhythm] : normal rate/rhythm [Normal S1, S2] : normal s1, s2 [No Rubs] : no rubs [No Gallops] : no gallops [No Resp Distress] : no resp distress [Rales] : rales [No Abnormalities] : no abnormalities [Benign] : benign [Normal Gait] : normal gait [No Clubbing] : no clubbing [No Cyanosis] : no cyanosis [1+ Pitting] : 1+ pitting [Normal Color/ Pigmentation] : normal color/ pigmentation [No Focal Deficits] : no focal deficits [Oriented x3] : oriented x3 [Normal Insight/judgment] : normal insight/judgment [Normal Affect] : normal affect

## 2022-08-22 NOTE — REVIEW OF SYSTEMS
[Fever] : no fever [Fatigue] : no fatigue [Recent Wt Gain (___ Lbs)] : ~T no recent weight gain [Chills] : no chills [Poor Appetite] : no poor appetite [Recent Wt Loss (___ Lbs)] : ~T no recent weight loss [Sore Throat] : no sore throat [Nasal Congestion] : no nasal congestion [Sinus Problems] : no sinus problems [Cough] : no cough [Chest Tightness] : no chest tightness [Sputum] : no sputum [Pleuritic Pain] : no pleuritic pain [SOB on Exertion] : sob on exertion [Chest Discomfort] : no chest discomfort [Edema] : no edema [Leg Cramps] : no leg cramps [Palpitations] : no palpitations [Syncope] : no syncope [GERD] : no gerd [Abdominal Pain] : no abdominal pain [Nausea] : no nausea [Vomiting] : no vomiting [Arthralgias] : no arthralgias [Myalgias] : no myalgias [Rash] : no rash [Headache] : no headache [Dizziness] : no dizziness [Obesity] : obesity

## 2022-08-22 NOTE — HISTORY OF PRESENT ILLNESS
[Former] : former [TextBox_4] : Ms. 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 (8/2-8/9/22) with acute hypoxic respiratory failure due to pulmonary edema/bilateral pleural effusions which improved with diuresis.  There was some consideration given to possibility of pneumonitis and she received a dose of steroids, however her oxygenation improved significantly with diuresis that this was thought to be unlikely. She was sent home without oxygen - per daughter was ambulated before leaving hospital and O2Sats with exertion ranged 88-90% (did not meet criteria).  She reports feeling relatively the same since dischage - denies any increased dyspnea or swelling in her LEs.  She was sent here by her Oncologist (Dr. Nielson is out on maternity leave and was seen by Dr. Fang) due to desaturation with ambulation down to 86%.She denies any recent fever, chills, no increased sputum production.

## 2022-09-01 NOTE — END OF VISIT
[Time Spent: ___ minutes] : I have spent [unfilled] minutes of time on the encounter. [] : Fellow [FreeTextEntry3] : \par Kuldip Dos Santos MD PhD\par Medical Oncology Attending\par I personally have spent a total of 45 minutes of time on the date of this encounter reviewing test results, documenting findings, coordinating care, and directly consulting with the patient and/or designated family member.\par I was present with the trainee during the key portions of the history and exam. I agree with the findings and plan as documented above.\par

## 2022-09-01 NOTE — HISTORY OF PRESENT ILLNESS
[Home] : at home, [unfilled] , at the time of the visit. [Other Location: e.g. Home (Enter Location, City,State)___] : at [unfilled] [Family Member] : family member [Verbal consent obtained from patient] : the patient, [unfilled] [Disease: _____________________] : Disease: [unfilled] [AJCC Stage: ____] : AJCC Stage: [unfilled] [de-identified] : 77 F w/ h/o CVA, CAD, a fib, presents for further management of Stage IV pancreatic cancer. \par \giulia Peralta was admitted to [facility] for worsening cough and acute SOB in September 2021 and was found to have extensive ill defined pulmonary nodules on CT Chest suggestive of either a metastatic disease or infectious process. She was started on IV antibiotics. CT A/P on 9/4/21 showed mixed solid and cystic left ovarian mass measuring up to 4.6 cm suspicious for an ovarian cystic neoplasm, no pelvic or RP adenopathy, ill defined cystic changes within the pancreatic body and tail, possibly representing a pancreatic cystic neoplasm such as an IPMN. CTA on 9/4/21 showed extensive scattered ill defined pulmonary nodule/nodular infiltrates throughout the lungs, concerning for atypical infection/pneumonia. A bronchoscopy was done on 9/7/21 with BAL and RML x 3 of RLL posterior segment. Negative for malignancy. Pt was d[facility]c from [facility] after improvement in symptoms with Ab. \par \giulia Peralta followed up with GI and underwent an MR Abd on 9/29/21 which showed segmental dilatation of the pancreatic duct within the distal body and tail, measuring up to 5 mm associated pancreatic parenchymal atrophy. Mass suspected in the mid body. No liver mass noted. 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 EUS at [place] on 10/12/21 showed an ill defined mass in the body involving the splenic vein. FNA c/w adenocarcinoma. CA 19-9 3164,  35. \par \par Referred to Dr. Barrow at [facility]. 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 Underwent a lung biopsy on 11/11/21 which confirmed metastatic pancreatic adenocarcinoma, MMR intact, NTRK negative. \par \par Restaging CT CAP oon 11/30/21 showed slight interval progression of lymphangitic spread of cancer, numerous b/l pulm nodules c/w known metastatic disease, 2.6 x 1.9 x 1.9 cm pancreatic body mass, diffuse mesenteric haziness, intra-abdominal LN, small pelvic ascites. \par \par 12/6/21: C1D1, 8, 15 Q 28 cycle Gemzar 800 mg/m2 + Abraxane 100 mg/m2 \par 12/24/21: admitted to [facility] for neutropenic fever after syncope and diarrhea at home. \par 1/10/22: C2D1 and D15 (alternating weeks) \par 2/1/22: CT CAP: decr in lymphangitis carcinomatosis and pulm nodules, 1.7 x 1.1 pancreatic body mass decr in size\par 2/7/22: C3D1\par \par Decided to transfer care to [facility] due to proximity to home. \par \par 2/22-4/18/22: [id number]\par 4/25/22: CT CAP: interval improvement in diffuse interlobular septal thickening and multiple bilateral pulmonary nodules, stable pancreatic mass\par 5/2-7/18/22: [id number]\par 7/25/22: CT CAP: irregular thickening along left bladder wall; otherwise stable disease\par 8/2-8/9/22: [facility] admission due to hypoxia/worsening GALLARDO\par  [de-identified] : adenocarcinoma  [de-identified] : David testing: MSH3 VUS denoted [id number]  [de-identified] : 9/1/22-  \par She was hospitalized from 8/1-8/9 for possible HFpEF exacerbation. She needed aggressive diuresis and was discharged on lasix 40 mg qD. \par Patient reports that SOB is better since discharge. Patient saw pulmonologist and is awaiting for PFT and 6MWT to be done on 9/30.\par She said she uses home O2 when she sleeps at night and sometimes when she desaturates.\par She also reported numbness of bilateral toes but no symptoms on her hands. \par She had hematuria in July for which she saw urologist but didn't get a chance to get cystoscopy as she got hospitalized.  Her hematuria has stopped at this time.

## 2022-09-01 NOTE — ASSESSMENT
[Palliative] : Goals of care discussed with patient: Palliative [Palliative Care Plan] : not applicable at this time [FreeTextEntry1] : 77 F diagnosed with stage IV pancreas cancer, currently on Gemzar 800 mg/m2/Abraxane 100 mg/m2 Q 15 days \par \par She was recently hospitalized from 8/1-8/9 for SOB. CXR on admission showed pulmonary edema and bilateral pleural effusions. CT chest s/p diuresis showed improved pulmonary edema and pleural effusions . Her SOB could be from HFpEF or Gemzar related non-cardiogenic pulmonary edema.Patient has portable oxygen arranged at home. She is now being followed by pulmonology as an out-patient for management of hypoxia and pulmonary edema of uncertain cause.\par \par Today, she is deconditioned, had to catch her breath even walking into the examination room. Given that she is still recovering from hospitalization and the nature of the insult may be related to gemcitabine, we will hold off on therapy for now, allowing further time of recovery. \par \par Plan    \par - f/u with pulm as directed. PFT and 6MWT towards the end of September. Will try to get earlier pulmonology follow up appointment . Megan emailed about this\par - Will set up cardiology referral here with Dr. Moreno. Megan emailed about this.\par - Recommended to get cystoscopy and biopsy for evaluation of h/o hematuria in the mean time\par - Continue supportive care (pain management, antiemetics, bowel regimen, hydration\par - Monitor for toxicity and physical activity as tolerated. \par - All concerns and questions were addressed and answered in full detail to the patient's apparent satisfaction and agreement.\par - Will hold off chemo for now until patient's respiratory status is improved.\par - RTC in 2 weeks; at that time we will re-evaluate her pulmonary status, and review on-going w/u with cardiology, pulmonology, urology\par \par Dolye Crawford MD\par \par \par \par Phyu Thin Ty \par Hematology and Oncology Fellow (PGY-4)

## 2022-09-01 NOTE — REASON FOR VISIT
[Family Member] : family member [Follow-Up Visit] : a follow-up [Other: _____] : [unfilled] [FreeTextEntry2] : Stage IV pancreatic cancer

## 2022-09-01 NOTE — PHYSICAL EXAM
[Ambulatory and capable of all self care but unable to carry out any work activities] : Status 2- Ambulatory and capable of all self care but unable to carry out any work activities. Up and about more than 50% of waking hours [Normal] : affect appropriate [de-identified] : Mild bibasilar crackles [de-identified] : erythematous skin rash noted underneath bilateral breasts

## 2022-09-07 PROBLEM — R31.9 HEMATURIA: Status: ACTIVE | Noted: 2022-01-01

## 2022-09-07 NOTE — HISTORY OF PRESENT ILLNESS
[Home] : at home, [unfilled] , at the time of the visit. [Other Location: e.g. Home (Enter Location, City,State)___] : at [unfilled] [Family Member] : family member [Verbal consent obtained from patient] : the patient, [unfilled] [Disease: _____________________] : Disease: [unfilled] [AJCC Stage: ____] : AJCC Stage: [unfilled] [de-identified] : 76 F w/ h/o CVA, CAD, a fib, presents for further management of Stage IV pancreatic cancer. \par \giulia Peralta was admitted to [facility] for worsening cough and acute SOB in September 2021 and was found to have extensive ill defined pulmonary nodules on CT Chest suggestive of either a metastatic disease or infectious process. She was started on IV antibiotics. CT A/P on 9/4/21 showed mixed solid and cystic left ovarian mass measuring up to 4.6 cm suspicious for an ovarian cystic neoplasm, no pelvic or RP adenopathy, ill defined cystic changes within the pancreatic body and tail, possibly representing a pancreatic cystic neoplasm such as an IPMN. CTA on 9/4/21 showed extensive scattered ill defined pulmonary nodule/nodular infiltrates throughout the lungs, concerning for atypical infection/pneumonia. A bronchoscopy was done on 9/7/21 with BAL and RML x 3 of RLL posterior segment. Negative for malignancy. Pt was [facility]c from [facility] after improvement in symptoms with Ab. \par \giulia Peralta followed up with GI and underwent an MR Abd on 9/29/21 which showed segmental dilatation of the pancreatic duct within the distal body and tail, measuring up to 5 mm associated pancreatic parenchymal atrophy. Mass suspected in the mid body. No liver mass noted. 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 EUS at [place] on 10/12/21 showed an ill defined mass in the body involving the splenic vein. FNA c/w adenocarcinoma. CA 19-9 3164,  35. \par \par Referred to Dr. Barrow at [facility]. 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 Underwent a lung biopsy on 11/11/21 which confirmed metastatic pancreatic adenocarcinoma, MMR intact, NTRK negative. \par \par Restaging CT CAP oon 11/30/21 showed slight interval progression of lymphangitic spread of cancer, numerous b/l pulm nodules c/w known metastatic disease, 2.6 x 1.9 x 1.9 cm pancreatic body mass, diffuse mesenteric haziness, intra-abdominal LN, small pelvic ascites. \par \par 12/6/21: C1D1, 8, 15 Q 28 cycle Gemzar 800 mg/m2 + Abraxane 100 mg/m2 \par 12/24/21: admitted to [facility] for [facility]ropenic fever after syncope and diarrhea at home. \par 1/10/22: C2D1 and D15 (alternating weeks) \par 2/1/22: CT CAP: decr in lymphangitis carcinomatosis and pulm nodules, 1.7 x 1.1 pancreatic body mass decr in size\par 2/7/22: C3D1\par \par Decided to transfer care to [facility] due to proximity to home. \par \par 2/22-7/18/22: C3-9 Gemzar/Abraxane\par 4/25/22: CT CAP: improvement in pulm nodules, pancreatic mass is unchanged, L ovarian mass is stable \par 8/1/22: C10\par 7/25/22: CT CAP: stable disease, new bladder wall thickening \par 8/2-8/9/22: Admittd to [facility] with hypoxia, b/l infiltrates on imaging c/w noncardiogenic pulm edema. Quickly improved with diuresis. Unlikely to be pneumonitis or capillary leak syndrome from Gemzar given the rapid improvement.  [de-identified] : adenocarcinoma  [de-identified] : David testing: MSH3 VUS denoted [id number]  [FreeTextEntry1] : c [de-identified] : SOB has improved but still has some on exertion. Has checked pulse ox and it ranges from 87% on exertion but increases to 93% on RA. was not sent home with supplemental O2. + fatigue. appetite is good. still on lasix daily. cystoscopy is on hold.

## 2022-09-22 NOTE — ASSESSMENT
[FreeTextEntry1] : Ms. Steward is a 78 yo F with PMHx CVA, CAD, Afib (s/p ablation x 3 w/o recurrence, on AC with Eliquis), Stage IV pancreatic cancer (lung), lymphangitic carcinomatosis, prior BK and CMV viremia - on treatment with Gemcitabine/Abraxane - who was recently admitted to [facility] (8/2-8/9/22) with acute hypoxic respiratory failure due to pulmonary edema/bilateral pleural effusions which improved with diuresis (only received a single dose of prednisone). BNP mildly elevated at 327. When comparing her Chest CTs from 8/1/22 to 8/9/22 there is notably significant improvement in her B/L pleural effusions and B/L GGOs - she was on RA at rest. With this in mind, the cause of her hypoxia appeared to be cardiogenic pulmonary edema and pneumonitis felt to be less likely given significant improvement in her Chest CT post diuresis. Echo during that admission (8/2/22) showed EF 75%, PASP 39, small pericardial effusion, no mention of diastolic dysfunction (but LVH previously mentioned on prior echos). Her BP has room for optimization and me be contributing to acuie on chronic HFpEF exacerbation.  Discussed extensively with patient and her daughter that based on the available evidence I do not feel strongly that this was noncardiogenic pulm edema due to chemotherapy - while we cannot say with 100% certainty it seems less likely at this juncture.  In fact, her CT scans in July showed e/o of mild pulm edema that more readily progressed to the point of more moderate pulm edema leading to her resting hypoxia in the setting of stable lymphangitic spread.\par At this point it seems the benefits of chemo outweigh the risks but perhaps a reduced dose (initially) might be pursued.\par \par Her PFTs show no evidence of obstruction on spirometry, normal lung volumes and moderately reduced DLCO.  On 6MWT she was able to walk 95% of predicted but required up to 8Lpm of oxygen to maintain an O2Sat of 93% (with brittany 4 only).\par \par Above findings also make me suspect that Ms. Steward has potentially had exertional hypoxia for a longer time than we have been aware, but has not been symptomatic and therefore would not have readily come to attention.\par \par Recommend:\par -Continue Lasix 40mg daily\par - Obtain new CXR for current baseline prior to next chemo (chemo potentially at reduced dose if feasible)\par - Instructed to use suppl oxygen whenever she is engaged in continuous activity (use pulse ox for guidance of titration as patient does not become dyspneic - should maintain O2Sat > 88%); also continue to wear while sleeping\par - Portable O2 concentrator in use by patient\par - Follow-up with Cardiologist (Dr. Moreno) to determine if diastolic dysfunction is in fact apparent on prior Echos\par - PSG split night ordered to evaluate for MELISSA (prev dx but 50lbs lighter) with titration\par - Urology follow-up for history of hematuria, check UA\par \par \par RTC in 6 weeeks.\par \par Lali Wilson MD. \par

## 2022-09-22 NOTE — PHYSICAL EXAM
[No Acute Distress] : no acute distress [Well Nourished] : well nourished [Well Groomed] : well groomed [Well Developed] : well developed [Normal Oropharynx] : normal oropharynx [Normal Appearance] : normal appearance [Supple] : supple [No Neck Mass] : no neck mass [No JVD] : no jvd [Normal Rate/Rhythm] : normal rate/rhythm [Normal S1, S2] : normal s1, s2 [No Rubs] : no rubs [No Gallops] : no gallops [No Resp Distress] : no resp distress [No Acc Muscle Use] : no acc muscle use [Normal Rhythm and Effort] : normal rhythm and effort [No Abnormalities] : no abnormalities [Benign] : benign [No Clubbing] : no clubbing [No Cyanosis] : no cyanosis [No Edema] : no edema [Normal Color/ Pigmentation] : normal color/ pigmentation [No Rash] : no rash [Oriented x3] : oriented x3 [Normal Affect] : normal affect [TextBox_68] : RLL inspiratory squeak

## 2022-09-22 NOTE — HISTORY OF PRESENT ILLNESS
[Never] : never [TextBox_4] : Current HPI: Ms. Steward returns for follow-up.  Reports decrease in LE swelling but no appreciable change in shortness of breath.  She reports being able to walk around her condo without any sensation of limitation from respiratory standpoint.  We discussed that after her last chemo had experienced (new) gross hematuria and was to go for biopsy however she ended up hospitalized and this was never completed.  Immediately after hospitalization the hematuria recurred, albeit briefly and then self-resolved in early August.  She has remained off chemo since July.  At today's visit she also endorses history of mild MELISSA for which CPAP had been recommended in past "because of my Atrial fibrillation at the time".  She has been using her supplemental O2 while sleeping and if she leaves the house for longer excursions.  She has no new complaints at this time.  No fever, chills, cough.She is 95% on RA.\par \par \par Prior HPI (8/18/22): Ms. 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 There was some consideration given to possibility of pneumonitis and she received a dose of steroids, however her oxygenation improved significantly with diuresis that this was thought to be unlikely. She was sent home without oxygen - per daughter was ambulated before leaving hospital and O2Sats with exertion ranged 88-90% (did not meet criteria). She reports feeling relatively the same since dischage - denies any increased dyspnea or swelling in her LEs. She was sent here by her Oncologist (Dr. Nielson is out on maternity leave and was seen by Dr. Fang) due to desaturation with ambulation down to 86%.She denies any recent fever, chills, no increased sputum production.

## 2022-09-22 NOTE — REVIEW OF SYSTEMS
[Fever] : no fever [Fatigue] : no fatigue [Recent Wt Gain (___ Lbs)] : ~T no recent weight gain [Chills] : no chills [Poor Appetite] : no poor appetite [Recent Wt Loss (___ Lbs)] : ~T no recent weight loss [Sinus Problems] : no sinus problems [Cough] : no cough [Hemoptysis] : no hemoptysis [Chest Tightness] : no chest tightness [Sputum] : no sputum [Dyspnea] : no dyspnea [Pleuritic Pain] : no pleuritic pain [Wheezing] : no wheezing [SOB on Exertion] : no sob on exertion [Chest Discomfort] : no chest discomfort [Palpitations] : no palpitations [Syncope] : no syncope [Abdominal Pain] : no abdominal pain [Nausea] : no nausea [Vomiting] : no vomiting [Dysuria] : no dysuria [Frequency] : frequency [Rash] : no rash [Dizziness] : no dizziness [Confusion] : no confusion [Obesity] : obesity

## 2022-09-25 NOTE — ASSESSMENT
[FreeTextEntry1] : 77 year old woman with history of atrial fibrillation, hypertension, recurrent CVA, now with metastatic pancreatic cancer including to the lungs seen for shortness of breath and acute pulmonary edema with ongoing oxygen desaturation with ambulation. EKG today shows NSR and 1st degree AV block. She appears euvolemic on examination with normal jugular venous pressure.\par \par Unclear precipitant of acute pleural effusions, ground glass opacities, and hypoxemic respiratory failure, but gemcitabine induced pulmonary toxicity must be considered. She certainly has risk factors for diastolic heart failure and this may contribute to acute pleural effusions which improved with diuretics. However, it unlikely explains the persistent exertional hypoxemia.\par \par Will refer for an exercise stress echo to evaluate filling pressures pre/post treadmill exercise.\par Continue furosemide 40 mg daily and spironolactone 25 mg daily for HTN and diastolic dysfunction.\par Will check pro-BNP today.\par \par With respect to atrial fibrillation and recurrent stroke. May consider change from apixaban to a different DOAC, perhaps dabigatran because of possibly superior efficacy in terms of ischemic events. I think she can discontinue aspirin. Continue metoprolol tartrate 100 mg BID.\par \par Continue statin therapy.\par \par Above recommendations discussed with the patient and her daughter and all questions answered to the best of my ability and to their apparent satisfaction.\par \par \par

## 2022-09-25 NOTE — REASON FOR VISIT
[Family Member] : family member [FreeTextEntry3] : Dr. Dos Santos (Dr. Nielson) [FreeTextEntry1] : JULIANNA LOPEZ is a 77 year woman with a history of paroxysmal atrial fibrillation s/p ablation, prior CVA, hypertension, hypercholesterolemia, and pancreatic cancer who is referred for evaluation of shortness of breath and hypoxia.\par \par Prior Cancer Treatments:\par ------------------------------------------------------------------------\par Chemo/targeted therapy:\par 12/6/2021: Gemzar+Abraxane (s/p 8 cycles)\par ------------------------------------------------------------------------\par Surgery:\par \par ------------------------------------------------------------------------\par Radiation:\par \par

## 2022-09-25 NOTE — PHYSICAL EXAM
[Normal] : well developed, well nourished, no acute distress [Normal Conjunctiva] : normal conjunctiva [No Xanthelasma] : no xanthelasma [Normal Venous Pressure] : normal venous pressure [No Carotid Bruit] : no carotid bruit [Normal S1, S2] : normal S1, S2 [No Murmur] : no murmur [No Rub] : no rub [No Gallop] : no gallop [Clear Lung Fields] : clear lung fields [Good Air Entry] : good air entry [No Respiratory Distress] : no respiratory distress  [Soft] : abdomen soft [Normal Gait] : normal gait [Gait - Sufficient for Exercise Testing] : gait - sufficient for exercise testing [No Edema] : no edema [No Cyanosis] : no cyanosis [No Clubbing] : no clubbing [No Varicosities] : no varicosities [No Rash] : no rash [Moves all extremities] : moves all extremities [Normal Speech] : normal speech [Alert and Oriented] : alert and oriented

## 2022-09-25 NOTE — HISTORY OF PRESENT ILLNESS
[FreeTextEntry1] : This 76 year old woman with stage IV pancreatic adenocarcinoma treated with Gemzar/Abraxane since December 2021 was admitted to [facility] on 8/1 for shortness of breath and hypoxia after scheduled chemotherapy. She reported dyspnea on exertion over the preceding two weeks. After chemotherapy, noted to have saturation of 85 % in ambient air and sent to the ER. CT-angiogram showed no pulmonary embolism, but there were new ground glass opacities and increased pleural effusions. Echocardiogram showed normal LVEF. She improved with diuretics and was dismissed on PO furosemide, 40 mg daily. There was some consideration given to possibility of pneumonitis and she received a dose of steroids, however her oxygenation improved significantly with diuresis that this was thought to be unlikely. She feels better, but desaturates with ambulation and requires supplemental oxygen. \par \par Recent PFTs showed no evidence of obstruction on spirometry, normal lung volumes and moderately reduced DLCO. On 6MWT she was able to walk 95% of predicted but required up to 8Lpm of oxygen to maintain an O2Sat of 93% (with Tee 4 only).\par \par She denies any orthopnea, lower extremity edema, palpitations, chest pain.\par \par With respect to atrial fibrillation, she had recurrent symptomatic atrial fibrillation. Dr. Sergio Meredith performed catheter ablation x3, the last in February, 2021 without apparent recurrence to date.\par \par In 2015, while on warfarin, she had acute right sided sensory/motor deficits and dysarthria was found to have multiple ischemic infarcts involving the left hemisphere. INR was subtherapeutic at the time. She recovered most function. in 2018, while on apixaban 5 mg twice daily, she had recurrent neurologic deficit following hip replacement surgery and there was a new acute lacunar infarct. Aspirin 81 mg daily was added to apixaban at this time. \par \par Completed Pulmonary evaluation and advised to see me to evaluate for diastolic dysfunction or other cardiac cause of dyspnea prior to resuming systemic chemotherapy.\par \par \par Cardiovascular Summary:\par ----------------------------------------------\par ECG:\par 9/23/2022:sinus bradycardia 56 bpm, with first degree AV block\par ----------------------------------------------\par Echo:\par 8/2/2022: normal EF, no diastolic dysfunction, mild PHTN, small pericardial and pleural effusions\par 1/15/2018: EF 65 %\par ----------------------------------------------\par Vascular:\par 2/17/2015: carotid sono - mild bilateral ICA plaque\par ----------------------------------------------

## 2022-09-25 NOTE — REVIEW OF SYSTEMS
[Feeling Fatigued] : feeling fatigued [Dyspnea on exertion] : dyspnea during exertion [Negative] : Heme/Lymph [FreeTextEntry8] : hematuria

## 2022-09-29 NOTE — PHYSICAL EXAM
[Ambulatory and capable of all self care but unable to carry out any work activities] : Status 2- Ambulatory and capable of all self care but unable to carry out any work activities. Up and about more than 50% of waking hours [Normal] : affect appropriate [de-identified] : Mild bibasilar crackles [de-identified] : erythematous skin rash noted underneath bilateral breasts [de-identified] : b/l feet tingling and numbness noted

## 2022-09-29 NOTE — HISTORY OF PRESENT ILLNESS
[Disease: _____________________] : Disease: [unfilled] [AJCC Stage: ____] : AJCC Stage: [unfilled] [de-identified] : 77 F w/ h/o CVA, CAD, a fib, presents for further management of Stage IV pancreatic cancer. \par \giulia Peralta was admitted to [facility] for worsening cough and acute SOB in September 2021 and was found to have extensive ill defined pulmonary nodules on CT Chest suggestive of either a metastatic disease or infectious process. She was started on IV antibiotics. CT A/P on 9/4/21 showed mixed solid and cystic left ovarian mass measuring up to 4.6 cm suspicious for an ovarian cystic neoplasm, no pelvic or RP adenopathy, ill defined cystic changes within the pancreatic body and tail, possibly representing a pancreatic cystic neoplasm such as an IPMN. CTA on 9/4/21 showed extensive scattered ill defined pulmonary nodule/nodular infiltrates throughout the lungs, concerning for atypical infection/pneumonia. A bronchoscopy was done on 9/7/21 with BAL and RML x 3 of RLL posterior segment. Negative for malignancy. Pt was d/c from [facility] after improvement in symptoms with Ab. \par \giulia Peralta followed up with GI and underwent an MR Abd on 9/29/21 which showed segmental dilatation of the pancreatic duct within the distal body and tail, measuring up to 5 mm associated pancreatic parenchymal atrophy. Mass suspected in the mid body. No liver mass noted. 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 EUS at [place] on 10/12/21 showed an ill defined mass in the body involving the splenic vein. FNA c/w adenocarcinoma. CA 19-9 [id number],  35. \par \par Referred to Dr. Barrow at [facility]. 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 Underwent a lung biopsy on 11/11/21 which confirmed metastatic pancreatic adenocarcinoma, MMR intact, NTRK negative. \par \par Restaging CT CAP oon 11/30/21 showed slight interval progression of lymphangitic spread of cancer, numerous b/l pulm nodules c/w known metastatic disease, 2.6 x 1.9 x 1.9 cm pancreatic body mass, diffuse mesenteric haziness, intra-abdominal LN, small pelvic ascites. \par \par 12/6/21: C1D1, 8, 15 Q 28 cycle Gemzar 800 mg/m2 + Abraxane 100 mg/m2 \par 12/24/21: admitted to [facility] for neutropenic fever after syncope and diarrhea at home. \par 1/10/22: C2D1 and D15 (alternating weeks) \par 2/1/22: CT CAP: decr in lymphangitis carcinomatosis and pulm nodules, 1.7 x 1.1 pancreatic body mass decr in size\par 2/7/22: C3D1\par \par Decided to transfer care to [facility] due to proximity to home. \par \par 2/22-4/18/22: [id number]\par 4/25/22: CT CAP: interval improvement in diffuse interlobular septal thickening and multiple bilateral pulmonary nodules, stable pancreatic mass\par 5/2-7/18/22: [id number]\par 7/25/22: CT CAP: irregular thickening along left bladder wall; otherwise stable disease\par 8/2-8/9/22: [facility] admission due to hypoxia/worsening GALLARDO\par \par 09/22/22- Was seen By Pulm/ cardiology \par  [de-identified] : adenocarcinoma  [de-identified] : David testing: MSH3 VUS denoted [id number]  [de-identified] : 9/29/22-  \par Pt was seen by Pulmonary and Cardiology \par She had PFT tests done by Pulmonary and plan is to use supplemental oxygen with ADLS and maintain O2 sat >88 % \par Was also seen by cardiology - awaiting echocardiogram  scheduled on 10/05/22.\par She is anxious that she has not received chemo over 2 months .\par Given that her symptoms have still not improved with Lasix and Oxygen- and that she has been off chemo more than 2 months- Will skip Gemcitabine and plan to start chemo with second line - 5 FU/ Onivyde \par Drug sheet was provided Risks/ benefits and alternatives of chemotherapy were discussed and included but not limited to fatigue,low blood counts, nausea/vomiting, skin reactions, hair thinning, possible blood transfusion requirement,increased risk of infection,neuropathy. Patient agreed to above and informed consent was signed.\par She also reported numbness of bilateral toes but no symptoms on her hands.

## 2022-09-29 NOTE — ASSESSMENT
[Palliative] : Goals of care discussed with patient: Palliative [Palliative Care Plan] : not applicable at this time [FreeTextEntry1] : 77 F diagnosed with stage IV pancreas cancer, currently on Gemzar 800 mg/m2/Abraxane 100 mg/m2 Q 15 days \par \par She was recently hospitalized from 8/1-8/9 for SOB. CXR on admission showed pulmonary edema and bilateral pleural effusions. CT chest s/p diuresis showed improved pulmonary edema and pleural effusions . Her SOB could be from HFpEF or Gemzar related non-cardiogenic pulmonary edema.Patient has portable oxygen arranged at home. She is now being followed by pulmonology as an out-patient for management of hypoxia and pulmonary edema of uncertain cause.\par \par 09/01/22:  she is deconditioned, had to catch her breath even walking into the examination room. Given that she is still recovering from hospitalization and the nature of the insult may be related to gemcitabine, we will hold off on therapy for now, allowing further time of recovery. \par \par Plan :\par Metastatic Pancreatic cancer : \par Pt was on [place]/ abraxane and developed Pulm edema and Hypoxia .\par There was a concern for capillary leak syndrome likely from gem .\par Pt was seen by Pulm/ cardiology and had PFTS.\par Unclear if this is Gemcitabine related \par Discussed to get a new set of scans and switching chemo to 5 FU/ Onivyde \par Chemo consent obtained by Dr Dos Santos \par Drug sheet was provided Risks/ benefits and alternatives of chemotherapy were discussed and included but not limited to fatigue,low blood counts, nausea/vomiting, skin reactions, hair thinning, possible blood transfusion requirement,increased risk of infection,neuropathy. Patient agreed to above and informed consent was signed.\par - All concerns and questions were addressed and answered in full detail to the patient's apparent satisfaction and agreement.\par \par \par RTC in 2 weeks \par Will get restaging scans \par Will resume chemo with 5 FU/ Onivyde \par

## 2022-09-29 NOTE — REASON FOR VISIT
[Follow-Up Visit] : a follow-up [Family Member] : family member [Other: _____] : [unfilled] [FreeTextEntry2] : Stage IV pancreatic cancer

## 2022-09-29 NOTE — END OF VISIT
[FreeTextEntry3] : given persistent home O2 requirement and possible attribution to gemcitabine will switch to [id number]/nal-iri\par \par Kuldip Dos Santos MD PhD\par Medical Oncology Attending\par I personally have spent a total of 30 minutes of time on the date of this encounter reviewing test results, documenting findings, coordinating care, and directly consulting with the patient and/or designated family member.\par

## 2022-10-03 PROBLEM — J20.9 ACUTE BRONCHITIS, UNSPECIFIED ORGANISM: Status: RESOLVED | Noted: 2022-01-01 | Resolved: 2022-01-01

## 2022-10-07 NOTE — ASSESSMENT
[FreeTextEntry1] : 77 F diagnosed with stage IV pancreas cancer, currently on Gemzar 800 mg/m2/Abraxane 100 mg/m2 Q 15 days \par \par She was recently hospitalized from 8/1-8/9 for SOB. CXR on admission showed pulmonary edema and bilateral pleural effusions. CT chest s/p diuresis showed improved pulmonary edema and pleural effusions . Her SOB could be from HFpEF or Gemzar related non-cardiogenic pulmonary edema.Patient has portable oxygen arranged at home. She is now being followed by pulmonology as an out-patient for management of hypoxia and pulmonary edema of uncertain cause.\par \par 09/01/22:  she is deconditioned, had to catch her breath even walking into the examination room. Given that she is still recovering from hospitalization and the nature of the insult may be related to gemcitabine, we will hold off on therapy for now, allowing further time of recovery. \par \par 10/04/22 repeat CT A/P: Findings concerning for progression of disease. Interval increase in size of the known pancreatic body mass. Progressed pulmonary nodular opacities and interlobular septal thickening, concerning for worsening pulmonary metastases and lymphangitic spread of tumor. New mild peritoneal carcinomatosis. VERTEBRAL BODY ANALYSIS: Vertebral compression fractures as described, consider further workup for osteoporosis. \par \par Plan :\par Metastatic Pancreatic cancer : \par Pt was on [place]/ abraxane and developed Pulm edema and Hypoxia. Unclear if this is Gemcitabine related (concern for capillary leak syndrome likely from gem ) VS. POD\par Pt was seen by Pulm/ cardiology and had PFTS. Started on ABx for possible acute bronchitis. Stress echo with normal findings.  \par Discussed switching chemo to 5 FU/ Onivyde \par Chemo consent obtained by Dr Dos Santos \par Drug sheet was provided Risks/ benefits and alternatives of chemotherapy were discussed and included but not limited to fatigue,low blood counts, nausea/vomiting, skin reactions, hair thinning, possible blood transfusion requirement,increased risk of infection,neuropathy. Patient agreed to above and informed consent was signed.\par - All concerns and questions were addressed and answered in full detail to the patient's apparent satisfaction and agreement.\par \par \par Will resume chemo with 5 FU/ Onivyde on monday (10/10/22) after completion of the ABx and only if patient feels good with no respiratory symptoms\par \par Azra French MD\par

## 2022-10-07 NOTE — PHYSICAL EXAM
[de-identified] : Mild bibasilar crackles [de-identified] : erythematous skin rash noted underneath bilateral breasts [de-identified] : b/l feet tingling and numbness noted

## 2022-10-07 NOTE — END OF VISIT
[] : Fellow [FreeTextEntry3] : I personally have spent a total of 30 minutes of time on the date of this encounter reviewing test results, documenting findings, coordinating care, and directly consulting with the patient and/or designated family member.\par I was present with the trainee during the key portions of the history and exam. I agree with the findings and plan as documented above.\par \par Kuldip Dos Santos MD PhD\par Medical Oncology Attending\par I personally have spent a total of 30 minutes of time on the date of this encounter reviewing test results, documenting findings, coordinating care, and directly consulting with the patient and/or designated family member.\par

## 2022-10-07 NOTE — HISTORY OF PRESENT ILLNESS
[de-identified] : 77 F w/ h/o CVA, CAD, a fib, presents for further management of Stage IV pancreatic cancer. \par \giulia Peralta was admitted to [facility] for worsening cough and acute SOB in September 2021 and was found to have extensive ill defined pulmonary nodules on CT Chest suggestive of either a metastatic disease or infectious process. She was started on IV antibiotics. CT A/P on 9/4/21 showed mixed solid and cystic left ovarian mass measuring up to 4.6 cm suspicious for an ovarian cystic neoplasm, no pelvic or RP adenopathy, ill defined cystic changes within the pancreatic body and tail, possibly representing a pancreatic cystic neoplasm such as an IPMN. CTA on 9/4/21 showed extensive scattered ill defined pulmonary nodule/nodular infiltrates throughout the lungs, concerning for atypical infection/pneumonia. A bronchoscopy was done on 9/7/21 with BAL and RML x 3 of RLL posterior segment. Negative for malignancy. Pt was d/c from [facility] after improvement in symptoms with Ab. \par \giulia Peralta followed up with GI and underwent an MR Abd on 9/29/21 which showed segmental dilatation of the pancreatic duct within the distal body and tail, measuring up to 5 mm associated pancreatic parenchymal atrophy. Mass suspected in the mid body. No liver mass noted. 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 EUS at [facility] on 10/12/21 showed an ill defined mass in the body involving the splenic vein. FNA c/w adenocarcinoma. CA 19-9 3164,  35. \par \par Referred to Dr. Barrow at [facility]. 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 Underwent a lung biopsy on 11/11/21 which confirmed metastatic pancreatic adenocarcinoma, MMR intact, NTRK negative. \par \par Restaging CT CAP oon 11/30/21 showed slight interval progression of lymphangitic spread of cancer, numerous b/l pulm nodules c/w known metastatic disease, 2.6 x 1.9 x 1.9 cm pancreatic body mass, diffuse mesenteric haziness, intra-abdominal LN, small pelvic ascites. \par \par 12/6/21: C1D1, 8, 15 Q 28 cycle Gemzar 800 mg/m2 + Abraxane 100 mg/m2 \par 12/24/21: admitted to [facility] for neutropenic fever after syncope and diarrhea at home. \par 1/10/22: C2D1 and D15 (alternating weeks) \par 2/1/22: CT CAP: decr in lymphangitis carcinomatosis and pulm nodules, 1.7 x 1.1 pancreatic body mass decr in size\par 2/7/22: C3D1\par \par Decided to transfer care to [facility] due to proximity to home. \par \par 2/22-4/18/22: [id number]\par 4/25/22: CT CAP: interval improvement in diffuse interlobular septal thickening and multiple bilateral pulmonary nodules, stable pancreatic mass\par 5/2-7/18/22: [id number]\par 7/25/22: CT CAP: irregular thickening along left bladder wall; otherwise stable disease\par 8/2-8/9/22: [facility] admission due to hypoxia/worsening GALLARDO\par \par 09/22/22- Was seen By Pulm/ cardiology \par 10/03/22 saw Pulmonology for acute bronchitis, recommended that given her response to Amoxicillin will continue with same antibiotic and plan for additional 7 days of 500mg BID (until 10/10/22) \par 10/04/22- CT A/P-  Findings concerning for progression of disease. Interval increase in size of the known pancreatic body mass. Progressed pulmonary nodular opacities and interlobular septal thickening, concerning for worsening pulmonary metastases and lymphangitic spread of tumor. New mild peritoneal carcinomatosis. VERTEBRAL BODY ANALYSIS: Vertebral compression fractures as described, consider further workup for osteoporosis. \par  [de-identified] : adenocarcinoma  [de-identified] : David testing: MSH3 VUS denoted [id number]  [de-identified] : 10/06//22-  \par -Repeat CTAP 10/04/22 with POD\par -Pt was seen by Pulmonary and Cardiology \par   She had PFT tests done by Pulmonary and plan is to use supplemental oxygen with ADLS and maintain O2 sat >88 %. Pulmonology recommended 10 days of ABx for acute bronchitis   (10/03-10/2022) \par   Was also seen by cardiology - Stress Wcho with normal findings (10/05/22).\par \par Given that her symptoms have still not improved with Lasix and Oxygen, and now she is getting Abx for acute bronchitis in the setting of being off chemo more than 2 months- Plan is to skip Gemcitabine and plan to start chemo with second line - 5 FU/ Onivyde, after completion of the ABx course. \par

## 2022-10-10 PROBLEM — Z71.89 ENCOUNTER FOR EDUCATION ABOUT INFUSION CARE: Status: ACTIVE | Noted: 2022-01-01

## 2022-10-17 PROBLEM — I63.9 CVA (CEREBRAL VASCULAR ACCIDENT): Status: ACTIVE | Noted: 2021-08-14

## 2022-10-17 NOTE — REASON FOR VISIT
[Family Member] : family member [FreeTextEntry3] : Dr. Dos Santos (Dr. Nielson) [FreeTextEntry1] : JULIANNA LOPEZ is a 77 year woman with a history of paroxysmal atrial fibrillation s/p ablation, prior CVA, hypertension, hypercholesterolemia, and pancreatic cancer here for follow up of shortness of breath.\par \par \par Prior Cancer Treatments:\par ------------------------------------------------------------------------\par Chemo/targeted therapy:\par 12/6/2021: Gemzar+Abraxane (s/p 8 cycles)\par 10/10/2022: FOLFOX-\par ------------------------------------------------------------------------

## 2022-10-17 NOTE — HISTORY OF PRESENT ILLNESS
[FreeTextEntry1] : Interval History:\par Stress echo - no ischemia.\par Treatment changed to FOLFOX. Breathing about the same. OK at rest, but desaturates with exertion. Felt better after steroid pretreatment. But very fatigued after completing infusional 5FU.\par No chest pain.\par Persistent cough.\par Rare palpitations.\par No edema.\par Continues on Lasix 40 daily.\par \par \par History:\par This 76 year old woman with stage IV pancreatic adenocarcinoma treated with Gemzar/Abraxane since December 2021 was admitted to [facility] on 8/1/2022 for shortness of breath and hypoxia after chemotherapy. She reported dyspnea on exertion for two weeks prior. After chemotherapy, saturation was 85 % in ambient air so sent to the ER. CT-angiogram showed no pulmonary embolism, but there were new ground glass opacities and increased pleural effusions. Echocardiogram showed normal LVEF. She improved with diuretics and was dismissed on PO furosemide, 40 mg daily. Consideration given to possibility of pneumonitis and she received a dose of steroids, however as oxygenation improved significantly with diuresis, drug induced pneumonitis was thought less likely. She feels better, but desaturates with ambulation which requires supplemental oxygen. \par \par Recent PFTs showed no evidence of obstruction on spirometry, normal lung volumes and moderately reduced DLCO. On 6MWT she was able to walk 95% of predicted but required up to 8Lpm of oxygen to maintain O2 Sat of 93% (with Tee 4 only).\par \par She denies any orthopnea, lower extremity edema, palpitations, chest pain.\par \par With respect to atrial fibrillation, she had recurrent symptomatic atrial fibrillation. Dr. Sergio Meredith performed catheter ablation x3, the last in February, 2021 without apparent recurrence to date.\par \par In 2015, while on warfarin, she had acute right sided sensory/motor deficits and dysarthria was found to have multiple ischemic infarcts involving the left hemisphere. INR was subtherapeutic at the time. She recovered most function. in 2018, while on apixaban 5 mg twice daily, she had recurrent neurologic deficit following hip replacement surgery and a new acute lacunar infarct. Aspirin 81 mg daily was added to apixaban at this time. \par \par Completed Pulmonary evaluation and comes for evaluation of diastolic dysfunction or other cardiac causes of dyspnea prior to resuming systemic chemotherapy.\par \par \par Cardiovascular Summary:\par ----------------------------------------------\par ECG:\par 10/17/2022: sinus 1st degree AV block, with PVCs, 71 bpm\par 9/23/2022: sinus bradycardia 56 bpm, with first degree AV block\par ----------------------------------------------\par Echo:\par 10/5/2022: normal diastolic function, EF normal.\par 8/2/2022: normal EF, no diastolic dysfunction, mild PHTN, small pericardial and pleural effusions\par 1/15/2018: EF 65 %\par ----------------------------------------------\par Stress echo:\par 10/5/2022: exercise 4 mets (dyspnea and desat), no ischemia, peak /80\par ----------------------------------------------\par Vascular:\par 2/17/2015: carotid sono - mild bilateral ICA plaque\par ----------------------------------------------

## 2022-10-17 NOTE — ASSESSMENT
[FreeTextEntry1] : 77 year old woman with history of atrial fibrillation, hypertension, recurrent CVA, now with metastatic pancreatic cancer including to the lungs seen for shortness of breath and acute pulmonary edema with ongoing oxygen desaturation with ambulation. EKG today shows NSR and 1st degree AV block. She appears euvolemic on examination with normal jugular venous pressure. Pro-BNP is mildly elevated .\par \par Gemcitabine pulmonary toxicity can be associated with capillary leak syndrome, which may explain the overlapping presentation with fluid overload and pneumonitis and the brisk improvement after diuresis, followed by more prolonged exertional dyspnea. (Dread, J Allergy Clin Immunol 2019; 143)\par \par Stress echo no ischemia at 91 % MPHR, exertional hypertension, but no assessment of filling pressures with exercise.\par \par #HFpEF:\par - to continue spironolactone\par - decrease lasix to 3x weekly now to avoid dehydration/electrolyte depletion during chemotherapy\par - can resume daily PRN\par - may benefit from SGLT-2, but given pancreatic CA, normal A1c, and risk for dehydration will hold off for now\par \par # Atrial fibrillation: \par - Continue metoprolol tartrate 100 mg BID.\par - continue apixaban 5 mg BID -- would not decrease the dose\par \par # Atherosclerosis and cerebrovascular disease:\par - Continue statin therapy.\par - DC'd aspirin to reduce bleeding risk with dual therapy\par \par Follow up in 3 months with me.\par \par Above recommendations discussed with the patient and her daughter and all questions answered to the best of my ability and to their apparent satisfaction.\par

## 2022-10-17 NOTE — REVIEW OF SYSTEMS
[Feeling Fatigued] : feeling fatigued [Dyspnea on exertion] : dyspnea during exertion [Negative] : Heme/Lymph [Cough] : cough [FreeTextEntry8] : hematuria

## 2022-10-21 NOTE — ED PROVIDER NOTE - CLINICAL SUMMARY MEDICAL DECISION MAKING FREE TEXT BOX
hx of metastatic pancreatic CA on chemo, p/w multiple episodes of diarrhea. VSWNL on arrival. satting 98% on RA. no abdominal tenderness on exam. patient speaking full sentences. will get labs to check for electrolyte abnormalities, iv fluid, gi stool pcr, cdiff, reassess. Likely chemo side effects vs infectious etiology.

## 2022-10-21 NOTE — ED PROVIDER NOTE - ATTENDING CONTRIBUTION TO CARE
Attending MD Corona:  I personally have seen and examined this patient. I have performed a substantive portion of the visit including all aspects of the medical decision making.  Resident note reviewed and agree on plan of care and except where noted.      77-year-old woman with a history of pancreatic cancer on chemotherapy, most recently switched to Onivyde/5FU/leucovorin presenting for evaluation of multiple episodes of loose stool.  She states that it is nonbloody.  There is no associated fevers or abdominal pain.  She has been somewhat nauseated over the last few days.  She took multiple tablets of loperamide without improvement in her symptoms.    Vital signs nonactionable, she is not tachycardic or febrile.  She is normotensive.  Fatigued appearing but nontoxic.  Abdomen nontender.  Extremities warm and well-perfused.    Differential diagnosis for acute diarrhea includes infectious diarrhea versus chemotherapy related diarrhea.  Specifically it seems that the medication Onivyde is associated with acute diarrhea.    Plan for labs, IV fluids, admission for ongoing management of acute diarrheal illness.        *The above represents an initial assessment/impression. Please refer to progress notes for potential changes in patient clinical course*

## 2022-10-21 NOTE — ED ADULT NURSE NOTE - AGENT'S NAME
Norma Ball (daughter) [phone]  alternates Josh Steward (son) [phone] Alejandro Steward (son) [phone]

## 2022-10-21 NOTE — ED PROVIDER NOTE - OBJECTIVE STATEMENT
77 y F, hx of stage 4 metastatic pancreatic CA on chemo (last treatment on 10/10), afib on eliquis, htn, presenting with 3-days onset of multiple episodes of nonbloody diarrhea. Reports feeling weak from the diarrhea. Denies fever, abdominal pain, chest pain, shortness of breath, nausea, vomiting, dizziness. Patient reports that he is on a new chemo treatment. Used to be gemcitabine for 10 months. Switched to a new chemo due to side effects. He is not able to remember the name of the new chemo drugs. Patient was on antibiotics (amoxicillin) prior to her chemo treatment for PNA. PMD: Dr. John Riley, Oncologist: Dr. Dos Santos ([facility]).

## 2022-10-21 NOTE — ED PROVIDER NOTE - PHYSICAL EXAMINATION
Gen: Patient is well-appearing, NAD, AAOx3, able to ambulate without assistance  HEENT: NCAT, normal conjunctiva, tongue midline, oral mucosa moist  Lung: CTAB, no respiratory distress, no wheezes/rhonchi/rales B/L, speaking in full sentences  CV: RRR, no murmurs, rubs or gallops, distal pulses 2+ b/l  Abd: soft, NT, ND, no guarding, no rigidity, no rebound tenderness, no CVA tenderness   MSK: no visible deformities, ROM normal in UE/LE,  Neuro: No focal sensory or motor deficits  Skin: Warm, well perfused, no leg swelling  Psych: normal affect, calm

## 2022-10-21 NOTE — CHART NOTE - NSCHARTNOTEFT_GEN_A_CORE
77F h/o HTN, afib on eliquis, Stage IV pancreatic cancer (diagnosed initially with metastatic disease to lung s/p lung biopsy Nov 2021, started gem/abraxane Dec 2021, progression noted on scans October 2022 and now s/p C1 5FU/Onivyde on 10/10), recently completed course of abx for pna, now presenting with 3-days onset of multiple episodes of nonbloody diarrhea, had syncopal episode at home. Suspect synocopal episode 2/2 dehydration from diarrhea, c/w w/u per ER/primary team. For diarrhea, may be 2/2 chemo (irinotecan) vs other infectious etiology (cdiff) given recently on abx. Recemmend infectious eval (GI pcr, cdiff pcr, stool culture) and c/w supportive care (IVF, check lytes and replete if low). Full onc c/s to follow in the AM.

## 2022-10-21 NOTE — ED ADULT NURSE NOTE - OBJECTIVE STATEMENT
Patient is a 78 y/o female with PMH of pancreatic cancer, Afib (on blood thinners) presenting to the ED via EMS with c/o diarrhea since yesterday. Pt denies blood in stools. Pt denies n/v. Pt recently started new round of chemo. Pt A&Ox4, speaking coherently, well-appearing, breathing unlabored, denies SOB, denies CP, peripheral pulses strong, skin normal color/warm/intact, denies urinary symptoms, denies fever, cough, or chills.

## 2022-10-22 NOTE — H&P ADULT - PROBLEM SELECTOR PLAN 4
c/w home Metoprolol and spironolactone Hx of Afib s/p ablation x3, on Eliquis at home  hx of CVA in the past  - c/w Eliquis 5mg BID

## 2022-10-22 NOTE — CONSULT NOTE ADULT - ATTENDING COMMENTS
Metastatic pancreatic cancer involving lungs and peritoneum on 2nd line 5-FU/Irinotecan started 10/10 admitted with severe diarrhea, likely adverse effect of chemotherapy.    Continue medical management and supportive measures.    Would likely check (as outpatient) for UGT1A1 polymorphism which can affect irinotecan metabolism.    Would likely plan to dose-reduce for any subsequent cycle; patient can address this with Dr. Dos Santos in her follow-up as outpatient.      Eh Reynolds MD (Weekend Coverage)

## 2022-10-22 NOTE — H&P ADULT - ASSESSMENT
hx of metastatic pancreatic CA on chemo, p/w multiple episodes of diarrhea. VSWNL on arrival. satting 98% on RA. no abdominal tenderness on exam. patient speaking full sentences. will get labs to check for electrolyte abnormalities, iv fluid, gi stool pcr, cdiff, reassess. Likely chemo side effects vs infectious etiology.    Differential diagnosis for acute diarrhea includes infectious diarrhea versus chemotherapy related diarrhea.  Specifically it seems that the medication Onivyde is associated with acute diarrhea.   77F with PMHhx of stage 4 metastatic pancreatic CA on chemo (last treatment on 10/10), CVA, Afib s/p ablation on eliquis, htn, and HLD presenting with 3-days onset of multiple episodes (7-8x/day) of nonbloody diarrhea likely chemotherapy related vs infectious. 77F with PMHhx of stage 4 metastatic pancreatic CA on chemo (last treatment on 10/10), CVA, Afib s/p ablation on eliquis, htn, and HLD presenting with 3-days of nonbloody diarrhea likely chemotherapy related vs infectious.

## 2022-10-22 NOTE — H&P ADULT - NSHPLABSRESULTS_GEN_ALL_CORE
10.2   2.93  )-----------( 295      ( 21 Oct 2022 16:13 )             31.3       10-21    135  |  102  |  9   ----------------------------<  118<H>  4.0   |  23  |  0.85    Ca    8.6      21 Oct 2022 16:13    TPro  6.7  /  Alb  3.4  /  TBili  0.5  /  DBili  x   /  AST  18  /  ALT  17  /  AlkPhos  93  10-21                      Lactate Trend            CAPILLARY BLOOD GLUCOSE            EKG: · EKG Date/Time: 21-Oct-2022 16:01  · Rate: 66  · Interpretation: abnormal  sinus rhythm, irregular rhythm (Sinus rhythm with 1st degree A-V block with occasional Premature ventricular complexes)  · Axis: Normal  · SD: 238  · QRS: 86 10.2   2.93  )-----------( 295      ( 21 Oct 2022 16:13 )             31.3       10-21    135  |  102  |  9   ----------------------------<  118<H>  4.0   |  23  |  0.85    Ca    8.6      21 Oct 2022 16:13    TPro  6.7  /  Alb  3.4  /  TBili  0.5  /  DBili  x   /  AST  18  /  ALT  17  /  AlkPhos  93  10-21          Lactate Trend      CAPILLARY BLOOD GLUCOSE      EKG: · EKG Date/Time: 21-Oct-2022 16:01  · Rate: 66  · Interpretation: abnormal sinus rhythm, irregular rhythm  · Axis: Normal  · WV: 238  · QRS: 86

## 2022-10-22 NOTE — CONSULT NOTE ADULT - SUBJECTIVE AND OBJECTIVE BOX
Hematology/Oncology Consult Note    HPI:  77 year old woman with PMHx of stage 4 metastatic pancreatic CA on chemo (last treatment on 10/10), CVA, afib s/p ablation on eliquis, htn, and HLD presenting with 3-days onset of multiple episodes (7-8x/day) of nonbloody diarrhea. Reports feeling weak from the diarrhea and has taken imodium without relief. Noted that she vomited (nonbloody) once today and took Zofran which helped her symptoms. Patient further reports fainting today at 1pm when she was alone in the bathroom without LOC and was able to catch herself before falling and get to a couch. Patient reports that she is on a new chemo treatment started last week after seeing her oncologist. Used to be gemcitabine for 10 months. Switched to a new chemo due to side effects but is not able to remember the name of the new chemo drugs. Patient was on antibiotics (amoxicillin) prior to her chemo treatment for PNA. Patient also endorses intermittent cough and is on 2L O2 at home after her last chemo but otherwise denies CP, SOB, fever, chills, abdominal pain, dizziness, numbness or tingling.    PMD: Dr. John Riley, Oncologist: Dr. Dos Santos ([facility])    In the ED, /58, VS otherwise stable. Labs remarkable for WBC 2.93. Imaging included chest xray Diffuse reticulonodular opacities, unchanged from prior x-ray on 9/29/2022. EKG abnormal sinus rhythm, irregular rhythm. Patient given 1L LR and 500cc NS. (22 Oct 2022 00:36)      Onc hx:  77 F diagnosed with stage IV pancreas cancer, previously on Gemzar 800 mg/m2/Abraxane 100 mg/m2 but discontinued due to adverse effects and ultimately POD.    She was recently hospitalized from 8/1-8/9 for SOB. CXR on admission showed pulmonary edema and bilateral pleural effusions. CT chest s/p diuresis showed improved pulmonary edema and pleural effusions. Her SOB could be from HFpEF or Gemzar related non-cardiogenic pulmonary edema.Patient has portable oxygen arranged at home. She is now being followed by pulmonology as an out-patient for management of hypoxia and pulmonary edema of uncertain cause.    10/04/22 repeat CT A/P: Findings concerning for progression of disease. Interval increase in size of the known pancreatic body mass. Progressed pulmonary nodular opacities and interlobular septal thickening, concerning for worsening pulmonary metastases and lymphangitic spread of tumor. New mild peritoneal carcinomatosis. VERTEBRAL BODY ANALYSIS: Vertebral compression fractures as described, consider further workup for osteoporosis.     Allergies    No Known Drug Allergies  Seasonal allergies - stuffy nose (Other)  shellfish (Other (Moderate))    Intolerances      MEDICATIONS  (STANDING):  apixaban 5 milliGRAM(s) Oral every 12 hours  atorvastatin 40 milliGRAM(s) Oral at bedtime  chlorhexidine 2% Cloths 1 Application(s) Topical daily  escitalopram 5 milliGRAM(s) Oral daily  influenza  Vaccine (HIGH DOSE) 0.7 milliLiter(s) IntraMuscular once  metoprolol tartrate 100 milliGRAM(s) Oral two times a day  spironolactone 25 milliGRAM(s) Oral daily    MEDICATIONS  (PRN):  ALPRAZolam 0.25 milliGRAM(s) Oral daily PRN anxiety  lidocaine/prilocaine Cream 1 Application(s) Topical once PRN prior to mediport access for chemo  ondansetron    Tablet 8 milliGRAM(s) Oral every 8 hours PRN Nausea and/or Vomiting  prochlorperazine   Tablet 10 milliGRAM(s) Oral daily PRN nausea      PAST MEDICAL & SURGICAL HISTORY:  HTN (hypertension)  Afib s/p ablation x3, on Eliquis  HLD (hyperlipidemia)  CVA (cerebral vascular accident)  Anxiety and depression  Atrial flutter  S/P popliteal-tibial bypass  Status post ORIF of fracture of ankle  s/p rght TR        FAMILY HISTORY:  No pertinent family history in first degree relatives        SOCIAL HISTORY: No EtOH, no tobacco    REVIEW OF SYSTEMS:    CONSTITUTIONAL: + weakness, no fevers or chills  EYES/ENT: No visual changes;  No vertigo or throat pain   NECK: No pain or stiffness  RESPIRATORY: No cough, wheezing, hemoptysis; No shortness of breath  CARDIOVASCULAR: No chest pain or palpitations  GASTROINTESTINAL: No abdominal or epigastric pain. No nausea, vomiting, or hematemesis; +diarrhea. No melena or hematochezia.  GENITOURINARY: No dysuria, frequency or hematuria  NEUROLOGICAL: No numbness or weakness  SKIN: No itching, burning, rashes, or lesions   All other review of systems is negative unless indicated above.    Height (cm): 162.6 (10-21 @ 15:10)  Weight (kg): 90.3 (10-21 @ 15:10)  BMI (kg/m2): 34.2 (10-21 @ 15:10)  BSA (m2): 1.95 (10-21 @ 15:10)    T(F): 97.7 (10-22-22 @ 08:59), Max: 98.3 (10-21-22 @ 20:07)  HR: 64 (10-22-22 @ 08:59) (60 - 69)  BP: 116/67 (10-22-22 @ 08:59)  RR: 17 (10-22-22 @ 08:59)  SpO2: 97% (10-22-22 @ 08:59) (97% - 100%)    Physical Exam  GENERAL: NAD, well-developed  HEAD:  Atraumatic, Normocephalic  EYES: EOMI, PERRLA, conjunctiva and sclera clear  NECK: Supple, No JVD  CHEST/LUNG: Clear to auscultation bilaterally; No wheeze  HEART: Regular rate and rhythm; No murmurs, rubs, or gallops  ABDOMEN: Soft, Nontender, Nondistended; Bowel sounds present  EXTREMITIES:  2+ Peripheral Pulses, No clubbing, cyanosis, or edema  NEUROLOGY: non-focal  SKIN: No rashes or lesions                          10.2   2.93  )-----------( 295      ( 21 Oct 2022 16:13 )             31.3       10-21    135  |  102  |  9   ----------------------------<  118<H>  4.0   |  23  |  0.85    Ca    8.6      21 Oct 2022 16:13    TPro  6.7  /  Alb  3.4  /  TBili  0.5  /  DBili  x   /  AST  18  /  ALT  17  /  AlkPhos  93  10-21          Imaging:  < from: Xray Chest 1 View- PORTABLE-Urgent (Xray Chest 1 View- PORTABLE-Urgent .) (10.21.22 @ 16:49) >  IMPRESSION:  Diffuse reticulonodular opacities, unchanged from prior x-ray on   9/29/2022.       Hematology/Oncology Consult Note    HPI:  77 year old woman with PMHx of stage 4 metastatic pancreatic CA on chemo (last treatment on 10/10), CVA, afib s/p ablation on eliquis, htn, and HLD presenting with 3-days onset of multiple episodes (7-8x/day) of nonbloody diarrhea. Reports feeling weak from the diarrhea and has taken imodium without relief. Noted that she vomited (nonbloody) once today and took Zofran which helped her symptoms. Patient further reports fainting today at 1pm when she was alone in the bathroom without LOC and was able to catch herself before falling and get to a couch. Patient reports that she is on a new chemo treatment started last week after seeing her oncologist. Used to be gemcitabine for 10 months. Switched to a new chemo due to side effects but is not able to remember the name of the new chemo drugs. Patient was on antibiotics (amoxicillin) prior to her chemo treatment for PNA. Patient also endorses intermittent cough and is on 2L O2 at home after her last chemo but otherwise denies CP, SOB, fever, chills, abdominal pain, dizziness, numbness or tingling.      In the ED, /58, VS otherwise stable. Labs remarkable for WBC 2.93. Imaging included chest xray Diffuse reticulonodular opacities, unchanged from prior x-ray on 9/29/2022. EKG abnormal sinus rhythm, irregular rhythm. Patient given 1L LR and 500cc NS. (22 Oct 2022 00:36)      Onc hx:  77 year old woman diagnosed with stage IV pancreas cancer, previously on Gemzar 800 mg/m2/Abraxane 100 mg/m2 but discontinued due to adverse effects and ultimately POD.    She was recently hospitalized from 8/1-8/9 for SOB. CXR on admission showed pulmonary edema and bilateral pleural effusions. CT chest s/p diuresis showed improved pulmonary edema and pleural effusions. Her SOB could be from HFpEF or Gemzar related non-cardiogenic pulmonary edema.Patient has portable oxygen arranged at home. She is now being followed by pulmonology as an out-patient for management of hypoxia and pulmonary edema of uncertain cause.    10/04/22 repeat CT A/P: Findings concerning for progression of disease. Interval increase in size of the known pancreatic body mass. Progressed pulmonary nodular opacities and interlobular septal thickening, concerning for worsening pulmonary metastases and lymphangitic spread of tumor. New mild peritoneal carcinomatosis. VERTEBRAL BODY ANALYSIS: Vertebral compression fractures as described, consider further workup for osteoporosis.     Pt started on 5 FU and Irenotecan, last given 10/10/2022    Allergies    No Known Drug Allergies  Seasonal allergies - stuffy nose (Other)  shellfish (Other (Moderate))    Intolerances      MEDICATIONS  (STANDING):  apixaban 5 milliGRAM(s) Oral every 12 hours  atorvastatin 40 milliGRAM(s) Oral at bedtime  chlorhexidine 2% Cloths 1 Application(s) Topical daily  escitalopram 5 milliGRAM(s) Oral daily  influenza  Vaccine (HIGH DOSE) 0.7 milliLiter(s) IntraMuscular once  metoprolol tartrate 100 milliGRAM(s) Oral two times a day  spironolactone 25 milliGRAM(s) Oral daily    MEDICATIONS  (PRN):  ALPRAZolam 0.25 milliGRAM(s) Oral daily PRN anxiety  lidocaine/prilocaine Cream 1 Application(s) Topical once PRN prior to mediport access for chemo  ondansetron    Tablet 8 milliGRAM(s) Oral every 8 hours PRN Nausea and/or Vomiting  prochlorperazine   Tablet 10 milliGRAM(s) Oral daily PRN nausea      PAST MEDICAL & SURGICAL HISTORY:  HTN (hypertension)  Afib s/p ablation x3, on Eliquis  HLD (hyperlipidemia)  CVA (cerebral vascular accident)  Anxiety and depression  Atrial flutter  S/P popliteal-tibial bypass  Status post ORIF of fracture of ankle  s/p rght TR        FAMILY HISTORY:  No pertinent family history in first degree relatives        SOCIAL HISTORY: No EtOH, no tobacco    REVIEW OF SYSTEMS:    CONSTITUTIONAL: + weakness, no fevers or chills  EYES/ENT: No visual changes;  No vertigo or throat pain   NECK: No pain or stiffness  RESPIRATORY: No cough, wheezing, hemoptysis; No shortness of breath  CARDIOVASCULAR: No chest pain or palpitations  GASTROINTESTINAL: No abdominal or epigastric pain. +nausea and vomiting, no hematemesis; +diarrhea. No melena or hematochezia.  GENITOURINARY: No dysuria, frequency or hematuria  NEUROLOGICAL: No numbness or weakness  SKIN: No itching, burning, rashes, or lesions   All other review of systems is negative unless indicated above.    Height (cm): 162.6 (10-21 @ 15:10)  Weight (kg): 90.3 (10-21 @ 15:10)  BMI (kg/m2): 34.2 (10-21 @ 15:10)  BSA (m2): 1.95 (10-21 @ 15:10)    T(F): 97.7 (10-22-22 @ 08:59), Max: 98.3 (10-21-22 @ 20:07)  HR: 64 (10-22-22 @ 08:59) (60 - 69)  BP: 116/67 (10-22-22 @ 08:59)  RR: 17 (10-22-22 @ 08:59)  SpO2: 97% (10-22-22 @ 08:59) (97% - 100%)    Physical Exam  GENERAL: NAD, well-developed  HEAD:  Atraumatic, Normocephalic  EYES: EOMI, PERRLA, conjunctiva and sclera clear  NECK: Supple, No JVD  CHEST/LUNG: Clear to auscultation bilaterally; No wheeze  HEART: Regular rate and rhythm; No murmurs, rubs, or gallops  ABDOMEN: Soft, Nontender, Nondistended; Bowel sounds present  EXTREMITIES:  2+ Peripheral Pulses, No clubbing, cyanosis, or edema  NEUROLOGY: non-focal  SKIN: No rashes or lesions                          10.2   2.93  )-----------( 295      ( 21 Oct 2022 16:13 )             31.3       10-21    135  |  102  |  9   ----------------------------<  118<H>  4.0   |  23  |  0.85    Ca    8.6      21 Oct 2022 16:13    TPro  6.7  /  Alb  3.4  /  TBili  0.5  /  DBili  x   /  AST  18  /  ALT  17  /  AlkPhos  93  10-21          Imaging:  < from: Xray Chest 1 View- PORTABLE-Urgent (Xray Chest 1 View- PORTABLE-Urgent .) (10.21.22 @ 16:49) >  IMPRESSION:  Diffuse reticulonodular opacities, unchanged from prior x-ray on   9/29/2022.

## 2022-10-22 NOTE — DISCHARGE NOTE PROVIDER - NSDCCPCAREPLAN_GEN_ALL_CORE_FT
PRINCIPAL DISCHARGE DIAGNOSIS  Diagnosis: Acute diarrhea  Assessment and Plan of Treatment: You came to the hospital because you were having nonbloody, watery diarrhea for multiple days with multiple episodes with one episode of vomiting, nausea, and an episode of near fainting. Your symptoms were likely the result of the new chemotherapy you were recently switched to by your oncologist at [facility]. Our heme/onc physicians here at the hospital evaluated you and agreed that there was a low likelihood of your symptoms being due to an infection. Please follow up with your oncologist outpatient to discuss alternate treatment forms.      SECONDARY DISCHARGE DIAGNOSES  Diagnosis: Pancreatic cancer  Assessment and Plan of Treatment: You have a history of pancreatic cancer and are followed by oncologists at [facility] as well as in the hospital. They recommend folloiwng up with them outpatient.     PRINCIPAL DISCHARGE DIAGNOSIS  Diagnosis: Acute diarrhea  Assessment and Plan of Treatment: You came to the hospital because you were having nonbloody, watery diarrhea for multiple days with multiple episodes with one episode of vomiting, nausea, and an episode of near fainting. Your symptoms were likely the result of the new chemotherapy you were recently switched to by your oncologist at [facility]. Our heme/onc physicians here at the hospital evaluated you and agreed that there was a low likelihood of your symptoms being due to an infection. Please follow up with your oncologist outpatient to discuss alternate treatment forms.   Please also follow up with your PCP to discuss starting your Lasix again. Continue to hold off on taking it until your PCP discusses with you.      SECONDARY DISCHARGE DIAGNOSES  Diagnosis: Pancreatic cancer  Assessment and Plan of Treatment: You have a history of pancreatic cancer and are followed by oncologists at [facility] as well as in the hospital. They recommend folloiwng up with them outpatient.

## 2022-10-22 NOTE — H&P ADULT - ATTENDING COMMENTS
77F F, hx of stage 4 metastatic pancreatic CA on chemo (last treatment on 10/10), CVA, afib s/p ablation on eliquis, htn, and HLD presenting with 3-days onset of multiple episodes (7-8x/day) of nonbloody diarrhea and progressive weakness. Patient is on new chemotherapy (5 FU/ Onivyde) (developed AHRF during prior admission from gemcitabine) and also tx for PNA w amoxicillin about 2 weeks ago.     On arrival, Vitals: Afebrile, HR: 60, BP: 1110/58, saturating 100% on 3 L NC   On my exam: no abdominal tenderness  Significant Labs: WBC: 2.93, lymphopenic, Hb 10.2  Imaging: CXR: Diffuse reticulonodular opacities, similar to prior CXR     Patient admitted for w/u of diarrhea and weakness    #Diarrhea  -F/u GI PCR, C diff testing   -Imodium once infectious causes are ruled out   -Encourage PO intake     #H/O Stage 4 metastatic pancreatic cancer  -Heme/onc f/u in AM

## 2022-10-22 NOTE — PROGRESS NOTE ADULT - PROBLEM SELECTOR PLAN 1
3-days onset of multiple episodes (7-8x/day) of nonbloody diarrhea  /58, VS otherwise stable  Labs remarkable for WBC 2.93  s/p 1l LR and 500cc NS  - no symptomatic relief from Imodium, hold for now  - on mIVF

## 2022-10-22 NOTE — DISCHARGE NOTE PROVIDER - NSDCMRMEDTOKEN_GEN_ALL_CORE_FT
Afrin 0.05% nasal spray: 2 spray(s) nasal 2 times a day, As Needed  atorvastatin 40 mg oral tablet: 1 tab(s) orally once a day (at bedtime)  Eliquis 5 mg oral tablet: 1 tab(s) orally 2 times a day  escitalopram 5 mg oral tablet: 1 tab(s) orally once a day  Lasix 40 mg oral tablet: 1 tab(s) orally 3 times a week  lidocaine-prilocaine 2.5%-2.5% topical cream: Apply topically to affected area once, As Needed prior to mediport access for chemo  Melatonin 5 mg oral tablet: 1 tab(s) orally once a day (at bedtime)  Metoprolol Tartrate 100 mg oral tablet: 1 tab(s) orally 2 times a day  ondansetron 8 mg oral tablet: 1 tab(s) orally every 8 hours, As Needed -for nausea   prochlorperazine 10 mg oral tablet: 1 tab(s) orally once a day, As Needed for nausea  spironolactone 25 mg oral tablet: 1 tab(s) orally once a day  Vitamin D3 25 mcg (1000 intl units) oral tablet: 1 tab(s) orally once a day  Xanax 0.25 mg oral tablet: 1 tab(s) orally once a day, As Needed   Afrin 0.05% nasal spray: 2 spray(s) nasal 2 times a day, As Needed  atorvastatin 40 mg oral tablet: 1 tab(s) orally once a day (at bedtime)  Eliquis 5 mg oral tablet: 1 tab(s) orally 2 times a day  escitalopram 5 mg oral tablet: 1 tab(s) orally once a day  Lasix 40 mg oral tablet: 1 tab(s) orally once a day    Patient stated that pcp recently cut down on lasix to 3x/week due to risk of dehydration   lidocaine-prilocaine 2.5%-2.5% topical cream: Apply topically to affected area once, As Needed prior to mediport access for chemo  Melatonin 5 mg oral tablet: 1 tab(s) orally once a day (at bedtime)  Metoprolol Tartrate 100 mg oral tablet: 1 tab(s) orally 2 times a day  ondansetron 8 mg oral tablet: 1 tab(s) orally every 8 hours, As Needed -for nausea   prochlorperazine 10 mg oral tablet: 1 tab(s) orally once a day, As Needed for nausea  spironolactone 25 mg oral tablet: 1 tab(s) orally once a day  Vitamin D3 25 mcg (1000 intl units) oral tablet: 1 tab(s) orally once a day  Xanax 0.25 mg oral tablet: 1 tab(s) orally once a day, As Needed   Afrin 0.05% nasal spray: 2 spray(s) nasal 2 times a day, As Needed  apixaban 5 mg oral tablet: 1 tab(s) orally every 12 hours  atorvastatin 40 mg oral tablet: 1 tab(s) orally once a day (at bedtime)  escitalopram 5 mg oral tablet: 1 tab(s) orally once a day  Imodium 2 mg oral capsule: 1 cap(s) orally 3 times a day  lactobacillus acidophilus oral capsule: 1 tab(s) orally once a day  Melatonin 5 mg oral tablet: 1 tab(s) orally once a day (at bedtime)  Metoprolol Tartrate 100 mg oral tablet: 1 tab(s) orally 2 times a day  spironolactone 25 mg oral tablet: 1 tab(s) orally once a day  Vitamin D3 25 mcg (1000 intl units) oral tablet: 1 tab(s) orally once a day  Xanax 0.25 mg oral tablet: 1 tab(s) orally once a day, As Needed   Afrin 0.05% nasal spray: 2 spray(s) nasal 2 times a day, As Needed  apixaban 5 mg oral tablet: 1 tab(s) orally every 12 hours  atorvastatin 40 mg oral tablet: 1 tab(s) orally once a day (at bedtime)  escitalopram 5 mg oral tablet: 1 tab(s) orally once a day  Imodium 2 mg oral capsule: 1 cap(s) orally 3 times a day  Melatonin 5 mg oral tablet: 1 tab(s) orally once a day (at bedtime)  Metoprolol Tartrate 100 mg oral tablet: 1 tab(s) orally 2 times a day  spironolactone 25 mg oral tablet: 1 tab(s) orally once a day  Vitamin D3 25 mcg (1000 intl units) oral tablet: 1 tab(s) orally once a day  Xanax 0.25 mg oral tablet: 1 tab(s) orally once a day, As Needed

## 2022-10-22 NOTE — PATIENT PROFILE ADULT - FALL HARM RISK - HARM RISK INTERVENTIONS
Assistance OOB with selected safe patient handling equipment/Communicate Risk of Fall with Harm to all staff/Discuss with provider need for PT consult/Monitor gait and stability/Provide patient with walking aids - walker, cane, crutches/Reinforce activity limits and safety measures with patient and family/Review medications for side effects contributing to fall risk/Sit up slowly, dangle for a short time, stand at bedside before walking/Tailored Fall Risk Interventions/Use of alarms - bed, chair and/or voice tab/Visual Cue: Yellow wristband and red socks/Bed in lowest position, wheels locked, appropriate side rails in place/Call bell, personal items and telephone in reach/Instruct patient to call for assistance before getting out of bed or chair/Non-slip footwear when patient is out of bed/[place] to call system/Physically safe environment - no spills, clutter or unnecessary equipment/Purposeful Proactive Rounding/Room/bathroom lighting operational, light cord in reach

## 2022-10-22 NOTE — H&P ADULT - PROBLEM SELECTOR PLAN 2
Hx of Stage IV pancreatic cancer (dx'd 2021) c/b spread to lung w/ lymphangitic spread  on chemo, last treatment 10/10  CTA chest (8/1): Stable mild interlobular septal thickening in the setting of known metastases/lymphangitic spread of tumor  cxr: Diffuse reticulonodular opacities, unchanged from prior x-ray on 9/29/2022.  Follows with Oncologist Dr. Dos Santos at [facility] Hx of Stage IV pancreatic cancer (dx'd 2021) c/b spread to lung w/ lymphangitic spread  -on chemo, last treatment 10/10  -pt was on [place]/ abraxane and developed Pulm edema and Hypoxia, seen by Pulm/cardiology and had PFTS. -Started on ABx for possible acute bronchitis. Stress echo with normal findings. Pt then switched chemo to 5 FU/ Onivyde   CTA chest (8/1): Stable mild interlobular septal thickening in the setting of known metastases/lymphangitic spread of tumor  cxr: Diffuse reticulonodular opacities, unchanged from prior x-ray on 9/29/2022.  Follows with Oncologist Dr. Dos Santos at [facility]

## 2022-10-22 NOTE — H&P ADULT - NSHPPHYSICALEXAM_GEN_ALL_CORE
Gen: NAD, AAOx3  HEENT: NCAT, normal conjunctiva, tongue midline, oral mucosa moist  Lung: CTAB, no respiratory distress, no wheezes/rhonchi/rales B/L, speaking in full sentences  CV: RRR, no murmurs, rubs or gallops, distal pulses 2+ b/l  Abd: soft, NT, ND, no guarding, no rigidity, no rebound tenderness, no CVA tenderness   MSK: no visible deformities, ROM normal in UE/LE  Neuro: No focal sensory or motor deficits  Skin: Warm, well perfused, no leg swelling  Psych: normal affect, calm

## 2022-10-22 NOTE — PROGRESS NOTE ADULT - SUBJECTIVE AND OBJECTIVE BOX
Lashonda Delgadillo, PGY1        Patient is a 77y old  Female who presents with a chief complaint of diarrhea (22 Oct 2022 00:36)      SUBJECTIVE/INTERVAL EVENTS: Patient seen and examined at bedside. She denies any additional BM/diarrhea since coming into the hospital. Otherwise no acute events overnight.     MEDICATIONS  (STANDING):  apixaban 5 milliGRAM(s) Oral every 12 hours  atorvastatin 40 milliGRAM(s) Oral at bedtime  chlorhexidine 2% Cloths 1 Application(s) Topical daily  escitalopram 5 milliGRAM(s) Oral daily  influenza  Vaccine (HIGH DOSE) 0.7 milliLiter(s) IntraMuscular once  metoprolol tartrate 100 milliGRAM(s) Oral two times a day  spironolactone 25 milliGRAM(s) Oral daily    MEDICATIONS  (PRN):  ALPRAZolam 0.25 milliGRAM(s) Oral daily PRN anxiety  lidocaine/prilocaine Cream 1 Application(s) Topical once PRN prior to mediport access for chemo  ondansetron    Tablet 8 milliGRAM(s) Oral every 8 hours PRN Nausea and/or Vomiting  prochlorperazine   Tablet 10 milliGRAM(s) Oral daily PRN nausea      VITAL SIGNS:  T(F): 97.7 (10-22-22 @ 08:59), Max: 98.3 (10-21-22 @ 20:07)  HR: 64 (10-22-22 @ 08:59) (60 - 69)  BP: 116/67 (10-22-22 @ 08:59) (107/63 - 121/47)  RR: 17 (10-22-22 @ 08:59) (16 - 18)  SpO2: 97% (10-22-22 @ 08:59) (97% - 100%)    I&O's Summary    Daily Height in cm: 162.56 (21 Oct 2022 15:10)    Daily     PHYSICAL EXAM:  Gen: Alert, NAD  HEENT: NCAT, conjunctiva clear, sclera anicteric, no erythema or exudates in the oropharynx, mmm  Neck: Supple, no JVD  CV: RRR, S1S2, no m/r/g  Resp: CTAB, normal respiratory effort  Abd: Soft, nontender, obese abdomen.   Ext: no edema, no clubbing or cyanosis  Neuro: AOx3, CN2-12 grossly intact, PAGAN  SKIN: warm, perfused    LABS:                        10.2   2.93  )-----------( 295      ( 21 Oct 2022 16:13 )             31.3     Hgb Trend: 10.2<--  10-21    135  |  102  |  9   ----------------------------<  118<H>  4.0   |  23  |  0.85    Ca    8.6      21 Oct 2022 16:13    TPro  6.7  /  Alb  3.4  /  TBili  0.5  /  DBili  x   /  AST  18  /  ALT  17  /  AlkPhos  93  10-21    Creatinine Trend: 0.85<--, 0.64<--  LIVER FUNCTIONS - ( 21 Oct 2022 16:13 )  Alb: 3.4 g/dL / Pro: 6.7 g/dL / ALK PHOS: 93 U/L / ALT: 17 U/L / AST: 18 U/L / GGT: x                     CAPILLARY BLOOD GLUCOSE          RADIOLOGY & ADDITIONAL TESTS: Reviewed    Imaging Personally Reviewed:    Consultant(s) Notes Reviewed:      Care Discussed with Consultants/Other Providers:

## 2022-10-22 NOTE — CONSULT NOTE ADULT - ASSESSMENT
76 year old woman with a PMH of atrial fibrillation (s/p ablation x 3, on Eliquis), ischemic CVA (due to subtherapeutic Coumadin levels), HTN, stage IV pancreatic cancer with lung (and ?L ovary) metastasis with lymphangitic spread (diagnosed 11/2021, currently on Gemzar/Abraxane) who presents with diarrhea. Oncology consulted for management of pancreatic cancer.    #Stage IV Pancreatic cancer  -Onc hx as above  -Pt previously on Gemzar/Abraxane now switched to 5FU/irenotecan  -Pt presenting with diarrhea most likely from chemotherapy  -Can check GI PCR  -Continue IVF  -no plan for inpatient treatment at this time  -Patient to follow up with Dr. Dos Santos once stable for discharge, he is aware of the admission    Please page with questions or concerns. Will Follow with you.      Nicanor Morrison M.D.  Hematology/Oncology Fellow PGY5  Pager [phone]  After 5pm, please contact on-call team.   76 year old woman with a PMH of atrial fibrillation (s/p ablation x 3, on Eliquis), ischemic CVA (due to subtherapeutic Coumadin levels), HTN, stage IV pancreatic cancer with lung (and ?L ovary) metastasis with lymphangitic spread (diagnosed 11/2021, currently on Gemzar/Abraxane) who presents with diarrhea. Oncology consulted for management of pancreatic cancer.    #Stage IV Pancreatic cancer  -Onc hx as above  -Pt previously on Gemzar/Abraxane now switched to 5FU/irenotecan 10/10/2022 was the first and last dose  -Pt presenting with nausea/ vomiting and diarrhea most likely from chemotherapy  -Can check GI PCR, low suspicion for infectious etiology  -Pt tolerating PO now  -nausea, vomiting and diarrhea have improved  -Can start imodium PRN for diarrhea  -no plan for inpatient treatment at this time  -Patient to follow up with Dr. Dos Santos once stable for discharge, he is aware of the admission    Please page with questions or concerns. Will Follow with you.      Nicanor Morrison M.D.  Hematology/Oncology Fellow PGY5  Pager [phone]  After 5pm, please contact on-call team.   77 year old woman with a PMH of atrial fibrillation (s/p ablation x 3, on Eliquis), ischemic CVA (due to subtherapeutic Coumadin levels), HTN, stage IV pancreatic cancer with lung metastasis and lymphangitic spread (diagnosed 11/2021) who presents with nausea, vomiting and diarrhea. Oncology consulted for management of pancreatic cancer.    #Stage IV Pancreatic cancer  -Onc hx as above  -Pt previously on Gemzar/Abraxane now switched to 5FU/irenotecan 10/10/2022 was the first and last dose  -Pt presenting with nausea/ vomiting and diarrhea most likely from chemotherapy  -Can check GI PCR, low suspicion for infectious etiology  -Pt tolerating PO now  -nausea, vomiting and diarrhea have improved  -Can start imodium PRN for diarrhea  -no plan for inpatient treatment at this time  -Patient to follow up with Dr. Dos Santos once stable for discharge, he is aware of the admission    Please page with questions or concerns. Will Follow with you.      Nicanor Morrison M.D.  Hematology/Oncology Fellow PGY5  Pager [phone]  After 5pm, please contact on-call team.

## 2022-10-22 NOTE — H&P ADULT - PROBLEM SELECTOR PROBLEM 1
The ABCs of the Annual Wellness Visit  Subsequent Medicare Wellness Visit    Chief Complaint   Patient presents with   • Medicare Wellness-subsequent       Subjective   History of Present Illness:  Dariana Rivera is a 61 y.o. female who presents for a Subsequent Medicare Wellness Visit.    The patient states that she was hospitalized at the [facility] by the neurology service.  Patient had a video EEG.  Patient had normal EEG during seizures.  Patient is been weaned off of her anticonvulsants.    HEALTH RISK ASSESSMENT    Recent Hospitalizations:  No hospitalization(s) within the last year.    Current Medical Providers:  Patient Care Team:  Paul Larson Jr., MD as PCP - General (Family Medicine)  Paul Larson Jr., MD as PCP - Claims Attributed    Smoking Status:  Social History     Tobacco Use   Smoking Status Former Smoker   • Types: Cigarettes   • Last attempt to quit:    • Years since quittin.8   Smokeless Tobacco Never Used       Alcohol Consumption:  Social History     Substance and Sexual Activity   Alcohol Use No   • Frequency: Never       Depression Screen:   PHQ-2/PHQ-9 Depression Screening 2019   Little interest or pleasure in doing things 1   Feeling down, depressed, or hopeless 1   Trouble falling or staying asleep, or sleeping too much 1   Feeling tired or having little energy 1   Poor appetite or overeating 1   Feeling bad about yourself - or that you are a failure or have let yourself or your family down 1   Trouble concentrating on things, such as reading the newspaper or watching television 1   Moving or speaking so slowly that other people could have noticed. Or the opposite - being so fidgety or restless that you have been moving around a lot more than usual 1   Thoughts that you would be better off dead, or of hurting yourself in some way 1   Total Score 9   If you checked off any problems, how difficult have these problems made it for you to do your work, take care of  things at home, or get along with other people? -       Fall Risk Screen:  JUAN Fall Risk Assessment has not been completed.    Health Habits and Functional and Cognitive Screening:  Functional & Cognitive Status 11/6/2019   Do you have difficulty preparing food and eating? Yes   Do you have difficulty bathing yourself, getting dressed or grooming yourself? Yes   Do you have difficulty using the toilet? Yes   Do you have difficulty moving around from place to place? Yes   Do you have trouble with steps or getting out of a bed or a chair? Yes   Current Diet Well Balanced Diet   Dental Exam Up to date   Eye Exam Up to date   Exercise (times per week) 0 times per week   Current Exercise Activities Include None   Do you need help using the phone?  No   Are you deaf or do you have serious difficulty hearing?  No   Do you need help with transportation? Yes   Do you need help shopping? Yes   Do you need help preparing meals?  Yes   Do you need help with housework?  Yes   Do you need help with laundry? Yes   Do you need help taking your medications? Yes   Do you need help managing money? No   Do you ever drive or ride in a car without wearing a seat belt? No   Have you felt unusual stress, anger or loneliness in the last month? Yes   Who do you live with? Spouse   If you need help, do you have trouble finding someone available to you? No   Have you been bothered in the last four weeks by sexual problems? No   Do you have difficulty concentrating, remembering or making decisions? Yes         Does the patient have evidence of cognitive impairment? No    Asprin use counseling:Taking ASA appropriately as indicated    Age-appropriate Screening Schedule:  Refer to the list below for future screening recommendations based on patient's age, sex and/or medical conditions. Orders for these recommended tests are listed in the plan section. The patient has been provided with a written plan.    Health Maintenance   Topic Date Due   •  PNEUMOCOCCAL VACCINE (19-64 MEDIUM RISK) (1 of 1 - PPSV23) 09/20/1975   • TDAP/TD VACCINES (1 - Tdap) 09/20/1975   • ZOSTER VACCINE (1 of 2) 09/20/2006   • URINE MICROALBUMIN  06/15/2018   • DIABETIC FOOT EXAM  05/20/2019   • PAP SMEAR  05/20/2019   • DIABETIC EYE EXAM  05/20/2019   • INFLUENZA VACCINE  08/01/2019   • HEMOGLOBIN A1C  12/05/2019   • MAMMOGRAM  10/02/2020   • COLONOSCOPY  01/29/2029          The following portions of the patient's history were reviewed and updated as appropriate: allergies, current medications, past family history, past medical history, past social history, past surgical history and problem list.    Outpatient Medications Prior to Visit   Medication Sig Dispense Refill   • albuterol sulfate  (90 Base) MCG/ACT inhaler Inhale 2 puffs Every 6 (Six) Hours As Needed for Wheezing.     • aspirin 81 MG chewable tablet Chew 81 mg Daily.     • baclofen (LIORESAL) 10 MG tablet Take 10 mg by mouth 3 (Three) Times a Day.  3   • budesonide-formoterol (SYMBICORT) 160-4.5 MCG/ACT inhaler Inhale 2 puffs 2 (Two) Times a Day.     • carbidopa-levodopa (SINEMET)  MG per tablet Take 2 tablets by mouth 3 (Three) Times a Day.     • Cholecalciferol 5000 units tablet Take 1 tablet by mouth Daily.     • clonazePAM (KlonoPIN) 1 MG tablet Take 1 tablet by mouth 2 (Two) Times a Day As Needed for Anxiety. 60 tablet 2   • FLUZONE QUADRIVALENT 0.5 ML suspension prefilled syringe injection TO BE ADMINISTERED BY PHARMACIST FOR IMMUNIZATION  0   • furosemide (LASIX) 40 MG tablet TAKE 1 TABLET BY MOUTH EVERY DAY (Patient taking differently: TAKE 1 TABLET BY MOUTH TWICE DAILY) 30 tablet 2   • gabapentin (NEURONTIN) 300 MG capsule Take 300 mg by mouth 3 (Three) Times a Day.     • KLOR-CON 20 MEQ CR tablet TAKE 1 TABLET BY MOUTH DAILY 90 tablet 0   • lactulose (CHRONULAC) 10 GM/15ML solution Take 30 mL by mouth Every Night.     • lurasidone (LATUDA) 40 MG tablet tablet Take 1 tablet by mouth Daily. 30 tablet 2    • Multiple Vitamins-Minerals (MULTIVITAMIN WITH MINERALS) tablet tablet Take 1 tablet by mouth Daily.     • nitrofurantoin, macrocrystal-monohydrate, (MACROBID) 100 MG capsule Take 100 mg by mouth every night at bedtime.  11   • omeprazole (priLOSEC) 20 MG capsule Take 40 mg by mouth Daily.     • pioglitazone (ACTOS) 30 MG tablet Take 30 mg by mouth Daily.     • pramipexole (MIRAPEX) 0.5 MG tablet Take 0.5 mg by mouth 3 (Three) Times a Day.  2   • tiotropium (SPIRIVA) 18 MCG per inhalation capsule Place 1 capsule into inhaler and inhale Daily.     • vitamin C (ASCORBIC ACID) 500 MG tablet Take 1,000 mg by mouth Daily.     • levETIRAcetam (KEPPRA) 1000 MG tablet Take 1,000 mg by mouth every night at bedtime.     • levETIRAcetam (KEPPRA) 750 MG tablet Take 750 mg by mouth Every Morning.     • lisinopril-hydrochlorothiazide (PRINZIDE,ZESTORETIC) 10-12.5 MG per tablet Take 1 tablet by mouth Daily.     • OXcarbazepine (TRILEPTAL) 300 MG tablet Take 600 mg by mouth 2 (Two) Times a Day.     • sodium chloride 1 g tablet TAKE 2 TABLETS BY MOUTH EVERY DAY 60 tablet 2   • sulfamethoxazole-trimethoprim (BACTRIM DS) 800-160 MG per tablet Take 1 tablet by mouth 2 (Two) Times a Day. 14 tablet 0   • VIMPAT 150 MG tablet Take 1 tablet by mouth 2 (Two) Times a Day. 60 tablet 0     No facility-administered medications prior to visit.        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  Compared to one year ago, the patient feels her mental health is the same.    Reviewed chart for potential of high risk medication in the elderly: yes  Reviewed chart for potential of harmful drug interactions in the elderly:yes    Objective         Vitals:    11/06/19 1255   BP: 157/87   BP Location: Left arm   Patient Position: Sitting   Cuff Size: Adult   Pulse: 77   Resp: 16   Temp: 98.5 °F (36.9 °C)   TempSrc: Oral   SpO2: 96%   Weight: 130 kg (287 lb)   Height: 170.2 cm (67\")       Body mass index is 44.95 kg/m².  Discussed the patient's BMI with her. The BMI is above average; BMI management plan is completed.    Physical Exam   Constitutional: She is oriented to person, place, and time.   Obese   HENT:   Head: Normocephalic and atraumatic.   Eyes: EOM are normal. Pupils are equal, round, and reactive to light.   Neck: Neck supple.   Cardiovascular: Normal rate, regular rhythm, normal heart sounds and intact distal pulses.   Pulmonary/Chest: Effort normal and breath sounds normal.   Abdominal: Soft. Bowel sounds are normal.   Musculoskeletal: Normal range of motion.   Neurological: She is oriented to person, place, and time.   Skin: Skin is warm and dry.   Psychiatric: She has a normal mood and affect. Her behavior is normal. Judgment and thought content normal.   Nursing note and vitals reviewed.            Assessment/Plan   Medicare Risks and Personalized Health Plan  CMS Preventative Services Quick Reference  Advance Directive Discussion  Immunizations Discussed/Encouraged (specific immunizations; Td, Influenza, Pneumococcal 23, Prevnar and Shingrix )    The above risks/problems have been discussed with the " patient.  Pertinent information has been shared with the patient in the After Visit Summary.  Follow up plans and orders are seen below in the Assessment/Plan Section.    Diagnoses and all orders for this visit:    1. Encounter for Medicare annual wellness exam (Primary)      Follow Up:  Return in about 3 months (around 2/6/2020) for Recheck.     An After Visit Summary and PPPS were given to the patient.              Diarrhea

## 2022-10-22 NOTE — H&P ADULT - NSHPREVIEWOFSYSTEMS_GEN_ALL_CORE
Constitutional:  (-) fever, (-) chills, (-) lethargy  Eyes:  (-) eye pain (-) visual changes  ENMT: (-) nasal discharge, (-) sore throat. (-) neck pain or stiffness  Cardiac: (-) chest pain (-) palpitations  Respiratory:  (-) cough (-) respiratory distress.   GI:  (-) nausea (+) vomiting (+) diarrhea (-) abdominal pain.  :  (-) dysuria (-) frequency (-) burning.  MS:  (-) back pain (-) joint pain.  Neuro:  (-) headache (-) numbness (-) tingling (-) focal weakness  Skin:  (-) rash

## 2022-10-22 NOTE — DISCHARGE NOTE PROVIDER - HOSPITAL COURSE
77F with PMHx of stage 4 metastatic pancreatic CA on chemo (last treatment on 10/10), CVA, afib s/p ablation on eliquis, htn, and HLD presenting with 3-days onset of multiple episodes (7-8x/day) of nonbloody diarrhea in addition to weakness and 1 episode of vomiting. Patient further reports fainting today at 1pm when she was alone in the bathroom without LOC and was able to catch herself before falling and get to a couch. Patient reports that she is on a new chemo treatment started last week (5FU and Irenotecan) after seeing her oncologist. Used to be gemcitabine for 10 months.    PMD: Dr. John Riley, Oncologist: Dr. Dos Santos ([facility])    In the ED, /58, VS otherwise stable. Labs remarkable for WBC 2.93. Imaging included chest xray Diffuse reticulonodular opacities, unchanged from prior x-ray on 9/29/2022. EKG abnormal sinus rhythm, irregular rhythm. Patient given 1L LR and 500cc NS.    Diarrhea improved by time of transfer to floors. Low suspicion of infection given recent chemotherapy change.    77F with PMHx of stage 4 metastatic pancreatic CA on chemo (last treatment on 10/10), CVA, afib s/p ablation on eliquis, htn, and HLD presenting with 3-days onset of multiple episodes (7-8x/day) of nonbloody diarrhea in addition to weakness and 1 episode of vomiting. Patient further reports fainting today at 1pm when she was alone in the bathroom without LOC and was able to catch herself before falling and get to a couch. Patient reports that she is on a new chemo treatment started last week (5FU and Irenotecan) after seeing her oncologist. Used to be gemcitabine for 10 months.    PMD: Dr. John Riley, Oncologist: Dr. Dos Santos ([facility])    In the ED, /58, VS otherwise stable. Labs remarkable for WBC 2.93. Imaging included chest xray Diffuse reticulonodular opacities, unchanged from prior x-ray on 9/29/2022. EKG abnormal sinus rhythm, irregular rhythm. Patient given 1L LR and 500cc NS.    Diarrhea improved by time of transfer to floors. Low suspicion of infection given recent chemotherapy change. Patient stool studies came back negative   77F with PMHx of stage 4 metastatic pancreatic CA on chemo (last treatment on 10/10), CVA, afib s/p ablation on eliquis, htn, and HLD presenting with 3-days onset of multiple episodes (7-8x/day) of nonbloody, yellow diarrhea in addition to weakness and 1 episode of vomiting. Patient further reports fainting in the afternoon of presentation, when she was alone in the bathroom without LOC and was able to catch herself before falling and get to a couch. Patient reports that she is on a new chemo treatment started last week (5FU and Irenotecan) after seeing her oncologist. Used to be on gemcitabine for 10 months. Pt discontinued the gemcitabine 2/2 to "pulmonary side effects" per patient.     PMD: Dr. John Riley, Oncologist: Dr. Dos Santos ([facility])    In the ED, /58, VS otherwise stable. Labs remarkable for WBC 2.93. Imaging included CXR: showing diffuse reticulonodular opacities, unchanged from prior x-ray on 9/29/2022. EKG showed an abnormal sinus rhythm, irregular rhythm. Patient given 1L LR and 500cc NS.    Diarrhea improved by time of transfer to floors. Low suspicion of infection given recent chemotherapy change. Patient stool studies came back negative for GI PCR and C-diff. Patient also has remained afebrile since her presentation and hemodynamically stable. She was seen by Hematology/Oncology while in the hospital. Per Dr. Reynolds (hem/onc service), they recommend that the patient check as outpatient for UGT1A1 polymorphism which can affect irinotecan metabolism, and dose-reduce for any subsequent cycle; patient can address this with Dr. Dos Santos in her follow-up as outpatient (patient has an appointment with Dr. Dos Santos scheduled for 10/26/2022 at 10 AM). Patient also should hold on restarting her Lasix (diuretic) because she had significant volume loss 2/2 to diarrhea. She was prescribed 40 mg Lasix daily from her PCP. She should follow up with her PCP to discuss what dose of Lasix to restart within 1 week of discharge home. While here, she was also given Imodium PRN for the diarrhea, which she can continue taking after she goes home. Patient was deemed stable for discharge home on 10/24/2022. 77F with PMHx of stage 4 metastatic pancreatic CA on chemo (last treatment on 10/10), CVA, afib s/p ablation on eliquis, htn, and HLD presenting with 3-days onset of multiple episodes (7-8x/day) of nonbloody, yellow diarrhea in addition to weakness and 1 episode of vomiting. Patient further reports fainting in the afternoon of presentation, when she was alone in the bathroom without LOC and was able to catch herself before falling and get to a couch. Patient reports that she is on a new chemo treatment started last week (5FU and Irenotecan) after seeing her oncologist. Used to be on gemcitabine for 10 months. Pt discontinued the gemcitabine 2/2 to "pulmonary side effects" per patient.     PMD: Dr. John Riley, Oncologist: Dr. Dos Santos ([facility])    In the ED, /58, VS otherwise stable. Labs remarkable for WBC 2.93. Imaging included CXR: showing diffuse reticulonodular opacities, unchanged from prior x-ray on 9/29/2022. EKG showed an abnormal sinus rhythm, irregular rhythm. Patient given 1L LR and 500cc NS.    Diarrhea improved by time of transfer to floors. Low suspicion of infection given recent chemotherapy change. Patient stool studies came back negative for GI PCR and C-diff. Patient also has remained afebrile since her presentation and hemodynamically stable. She was seen by Hematology/Oncology while in the hospital. Per Dr. Reynolds (hem/onc service), they recommend that the patient check as outpatient for UGT1A1 polymorphism which can affect irinotecan metabolism, and dose-reduce for any subsequent cycle; patient can address this with Dr. Dos Santos in her follow-up as outpatient (patient has an appointment with Dr. Dos Santos scheduled for 10/26/2022 at 10 AM). Patient also should hold on restarting her Lasix (diuretic) because she had significant volume loss 2/2 to diarrhea. She was prescribed 40 mg Lasix daily from her PCP. She should follow up with her PCP to discuss what dose of Lasix to restart within 1 week of discharge home and continue to hold off on her Lasix until she follows up with her PCP. While here, she was also given Imodium PRN for the diarrhea, which she can continue taking after she goes home. Patient was deemed stable for discharge home on 10/24/2022. 77F with PMHx of stage 4 metastatic pancreatic CA on chemo (last treatment on 10/10), CVA, afib s/p ablation on eliquis, htn, and HLD presenting with 3-days onset of multiple episodes (7-8x/day) of nonbloody, yellow diarrhea in addition to weakness and 1 episode of vomiting. Patient further reports fainting in the afternoon of presentation, when she was alone in the bathroom without LOC and was able to catch herself before falling and get to a couch. Patient reports that she is on a new chemo treatment started last week (5FU and Irenotecan) after seeing her oncologist. Used to be on gemcitabine for 10 months. Pt discontinued the gemcitabine 2/2 to "pulmonary side effects" per patient.     PMD: Dr. John Riley, Oncologist: Dr. Dos Santos ([facility])    In the ED, /58, VS otherwise stable. Labs remarkable for WBC 2.93. Imaging included CXR: showing diffuse reticulonodular opacities, unchanged from prior x-ray on 9/29/2022. EKG showed an abnormal sinus rhythm, irregular rhythm. Patient given 1L LR and 500cc NS.    Diarrhea improved by time of transfer to floors. Low suspicion of infection given recent chemotherapy change. Patient stool studies came back negative for GI PCR and C-diff. Patient also has remained afebrile since her presentation and hemodynamically stable. She was seen by Hematology/Oncology while in the hospital. Per Dr. Reynolds (hem/onc service), they recommend that the patient check as outpatient for UGT1A1 polymorphism which can affect irinotecan metabolism, and dose-reduce for any subsequent cycle; patient can address this with Dr. Dos Santos in her follow-up as outpatient (patient has an appointment with Dr. Dos Santos scheduled for 10/26/2022 at 10 AM). Patient also should hold on restarting her Lasix (diuretic) because she had significant volume loss 2/2 to diarrhea. She was prescribed 40 mg Lasix three times a week. She should follow up with her PCP to discuss what dose of Lasix to restart within 1 week of discharge home and continue to hold off on her Lasix until she follows up with her PCP. While here, she was also given Imodium PRN for the diarrhea, which she can continue taking after she goes home. Patient was deemed stable for discharge home on 10/24/2022.

## 2022-10-22 NOTE — H&P ADULT - PROBLEM SELECTOR PLAN 3
Hx of Afib s/p ablation x3, on Eliquis at home  hx of CVA in the past  - c/w Eliquis 5mg BID CTA chest (8/1): new diffuse b/l GGOs and interval increase in pleural effusions consistent with interstitial pulmonary edema.  - Concern for possible cardiac etiology vs adverse effect from chemo vs viral infection  - prior TTE (2/10/21): EF 60-65%; normal LV size and contractility; mild concentric LVH; mild-mod MR; trace pericardial effusion is present  - pt on home lasix 3x per week per cards  - will hold lasix iso acute diarrhea until clinically improved

## 2022-10-22 NOTE — H&P ADULT - PROBLEM SELECTOR PLAN 1
3-days onset of multiple episodes (7-8x/day) of nonbloody diarrhea  /58, VS otherwise stable  Labs remarkable for WBC 2.93.   s/p 1l LR and 500cc NS 3-days onset of multiple episodes (7-8x/day) of nonbloody diarrhea  /58, VS otherwise stable  Labs remarkable for WBC 2.93  s/p 1l LR and 500cc NS  - no symptomatic relief from Imodium, hold for now  - on mIVF

## 2022-10-22 NOTE — DISCHARGE NOTE PROVIDER - ATTENDING DISCHARGE PHYSICAL EXAMINATION:
77F F, hx of stage 4 metastatic pancreatic CA on chemo (last treatment on 10/10), CVA, afib s/p ablation on eliquis, htn, and HLD presenting with 3-days onset of multiple episodes (7-8x/day) of nonbloody diarrhea and progressive weakness. Patient is on new chemotherapy (5 FU/ Onivyde) (developed AHRF during prior admission from gemcitabine) and also tx for PNA w amoxicillin about 2 weeks ago.   Patient admitted for w/u of diarrhea and weakness    Patient seen and examined on day of discharge.   General: comfortably resting in chair, NAD  HEENT: NC/AT; EOMI; MMM  Cards: S1/S2, RRR, no murmurs  Respiratory: normal effort; on baseline 2L NC; no w/r/r  Abdomen: soft, NT/ND, normoactive bowel, no rebound/guarding   Extremities: moves all extremities  Neuro:A&Ox3; no focal deficits noted   Psych: normal mood and affect     #Diarrhea  #H/O Stage 4 metastatic pancreatic cancer  R/o infectious etiology vs effects from chemotherapy  -GI PCR, C.diff negative. Likely 2/2 recent chemotherapy    -Diarrhea improving 3 episodes of semisolid BM yesterday; 1 BM day prior. Has been tolerating PO diet well; BP, HR stable. No abdominal pain. No n/v. Started on imodium prn. Patient to be discharged home today given clinical improvement. Encouraged PO intake, imodium prn.   -Pt on baseline 2L NC. No SOB.   -Has been on lasix three times a week outpatient. Will hold lasix on discharge for now. Discuss with patient to follow up with PCP within 1 week regarding resuming pending continued improvement in diarrhea.   -D/w heme/onc today; leukopenia expected given recent treatment. Has follow up scheduled 10/26/22 at 10am.   -PT eval: no PT service needs   -D/w daughter Norma on the phone. Patient and daughter is in agreement with this plan.

## 2022-10-22 NOTE — H&P ADULT - HISTORY OF PRESENT ILLNESS
77F F, hx of stage 4 metastatic pancreatic CA on chemo (last treatment on 10/10), CVA, afib s/p ablation on eliquis, htn, and HLD presenting with 3-days onset of multiple episodes (7-8x/day) of nonbloody diarrhea. Reports feeling weak from the diarrhea and has taken imodium without relief. Noted that she vomited (nonbloody) once today and took Zofran which helped her symptoms. Patient further reports fainting today at 1pm when she was alone in the bathroom without LOC and was able to catch herself before falling and get to a couch. Patient reports that she is on a new chemo treatment started last week after seeing her oncologist. Used to be gemcitabine for 10 months. Switched to a new chemo due to side effects but is not able to remember the name of the new chemo drugs. Patient was on antibiotics (amoxicillin) prior to her chemo treatment for PNA. Patient also endorses intermittent cough and is on 2L O2 at home after her last chemo but otherwise denies CP, SOB, fever, chills, abdominal pain, dizziness, numbness or tingling.    PMD: Dr. John Riley, Oncologist: Dr. Dos Santos ([facility])    In the ED, /58, VS otherwise stable. Labs remarkable for WBC 2.93. Imaging included chest xray Diffuse reticulonodular opacities, unchanged from prior x-ray on 9/29/2022. Patient given 1l LR and 500cc NS 77F with PMHx of stage 4 metastatic pancreatic CA on chemo (last treatment on 10/10), CVA, afib s/p ablation on eliquis, htn, and HLD presenting with 3-days onset of multiple episodes (7-8x/day) of nonbloody diarrhea. Reports feeling weak from the diarrhea and has taken imodium without relief. Noted that she vomited (nonbloody) once today and took Zofran which helped her symptoms. Patient further reports fainting today at 1pm when she was alone in the bathroom without LOC and was able to catch herself before falling and get to a couch. Patient reports that she is on a new chemo treatment started last week after seeing her oncologist. Used to be gemcitabine for 10 months. Switched to a new chemo due to side effects but is not able to remember the name of the new chemo drugs. Patient was on antibiotics (amoxicillin) prior to her chemo treatment for PNA. Patient also endorses intermittent cough and is on 2L O2 at home after her last chemo but otherwise denies CP, SOB, fever, chills, abdominal pain, dizziness, numbness or tingling.    PMD: Dr. John Riley, Oncologist: Dr. Dos Santos ([facility])    In the ED, /58, VS otherwise stable. Labs remarkable for WBC 2.93. Imaging included chest xray Diffuse reticulonodular opacities, unchanged from prior x-ray on 9/29/2022. EKG abnormal sinus rhythm, irregular rhythm. Patient given 1L LR and 500cc NS.

## 2022-10-22 NOTE — DISCHARGE NOTE PROVIDER - CARE PROVIDER_API CALL
Lele Dos Santos (MD; PhD)  Internal Medicine; Medical Oncology  [address] [place] [address]  Phone: [phone]  Fax: [phone]  Scheduled Appointment: 10/26/2022 10:00 AM   Lele Dos Santos (MD; PhD)  Internal Medicine; Medical Oncology  [address] [place] [address]  Phone: [phone]  Fax: [phone]  Scheduled Appointment: 10/26/2022 10:00 AM    HAMMAD CORTES  Internal Medicine  [address] [id number]  Phone: [phone]  Fax: [phone]  Follow Up Time: 1 week   Lele Dos Santos; PhD)  Internal Medicine; Medical Oncology  [address] [place] [address]  Phone: [phone]  Fax: [phone]  Scheduled Appointment: 10/26/2022 10:00 AM    John Riley)  Family Medicine  [address] [place] [address]  Phone: [phone]  Fax: [phone]  Follow Up Time: 1 week

## 2022-10-22 NOTE — PROGRESS NOTE ADULT - PROBLEM SELECTOR PLAN 3
CTA chest (8/1): new diffuse b/l GGOs and interval increase in pleural effusions consistent with interstitial pulmonary edema.  - Concern for possible cardiac etiology vs adverse effect from chemo vs viral infection  - prior TTE (2/10/21): EF 60-65%; normal LV size and contractility; mild concentric LVH; mild-mod MR; trace pericardial effusion is present  - pt on home lasix 3x per week per cards  - will hold lasix iso acute diarrhea until clinically improved

## 2022-10-22 NOTE — DISCHARGE NOTE PROVIDER - PROVIDER TOKENS
PROVIDER:[TOKEN:[[id number]],SCHEDULEDAPPT:[10/26/2022],SCHEDULEDAPPTTIME:[10:00 AM]] PROVIDER:[TOKEN:[[id number]],SCHEDULEDAPPT:[10/26/2022],SCHEDULEDAPPTTIME:[10:00 AM]],PROVIDER:[TOKEN:[[id number]],FOLLOWUP:[1 week]] PROVIDER:[TOKEN:[[id number]],SCHEDULEDAPPT:[10/26/2022],SCHEDULEDAPPTTIME:[10:00 AM]],PROVIDER:[TOKEN:[[id number]],FOLLOWUP:[1 week]]

## 2022-10-22 NOTE — PROGRESS NOTE ADULT - ASSESSMENT
77F with PMHhx of stage 4 metastatic pancreatic CA on chemo (last treatment on 10/10), CVA, Afib s/p ablation on eliquis, htn, and HLD presenting with 3-days of nonbloody diarrhea likely chemotherapy related vs infectious.

## 2022-10-22 NOTE — DISCHARGE NOTE PROVIDER - NSDCFUSCHEDAPPT_GEN_ALL_CORE_FT
[facility]  [facility]  Scheduled Appointment: 10/24/2022    [facility]  [facility]  Scheduled Appointment: 10/26/2022    Megan Morales  [facility]  [facility]  Scheduled Appointment: 10/26/2022    [facility]  [facility]  Scheduled Appointment: 11/07/2022    Lali Wilson  [facility] Physician [facility]  [facility] [address]  Scheduled Appointment: 11/08/2022    [facility]  [facility]  Scheduled Appointment: 11/09/2022    [facility]  [facility]  Scheduled Appointment: 11/21/2022    [facility]  [facility]  Scheduled Appointment: 11/23/2022    Asher Yuan  [facility]  CARDIOLOGY [address]   Scheduled Appointment: 01/20/2023     Megan Morales  [facility] Physician [facility]  [facility]  Scheduled Appointment: 10/26/2022    [facility]  Scheduled Appointment: 11/07/2022    Lali Wilson  [facility] Physician [facility]  [facility] [address]  Scheduled Appointment: 11/08/2022    [facility]  [facility]  Scheduled Appointment: 11/09/2022    [facility]  [facility]  Scheduled Appointment: 11/21/2022    [facility]  [facility]  Scheduled Appointment: 11/23/2022    Asher Yuan  [facility]  CARDIOLOGY [address]   Scheduled Appointment: 01/20/2023

## 2022-10-23 NOTE — PROGRESS NOTE ADULT - ASSESSMENT
77F with PMHhx of stage 4 metastatic pancreatic CA on chemo (last treatment on 10/10), CVA, Afib s/p ablation on eliquis, htn, and HLD presenting with 3-days of nonbloody diarrhea likely chemotherapy related vs infectious. 77F with PMHhx of stage 4 metastatic pancreatic CA on chemo (last treatment on 10/10), CVA, Afib s/p ablation on eliquis, htn, and HLD presenting with 3-days of nonbloody diarrhea likely chemotherapy related vs infectious. All infectious workup -ve and therefore will start imodium.

## 2022-10-23 NOTE — PROGRESS NOTE ADULT - PROBLEM SELECTOR PLAN 1
3-days onset of multiple episodes (7-8x/day) of nonbloody diarrhea  /58, VS otherwise stable  Labs remarkable for WBC 2.93  s/p 1l LR and 500cc NS  - no symptomatic relief from Imodium, hold for now  - on mIVF 3-days onset of multiple episodes (7-8x/day) of nonbloody diarrhea  /58, VS otherwise stable  Labs remarkable for WBC 2.93  s/p 1l LR and 500cc NS  - restarted imodium  - can dc tmr if stable

## 2022-10-23 NOTE — PROGRESS NOTE ADULT - SUBJECTIVE AND OBJECTIVE BOX
PROGRESS NOTE:     Patient is a 77y old  Female who presents with a chief complaint of diarrhea (22 Oct 2022 20:04)      SUBJECTIVE / OVERNIGHT EVENTS:    ADDITIONAL REVIEW OF SYSTEMS:    MEDICATIONS  (STANDING):  apixaban 5 milliGRAM(s) Oral every 12 hours  atorvastatin 40 milliGRAM(s) Oral at bedtime  chlorhexidine 2% Cloths 1 Application(s) Topical daily  escitalopram 5 milliGRAM(s) Oral daily  influenza  Vaccine (HIGH DOSE) 0.7 milliLiter(s) IntraMuscular once  metoprolol tartrate 100 milliGRAM(s) Oral two times a day  spironolactone 25 milliGRAM(s) Oral daily    MEDICATIONS  (PRN):  ALPRAZolam 0.25 milliGRAM(s) Oral daily PRN anxiety  lidocaine/prilocaine Cream 1 Application(s) Topical once PRN prior to mediport access for chemo  ondansetron    Tablet 8 milliGRAM(s) Oral every 8 hours PRN Nausea and/or Vomiting  prochlorperazine   Tablet 10 milliGRAM(s) Oral daily PRN nausea      CAPILLARY BLOOD GLUCOSE        I&O's Summary    22 Oct 2022 07:01  -  23 Oct 2022 07:00  --------------------------------------------------------  IN: 240 mL / OUT: 200 mL / NET: 40 mL        PHYSICAL EXAM:  Vital Signs Last 24 Hrs  T(C): 36.9 (23 Oct 2022 05:26), Max: 36.9 (23 Oct 2022 05:26)  T(F): 98.5 (23 Oct 2022 05:26), Max: 98.5 (23 Oct 2022 05:26)  HR: 68 (23 Oct 2022 05:26) (62 - 68)  BP: 109/66 (23 Oct 2022 05:26) (96/61 - 116/67)  BP(mean): --  RR: 18 (23 Oct 2022 05:26) (17 - 18)  SpO2: 97% (23 Oct 2022 05:26) (92% - 97%)    Parameters below as of 23 Oct 2022 05:26  Patient On (Oxygen Delivery Method): nasal cannula  O2 Flow (L/min): 2      CONSTITUTIONAL: NAD, well-developed  HEENT: normal conjunctiva, PEERLA  RESPIRATORY: Normal respiratory effort; lungs are clear to auscultation bilaterally  CARDIOVASCULAR: Regular rate and rhythm, normal S1 and S2, no murmur/rub/gallop;   ABDOMEN: No tenderness to palpation at all four quadrants, +BS  MUSCULOSKELETAL no clubbing or cyanosis of digits; no joint swelling or tenderness to palpation  EXTREMITIES No lower extremity edema; Peripheral pulses are 2+ bilaterally  SKIN: well-perfused, no dry skin    LABS:                        10.1   2.68  )-----------( 297      ( 23 Oct 2022 07:26 )             32.3     10-23    139  |  103  |  11  ----------------------------<  104<H>  4.2   |  23  |  0.75    Ca    8.8      23 Oct 2022 07:26  Phos  3.6     10-23  Mg     1.8     10-23    TPro  6.5  /  Alb  3.2<L>  /  TBili  0.5  /  DBili  x   /  AST  19  /  ALT  16  /  AlkPhos  80  10-23                RADIOLOGY & ADDITIONAL TESTS:  Results Reviewed:   Imaging Personally Reviewed:  Electrocardiogram Personally Reviewed:    COORDINATION OF CARE:  Care Discussed with Consultants/Other Providers [Y/N]:  Prior or Outpatient Records Reviewed [Y/N]:   PROGRESS NOTE:     Patient is a 77y old  Female who presents with a chief complaint of diarrhea (22 Oct 2022 20:04)      SUBJECTIVE / OVERNIGHT EVENTS: pt had 1x diarrhea this am. will keep an additional day w/ prn imodium.    ADDITIONAL REVIEW OF SYSTEMS:    MEDICATIONS  (STANDING):  apixaban 5 milliGRAM(s) Oral every 12 hours  atorvastatin 40 milliGRAM(s) Oral at bedtime  chlorhexidine 2% Cloths 1 Application(s) Topical daily  escitalopram 5 milliGRAM(s) Oral daily  influenza  Vaccine (HIGH DOSE) 0.7 milliLiter(s) IntraMuscular once  metoprolol tartrate 100 milliGRAM(s) Oral two times a day  spironolactone 25 milliGRAM(s) Oral daily    MEDICATIONS  (PRN):  ALPRAZolam 0.25 milliGRAM(s) Oral daily PRN anxiety  lidocaine/prilocaine Cream 1 Application(s) Topical once PRN prior to mediport access for chemo  ondansetron    Tablet 8 milliGRAM(s) Oral every 8 hours PRN Nausea and/or Vomiting  prochlorperazine   Tablet 10 milliGRAM(s) Oral daily PRN nausea      CAPILLARY BLOOD GLUCOSE        I&O's Summary    22 Oct 2022 07:01  -  23 Oct 2022 07:00  --------------------------------------------------------  IN: 240 mL / OUT: 200 mL / NET: 40 mL        PHYSICAL EXAM:  Vital Signs Last 24 Hrs  T(C): 36.9 (23 Oct 2022 05:26), Max: 36.9 (23 Oct 2022 05:26)  T(F): 98.5 (23 Oct 2022 05:26), Max: 98.5 (23 Oct 2022 05:26)  HR: 68 (23 Oct 2022 05:26) (62 - 68)  BP: 109/66 (23 Oct 2022 05:26) (96/61 - 116/67)  BP(mean): --  RR: 18 (23 Oct 2022 05:26) (17 - 18)  SpO2: 97% (23 Oct 2022 05:26) (92% - 97%)    Parameters below as of 23 Oct 2022 05:26  Patient On (Oxygen Delivery Method): nasal cannula  O2 Flow (L/min): 2      CONSTITUTIONAL: NAD, well-developed  HEENT: normal conjunctiva, PEERLA  RESPIRATORY: Normal respiratory effort; lungs are clear to auscultation bilaterally  CARDIOVASCULAR: Regular rate and rhythm, normal S1 and S2, no murmur/rub/gallop;   ABDOMEN: No tenderness to palpation at all four quadrants, +BS  MUSCULOSKELETAL no clubbing or cyanosis of digits; no joint swelling or tenderness to palpation  EXTREMITIES No lower extremity edema; Peripheral pulses are 2+ bilaterally  SKIN: well-perfused, no dry skin    LABS:                        10.1   2.68  )-----------( 297      ( 23 Oct 2022 07:26 )             32.3     10-23    139  |  103  |  11  ----------------------------<  104<H>  4.2   |  23  |  0.75    Ca    8.8      23 Oct 2022 07:26  Phos  3.6     10-23  Mg     1.8     10-23    TPro  6.5  /  Alb  3.2<L>  /  TBili  0.5  /  DBili  x   /  AST  19  /  ALT  16  /  AlkPhos  80  10-23                RADIOLOGY & ADDITIONAL TESTS:  Results Reviewed:   Imaging Personally Reviewed:  Electrocardiogram Personally Reviewed:    COORDINATION OF CARE:  Care Discussed with Consultants/Other Providers [Y/N]:  Prior or Outpatient Records Reviewed [Y/N]:

## 2022-10-24 NOTE — PROGRESS NOTE ADULT - PROBLEM SELECTOR PLAN 3
CTA chest (8/1): new diffuse b/l GGOs and interval increase in pleural effusions consistent with interstitial pulmonary edema.  - Concern for possible cardiac etiology vs adverse effect from chemo vs viral infection  - prior TTE (2/10/21): EF 60-65%; normal LV size and contractility; mild concentric LVH; mild-mod MR; trace pericardial effusion is present  - pt on home lasix 3x per week per cards  - will hold lasix iso acute diarrhea until clinically improved CTA chest (8/1): new diffuse b/l GGOs and interval increase in pleural effusions consistent with interstitial pulmonary edema.  - Concern for possible cardiac etiology vs adverse effect from chemo vs viral infection  - prior TTE (2/10/21): EF 60-65%; normal LV size and contractility; mild concentric LVH; mild-mod MR; trace pericardial effusion is present  - pt on home lasix 3x per week per cards  - will hold lasix iso diarrhea

## 2022-10-24 NOTE — PROGRESS NOTE ADULT - PROBLEM SELECTOR PLAN 6
c/w home Metoprolol and spironolactone

## 2022-10-24 NOTE — PROGRESS NOTE ADULT - ASSESSMENT
77F with PMHhx of stage 4 metastatic pancreatic CA on chemo (last treatment on 10/10), CVA, Afib s/p ablation on eliquis, htn, and HLD presenting with 3-days of nonbloody diarrhea likely chemotherapy related vs infectious. All infectious workup -ve and therefore will start imodium.

## 2022-10-24 NOTE — PROGRESS NOTE ADULT - PROBLEM SELECTOR PLAN 5
Hb 10.2 on admission (prior hospitalization 11.5)  - cbc qd, ctm Hb 10.2 on admission (prior hospitalization 11.5). No s/s of any acute bleeding at this time. VSS. Will continue to monitor  - cbc qd, ctm

## 2022-10-24 NOTE — PHARMACOTHERAPY INTERVENTION NOTE - COMMENTS
Medication reconciliation completed. Please refer to specifics in home medication list (outpatient medication review). Medications verified with patient.    -------------------------------------------------------------------------------------------  Home Medications:  Afrin 0.05% nasal spray: 2 spray(s) nasal 2 times a day, As Needed   atorvastatin 40 mg oral tablet: 1 tab(s) orally once a day (at bedtime)  Eliquis 5 mg oral tablet: 1 tab(s) orally 2 times a day   escitalopram 5 mg oral tablet: 1 tab(s) orally once a day   lidocaine-prilocaine 2.5%-2.5% topical cream: Apply topically to affected area once, As Needed prior to mediport access for chemo   Melatonin 5 mg oral tablet: 1 tab(s) orally once a day (at bedtime)   Metoprolol Tartrate 100 mg oral tablet: 1 tab(s) orally 2 times a day  ondansetron 8 mg oral tablet: 1 tab(s) orally every 8 hours, As Needed -for nausea  prochlorperazine 10 mg oral tablet: 1 tab(s) orally once a day, As Needed for nausea   spironolactone 25 mg oral tablet: 1 tab(s) orally once a day   Vitamin D3 25 mcg (1000 intl units) oral tablet: 1 tab(s) orally once a day   Xanax 0.25 mg oral tablet: 1 tab(s) orally once a day, As Needed     Changed:  Lasix 40 mg oral tablet: 1 tab(s) orally once a day (previously 1 tab TID; Patient stated that pcp recently cut down on lasix to 3x/week due to risk of dehydration)    -------------------------------------------------------------------------------------------      Time spent: 20 minutes  Medication reconciliation completed. Please refer to specifics in home medication list (outpatient medication review). Medications verified with patient.    -------------------------------------------------------------------------------------------  Home Medications:  Afrin 0.05% nasal spray: 2 spray(s) nasal 2 times a day, As Needed   atorvastatin 40 mg oral tablet: 1 tab(s) orally once a day (at bedtime)  Eliquis 5 mg oral tablet: 1 tab(s) orally 2 times a day   escitalopram 5 mg oral tablet: 1 tab(s) orally once a day   lidocaine-prilocaine 2.5%-2.5% topical cream: Apply topically to affected area once, As Needed prior to mediport access for chemo   Melatonin 5 mg oral tablet: 1 tab(s) orally once a day (at bedtime)   Metoprolol Tartrate 100 mg oral tablet: 1 tab(s) orally 2 times a day  ondansetron 8 mg oral tablet: 1 tab(s) orally every 8 hours, As Needed -for nausea  prochlorperazine 10 mg oral tablet: 1 tab(s) orally once a day, As Needed for nausea   spironolactone 25 mg oral tablet: 1 tab(s) orally once a day   Vitamin D3 25 mcg (1000 intl units) oral tablet: 1 tab(s) orally once a day   Xanax 0.25 mg oral tablet: 1 tab(s) orally once a day, As Needed     Changed:  Lasix 40 mg oral tablet: 1 tab(s) orally once a day (previously 1 tab TID; Patient stated that pcp recently cut down on lasix to 3x/week due to risk of dehydration)    -------------------------------------------------------------------------------------------      Time spent: 20 minutes     Mary Ann Barrera, PharmD Candidate  Leon Abdi PharmD, BCPS  [phone]  Available on [employer] Teams  Medication reconciliation completed. Please refer to specifics in home medication list (outpatient medication review). Medications verified with patient.    -------------------------------------------------------------------------------------------  Home Medications:  Afrin 0.05% nasal spray: 2 spray(s) nasal 2 times a day, As Needed   atorvastatin 40 mg oral tablet: 1 tab(s) orally once a day (at bedtime)  Eliquis 5 mg oral tablet: 1 tab(s) orally 2 times a day   escitalopram 5 mg oral tablet: 1 tab(s) orally once a day   lidocaine-prilocaine 2.5%-2.5% topical cream: Apply topically to affected area once, As Needed prior to mediport access for chemo   Melatonin 5 mg oral tablet: 1 tab(s) orally once a day (at bedtime)   Metoprolol Tartrate 100 mg oral tablet: 1 tab(s) orally 2 times a day  ondansetron 8 mg oral tablet: 1 tab(s) orally every 8 hours, As Needed -for nausea  prochlorperazine 10 mg oral tablet: 1 tab(s) orally once a day, As Needed for nausea   spironolactone 25 mg oral tablet: 1 tab(s) orally once a day   Vitamin D3 25 mcg (1000 intl units) oral tablet: 1 tab(s) orally once a day   Xanax 0.25 mg oral tablet: 1 tab(s) orally once a day, As Needed     Changed:  Lasix 40 mg oral tablet: 1 tab(s) orally once a day (previously 1 tab once a day; Patient stated that pcp recently cut down on lasix to 3x/week due to risk of dehydration)    -------------------------------------------------------------------------------------------      Time spent: 20 minutes     Mary Ann Barrera, PharmD Candidate  Leon Abdi, PharmD, BCPS  [phone]  Available on [employer] Teams

## 2022-10-24 NOTE — PROGRESS NOTE ADULT - PROBLEM SELECTOR PLAN 8
- DVT ppx: on Eliquis for Afib  - Diet: DASH

## 2022-10-24 NOTE — PHYSICAL THERAPY INITIAL EVALUATION ADULT - PRECAUTIONS/LIMITATIONS, REHAB EVAL
at home 2L N/C for ambulation, ADLs/oxygen therapy device and L/min/vision precautions at home 2L N/C for ambulation, ADLs, +glasses/oxygen therapy device and L/min/vision precautions

## 2022-10-24 NOTE — DISCHARGE NOTE NURSING/CASE MANAGEMENT/SOCIAL WORK - PATIENT PORTAL LINK FT
You can access the FollowMyHealth Patient Portal offered by [facility] by registering at the following website: [email or web address]. By joining [facility]’s FollowMyHealth portal, you will also be able to view your health information using other applications (apps) compatible with our system.

## 2022-10-24 NOTE — PROGRESS NOTE ADULT - ATTENDING COMMENTS
77F F, hx of stage 4 metastatic pancreatic CA on chemo (last treatment on 10/10), CVA, afib s/p ablation on eliquis, htn, and HLD presenting with 3-days onset of multiple episodes (7-8x/day) of nonbloody diarrhea and progressive weakness. Patient is on new chemotherapy (5 FU/ Onivyde) (developed AHRF during prior admission from gemcitabine) and also tx for PNA w amoxicillin about 2 weeks ago.   Patient admitted for w/u of diarrhea and weakness    #Diarrhea  R/o infectious etiology vs effects from chemotherapy   -Improving; no BM today   -F/u GI PCR, C diff testing   -Imodium once infectious causes are ruled out   -Encourage PO intake     #H/O Stage 4 metastatic pancreatic cancer  -Heme/onc f/u in AM.
77F F, hx of stage 4 metastatic pancreatic CA on chemo (last treatment on 10/10), CVA, afib s/p ablation on eliquis, htn, and HLD presenting with 3-days onset of multiple episodes (7-8x/day) of nonbloody diarrhea and progressive weakness. Patient is on new chemotherapy (5 FU/ Onivyde) (developed AHRF during prior admission from gemcitabine) and also tx for PNA w amoxicillin about 2 weeks ago.   Patient admitted for w/u of diarrhea and weakness    #Diarrhea  R/o infectious etiology vs effects from chemotherapy   -Improving; 1 BM today  -F/u GI PCR, C diff negative  -Will start Imodium prn   -Encourage PO intake     #H/O Stage 4 metastatic pancreatic cancer  -Heme/onc f/u    PT eval pending
77F F, hx of stage 4 metastatic pancreatic CA on chemo (last treatment on 10/10), CVA, afib s/p ablation on eliquis, htn, and HLD presenting with 3-days onset of multiple episodes (7-8x/day) of nonbloody diarrhea and progressive weakness. Patient is on new chemotherapy (5 FU/ Onivyde) (developed AHRF during prior admission from gemcitabine) and also tx for PNA w amoxicillin about 2 weeks ago.   Patient admitted for w/u of diarrhea and weakness    #Diarrhea  #H/O Stage 4 metastatic pancreatic cancer  R/o infectious etiology vs effects from chemotherapy  -GI PCR, C.diff negative. Likely 2/2 recent chemotherapy    -Diarrhea improving 3 episodes of semisolid BM yesterday; 1 BM day prior. Has been tolerating PO diet well; BP, HR stable. No abdominal pain. No n/v. Started on imodium prn. Patient to be discharged home today given clinical improvement. Encouraged PO intake, imodium prn.   -Has been on lasix three times a week outpatient. Will hold lasix on discharge for now. Discuss with patient to follow up with PCP within 1 week regarding resuming pending continued improvement in diarrhea.   -D/w heme/onc today; leukopenia expected given recent treatment. Has follow up scheduled 10/26/22 at 10am.   -PT eval: no PT service needs   -D/w daughter Norma on the phone. Patient and daughter is in agreement with this plan.    Time spent discharge plannin minutes

## 2022-10-24 NOTE — PROGRESS NOTE ADULT - SUBJECTIVE AND OBJECTIVE BOX
Lashonda Delgadillo, PGY1        Patient is a 77y old  Female who presents with a chief complaint of diarrhea (23 Oct 2022 08:29)      SUBJECTIVE/INTERVAL EVENTS: Patient seen and examined at bedside. Had 4 yellow loose BMs yesterday throughout the day. Otherwise no acute complaints: no fever, chills, N/V, lightheadedness, fainting, headache, chest pain. Endorses some continued chronic abdominal pain (epigastric region).     MEDICATIONS  (STANDING):  apixaban 5 milliGRAM(s) Oral every 12 hours  atorvastatin 40 milliGRAM(s) Oral at bedtime  chlorhexidine 2% Cloths 1 Application(s) Topical daily  escitalopram 5 milliGRAM(s) Oral daily  influenza  Vaccine (HIGH DOSE) 0.7 milliLiter(s) IntraMuscular once  lactobacillus acidophilus 1 Tablet(s) Oral daily  metoprolol tartrate 100 milliGRAM(s) Oral two times a day  spironolactone 25 milliGRAM(s) Oral daily    MEDICATIONS  (PRN):  ALPRAZolam 0.25 milliGRAM(s) Oral daily PRN anxiety  lidocaine/prilocaine Cream 1 Application(s) Topical once PRN prior to mediport access for chemo  loperamide 2 milliGRAM(s) Oral three times a day PRN Diarrhea  ondansetron    Tablet 8 milliGRAM(s) Oral every 8 hours PRN Nausea and/or Vomiting  prochlorperazine   Tablet 10 milliGRAM(s) Oral daily PRN nausea      VITAL SIGNS:  T(F): 97.7 (10-24-22 @ 05:15), Max: 98.6 (10-23-22 @ 18:23)  HR: 62 (10-24-22 @ 05:15) (56 - 73)  BP: 114/65 (10-24-22 @ 05:15) (108/67 - 121/74)  RR: 18 (10-24-22 @ 05:15) (18 - 18)  SpO2: 98% (10-24-22 @ 05:15) (92% - 98%)    I&O's Summary    23 Oct 2022 07:01  -  24 Oct 2022 07:00  --------------------------------------------------------  IN: 240 mL / OUT: 0 mL / NET: 240 mL      Daily     Daily     PHYSICAL EXAM:  Gen: Alert, NAD  HEENT: NCAT, conjunctiva clear, sclera anicteric, no erythema or exudates in the oropharynx, mmm  Neck: Supple, no JVD  CV: RRR, S1S2, no m/r/g  Resp: CTAB, normal respiratory effort  Abd: Soft, obese abdomen. Nondistended. Mildly tender to palpation. No rebound tenderness or guarding present.    Ext: no edema, no clubbing or cyanosis  Neuro: AOx3, CN2-12 grossly intact, PAGAN  SKIN: warm, perfused        LABS:                        9.7    2.68  )-----------( 285      ( 24 Oct 2022 07:27 )             30.6     Hgb Trend: 9.7<--, 10.1<--, 10.2<--  10-24    137  |  103  |  13  ----------------------------<  102<H>  3.8   |  23  |  0.86    Ca    8.7      24 Oct 2022 07:30  Phos  3.3     10-24  Mg     1.6     10-24    TPro  6.4  /  Alb  3.0<L>  /  TBili  0.5  /  DBili  x   /  AST  16  /  ALT  17  /  AlkPhos  78  10-24    Creatinine Trend: 0.86<--, 0.75<--, 0.85<--, 0.64<--  LIVER FUNCTIONS - ( 24 Oct 2022 07:30 )  Alb: 3.0 g/dL / Pro: 6.4 g/dL / ALK PHOS: 78 U/L / ALT: 17 U/L / AST: 16 U/L / GGT: x                     CAPILLARY BLOOD GLUCOSE          RADIOLOGY & ADDITIONAL TESTS: Reviewed    Imaging Personally Reviewed:    Consultant(s) Notes Reviewed:      Care Discussed with Consultants/Other Providers:   Lashonda Delgadillo, PGY1  Internal Medicine    Patient is a 77y old  Female who presents with a chief complaint of diarrhea (23 Oct 2022 08:29)      SUBJECTIVE/INTERVAL EVENTS: Patient seen and examined at bedside. Had 4 yellow loose BMs yesterday throughout the day. Otherwise no acute complaints: no fever, chills, N/V, lightheadedness, fainting, headache, chest pain. Endorses some continued chronic abdominal pain (epigastric region).     MEDICATIONS  (STANDING):  apixaban 5 milliGRAM(s) Oral every 12 hours  atorvastatin 40 milliGRAM(s) Oral at bedtime  chlorhexidine 2% Cloths 1 Application(s) Topical daily  escitalopram 5 milliGRAM(s) Oral daily  influenza  Vaccine (HIGH DOSE) 0.7 milliLiter(s) IntraMuscular once  lactobacillus acidophilus 1 Tablet(s) Oral daily  metoprolol tartrate 100 milliGRAM(s) Oral two times a day  spironolactone 25 milliGRAM(s) Oral daily    MEDICATIONS  (PRN):  ALPRAZolam 0.25 milliGRAM(s) Oral daily PRN anxiety  lidocaine/prilocaine Cream 1 Application(s) Topical once PRN prior to mediport access for chemo  loperamide 2 milliGRAM(s) Oral three times a day PRN Diarrhea  ondansetron    Tablet 8 milliGRAM(s) Oral every 8 hours PRN Nausea and/or Vomiting  prochlorperazine   Tablet 10 milliGRAM(s) Oral daily PRN nausea      VITAL SIGNS:  T(F): 97.7 (10-24-22 @ 05:15), Max: 98.6 (10-23-22 @ 18:23)  HR: 62 (10-24-22 @ 05:15) (56 - 73)  BP: 114/65 (10-24-22 @ 05:15) (108/67 - 121/74)  RR: 18 (10-24-22 @ 05:15) (18 - 18)  SpO2: 98% (10-24-22 @ 05:15) (92% - 98%)    I&O's Summary    23 Oct 2022 07:01  -  24 Oct 2022 07:00  --------------------------------------------------------  IN: 240 mL / OUT: 0 mL / NET: 240 mL      Daily     Daily     PHYSICAL EXAM:  Gen: Alert, NAD  HEENT: NCAT, conjunctiva clear, sclera anicteric, no erythema or exudates in the oropharynx, mmm  Neck: Supple, no JVD  CV: RRR, S1S2, no m/r/g  Resp: CTAB, normal respiratory effort  Abd: Soft, obese abdomen. Nondistended. Mildly tender to palpation. No rebound tenderness or guarding present.    Ext: no edema, no clubbing or cyanosis  Neuro: AOx3, CN2-12 grossly intact, PAGAN  SKIN: warm, perfused        LABS:                        9.7    2.68  )-----------( 285      ( 24 Oct 2022 07:27 )             30.6     Hgb Trend: 9.7<--, 10.1<--, 10.2<--  10-24    137  |  103  |  13  ----------------------------<  102<H>  3.8   |  23  |  0.86    Ca    8.7      24 Oct 2022 07:30  Phos  3.3     10-24  Mg     1.6     10-24    TPro  6.4  /  Alb  3.0<L>  /  TBili  0.5  /  DBili  x   /  AST  16  /  ALT  17  /  AlkPhos  78  10-24    Creatinine Trend: 0.86<--, 0.75<--, 0.85<--, 0.64<--  LIVER FUNCTIONS - ( 24 Oct 2022 07:30 )  Alb: 3.0 g/dL / Pro: 6.4 g/dL / ALK PHOS: 78 U/L / ALT: 17 U/L / AST: 16 U/L / GGT: x             CAPILLARY BLOOD GLUCOSE          RADIOLOGY & ADDITIONAL TESTS: Reviewed    Imaging Personally Reviewed:    Consultant(s) Notes Reviewed:      Care Discussed with Consultants/Other Providers:   Lashonda Delgadillo, PGY1  Internal Medicine    Patient is a 77y old  Female who presents with a chief complaint of diarrhea (23 Oct 2022 08:29)      SUBJECTIVE/INTERVAL EVENTS: Patient seen and examined at bedside. Had 4 semisolid BMs yesterday throughout the day. Otherwise no acute complaints: no fever, chills, N/V, lightheadedness, fainting, headache, chest pain.     MEDICATIONS  (STANDING):  apixaban 5 milliGRAM(s) Oral every 12 hours  atorvastatin 40 milliGRAM(s) Oral at bedtime  chlorhexidine 2% Cloths 1 Application(s) Topical daily  escitalopram 5 milliGRAM(s) Oral daily  influenza  Vaccine (HIGH DOSE) 0.7 milliLiter(s) IntraMuscular once  lactobacillus acidophilus 1 Tablet(s) Oral daily  metoprolol tartrate 100 milliGRAM(s) Oral two times a day  spironolactone 25 milliGRAM(s) Oral daily    MEDICATIONS  (PRN):  ALPRAZolam 0.25 milliGRAM(s) Oral daily PRN anxiety  lidocaine/prilocaine Cream 1 Application(s) Topical once PRN prior to mediport access for chemo  loperamide 2 milliGRAM(s) Oral three times a day PRN Diarrhea  ondansetron    Tablet 8 milliGRAM(s) Oral every 8 hours PRN Nausea and/or Vomiting  prochlorperazine   Tablet 10 milliGRAM(s) Oral daily PRN nausea      VITAL SIGNS:  T(F): 97.7 (10-24-22 @ 05:15), Max: 98.6 (10-23-22 @ 18:23)  HR: 62 (10-24-22 @ 05:15) (56 - 73)  BP: 114/65 (10-24-22 @ 05:15) (108/67 - 121/74)  RR: 18 (10-24-22 @ 05:15) (18 - 18)  SpO2: 98% (10-24-22 @ 05:15) (92% - 98%)    I&O's Summary    23 Oct 2022 07:01  -  24 Oct 2022 07:00  --------------------------------------------------------  IN: 240 mL / OUT: 0 mL / NET: 240 mL      Daily     Daily     PHYSICAL EXAM:  Gen: Alert, NAD  HEENT: NCAT, conjunctiva clear, sclera anicteric, no erythema or exudates in the oropharynx, mmm  Neck: Supple, no JVD  CV: RRR, S1S2, no m/r/g  Resp: CTAB, normal respiratory effort  Abd: Soft, obese abdomen. Nondistended. Mildly tender to palpation. No rebound tenderness or guarding present.    Ext: no edema, no clubbing or cyanosis  Neuro: AOx3, CN2-12 grossly intact, PAGAN  SKIN: warm, perfused        LABS:                        9.7    2.68  )-----------( 285      ( 24 Oct 2022 07:27 )             30.6     Hgb Trend: 9.7<--, 10.1<--, 10.2<--  10-24    137  |  103  |  13  ----------------------------<  102<H>  3.8   |  23  |  0.86    Ca    8.7      24 Oct 2022 07:30  Phos  3.3     10-24  Mg     1.6     10-24    TPro  6.4  /  Alb  3.0<L>  /  TBili  0.5  /  DBili  x   /  AST  16  /  ALT  17  /  AlkPhos  78  10-24    Creatinine Trend: 0.86<--, 0.75<--, 0.85<--, 0.64<--  LIVER FUNCTIONS - ( 24 Oct 2022 07:30 )  Alb: 3.0 g/dL / Pro: 6.4 g/dL / ALK PHOS: 78 U/L / ALT: 17 U/L / AST: 16 U/L / GGT: x             CAPILLARY BLOOD GLUCOSE          RADIOLOGY & ADDITIONAL TESTS: Reviewed    Imaging Personally Reviewed:    Consultant(s) Notes Reviewed:      Care Discussed with Consultants/Other Providers:   Lashonda Delgadillo, PGY1  Internal Medicine    Patient is a 77y old  Female who presents with a chief complaint of diarrhea (23 Oct 2022 08:29)    SUBJECTIVE/INTERVAL EVENTS: Patient seen and examined at bedside. Had 4 semisolid BMs yesterday throughout the day. Otherwise no acute complaints: no fever, chills, N/V, lightheadedness, fainting, headache, chest pain.     MEDICATIONS  (STANDING):  apixaban 5 milliGRAM(s) Oral every 12 hours  atorvastatin 40 milliGRAM(s) Oral at bedtime  chlorhexidine 2% Cloths 1 Application(s) Topical daily  escitalopram 5 milliGRAM(s) Oral daily  influenza  Vaccine (HIGH DOSE) 0.7 milliLiter(s) IntraMuscular once  lactobacillus acidophilus 1 Tablet(s) Oral daily  metoprolol tartrate 100 milliGRAM(s) Oral two times a day  spironolactone 25 milliGRAM(s) Oral daily    MEDICATIONS  (PRN):  ALPRAZolam 0.25 milliGRAM(s) Oral daily PRN anxiety  lidocaine/prilocaine Cream 1 Application(s) Topical once PRN prior to mediport access for chemo  loperamide 2 milliGRAM(s) Oral three times a day PRN Diarrhea  ondansetron    Tablet 8 milliGRAM(s) Oral every 8 hours PRN Nausea and/or Vomiting  prochlorperazine   Tablet 10 milliGRAM(s) Oral daily PRN nausea      VITAL SIGNS:  T(F): 97.7 (10-24-22 @ 05:15), Max: 98.6 (10-23-22 @ 18:23)  HR: 62 (10-24-22 @ 05:15) (56 - 73)  BP: 114/65 (10-24-22 @ 05:15) (108/67 - 121/74)  RR: 18 (10-24-22 @ 05:15) (18 - 18)  SpO2: 98% (10-24-22 @ 05:15) (92% - 98%)    I&O's Summary    23 Oct 2022 07:01  -  24 Oct 2022 07:00  --------------------------------------------------------  IN: 240 mL / OUT: 0 mL / NET: 240 mL      Daily     Daily     PHYSICAL EXAM:  Gen: Alert, NAD  HEENT: NCAT, conjunctiva clear, sclera anicteric, no erythema or exudates in the oropharynx, mmm  Neck: Supple, no JVD  CV: RRR, S1S2, no m/r/g  Resp: CTAB, normal respiratory effort  Abd: Soft, obese abdomen. Nondistended. Mildly tender to palpation. No rebound tenderness or guarding present.    Ext: no edema, no clubbing or cyanosis  Neuro: AOx3, CN2-12 grossly intact, PAGAN  SKIN: warm, perfused        LABS:                        9.7    2.68  )-----------( 285      ( 24 Oct 2022 07:27 )             30.6     Hgb Trend: 9.7<--, 10.1<--, 10.2<--  10-24    137  |  103  |  13  ----------------------------<  102<H>  3.8   |  23  |  0.86    Ca    8.7      24 Oct 2022 07:30  Phos  3.3     10-24  Mg     1.6     10-24    TPro  6.4  /  Alb  3.0<L>  /  TBili  0.5  /  DBili  x   /  AST  16  /  ALT  17  /  AlkPhos  78  10-24    Creatinine Trend: 0.86<--, 0.75<--, 0.85<--, 0.64<--  LIVER FUNCTIONS - ( 24 Oct 2022 07:30 )  Alb: 3.0 g/dL / Pro: 6.4 g/dL / ALK PHOS: 78 U/L / ALT: 17 U/L / AST: 16 U/L / GGT: x             CAPILLARY BLOOD GLUCOSE          RADIOLOGY & ADDITIONAL TESTS: Reviewed    Imaging Personally Reviewed:    Consultant(s) Notes Reviewed:      Care Discussed with Consultants/Other Providers:   Lashonda Delgadillo, PGY1  Internal Medicine    Patient is a 77y old  Female who presents with a chief complaint of diarrhea (23 Oct 2022 08:29)    SUBJECTIVE/INTERVAL EVENTS: Patient seen and examined at bedside. Had 4 semisolid BMs yesterday throughout the day. Otherwise no acute complaints: no fever, chills, N/V, lightheadedness, fainting, headache, chest pain.     MEDICATIONS  (STANDING):  apixaban 5 milliGRAM(s) Oral every 12 hours  atorvastatin 40 milliGRAM(s) Oral at bedtime  chlorhexidine 2% Cloths 1 Application(s) Topical daily  escitalopram 5 milliGRAM(s) Oral daily  influenza  Vaccine (HIGH DOSE) 0.7 milliLiter(s) IntraMuscular once  lactobacillus acidophilus 1 Tablet(s) Oral daily  metoprolol tartrate 100 milliGRAM(s) Oral two times a day  spironolactone 25 milliGRAM(s) Oral daily    MEDICATIONS  (PRN):  ALPRAZolam 0.25 milliGRAM(s) Oral daily PRN anxiety  lidocaine/prilocaine Cream 1 Application(s) Topical once PRN prior to mediport access for chemo  loperamide 2 milliGRAM(s) Oral three times a day PRN Diarrhea  ondansetron    Tablet 8 milliGRAM(s) Oral every 8 hours PRN Nausea and/or Vomiting  prochlorperazine   Tablet 10 milliGRAM(s) Oral daily PRN nausea      VITAL SIGNS:  T(F): 97.7 (10-24-22 @ 05:15), Max: 98.6 (10-23-22 @ 18:23)  HR: 62 (10-24-22 @ 05:15) (56 - 73)  BP: 114/65 (10-24-22 @ 05:15) (108/67 - 121/74)  RR: 18 (10-24-22 @ 05:15) (18 - 18)  SpO2: 98% (10-24-22 @ 05:15) (92% - 98%)    I&O's Summary    23 Oct 2022 07:01  -  24 Oct 2022 07:00  --------------------------------------------------------  IN: 240 mL / OUT: 0 mL / NET: 240 mL      Daily     Daily     PHYSICAL EXAM:  Gen: Alert, NAD  HEENT: NCAT, conjunctiva clear, sclera anicteric, no erythema or exudates in the oropharynx, mmm  Neck: Supple, no JVD  CV: RRR, S1S2, no m/r/g  Resp: CTAB, normal respiratory effort  Abd: Soft, obese abdomen. Nondistended. Nontender to palpation. No rebound tenderness or guarding present.    Ext: no edema, no clubbing or cyanosis  Neuro: AOx3, CN2-12 grossly intact, PAGAN  SKIN: warm, perfused        LABS:                        9.7    2.68  )-----------( 285      ( 24 Oct 2022 07:27 )             30.6     Hgb Trend: 9.7<--, 10.1<--, 10.2<--  10-24    137  |  103  |  13  ----------------------------<  102<H>  3.8   |  23  |  0.86    Ca    8.7      24 Oct 2022 07:30  Phos  3.3     10-24  Mg     1.6     10-24    TPro  6.4  /  Alb  3.0<L>  /  TBili  0.5  /  DBili  x   /  AST  16  /  ALT  17  /  AlkPhos  78  10-24    Creatinine Trend: 0.86<--, 0.75<--, 0.85<--, 0.64<--  LIVER FUNCTIONS - ( 24 Oct 2022 07:30 )  Alb: 3.0 g/dL / Pro: 6.4 g/dL / ALK PHOS: 78 U/L / ALT: 17 U/L / AST: 16 U/L / GGT: x             CAPILLARY BLOOD GLUCOSE          RADIOLOGY & ADDITIONAL TESTS: Reviewed    Imaging Personally Reviewed:    Consultant(s) Notes Reviewed:      Care Discussed with Consultants/Other Providers:

## 2022-10-24 NOTE — PROGRESS NOTE ADULT - PROBLEM SELECTOR PLAN 4
Hx of Afib s/p ablation x3, on Eliquis at home  hx of CVA in the past  - c/w Eliquis 5mg BID

## 2022-10-24 NOTE — PROGRESS NOTE ADULT - PROBLEM SELECTOR PLAN 2
Hx of Stage IV pancreatic cancer (dx'd 2021) c/b spread to lung w/ lymphangitic spread  -on chemo, last treatment 10/10  -pt was on [place]/ abraxane and developed Pulm edema and Hypoxia, seen by Pulm/cardiology and had PFTS. -Started on ABx for possible acute bronchitis. Stress echo with normal findings. Pt then switched chemo to 5 FU/ Onivyde   CTA chest (8/1): Stable mild interlobular septal thickening in the setting of known metastases/lymphangitic spread of tumor  cxr: Diffuse reticulonodular opacities, unchanged from prior x-ray on 9/29/2022.  Follows with Oncologist Dr. Dos Santos at [facility]
Hx of Stage IV pancreatic cancer (dx'd 2021) c/b spread to lung w/ lymphangitic spread  -on chemo, last treatment 10/10  -pt was on [place]/ abraxane and developed Pulm edema and Hypoxia, seen by Pulm/cardiology and had PFTS. -Started on ABx for possible acute bronchitis. Stress echo with normal findings. Pt then switched chemo to 5 FU/ Onivyde   CTA chest (8/1): Stable mild interlobular septal thickening in the setting of known metastases/lymphangitic spread of tumor  cxr: Diffuse reticulonodular opacities, unchanged from prior x-ray on 9/29/2022.  Follows with Oncologist Dr. Dos Santos at [facility]
Hx of Stage IV pancreatic cancer (dx'd 2021) c/b spread to lung w/ lymphangitic spread  -on chemo, last treatment 10/10  -pt was on [place]/ abraxane and developed Pulm edema and Hypoxia, seen by Pulm/cardiology and had PFTS. -Started on ABx for possible acute bronchitis. Stress echo with normal findings. Pt then switched chemo to 5 FU/ Onivyde   CTA chest (8/1): Stable mild interlobular septal thickening in the setting of known metastases/lymphangitic spread of tumor  cxr: Diffuse reticulonodular opacities, unchanged from prior x-ray on 9/29/2022.  Follows with Oncologist Dr. Dos Santos at [facility]

## 2022-10-24 NOTE — PHYSICAL THERAPY INITIAL EVALUATION ADULT - PERTINENT HX OF CURRENT PROBLEM, REHAB EVAL
Pt is a 78 yo female with PMHx of stage 4 metastatic pancreatic CA on chemo (last treatment on 10/10), CVA, afib s/p ablation x 3, HTN, anxiety, depression and HLD presenting with 3-days onset of multiple episodes (7-8x/day) of nonbloody diarrhea in addition to weakness and 1 episode of vomiting. Pt reports history of fainting and falls. Past surgical hx includes + ORIF R Tib/fib 01/15, popliteal-tibial bypass 01/15 and cardiac ablation x 3 01/15.

## 2022-10-24 NOTE — PROGRESS NOTE ADULT - PROBLEM SELECTOR PLAN 1
3-days onset of multiple episodes (7-8x/day) of nonbloody diarrhea  /58, VS otherwise stable  Labs remarkable for WBC 2.93  s/p 1l LR and 500cc NS  - restarted imodium  - 4 episodes of loose BMs on 10/23  - GI PCR negative, C diff negative  - likely 2/2 to chemo therapy (5FU/Onivyde) side effect and/or Viral gastroenteritis 3-days onset of multiple episodes (7-8x/day) of nonbloody diarrhea  /58, VS otherwise stable  s/p 1 LR and 500cc NS  - restarted imodium  - GI PCR negative, C diff negative, started on imodium. Diarrhea improving. Semisolid BM. 3 semisolid stools yesterday and 1 BM day prior.   - likely 2/2 to chemo therapy (5FU/Onivyde) side effect and/or Viral gastroenteritis

## 2022-10-24 NOTE — DISCHARGE NOTE NURSING/CASE MANAGEMENT/SOCIAL WORK - NSDCPEFALRISK_GEN_ALL_CORE
For information on Fall & Injury Prevention, visit: [email or web address] OR  [email or web address] OR  https://www.cdc.gov/steadi/patient.html

## 2022-10-24 NOTE — PHYSICAL THERAPY INITIAL EVALUATION ADULT - ADDITIONAL COMMENTS
Prior to admission pt reports being independent of all ADL's & functional mobility without AD. Pt resides in studio apt, ground level, ramp entrance. (+) driving. (+) tub with grab bars. Pt reports utilizing O2 2L N/C for ambulation and ADLs as she tends to desat with activity.

## 2022-10-26 PROBLEM — I48.92 UNSPECIFIED ATRIAL FLUTTER: Chronic | Status: ACTIVE | Noted: 2022-01-01

## 2022-10-26 NOTE — HISTORY OF PRESENT ILLNESS
[Disease: _____________________] : Disease: [unfilled] [AJCC Stage: ____] : AJCC Stage: [unfilled] [de-identified] : 77 F w/ h/o CVA, CAD, a fib, presents for further management of Stage IV pancreatic cancer. \par \giulia Peralta was admitted to [facility] for worsening cough and acute SOB in September 2021 and was found to have extensive ill defined pulmonary nodules on CT Chest suggestive of either a metastatic disease or infectious process. She was started on IV antibiotics. CT A/P on 9/4/21 showed mixed solid and cystic left ovarian mass measuring up to 4.6 cm suspicious for an ovarian cystic neoplasm, no pelvic or RP adenopathy, ill defined cystic changes within the pancreatic body and tail, possibly representing a pancreatic cystic neoplasm such as an IPMN. CTA on 9/4/21 showed extensive scattered ill defined pulmonary nodule/nodular infiltrates throughout the lungs, concerning for atypical infection/pneumonia. A bronchoscopy was done on 9/7/21 with BAL and RML x 3 of RLL posterior segment. Negative for malignancy. Pt was d/c from [facility] after improvement in symptoms with Ab. \par \giulia Peralta followed up with GI and underwent an MR Abd on 9/29/21 which showed segmental dilatation of the pancreatic duct within the distal body and tail, measuring up to 5 mm associated pancreatic parenchymal atrophy. Mass suspected in the mid body. No liver mass noted. 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 EUS at [place] on 10/12/21 showed an ill defined mass in the body involving the splenic vein. FNA c/w adenocarcinoma. CA 19-9 3164,  35. \par \par Referred to Dr. Barrow at [facility]. 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 Underwent a lung biopsy on 11/11/21 which confirmed metastatic pancreatic adenocarcinoma, MMR intact, NTRK negative. \par \par 110/30/21 CT CAP with progression of lymphangitic spread of cancer, pulmonary nodes, 2.6 cm pancreatic body mass, small ascites\par 12/06/21: C1D1, 8, 15 Q 28 cycle Gemzar 800 mg/m2 + Abraxane 100 mg/m2 \par 12/24/21: admitted to [facility] for neutropenic fever after syncope and diarrhea at home. \par 01/10/22: C2D1 and D15 (alternating weeks) \par 02/01/22: CT CAP: decr in lymphangitis carcinomatosis and pulm nodules, 1.7 x 1.1 pancreatic body mass decr in size\par 02/07/22: C3D1 -> Decided to transfer care to [facility] due to proximity to home. \par 02/22-4/18/22: [id number]\par 04/25/22: CT CAP: interval improvement in diffuse interlobular septal thickening and multiple bilateral pulmonary nodules, stable pancreatic mass\par 05/2-7/18/22: [id number]\par 7/25/22: CT CAP: irregular thickening along left bladder wall; otherwise stable disease\par 08/2-8/9/22: [facility] admission due to hypoxia/worsening GALLARDO\par 09/22/22- Was seen By Pulm/ cardiology -- Pt was on [place]/ abraxane and developed Pulm edema and Hypoxia. Unclear if this is Gemcitabine related (concern for capillary leak syndrome likely from gem ) VS. POD\par 10/03/22 saw Pulmonology for acute bronchitis, recommended that given her response to Amoxicillin will continue with same antibiotic and plan for additional 7 days of 500mg BID (until 10/10/22) \par 10/04/22- CT A/P-  progression, with interval increase in size of the known pancreatic body mass. Progressed pulmonary nodular opacities and interlobular septal thickening, concerning for worsening pulmonary metastases and lymphangitic spread of tumor. New mild peritoneal carcinomatosis. 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

## 2022-10-26 NOTE — ASSESSMENT
[Palliative] : Goals of care discussed with patient: Palliative [Palliative Care Plan] : not applicable at this time [FreeTextEntry1] : 77 F diagnosed with stage IV pancreas cancer, currently on Gemzar 800 mg/m2/Abraxane 100 mg/m2 Q 15 days \par \par She was recently hospitalized from 8/1-8/9 for SOB. CXR on admission showed pulmonary edema and bilateral pleural effusions. CT chest s/p diuresis showed improved pulmonary edema and pleural effusions . Her SOB could be from HFpEF or Gemzar related non-cardiogenic pulmonary edema.Patient has portable oxygen arranged at home. She is now being followed by pulmonology as an out-patient for management of hypoxia and pulmonary edema of uncertain cause.\par \par 09/01/22:  she is deconditioned, had to catch her breath even walking into the examination room. Given that she is still recovering from hospitalization and the nature of the insult may be related to gemcitabine, we will hold off on therapy for now, allowing further time of recovery. \par \par 10/04/22 repeat CT A/P: Findings concerning for progression of disease. Interval increase in size of the known pancreatic body mass. Progressed pulmonary nodular opacities and interlobular septal thickening, concerning for worsening pulmonary metastases and lymphangitic spread of tumor. New mild peritoneal carcinomatosis. VERTEBRAL BODY ANALYSIS: Vertebral compression fractures as described, consider further workup for osteoporosis. \par \par Hospitalized with diarrhea/syncope\par \par Plan :\par Metastatic Pancreatic cancer : \par - stop FOLFIRII due to intolerance and patient preference\par - start FOLFOX, gently at first as patient is very concerned about s/e\par \par FOLFOX regimen\par - 5FU 1800 mg/m2\par - oxaliplatin 50 mg/m2\par - leucovorin 400 mg/m2\par \par - daughter says she can help mom sign chemo consent by email\par \par \par Anxiety :\par On Lexapro and Xanax prn - Refilled Rx this time but will need to follow up with PCP \par Advised pt to follow up with her PCP \par Pt may benefit with the psychooncology support referral \par \par I personally have spent a total of 30 minutes of time on the date of this encounter reviewing test results, documenting findings, coordinating care, and directly consulting with the patient and/or designated family member.\par Kuldip Dos Santos MD PhD\par Medical Oncology Attending\par

## 2022-10-26 NOTE — PHYSICAL EXAM
[Ambulatory and capable of all self care but unable to carry out any work activities] : Status 2- Ambulatory and capable of all self care but unable to carry out any work activities. Up and about more than 50% of waking hours [Normal] : affect appropriate [de-identified] : Mild bibasilar crackles [de-identified] : erythematous skin rash noted underneath bilateral breasts [de-identified] : b/l feet tingling and numbness noted

## 2022-11-18 NOTE — PHYSICAL EXAM
[de-identified] : Mild bibasilar crackles [de-identified] : erythematous skin rash noted underneath bilateral breasts [de-identified] : b/l feet tingling and numbness noted

## 2022-11-18 NOTE — ASSESSMENT
[FreeTextEntry1] : 77 F diagnosed with stage IV pancreas cancer, currently on Gemzar 800 mg/m2/Abraxane 100 mg/m2 Q 15 days \par \par She was recently hospitalized from 8/1-8/9 for SOB. CXR on admission showed pulmonary edema and bilateral pleural effusions. CT chest s/p diuresis showed improved pulmonary edema and pleural effusions . Her SOB could be from HFpEF or Gemzar related non-cardiogenic pulmonary edema.Patient has portable oxygen arranged at home. She is now being followed by pulmonology as an out-patient for management of hypoxia and pulmonary edema of uncertain cause.\par \par 09/01/22:  she is deconditioned, had to catch her breath even walking into the examination room. Given that she is still recovering from hospitalization and the nature of the insult may be related to gemcitabine, we will hold off on therapy for now, allowing further time of recovery. \par \par 10/04/22 repeat CT A/P: Findings concerning for progression of disease. Interval increase in size of the known pancreatic body mass. Progressed pulmonary nodular opacities and interlobular septal thickening, concerning for worsening pulmonary metastases and lymphangitic spread of tumor. New mild peritoneal carcinomatosis. VERTEBRAL BODY ANALYSIS: Vertebral compression fractures as described, consider further workup for osteoporosis. \par \par Plan :\par Metastatic Pancreatic cancer : \par Pt was on [place]/ abraxane and developed Pulm edema and Hypoxia. Unclear if this is Gemcitabine related (concern for capillary leak syndrome likely from gem ) VS. POD\par Pt was seen by Pulm/ cardiology and had PFTS. Started on ABx for possible acute bronchitis. Stress echo with normal findings.  \par Discussed switching chemo to 5 FU/ Onivyde \par Will resume chemo with 5 FU/ Onivyde today- 10/10/22) \par Made more chemo appts \par \par Anxiety :\par On Lexapro and Xanax prn - Refilled Rx this time but will need to follow up with PCP \par Advised pt to follow up with her PCP \par Pt may benefit with the psychooncology support referral \par \par \par RTC in 2 weeks \par Pt was seen with Dr Dos Santos \par

## 2022-11-18 NOTE — HISTORY OF PRESENT ILLNESS
[Disease: _____________________] : Disease: [unfilled] [AJCC Stage: ____] : AJCC Stage: [unfilled] [de-identified] : 77 F w/ h/o CVA, CAD, a fib, presents for further management of Stage IV pancreatic cancer. \par \giulia Peralta was admitted to [facility] for worsening cough and acute SOB in September 2021 and was found to have extensive ill defined pulmonary nodules on CT Chest suggestive of either a metastatic disease or infectious process. She was started on IV antibiotics. CT A/P on 9/4/21 showed mixed solid and cystic left ovarian mass measuring up to 4.6 cm suspicious for an ovarian cystic neoplasm, no pelvic or RP adenopathy, ill defined cystic changes within the pancreatic body and tail, possibly representing a pancreatic cystic neoplasm such as an IPMN. CTA on 9/4/21 showed extensive scattered ill defined pulmonary nodule/nodular infiltrates throughout the lungs, concerning for atypical infection/pneumonia. A bronchoscopy was done on 9/7/21 with BAL and RML x 3 of RLL posterior segment. Negative for malignancy. Pt was [facility]c from [facility] after improvement in symptoms with Ab. \par \giulia Peralta followed up with GI and underwent an MR Abd on 9/29/21 which showed segmental dilatation of the pancreatic duct within the distal body and tail, measuring up to 5 mm associated pancreatic parenchymal atrophy. Mass suspected in the mid body. No liver mass noted. 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 EUS at [place] on 10/12/21 showed an ill defined mass in the body involving the splenic vein. FNA c/w adenocarcinoma. CA 19-9 3164,  35. \par \par Referred to Dr. Barrow at [facility]. 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 Underwent a lung biopsy on 11/11/21 which confirmed metastatic pancreatic adenocarcinoma, MMR intact, NTRK negative. \par \par 110/30/21 CT CAP with progression of lymphangitic spread of cancer, pulmonary nodes, 2.6 cm pancreatic body mass, small ascites\par 12/06/21: C1D1, 8, 15 Q 28 cycle Gemzar 800 mg/m2 + Abraxane 100 mg/m2 \par 12/24/21: admitted to [facility] for neutropenic fever after syncope and diarrhea at home. \par 01/10/22: C2D1 and D15 (alternating weeks) \par 02/01/22: CT CAP: decr in lymphangitis carcinomatosis and pulm nodules, 1.7 x 1.1 pancreatic body mass decr in size\par 02/07/22: C3D1 -> Decided to transfer care to [facility] due to proximity to home. \par 02/22-4/18/22: [id number]\par 04/25/22: CT CAP: interval improvement in diffuse interlobular septal thickening and multiple bilateral pulmonary nodules, stable pancreatic mass\par 05/2-7/18/22: [id number]\par 7/25/22: CT CAP: irregular thickening along left bladder wall; otherwise stable disease\par 08/2-8/9/22: [facility] admission due to hypoxia/worsening GALLARDO\par 09/22/22- Was seen By Pulm/ cardiology -- Pt was on [place]/ abraxane and developed Pulm edema and Hypoxia. Unclear if this is Gemcitabine related (concern for capillary leak syndrome likely from gem ) VS. POD\par 10/03/22 saw Pulmonology for acute bronchitis, recommended that given her response to Amoxicillin will continue with same antibiotic and plan for additional 7 days of 500mg BID (until 10/10/22) \par 10/04/22- CT A/P-  progression, with interval increase in size of the known pancreatic body mass. Progressed pulmonary nodular opacities and interlobular septal thickening, concerning for worsening pulmonary metastases and lymphangitic spread of tumor. New mild peritoneal carcinomatosis. \par 10/10/22: C # 1 5 FU 2400 mg/m2/  mg/m2/ Onivyde 70 mg/m2 \par 10/20/22 hospitalized for fainting/diarrhea\par \par 10/31/22: C # 1 FOLFOX- 5 FU 1800 mg/m2/  mg/m2/ Oxaliplatin 50 mg/m2.\par \par Germline testing: Ambry testing: MSH3 VUS denoted [id number] \par Tumor testing: [facility] on pancreas 10/12/21, showed KRAS G12D, CDKN2A loss, SMAD4 suclonal mutation, FBXW7 subclonal, MTAP loss, TMB 4, LENKA [de-identified] : adenocarcinoma  [de-identified] : 10/31/22:\par Pt is seen in the treatment room accompanied by her son . \par She has significant Neuropathy in left leg- Not new -\par She has been terrified with the adverse effects of NALIRI regimen - \par Dr Dos Santos had discussed with her on switching therapy to FOLFOX- modified dose.\par Drug sheet was provided Risks/ benefits and alternatives of chemotherapy were discussed and included but not limited to fatigue,low blood counts, nausea/vomiting, skin reactions, hair thinning, possible blood transfusion requirement,increased risk of infection,neuropathy. Patient agreed to above and informed consent was signed.\par She has been feeling fatigued and is concerned if chemo will make her fatigue more worse. \par She has been eating okay / keeping hydrated as well. \par Advised to use a Safety/ Emergency alert necklace since she lives home alone and had an episode of fainting last treatment .\par Still needs to use Oxygen with activity and exertion \par Advised to follow up with Dr Moreno on resuming Lasix- Her lasix was stopped while she was hospitalized. \par Also advised she will need to contact her PCP for further medical management of Lexapro/ BP meds. \par \par

## 2022-11-18 NOTE — HISTORY OF PRESENT ILLNESS
[Disease: _____________________] : Disease: [unfilled] [AJCC Stage: ____] : AJCC Stage: [unfilled] [de-identified] : 77 F w/ h/o CVA, CAD, a fib, presents for further management of Stage IV pancreatic cancer. \par \giulia Pearlta was admitted to [facility] for worsening cough and acute SOB in September 2021 and was found to have extensive ill defined pulmonary nodules on CT Chest suggestive of either a metastatic disease or infectious process. She was started on IV antibiotics. CT A/P on 9/4/21 showed mixed solid and cystic left ovarian mass measuring up to 4.6 cm suspicious for an ovarian cystic neoplasm, no pelvic or RP adenopathy, ill defined cystic changes within the pancreatic body and tail, possibly representing a pancreatic cystic neoplasm such as an IPMN. CTA on 9/4/21 showed extensive scattered ill defined pulmonary nodule/nodular infiltrates throughout the lungs, concerning for atypical infection/pneumonia. A bronchoscopy was done on 9/7/21 with BAL and RML x 3 of RLL posterior segment. Negative for malignancy. Pt was [facility]c from [facility] after improvement in symptoms with Ab. \par \giulia Peralta followed up with GI and underwent an MR Abd on 9/29/21 which showed segmental dilatation of the pancreatic duct within the distal body and tail, measuring up to 5 mm associated pancreatic parenchymal atrophy. Mass suspected in the mid body. No liver mass noted. 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 EUS at [place] on 10/12/21 showed an ill defined mass in the body involving the splenic vein. FNA c/w adenocarcinoma. CA 19-9 3164,  35. \par \par Referred to Dr. Barrow at [facility]. 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 Underwent a lung biopsy on 11/11/21 which confirmed metastatic pancreatic adenocarcinoma, MMR intact, NTRK negative. \par \par 110/30/21 CT CAP with progression of lymphangitic spread of cancer, pulmonary nodes, 2.6 cm pancreatic body mass, small ascites\par 12/06/21: C1D1, 8, 15 Q 28 cycle Gemzar 800 mg/m2 + Abraxane 100 mg/m2 \par 12/24/21: admitted to [facility] for [facility]ropenic fever after syncope and diarrhea at home. \par 01/10/22: C2D1 and D15 (alternating weeks) \par 02/01/22: CT CAP: decr in lymphangitis carcinomatosis and pulm nodules, 1.7 x 1.1 pancreatic body mass decr in size\par 02/07/22: C3D1 -> Decided to transfer care to [facility] due to proximity to home. \par 02/22-4/18/22: [id number]\par 04/25/22: CT CAP: interval improvement in diffuse interlobular septal thickening and multiple bilateral pulmonary nodules, stable pancreatic mass\par 05/2-7/18/22: [id number]\par 7/25/22: CT CAP: irregular thickening along left bladder wall; otherwise stable disease\par 08/2-8/9/22: [facility] admission due to hypoxia/worsening GALLARDO\par 09/22/22- Was seen By Pulm/ cardiology -- Pt was on [place]/ abraxane and developed Pulm edema and Hypoxia. Unclear if this is Gemcitabine related (concern for capillary leak syndrome likely from gem ) VS. POD\par 10/03/22 saw Pulmonology for acute bronchitis, recommended that given her response to Amoxicillin will continue with same antibiotic and plan for additional 7 days of 500mg BID (until 10/10/22) \par 10/04/22- CT A/P-  progression, with interval increase in size of the known pancreatic body mass. Progressed pulmonary nodular opacities and interlobular septal thickening, concerning for worsening pulmonary metastases and lymphangitic spread of tumor. New mild peritoneal carcinomatosis. \par 10/10/22: C # 1 5 FU 2400 mg/m2/  mg/m2/ Onivyde 70 mg/m2 \par 10/20/22 hospitalized for fainting/diarrhea\par \par 10/31/22: C # 1 FOLFOX- 5 FU 1800 mg/m2/  mg/m2/ Oxaliplatin 50 mg/m2.\par 11/14/22- C # 2 FOLFOX- 5 FU 1800 mg/m2/  mg/m2/ Oxaliplatin 50 mg/m2.\par \par \par Germline testing: Ambry testing: MSH3 VUS denoted [id number] \par Tumor testing: [facility] on pancreas 10/12/21, showed KRAS G12D, CDKN2A loss, SMAD4 suclonal mutation, FBXW7 subclonal, MTAP loss, TMB 4, LENKA [de-identified] : adenocarcinoma  [de-identified] : 11/14/22:\par Pt is seen in the treatment room accompanied by her daughter. \par She had felt very fatigued and tired on day 4 of last chemo- We had arranged for IVF ,but she did well and did not need it .\par No cold sensitivity noted \par has occasional nausea uses anti nausea meds prn \par She has been eating okay / keeping hydrated as well. \par Advised to use a Safety/ Emergency alert necklace since she lives home alone and had an episode of fainting last treatment .\par Still needs to use Oxygen with activity and exertion \par Resumed lasix by cardiology Dr Moreno- to take 3 times a week. \par \par

## 2022-11-18 NOTE — ASSESSMENT
[Palliative] : Goals of care discussed with patient: Palliative [Palliative Care Plan] : not applicable at this time [FreeTextEntry1] : 77 F diagnosed with stage IV pancreas cancer, currently on Gemzar 800 mg/m2/Abraxane 100 mg/m2 Q 15 days \par \par She was recently hospitalized from 8/1-8/9 for SOB. CXR on admission showed pulmonary edema and bilateral pleural effusions. CT chest s/p diuresis showed improved pulmonary edema and pleural effusions . Her SOB could be from HFpEF or Gemzar related non-cardiogenic pulmonary edema.Patient has portable oxygen arranged at home. She is now being followed by pulmonology as an out-patient for management of hypoxia and pulmonary edema of uncertain cause.\par \par 09/01/22:  she is deconditioned, had to catch her breath even walking into the examination room. Given that she is still recovering from hospitalization and the nature of the insult may be related to gemcitabine, we will hold off on therapy for now, allowing further time of recovery. \par \par 10/04/22 repeat CT A/P: Findings concerning for progression of disease. Interval increase in size of the known pancreatic body mass. Progressed pulmonary nodular opacities and interlobular septal thickening, concerning for worsening pulmonary metastases and lymphangitic spread of tumor. New mild peritoneal carcinomatosis. VERTEBRAL BODY ANALYSIS: Vertebral compression fractures as described, consider further workup for osteoporosis. \par \par \par Plan :\par Metastatic Pancreatic cancer : \par Pt was on [place]/ Abraxane and had POD \par Recieved 1 cycle of NALIRI but was hospitalized with diarrhea/syncope- \par Stopped  FOLFIRII due to intolerance and patient preference\par Start ed pt on  FOLFOX modified dose - \par \par FOLFOX regimen\par - 5FU 1800 mg/m2\par - oxaliplatin 50 mg/m2\par - leucovorin 400 mg/m2\par \par \par Anxiety :\par On Lexapro and Xanax prn - Refilled Rx one time  but will need to follow up with PCP \par Advised pt to follow up with her PCP \par Pt may benefit with the psychooncology support referral - But pt refused at the time \par \par Swelling of feet : \par Pt was on Furosemide and was taken off while she was hospitalized \par She was evaluated by  Dr Moreno and resumed Lasix 3 days a week. \par \par RTC in 2 weeks \par Pt would like to discuss a clinical trial with Dr Dos Santos \par Will schedule a TEB with DK in 2 weeks \par Case D/w Dr Dos Santos

## 2022-11-18 NOTE — REVIEW OF SYSTEMS
[Fatigue] : fatigue [SOB on Exertion] : shortness of breath during exertion [Negative] : Allergic/Immunologic [FreeTextEntry6] : On nasal cannula

## 2022-11-18 NOTE — PHYSICAL EXAM
[Ambulatory and capable of all self care but unable to carry out any work activities] : Status 2- Ambulatory and capable of all self care but unable to carry out any work activities. Up and about more than 50% of waking hours [Normal] : affect appropriate [de-identified] : Mild bibasilar crackles [de-identified] : erythematous skin rash noted underneath bilateral breasts [de-identified] : b/l feet tingling and numbness noted

## 2022-11-18 NOTE — ASSESSMENT
[Palliative] : Goals of care discussed with patient: Palliative [Palliative Care Plan] : not applicable at this time [FreeTextEntry1] : 77 F diagnosed with stage IV pancreas cancer, currently on Gemzar 800 mg/m2/Abraxane 100 mg/m2 Q 15 days \par \par She was recently hospitalized from 8/1-8/9 for SOB. CXR on admission showed pulmonary edema and bilateral pleural effusions. CT chest s/p diuresis showed improved pulmonary edema and pleural effusions . Her SOB could be from HFpEF or Gemzar related non-cardiogenic pulmonary edema.Patient has portable oxygen arranged at home. She is now being followed by pulmonology as an out-patient for management of hypoxia and pulmonary edema of uncertain cause.\par \par 09/01/22:  she is deconditioned, had to catch her breath even walking into the examination room. Given that she is still recovering from hospitalization and the nature of the insult may be related to gemcitabine, we will hold off on therapy for now, allowing further time of recovery. \par \par 10/04/22 repeat CT A/P: Findings concerning for progression of disease. Interval increase in size of the known pancreatic body mass. Progressed pulmonary nodular opacities and interlobular septal thickening, concerning for worsening pulmonary metastases and lymphangitic spread of tumor. New mild peritoneal carcinomatosis. VERTEBRAL BODY ANALYSIS: Vertebral compression fractures as described, consider further workup for osteoporosis. \par \par \par Plan :\par Metastatic Pancreatic cancer : \par Pt was on [place]/ Abraxane and had POD \par Recieved 1 cycle of NALIRI but was hospitalized with diarrhea/syncope- \par Stopped  FOLFIRII due to intolerance and patient preference\par Will Start  FOLFOX modified dose - \par \par FOLFOX regimen\par - 5FU 1800 mg/m2\par - oxaliplatin 50 mg/m2\par - leucovorin 400 mg/m2\par \par \par Anxiety :\par On Lexapro and Xanax prn - Refilled Rx this time but will need to follow up with PCP \par Advised pt to follow up with her PCP \par Pt may benefit with the psychooncology support referral - But pt refused at the time \par \par Swelling of feet : \par Pt was on Furosemide and was taken off while she was hospitalized \par Advised she follow up with Dr Moreno on resuming the meds \par \par RTC in 2 weeks \par Case D/w Dr Dos Santos

## 2022-11-18 NOTE — PHYSICAL EXAM
[Ambulatory and capable of all self care but unable to carry out any work activities] : Status 2- Ambulatory and capable of all self care but unable to carry out any work activities. Up and about more than 50% of waking hours [Normal] : affect appropriate [de-identified] : Mild bibasilar crackles, on nasal cannula oxygen  [de-identified] : swelling of b/l feet  [de-identified] : erythematous skin rash noted underneath bilateral breasts [de-identified] : b/l feet tingling and numbness noted

## 2022-11-18 NOTE — HISTORY OF PRESENT ILLNESS
[de-identified] : 77 F w/ h/o CVA, CAD, a fib, presents for further management of Stage IV pancreatic cancer. \par \giulia Peralta was admitted to [facility] for worsening cough and acute SOB in September 2021 and was found to have extensive ill defined pulmonary nodules on CT Chest suggestive of either a metastatic disease or infectious process. She was started on IV antibiotics. CT A/P on 9/4/21 showed mixed solid and cystic left ovarian mass measuring up to 4.6 cm suspicious for an ovarian cystic neoplasm, no pelvic or RP adenopathy, ill defined cystic changes within the pancreatic body and tail, possibly representing a pancreatic cystic neoplasm such as an IPMN. CTA on 9/4/21 showed extensive scattered ill defined pulmonary nodule/nodular infiltrates throughout the lungs, concerning for atypical infection/pneumonia. A bronchoscopy was done on 9/7/21 with BAL and RML x 3 of RLL posterior segment. Negative for malignancy. Pt was d[facility]c from [facility] after improvement in symptoms with Ab. \par \giulia Peralta followed up with GI and underwent an MR Abd on 9/29/21 which showed segmental dilatation of the pancreatic duct within the distal body and tail, measuring up to 5 mm associated pancreatic parenchymal atrophy. Mass suspected in the mid body. No liver mass noted. 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 EUS at [place] on 10/12/21 showed an ill defined mass in the body involving the splenic vein. FNA c/w adenocarcinoma. CA 19-9 3164,  35. \par \par Referred to Dr. Barrow at [facility]. 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 Underwent a lung biopsy on 11/11/21 which confirmed metastatic pancreatic adenocarcinoma, MMR intact, NTRK negative. \par \par Restaging CT CAP oon 11/30/21 showed slight interval progression of lymphangitic spread of cancer, numerous b/l pulm nodules c/w known metastatic disease, 2.6 x 1.9 x 1.9 cm pancreatic body mass, diffuse mesenteric haziness, intra-abdominal LN, small pelvic ascites. \par \par 12/6/21: C1D1, 8, 15 Q 28 cycle Gemzar 800 mg/m2 + Abraxane 100 mg/m2 \par 12/24/21: admitted to [facility] for neutropenic fever after syncope and diarrhea at home. \par 1/10/22: C2D1 and D15 (alternating weeks) \par 2/1/22: CT CAP: decr in lymphangitis carcinomatosis and pulm nodules, 1.7 x 1.1 pancreatic body mass decr in size\par 2/7/22: C3D1\par \par Decided to transfer care to [facility] due to proximity to home. \par \par 2/22-4/18/22: [id number]\par 4/25/22: CT CAP: interval improvement in diffuse interlobular septal thickening and multiple bilateral pulmonary nodules, stable pancreatic mass\par 5/2-7/18/22: [id number]\par 7/25/22: CT CAP: irregular thickening along left bladder wall; otherwise stable disease\par 8/2-8/9/22: [facility] admission due to hypoxia/worsening GALLARDO\par \par 09/22/22- Was seen By Pulm/ cardiology \par 10/03/22 saw Pulmonology for acute bronchitis, recommended that given her response to Amoxicillin will continue with same antibiotic and plan for additional 7 days of 500mg BID (until 10/10/22) \par 10/04/22- CT A/P-  Findings concerning for progression of disease. Interval increase in size of the known pancreatic body mass. Progressed pulmonary nodular opacities and interlobular septal thickening, concerning for worsening pulmonary metastases and lymphangitic spread of tumor. New mild peritoneal carcinomatosis. VERTEBRAL BODY ANALYSIS: Vertebral compression fractures as described, consider further workup for osteoporosis. \par \par 10/10/22: C # 1 5 FU 2400 mg/m2/  mg/m2/ Onivyde 70 mg/m2  [de-identified] : adenocarcinoma  [de-identified] : [place] testing: MSH3 VUS denoted [id number]  [de-identified] : 10/10/22:\par Pt was seen in the treatment room with Dr Dos Santos .\par Pt has completed her course of antibiotics with amoxicillin on 10/09/22.\par -Pt was seen by Pulmonary and Cardiology \par Pt had temp of 99.5 yesterday - but today no fever - Temp was 97.5 \par Still has productive cough but has improved.\par Will  start chemo with second line - 5 FU/ Onivyde today.

## 2022-11-20 NOTE — REVIEW OF SYSTEMS
[Poor Appetite] : poor appetite [Postnasal Drip] : postnasal drip [Cough] : cough [SOB on Exertion] : sob on exertion [Edema] : edema [Seasonal Allergies] : seasonal allergies [Fever] : no fever [Fatigue] : no fatigue [Recent Wt Gain (___ Lbs)] : ~T no recent weight gain [Chills] : no chills [Recent Wt Loss (___ Lbs)] : ~T no recent weight loss [Chest Tightness] : no chest tightness [Sputum] : no sputum [Dyspnea] : no dyspnea [Pleuritic Pain] : no pleuritic pain [Wheezing] : no wheezing [Chest Discomfort] : no chest discomfort [Palpitations] : no palpitations [GERD] : no gerd [Abdominal Pain] : no abdominal pain [Nausea] : no nausea [Vomiting] : no vomiting [Arthralgias] : no arthralgias [Myalgias] : no myalgias [Rash] : no rash [Easy Bruising] : no easy bruising [Clotting Disorder/ Frequent bleeding] : no clotting disorder/ frequent bleeding [Headache] : no headache

## 2022-11-20 NOTE — PHYSICAL EXAM
[No Acute Distress] : no acute distress [Well Nourished] : well nourished [Well Groomed] : well groomed [Well Developed] : well developed [Normal Oropharynx] : normal oropharynx [Normal Appearance] : normal appearance [Normal Rate/Rhythm] : normal rate/rhythm [Normal S1, S2] : normal s1, s2 [No Resp Distress] : no resp distress [Normal Rhythm and Effort] : normal rhythm and effort [No Abnormalities] : no abnormalities [Benign] : benign [Soft] : soft [Normal Gait] : normal gait [No Clubbing] : no clubbing [No Cyanosis] : no cyanosis [Normal Color/ Pigmentation] : normal color/ pigmentation [No Focal Deficits] : no focal deficits [Oriented x3] : oriented x3 [Normal Affect] : normal affect [TextBox_68] : diminished BS at bases

## 2022-11-20 NOTE — HISTORY OF PRESENT ILLNESS
[TextBox_4] : Ms. 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 She returns for follow-up for her chronic hypoxic respiratory failure.  Since her last visit she completed course of Augmentin for bronchitis with improvement and was subsequently started on new chemo regimen which was complicated by severe diarrhea/dehydration requiring hospitalization.  At today's visit she is fairing better - is in good spirits but has noted some increased cough productive of clear sputum and mild swelling in legs - has been off lasix since her hospitalization 3rd week October.  Otherwise denies fever, chills, N/V/D. Currently 91% on RA.\par \par \par \par Prior HPI (9/22/22): Ms. Steward returns for follow-up. Reports decrease in LE swelling but no appreciable change in shortness of breath. She reports being able to walk around her condo without any sensation of limitation from respiratory standpoint. We discussed that after her last chemo had experienced (new) gross hematuria and was to go for biopsy however she ended up hospitalized and this was never completed. Immediately after hospitalization the hematuria recurred, albeit briefly and then self-resolved in early August. She has remained off chemo since July. At today's visit she also endorses history of mild MELISSA for which CPAP had been recommended in past "because of my Atrial fibrillation at the time". She has been using her supplemental O2 while sleeping and if she leaves the house for longer excursions. She has no new complaints at this time. No fever, chills, cough.She is 95% on RA.\par

## 2022-11-20 NOTE — ASSESSMENT
[FreeTextEntry1] : Ms. Steward is a 76 yo F with PMHx CVA, CAD, Afib (s/p ablation x 3 w/o recurrence, on AC with Eliquis), Stage IV pancreatic cancer (lung), lymphangitic carcinomatosis, prior BK and CMV viremia - on treatment with Gemcitabine/Abraxane - who was admitted to [facility] (8/2-8/9/22) with acute hypoxic respiratory failure due to pulmonary edema/bilateral pleural effusions which improved with diuresis (only received a single dose of prednisone). BNP mildly elevated at 327. When comparing her Chest CTs from 8/1/22 to 8/9/22 there is notably significant improvement in her B/L pleural effusions and B/L GGOs - she was on RA at rest. With this in mind, the cause of her hypoxia appeared to be cardiogenic pulmonary edema and pneumonitis felt to be less likely given significant improvement in her Chest CT post diuresis. Echo during that admission (8/2/22) showed EF 75%, PASP 39, small pericardial effusion, no mention of diastolic dysfunction (but LVH previously mentioned on prior echos). \par Her PFTs show no evidence of obstruction on spirometry, normal lung volumes and moderately reduced DLCO. On 6MWT she was able to walk 95% of predicted but required up to 8Lpm of oxygen to maintain an O2Sat of 93% (with brittany 4 only).\par \par I treated her with course of Augmentin for suspected bronchitis vs PNA in early 10/2022 (prior to imaging) - upon review of Chest CT performed around that time showed progressive nodular opacities which may have been infectious related however increased interlobular septal thickening concerning for progressive lymphangitic spread.  She is one a new chemo regimen with FOLFOX.\par \par Recommend:\par -Resume Lasix 40mg 3x weekly; monitor daily weights and BP - instructed to hold if develops significant diarrhea again/she is symptomatic with lower BP from her baseline\par - Instructed to use suppl oxygen whenever she is engaged in continuous activity (use pulse ox for guidance of titration as patient does not become dyspneic - should maintain O2Sat > 88%); also continue to wear while sleeping\par - Portable O2 concentrator in use by patient\par - PSG split night ordered to evaluate for MELISSA (prev dx but 50lbs lighter) with titration\par - Zyrtec daily for chronic rhinitis\par \par \par RTC in 2 months for follow-up with one of my colleagues, will resume care once I return from leave.\par \par Lali Wilson MD.

## 2022-11-27 PROBLEM — I63.412 CEREBRAL INFARCTION DUE TO EMBOLISM OF LEFT MIDDLE CEREBRAL ARTERY: Status: ACTIVE | Noted: 2018-01-26

## 2022-11-27 PROBLEM — J81.1 NONCARDIOGENIC PULMONARY EDEMA: Status: ACTIVE | Noted: 2022-01-01

## 2022-11-27 PROBLEM — E66.9 OBESITY: Status: ACTIVE | Noted: 2018-03-14

## 2022-11-27 NOTE — PHYSICAL EXAM
[No Acute Distress] : no acute distress [Well Nourished] : well nourished [Well Developed] : well developed [Well-Appearing] : well-appearing [Normal Sclera/Conjunctiva] : normal sclera/conjunctiva [PERRL] : pupils equal round and reactive to light [EOMI] : extraocular movements intact [Normal Outer Ear/Nose] : the outer ears and nose were normal in appearance [Normal Oropharynx] : the oropharynx was normal [No JVD] : no jugular venous distention [No Lymphadenopathy] : no lymphadenopathy [Supple] : supple [Thyroid Normal, No Nodules] : the thyroid was normal and there were no nodules present [No Respiratory Distress] : no respiratory distress  [No Accessory Muscle Use] : no accessory muscle use [Clear to Auscultation] : lungs were clear to auscultation bilaterally [Normal Rate] : normal rate  [Regular Rhythm] : with a regular rhythm [Normal S1, S2] : normal S1 and S2 [No Murmur] : no murmur heard [No Carotid Bruits] : no carotid bruits [No Abdominal Bruit] : a ~M bruit was not heard ~T in the abdomen [No Varicosities] : no varicosities [Pedal Pulses Present] : the pedal pulses are present [No Edema] : there was no peripheral edema [No Palpable Aorta] : no palpable aorta [No Extremity Clubbing/Cyanosis] : no extremity clubbing/cyanosis [Soft] : abdomen soft [Non Tender] : non-tender [Non-distended] : non-distended [No Masses] : no abdominal mass palpated [No HSM] : no HSM [Normal Bowel Sounds] : normal bowel sounds [Normal Posterior Cervical Nodes] : no posterior cervical lymphadenopathy [Normal Anterior Cervical Nodes] : no anterior cervical lymphadenopathy [No CVA Tenderness] : no CVA  tenderness [No Spinal Tenderness] : no spinal tenderness [No Joint Swelling] : no joint swelling [Grossly Normal Strength/Tone] : grossly normal strength/tone [No Rash] : no rash [Coordination Grossly Intact] : coordination grossly intact [No Focal Deficits] : no focal deficits [Normal Gait] : normal gait [Deep Tendon Reflexes (DTR)] : deep tendon reflexes were 2+ and symmetric [Normal Affect] : the affect was normal [Normal Insight/Judgement] : insight and judgment were intact [de-identified] : on oxygen

## 2022-11-27 NOTE — HISTORY OF PRESENT ILLNESS
[FreeTextEntry1] : The patient is here for a follow up visit to review their medications and chronic medical conditions.\par pancr ca, afib, anxiety [de-identified] : Last seen 9/2021 at which time was being worked up for ? pancreatic lesion- Subsequently dxd with metastatic pancreatic cancer and HFpEF. Currently seeing onc through [facility] and receiving Folfx. No new/specific complaints today. She is overall in a good mood and her oncologist tells her " she is doing well". Recent CT abd reveals enlarging panc. mass lesion and progression of metastatic disease. \par Her appet, sleep, bms , mood all ok. \par She is on 24hr oxygen and sees cardio and pulm for f/u on cardiogenic pulmonary edema, dyspnea.

## 2022-11-27 NOTE — ASSESSMENT
[FreeTextEntry1] : Metastatic Pancreatic Cancer- cont folfx per onc. \par HFpEF- stable, cont lasix, oxygen, metoprolol, aldactone, cardio f/u\par Anxiety- lexapro, xanax. \par h/o CVA-asa, lipitor\par h/o afib- s/p ablation, eliquis\par HTN- stable,cotn valsartan,metoprolol\par HLD- stable, cont atorvastatin\par Pt. was encouraged to do all relevant screening tests including but not limited to mammogram, gyn visit, colonoscopy, bone density, annual skin exam.\par Total time spent 40 min. ( 25 min was FTF and 15 min was chart review and documentation for a total of 40 min. ).\par \par \par

## 2022-11-29 NOTE — ASSESSMENT
[Palliative] : Goals of care discussed with patient: Palliative [Palliative Care Plan] : not applicable at this time [FreeTextEntry1] : 77 F diagnosed with stage IV pancreas cancer, currently on Gemzar 800 mg/m2/Abraxane 100 mg/m2 Q 15 days \par \par She was recently hospitalized from 8/1-8/9 for SOB. CXR on admission showed pulmonary edema and bilateral pleural effusions. CT chest s/p diuresis showed improved pulmonary edema and pleural effusions . Her SOB could be from HFpEF or Gemzar related non-cardiogenic pulmonary edema.Patient has portable oxygen arranged at home. She is now being followed by pulmonology as an out-patient for management of hypoxia and pulmonary edema of uncertain cause.\par \par 09/01/22:  she is deconditioned, had to catch her breath even walking into the examination room. Given that she is still recovering from hospitalization and the nature of the insult may be related to gemcitabine, we will hold off on therapy for now, allowing further time of recovery. \par \par 10/04/22 repeat CT A/P: Findings concerning for progression of disease. Interval increase in size of the known pancreatic body mass. Progressed pulmonary nodular opacities and interlobular septal thickening, concerning for worsening pulmonary metastases and lymphangitic spread of tumor. New mild peritoneal carcinomatosis. VERTEBRAL BODY ANALYSIS: Vertebral compression fractures as described, consider further workup for osteoporosis. \par \par Pt was on [place]/ Abraxane and had POD. Recieved 1 cycle of NALIRI but was hospitalized with diarrhea/syncope, so stopped  FOLFIRII due to intolerance and patient preference. Started pt on  FOLFOX modified dose, has been tolerating well.\par \par 11/29: feeling well on FOLFOX\par \par Plan :\par Metastatic Pancreatic cancer : \par - continue FOLFOX\par - C4 in 2 weeks, followed by scan, and RTC to review; patient wants to take a break after C4 during holidays\par  \par \par FOLFOX regimen\par - 5FU 1800 mg/m2\par - oxaliplatin 50 mg/m2\par - leucovorin 400 mg/m2\par \par \par Anxiety :\par On Lexapro and Xanax prn - Refilled Rx one time  but will need to follow up with PCP \par Advised pt to follow up with her PCP \par Pt may benefit with the psychooncology support referral - But pt refused at the time \par \par Swelling of feet : \par Pt was on Furosemide and was taken off while she was hospitalized \par She was evaluated by  Dr Moreno and resumed Lasix 3 days a week. \par \par Kuldip Dos Santos MD PhD\par Medical Oncology Attending\par I personally have spent a total of 30 minutes of time on the date of this encounter reviewing test results, documenting findings, coordinating care, and directly consulting with the patient and/or designated family member.\par

## 2022-11-29 NOTE — PHYSICAL EXAM
[Ambulatory and capable of all self care but unable to carry out any work activities] : Status 2- Ambulatory and capable of all self care but unable to carry out any work activities. Up and about more than 50% of waking hours [Normal] : affect appropriate [de-identified] : Mild bibasilar crackles, on nasal cannula oxygen  [de-identified] : swelling of b/l feet  [de-identified] : erythematous skin rash noted underneath bilateral breasts [de-identified] : b/l feet tingling and numbness noted

## 2022-11-29 NOTE — HISTORY OF PRESENT ILLNESS
[Disease: _____________________] : Disease: [unfilled] [AJCC Stage: ____] : AJCC Stage: [unfilled] [de-identified] : 77 F w/ h/o CVA, CAD, a fib, presents for further management of Stage IV pancreatic cancer. \par \giulia Peralta was admitted to [facility] for worsening cough and acute SOB in September 2021 and was found to have extensive ill defined pulmonary nodules on CT Chest suggestive of either a metastatic disease or infectious process. She was started on IV antibiotics. CT A/P on 9/4/21 showed mixed solid and cystic left ovarian mass measuring up to 4.6 cm suspicious for an ovarian cystic neoplasm, no pelvic or RP adenopathy, ill defined cystic changes within the pancreatic body and tail, possibly representing a pancreatic cystic neoplasm such as an IPMN. CTA on 9/4/21 showed extensive scattered ill defined pulmonary nodule/nodular infiltrates throughout the lungs, concerning for atypical infection/pneumonia. A bronchoscopy was done on 9/7/21 with BAL and RML x 3 of RLL posterior segment. Negative for malignancy. Pt was d/c from [facility] after improvement in symptoms with Ab. \par \giulia Peralta followed up with GI and underwent an MR Abd on 9/29/21 which showed segmental dilatation of the pancreatic duct within the distal body and tail, measuring up to 5 mm associated pancreatic parenchymal atrophy. Mass suspected in the mid body. No liver mass noted. 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 EUS at [place] on 10/12/21 showed an ill defined mass in the body involving the splenic vein. FNA c/w adenocarcinoma. CA 19-9 3164,  35. \par \par Referred to Dr. Barrow at [facility]. 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 Underwent a lung biopsy on 11/11/21 which confirmed metastatic pancreatic adenocarcinoma, MMR intact, NTRK negative. \par \par 110/30/21 CT CAP with progression of lymphangitic spread of cancer, pulmonary nodes, 2.6 cm pancreatic body mass, small ascites\par 12/06/21: C1D1, 8, 15 Q 28 cycle Gemzar 800 mg/m2 + Abraxane 100 mg/m2 \par 12/24/21: admitted to [facility] for neutropenic fever after syncope and diarrhea at home. \par 01/10/22: C2D1 and D15 (alternating weeks) \par 02/01/22: CT CAP: decr in lymphangitis carcinomatosis and pulm nodules, 1.7 x 1.1 pancreatic body mass decr in size\par 02/07/22: C3D1 -> Decided to transfer care to [facility] due to proximity to home. \par 02/22-4/18/22: [id number]\par 04/25/22: CT CAP: interval improvement in diffuse interlobular septal thickening and multiple bilateral pulmonary nodules, stable pancreatic mass\par 05/2-7/18/22: [id number]\par 7/25/22: CT CAP: irregular thickening along left bladder wall; otherwise stable disease\par 08/2-8/9/22: [facility] admission due to hypoxia/worsening GALLARDO\par 09/22/22- Was seen By Pulm/ cardiology -- Pt was on [place]/ abraxane and developed Pulm edema and Hypoxia. Unclear if this is Gemcitabine related (concern for capillary leak syndrome likely from gem ) VS. POD\par 10/03/22 saw Pulmonology for acute bronchitis, recommended that given her response to Amoxicillin will continue with same antibiotic and plan for additional 7 days of 500mg BID (until 10/10/22) \par 10/04/22- CT A/P-  progression, with interval increase in size of the known pancreatic body mass. Progressed pulmonary nodular opacities and interlobular septal thickening, concerning for worsening pulmonary metastases and lymphangitic spread of tumor. New mild peritoneal carcinomatosis. \par 10/10/22: C # 1 5 FU 2400 mg/m2/  mg/m2/ Onivyde 70 mg/m2 \par 10/20/22 hospitalized for fainting/diarrhea\par \par 10/31/22 C1 FOLFOX- 5 FU 1800 mg/m2/  mg/m2/ Oxaliplatin 50 mg/m2.\par 11/14/22 C2 FOLFOX- 5 FU 1800 mg/m2/  mg/m2/ Oxaliplatin 50 mg/m2.\par 11/28/22 C3 FOLFOX (planned)\par \par Germline testing: [place] testing: MSH3 VUS denoted [id number] \par Tumor testing: [facility] on pancreas 10/12/21, showed KRAS G12D, CDKN2A loss, SMAD4 suclonal mutation, FBXW7 subclonal, MTAP loss, TMB 4, LENKA [de-identified] : adenocarcinoma  [de-identified] : 11/29\par - fatigued first few days then feels better\par - no cold sensitivity\par - no neuropathy; perhaps mild in toes\par - has occasional nausea uses anti nausea meds prn \par - eating well/weight stable\par - still using oxygen occasionally, when doing strenuous activity\par \par \par

## 2022-12-08 NOTE — PROGRESS NOTE ADULT - SUBJECTIVE AND OBJECTIVE BOX
How Severe Is Your Rash?: moderate [facility]                                        [facility] [place]                                 Shashi Cabrera, & Miguel                                  [address]                                        [place], [place], [address]                                             [phone]        No new complaints.    Vital Signs Last 24 Hrs  T(F): 98 (15 Stephen 2018 10:05), Max: 98.5 (14 Jan 2018 21:23)  HR: 70 (15 Stephen 2018 10:05) (69 - 77)  BP: 159/70 (15 Stephen 2018 10:05) (118/54 - 183/77)  RR: 18 (15 Stephen 2018 10:05) (18 - 20)  SpO2: 97% (15 Stephen 2018 10:05) (97% - 100%)    Awake and alert.  Pupils react.  Face symmetric.  Good strength bilaterally. Is This A New Presentation, Or A Follow-Up?: Rash

## 2022-12-22 NOTE — RESULTS/DATA
[FreeTextEntry1] : ---CT TAP 12/19/22---\par \par FINDINGS:\par CHEST:\par LUNGS AND LARGE AIRWAYS: Patent central airways. Diffuse interlobular septal thickening and multiple nodular opacities bilaterally, slightly increased.\par PLEURA: Small left pleural effusion, increased.\par VESSELS: Atherosclerotic changes of the aorta and coronary arteries.\par HEART: Heart size is normal. No pericardial effusion.\par MEDIASTINUM AND KAREN: No lymphadenopathy. Right Mediport catheter with tip in SVC. Calcified lymph nodes.\par CHEST WALL AND LOWER NECK: Within normal limits.\par \par ABDOMEN AND PELVIS:\par LIVER: Within normal limits.\par BILE DUCTS: Normal caliber.\par GALLBLADDER: Cholelithiasis.\par SPLEEN: Within normal limits.\par PANCREAS: Pancreatic body mass measures 1.7 x 3.7 cm (3, 75), previously 1.9 x 4.2 cm. Associated ductal dilatation and atrophy.\par ADRENALS: Within normal limits.\par KIDNEYS/URETERS: A small right renal cyst.\par \par BLADDER: Within normal limits.\par REPRODUCTIVE ORGANS: Complex cystic and solid appearance of the left ovary is unchanged. Uterus and right adnexa are within normal limits.\par \par BOWEL: No bowel obstruction. Appendix is normal.\par PERITONEUM: No ascites. Interval progression in peritoneal nodularity.\par VESSELS: Atherosclerotic changes. Replaced right hepatic artery arising from SMA. An accessory left hepatic artery arising from left gastric artery. Retroaortic left renal vein.\par RETROPERITONEUM/LYMPH NODES: No lymphadenopathy.\par ABDOMINAL WALL: Within normal limits.\par BONES: Degenerative changes. Right total hip replacement.\par \par IMPRESSION:\par Findings in the lungs overall appear minimally progressed. Small left pleural effusion, slightly increased.\par Increased peritoneal carcinomatosis. Pancreatic body mass however is slightly decreased.\par \par \par --- End of Report ---\par \par

## 2022-12-22 NOTE — ASSESSMENT
[FreeTextEntry1] : 77 F diagnosed with stage IV pancreas cancer, currently on Gemzar 800 mg/m2/Abraxane 100 mg/m2 Q 15 days \par \par She was recently hospitalized from 8/1-8/9 for SOB. CXR on admission showed pulmonary edema and bilateral pleural effusions. CT chest s/p diuresis showed improved pulmonary edema and pleural effusions . Her SOB could be from HFpEF or Gemzar related non-cardiogenic pulmonary edema.Patient has portable oxygen arranged at home. She is now being followed by pulmonology as an out-patient for management of hypoxia and pulmonary edema of uncertain cause.\par \par 09/01/22:  she is deconditioned, had to catch her breath even walking into the examination room. Given that she is still recovering from hospitalization and the nature of the insult may be related to gemcitabine, we will hold off on therapy for now, allowing further time of recovery. \par \par 10/04/22 repeat CT A/P: Findings concerning for progression of disease. Interval increase in size of the known pancreatic body mass. Progressed pulmonary nodular opacities and interlobular septal thickening, concerning for worsening pulmonary metastases and lymphangitic spread of tumor. New mild peritoneal carcinomatosis. VERTEBRAL BODY ANALYSIS: Vertebral compression fractures as described, consider further workup for osteoporosis. \par \par Pt was on Bath/ Abraxane and had POD. Recieved 1 cycle of NALIRI but was hospitalized with diarrhea/syncope, so stopped  FOLFIRII due to intolerance and patient preference. Started pt on  FOLFOX modified dose, has been tolerating well.\par \par 12/22/22: Restaging scans showing POD with increasing size of peritoneal mets and lung mets and decreasing size of pancreatic mass. Ca 19-9 also rising, now 972.\par \par Plan :\par #Metastatic Pancreatic cancer : \par -s/p 4 cycles FOLFOX with POD on scans from 12/19/22 (worsening peritoneal carcinomatosis and septal thickening) as well as rising Ca 19-9\par -She has progressed on Bath/Abraxane. Review of Foundation shows no actionable mutations. We would prefer to rechallenge with FOLFIRI with dose-reduced irinotecan as she only received 1 cycle. However, patient is hesitant to start new therapy as she is tolerating FOLFOX and clinically feels well. Therefore, we will plan to continue with FOLFOX (C5 to be given after New Years) and monitor her disease closely with Guardant Response q monthly and scans as clinically indicated. We will increase Oxaliplatin dose from 50-->85 as patient has been tolerating the lower dose and we will aim to get more response from the oxaliplatin\par ---Plan for C5 week of 1/9/23. Will check Guardant 360 / Guardant Response at that time\par -Patient wants to visit [place] over the holidays so we will plan to initiate treatment following the new year\par \par FOLFOX regimen\par - 5FU 1800 mg/m2\par - oxaliplatin 85 mg/m2\par - leucovorin 400 mg/m2\par \par  RTC week of 1/9/23 to continue FOLFOX\par \par Case discussed with Dr. Dos Santos\par \par Ashlee Villarreal MD\par Heme/Onc Fellow, PGY-6

## 2022-12-22 NOTE — HISTORY OF PRESENT ILLNESS
[Disease: _____________________] : Disease: [unfilled] [AJCC Stage: ____] : AJCC Stage: [unfilled] [de-identified] : 77 F w/ h/o CVA, CAD, a fib, presents for further management of Stage IV pancreatic cancer. \par \giulia Peralta was admitted to [facility] for worsening cough and acute SOB in September 2021 and was found to have extensive ill defined pulmonary nodules on CT Chest suggestive of either a metastatic disease or infectious process. She was started on IV antibiotics. CT A/P on 9/4/21 showed mixed solid and cystic left ovarian mass measuring up to 4.6 cm suspicious for an ovarian cystic neoplasm, no pelvic or RP adenopathy, ill defined cystic changes within the pancreatic body and tail, possibly representing a pancreatic cystic neoplasm such as an IPMN. CTA on 9/4/21 showed extensive scattered ill defined pulmonary nodule/nodular infiltrates throughout the lungs, concerning for atypical infection/pneumonia. A bronchoscopy was done on 9/7/21 with BAL and RML x 3 of RLL posterior segment. Negative for malignancy. Pt was d/c from [facility] after improvement in symptoms with Ab. \par \giulia Peralta followed up with GI and underwent an MR Abd on 9/29/21 which showed segmental dilatation of the pancreatic duct within the distal body and tail, measuring up to 5 mm associated pancreatic parenchymal atrophy. Mass suspected in the mid body. No liver mass noted. 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 EUS at [facility] on 10/12/21 showed an ill defined mass in the body involving the splenic vein. FNA c/w adenocarcinoma. CA 19-9 3164,  35. \par \par Referred to Dr. Barrow at [facility]. 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 Underwent a lung biopsy on 11/11/21 which confirmed metastatic pancreatic adenocarcinoma, MMR intact, NTRK negative. \par \par 110/30/21 CT CAP with progression of lymphangitic spread of cancer, pulmonary nodes, 2.6 cm pancreatic body mass, small ascites\par 12/06/21: C1D1, 8, 15 Q 28 cycle Gemzar 800 mg/m2 + Abraxane 100 mg/m2 \par 12/24/21: admitted to [facility] for neutropenic fever after syncope and diarrhea at home. \par 01/10/22: C2D1 and D15 (alternating weeks) \par 02/01/22: CT CAP: decr in lymphangitis carcinomatosis and pulm nodules, 1.7 x 1.1 pancreatic body mass decr in size\par 02/07/22: C3D1 -> Decided to transfer care to [facility] due to proximity to home. \par 02/22-4/18/22: [id number]\par 04/25/22: CT CAP: interval improvement in diffuse interlobular septal thickening and multiple bilateral pulmonary nodules, stable pancreatic mass\par 05/2-7/18/22: [id number]\par 7/25/22: CT CAP: irregular thickening along left bladder wall; otherwise stable disease\par 08/2-8/9/22: [facility] admission due to hypoxia/worsening GALLARDO\par 09/22/22- Was seen By Pulm/ cardiology -- Pt was on [place]/ abraxane and developed Pulm edema and Hypoxia. Unclear if this is Gemcitabine related (concern for capillary leak syndrome likely from gem ) VS. POD\par 10/03/22 saw Pulmonology for acute bronchitis, recommended that given her response to Amoxicillin will continue with same antibiotic and plan for additional 7 days of 500mg BID (until 10/10/22) \par 10/04/22- CT A/P-  progression, with interval increase in size of the known pancreatic body mass. Progressed pulmonary nodular opacities and interlobular septal thickening, concerning for worsening pulmonary metastases and lymphangitic spread of tumor. New mild peritoneal carcinomatosis. \par 10/10/22: C # 1 5 FU 2400 mg/m2/  mg/m2/ Onivyde 70 mg/m2 \par 10/20/22 hospitalized for fainting/diarrhea\par \par 10/31/22 C1 FOLFOX- 5 FU 1800 mg/m2/  mg/m2/ Oxaliplatin 50 mg/m2.\par 11/14/22 C2 FOLFOX- 5 FU 1800 mg/m2/  mg/m2/ Oxaliplatin 50 mg/m2.\par 11/28/22 C3 FOLFOX \par 12/13/22- C4 FOLFOX \par \par 12/19/22 Restaging scans showing increasing peritoneal nodularity and lung mets, but decreasing size of pancreatic mass\par \par Germline testing: [facility] testing: MSH3 VUS denoted [id number] \par Tumor testing: Foundation on pancreas 10/12/21, showed KRAS G12D, CDKN2A loss, SMAD4 suclonal mutation, FBXW7 subclonal, MTAP loss, TMB 4, LENKA [de-identified] : adenocarcinoma  [de-identified] : 12/22/22: Seen for Follow-up\par -Feels well today\par -Tolerating chemo. Some mild nausea but uses PO meds and is helping \par -Good appetite on off week\par -Having some fatigue\par -No tingling in fingertips. Some numbness in toes\par -No cold sensitivity\par -Some gassiness. Some abdominal pain\par -Having good BM's\par -Breathing is better, no longer coughing. Remains on 2L NC

## 2022-12-22 NOTE — REVIEW OF SYSTEMS
[Fatigue] : fatigue [SOB on Exertion] : shortness of breath during exertion [Negative] : Allergic/Immunologic [Shortness Of Breath] : shortness of breath [Cough] : no cough [FreeTextEntry6] : On nasal cannula

## 2022-12-22 NOTE — PHYSICAL EXAM
[Ambulatory and capable of all self care but unable to carry out any work activities] : Status 2- Ambulatory and capable of all self care but unable to carry out any work activities. Up and about more than 50% of waking hours [Normal] : affect appropriate [de-identified] :  on nasal cannula oxygen, no appreciable crackles [de-identified] : swelling of b/l feet, improving

## 2022-12-22 NOTE — END OF VISIT
[] : Fellow [FreeTextEntry3] : Kuldip Dos Santos MD PhD\par Medical Oncology Attending\par I personally have spent a total of 30 minutes of time on the date of this encounter reviewing test results, documenting findings, coordinating care, and directly consulting with the patient and/or designated family member.\par

## 2023-01-01 ENCOUNTER — APPOINTMENT (OUTPATIENT)
Dept: INFUSION THERAPY | Facility: HOSPITAL | Age: 78
End: 2023-01-01

## 2023-01-01 ENCOUNTER — TRANSCRIPTION ENCOUNTER (OUTPATIENT)
Age: 78
End: 2023-01-01

## 2023-01-01 ENCOUNTER — RX CHANGE (OUTPATIENT)
Age: 78
End: 2023-01-01

## 2023-01-01 ENCOUNTER — NON-APPOINTMENT (OUTPATIENT)
Age: 78
End: 2023-01-01

## 2023-01-01 ENCOUNTER — APPOINTMENT (OUTPATIENT)
Dept: CARDIOLOGY | Facility: CLINIC | Age: 78
End: 2023-01-01
Payer: MEDICARE

## 2023-01-01 ENCOUNTER — APPOINTMENT (OUTPATIENT)
Dept: HEMATOLOGY ONCOLOGY | Facility: CLINIC | Age: 78
End: 2023-01-01
Payer: MEDICARE

## 2023-01-01 ENCOUNTER — RESULT REVIEW (OUTPATIENT)
Age: 78
End: 2023-01-01

## 2023-01-01 ENCOUNTER — APPOINTMENT (OUTPATIENT)
Dept: CARDIOLOGY | Facility: CLINIC | Age: 78
End: 2023-01-01

## 2023-01-01 ENCOUNTER — INPATIENT (INPATIENT)
Facility: HOSPITAL | Age: 78
LOS: 2 days | Discharge: HOME | DRG: 392 | End: 2023-01-15
Attending: STUDENT IN AN ORGANIZED HEALTH CARE EDUCATION/TRAINING PROGRAM | Admitting: STUDENT IN AN ORGANIZED HEALTH CARE EDUCATION/TRAINING PROGRAM
Payer: COMMERCIAL

## 2023-01-01 ENCOUNTER — OUTPATIENT (OUTPATIENT)
Dept: OUTPATIENT SERVICES | Facility: HOSPITAL | Age: 78
LOS: 1 days | Discharge: HOME | End: 2023-01-01
Payer: MEDICARE

## 2023-01-01 ENCOUNTER — INPATIENT (INPATIENT)
Facility: HOSPITAL | Age: 78
LOS: 3 days | Discharge: HOME HEALTH | DRG: 312 | End: 2023-02-11
Attending: HOSPITALIST | Admitting: STUDENT IN AN ORGANIZED HEALTH CARE EDUCATION/TRAINING PROGRAM
Payer: COMMERCIAL

## 2023-01-01 ENCOUNTER — APPOINTMENT (OUTPATIENT)
Dept: HEMATOLOGY ONCOLOGY | Facility: CLINIC | Age: 78
End: 2023-01-01

## 2023-01-01 ENCOUNTER — APPOINTMENT (OUTPATIENT)
Dept: PULMONOLOGY | Facility: CLINIC | Age: 78
End: 2023-01-01

## 2023-01-01 ENCOUNTER — APPOINTMENT (OUTPATIENT)
Dept: PULMONOLOGY | Facility: CLINIC | Age: 78
End: 2023-01-01
Payer: MEDICARE

## 2023-01-01 ENCOUNTER — RX RENEWAL (OUTPATIENT)
Age: 78
End: 2023-01-01

## 2023-01-01 VITALS
RESPIRATION RATE: 18 BRPM | OXYGEN SATURATION: 92 % | WEIGHT: 197.86 LBS | TEMPERATURE: 97 F | SYSTOLIC BLOOD PRESSURE: 122 MMHG | HEIGHT: 72 IN | BODY MASS INDEX: 26.8 KG/M2 | HEART RATE: 76 BPM | DIASTOLIC BLOOD PRESSURE: 82 MMHG

## 2023-01-01 VITALS
OXYGEN SATURATION: 92 % | RESPIRATION RATE: 18 BRPM | TEMPERATURE: 98 F | HEART RATE: 65 BPM | DIASTOLIC BLOOD PRESSURE: 64 MMHG | SYSTOLIC BLOOD PRESSURE: 104 MMHG

## 2023-01-01 VITALS
OXYGEN SATURATION: 96 % | RESPIRATION RATE: 18 BRPM | HEART RATE: 72 BPM | SYSTOLIC BLOOD PRESSURE: 125 MMHG | TEMPERATURE: 98 F | DIASTOLIC BLOOD PRESSURE: 79 MMHG

## 2023-01-01 VITALS
DIASTOLIC BLOOD PRESSURE: 82 MMHG | SYSTOLIC BLOOD PRESSURE: 147 MMHG | TEMPERATURE: 98.7 F | HEART RATE: 79 BPM | WEIGHT: 201.72 LBS | BODY MASS INDEX: 35.74 KG/M2 | HEIGHT: 62.99 IN | RESPIRATION RATE: 16 BRPM | OXYGEN SATURATION: 94 %

## 2023-01-01 VITALS
TEMPERATURE: 97.2 F | RESPIRATION RATE: 17 BRPM | OXYGEN SATURATION: 97 % | DIASTOLIC BLOOD PRESSURE: 76 MMHG | HEIGHT: 62 IN | SYSTOLIC BLOOD PRESSURE: 115 MMHG | HEART RATE: 90 BPM

## 2023-01-01 VITALS
SYSTOLIC BLOOD PRESSURE: 182 MMHG | WEIGHT: 190.04 LBS | HEIGHT: 63 IN | DIASTOLIC BLOOD PRESSURE: 63 MMHG | TEMPERATURE: 98 F | HEART RATE: 83 BPM | RESPIRATION RATE: 16 BRPM

## 2023-01-01 VITALS
OXYGEN SATURATION: 95 % | DIASTOLIC BLOOD PRESSURE: 80 MMHG | HEART RATE: 80 BPM | RESPIRATION RATE: 17 BRPM | HEIGHT: 62 IN | SYSTOLIC BLOOD PRESSURE: 141 MMHG | WEIGHT: 204 LBS | TEMPERATURE: 98.2 F | BODY MASS INDEX: 37.54 KG/M2

## 2023-01-01 VITALS
WEIGHT: 200.62 LBS | TEMPERATURE: 97.2 F | DIASTOLIC BLOOD PRESSURE: 73 MMHG | BODY MASS INDEX: 36.69 KG/M2 | RESPIRATION RATE: 19 BRPM | SYSTOLIC BLOOD PRESSURE: 117 MMHG | OXYGEN SATURATION: 99 % | HEART RATE: 84 BPM

## 2023-01-01 VITALS
DIASTOLIC BLOOD PRESSURE: 62 MMHG | SYSTOLIC BLOOD PRESSURE: 116 MMHG | HEIGHT: 63 IN | TEMPERATURE: 98 F | RESPIRATION RATE: 18 BRPM | HEART RATE: 74 BPM | WEIGHT: 160.06 LBS | OXYGEN SATURATION: 96 %

## 2023-01-01 DIAGNOSIS — I10 ESSENTIAL (PRIMARY) HYPERTENSION: ICD-10-CM

## 2023-01-01 DIAGNOSIS — C79.9 SECONDARY MALIGNANT NEOPLASM OF UNSPECIFIED SITE: ICD-10-CM

## 2023-01-01 DIAGNOSIS — Z98.89 OTHER SPECIFIED POSTPROCEDURAL STATES: Chronic | ICD-10-CM

## 2023-01-01 DIAGNOSIS — J90 PLEURAL EFFUSION, NOT ELSEWHERE CLASSIFIED: ICD-10-CM

## 2023-01-01 DIAGNOSIS — R53.83 OTHER FATIGUE: ICD-10-CM

## 2023-01-01 DIAGNOSIS — C25.9 SECONDARY MALIGNANT NEOPLASM OF UNSPECIFIED SITE: ICD-10-CM

## 2023-01-01 DIAGNOSIS — R62.7 ADULT FAILURE TO THRIVE: ICD-10-CM

## 2023-01-01 DIAGNOSIS — C25.9 MALIGNANT NEOPLASM OF PANCREAS, UNSPECIFIED: ICD-10-CM

## 2023-01-01 DIAGNOSIS — F41.9 ANXIETY DISORDER, UNSPECIFIED: ICD-10-CM

## 2023-01-01 DIAGNOSIS — R55 SYNCOPE AND COLLAPSE: ICD-10-CM

## 2023-01-01 DIAGNOSIS — R05.9 COUGH, UNSPECIFIED: ICD-10-CM

## 2023-01-01 DIAGNOSIS — I50.30 UNSPECIFIED DIASTOLIC (CONGESTIVE) HEART FAILURE: ICD-10-CM

## 2023-01-01 DIAGNOSIS — T45.1X5A NAUSEA WITH VOMITING, UNSPECIFIED: ICD-10-CM

## 2023-01-01 DIAGNOSIS — I48.0 PAROXYSMAL ATRIAL FIBRILLATION: ICD-10-CM

## 2023-01-01 DIAGNOSIS — I48.91 UNSPECIFIED ATRIAL FIBRILLATION: ICD-10-CM

## 2023-01-01 DIAGNOSIS — E86.0 DEHYDRATION: ICD-10-CM

## 2023-01-01 DIAGNOSIS — R11.2 NAUSEA WITH VOMITING, UNSPECIFIED: ICD-10-CM

## 2023-01-01 DIAGNOSIS — R19.7 DIARRHEA, UNSPECIFIED: ICD-10-CM

## 2023-01-01 DIAGNOSIS — C78.00 SECONDARY MALIGNANT NEOPLASM OF UNSPECIFIED LUNG: ICD-10-CM

## 2023-01-01 DIAGNOSIS — I50.33 ACUTE ON CHRONIC DIASTOLIC (CONGESTIVE) HEART FAILURE: ICD-10-CM

## 2023-01-01 DIAGNOSIS — I63.9 CEREBRAL INFARCTION, UNSPECIFIED: ICD-10-CM

## 2023-01-01 DIAGNOSIS — Z02.9 ENCOUNTER FOR ADMINISTRATIVE EXAMINATIONS, UNSPECIFIED: ICD-10-CM

## 2023-01-01 DIAGNOSIS — I48.92 UNSPECIFIED ATRIAL FLUTTER: ICD-10-CM

## 2023-01-01 DIAGNOSIS — R82.998 OTHER ABNORMAL FINDINGS IN URINE: ICD-10-CM

## 2023-01-01 DIAGNOSIS — Z29.9 ENCOUNTER FOR PROPHYLACTIC MEASURES, UNSPECIFIED: ICD-10-CM

## 2023-01-01 DIAGNOSIS — D64.9 ANEMIA, UNSPECIFIED: ICD-10-CM

## 2023-01-01 LAB
A1C WITH ESTIMATED AVERAGE GLUCOSE RESULT: 5.6 % — SIGNIFICANT CHANGE UP (ref 4–5.6)
ALBUMIN SERPL ELPH-MCNC: 3.2 G/DL — LOW (ref 3.3–5)
ALBUMIN SERPL ELPH-MCNC: 3.3 G/DL — SIGNIFICANT CHANGE UP (ref 3.3–5)
ALBUMIN SERPL ELPH-MCNC: 3.3 G/DL — SIGNIFICANT CHANGE UP (ref 3.3–5)
ALBUMIN SERPL ELPH-MCNC: 3.6 G/DL
ALP BLD-CCNC: 98 U/L
ALP SERPL-CCNC: 104 U/L — SIGNIFICANT CHANGE UP (ref 40–120)
ALP SERPL-CCNC: 89 U/L — SIGNIFICANT CHANGE UP (ref 40–120)
ALP SERPL-CCNC: 97 U/L — SIGNIFICANT CHANGE UP (ref 40–120)
ALT FLD-CCNC: 11 U/L — SIGNIFICANT CHANGE UP (ref 10–45)
ALT FLD-CCNC: 18 U/L — SIGNIFICANT CHANGE UP (ref 10–45)
ALT FLD-CCNC: 18 U/L — SIGNIFICANT CHANGE UP (ref 10–45)
ALT SERPL-CCNC: 15 U/L
ANION GAP SERPL CALC-SCNC: 12 MMOL/L
ANION GAP SERPL CALC-SCNC: 12 MMOL/L — SIGNIFICANT CHANGE UP (ref 5–17)
ANION GAP SERPL CALC-SCNC: 12 MMOL/L — SIGNIFICANT CHANGE UP (ref 5–17)
ANION GAP SERPL CALC-SCNC: 13 MMOL/L — SIGNIFICANT CHANGE UP (ref 5–17)
ANION GAP SERPL CALC-SCNC: 8 MMOL/L — SIGNIFICANT CHANGE UP (ref 5–17)
APPEARANCE UR: CLEAR — SIGNIFICANT CHANGE UP
AST SERPL-CCNC: 19 U/L — SIGNIFICANT CHANGE UP (ref 10–40)
AST SERPL-CCNC: 22 U/L
AST SERPL-CCNC: 24 U/L — SIGNIFICANT CHANGE UP (ref 10–40)
AST SERPL-CCNC: 25 U/L — SIGNIFICANT CHANGE UP (ref 10–40)
BACTERIA # UR AUTO: NEGATIVE — SIGNIFICANT CHANGE UP
BASE EXCESS BLDV CALC-SCNC: 1.3 MMOL/L — SIGNIFICANT CHANGE UP (ref -2–3)
BASOPHILS # BLD AUTO: 0.02 K/UL — SIGNIFICANT CHANGE UP (ref 0–0.2)
BASOPHILS # BLD AUTO: 0.03 K/UL — SIGNIFICANT CHANGE UP (ref 0–0.2)
BASOPHILS NFR BLD AUTO: 0.2 % — SIGNIFICANT CHANGE UP (ref 0–2)
BASOPHILS NFR BLD AUTO: 0.4 % — SIGNIFICANT CHANGE UP (ref 0–2)
BILIRUB SERPL-MCNC: 0.6 MG/DL — SIGNIFICANT CHANGE UP (ref 0.2–1.2)
BILIRUB SERPL-MCNC: 0.8 MG/DL
BILIRUB UR-MCNC: NEGATIVE — SIGNIFICANT CHANGE UP
BUN SERPL-MCNC: 10 MG/DL
BUN SERPL-MCNC: 10 MG/DL — SIGNIFICANT CHANGE UP (ref 7–23)
BUN SERPL-MCNC: 10 MG/DL — SIGNIFICANT CHANGE UP (ref 7–23)
BUN SERPL-MCNC: 12 MG/DL — SIGNIFICANT CHANGE UP (ref 7–23)
BUN SERPL-MCNC: 9 MG/DL — SIGNIFICANT CHANGE UP (ref 7–23)
C DIFF GDH STL QL: NEGATIVE — SIGNIFICANT CHANGE UP
C DIFF GDH STL QL: SIGNIFICANT CHANGE UP
CA-I SERPL-SCNC: 1.14 MMOL/L — LOW (ref 1.15–1.33)
CALCIUM SERPL-MCNC: 8.9 MG/DL
CALCIUM SERPL-MCNC: 9 MG/DL — SIGNIFICANT CHANGE UP (ref 8.4–10.5)
CALCIUM SERPL-MCNC: 9 MG/DL — SIGNIFICANT CHANGE UP (ref 8.4–10.5)
CALCIUM SERPL-MCNC: 9.1 MG/DL — SIGNIFICANT CHANGE UP (ref 8.4–10.5)
CALCIUM SERPL-MCNC: 9.2 MG/DL — SIGNIFICANT CHANGE UP (ref 8.4–10.5)
CHLORIDE BLDV-SCNC: 102 MMOL/L — SIGNIFICANT CHANGE UP (ref 96–108)
CHLORIDE SERPL-SCNC: 101 MMOL/L — SIGNIFICANT CHANGE UP (ref 96–108)
CHLORIDE SERPL-SCNC: 102 MMOL/L
CHLORIDE SERPL-SCNC: 102 MMOL/L — SIGNIFICANT CHANGE UP (ref 96–108)
CHLORIDE SERPL-SCNC: 97 MMOL/L — SIGNIFICANT CHANGE UP (ref 96–108)
CHLORIDE SERPL-SCNC: 99 MMOL/L — SIGNIFICANT CHANGE UP (ref 96–108)
CO2 BLDV-SCNC: 29 MMOL/L — HIGH (ref 22–26)
CO2 SERPL-SCNC: 25 MMOL/L
CO2 SERPL-SCNC: 25 MMOL/L — SIGNIFICANT CHANGE UP (ref 22–31)
CO2 SERPL-SCNC: 28 MMOL/L — SIGNIFICANT CHANGE UP (ref 22–31)
CO2 SERPL-SCNC: 29 MMOL/L — SIGNIFICANT CHANGE UP (ref 22–31)
CO2 SERPL-SCNC: 31 MMOL/L — SIGNIFICANT CHANGE UP (ref 22–31)
COLOR SPEC: YELLOW — SIGNIFICANT CHANGE UP
COMMENT - URINE: SIGNIFICANT CHANGE UP
CREAT SERPL-MCNC: 0.62 MG/DL — SIGNIFICANT CHANGE UP (ref 0.5–1.3)
CREAT SERPL-MCNC: 0.64 MG/DL
CREAT SERPL-MCNC: 0.64 MG/DL — SIGNIFICANT CHANGE UP (ref 0.5–1.3)
CREAT SERPL-MCNC: 0.64 MG/DL — SIGNIFICANT CHANGE UP (ref 0.5–1.3)
CREAT SERPL-MCNC: 0.77 MG/DL — SIGNIFICANT CHANGE UP (ref 0.5–1.3)
CULTURE RESULTS: SIGNIFICANT CHANGE UP
DIFF PNL FLD: ABNORMAL
EGFR: 78 ML/MIN/1.73M2 — SIGNIFICANT CHANGE UP
EGFR: 90 ML/MIN/1.73M2
EGFR: 90 ML/MIN/1.73M2 — SIGNIFICANT CHANGE UP
EGFR: 90 ML/MIN/1.73M2 — SIGNIFICANT CHANGE UP
EGFR: 91 ML/MIN/1.73M2 — SIGNIFICANT CHANGE UP
EOSINOPHIL # BLD AUTO: 0.06 K/UL — SIGNIFICANT CHANGE UP (ref 0–0.5)
EOSINOPHIL # BLD AUTO: 0.07 K/UL — SIGNIFICANT CHANGE UP (ref 0–0.5)
EOSINOPHIL # BLD AUTO: 0.08 K/UL — SIGNIFICANT CHANGE UP (ref 0–0.5)
EOSINOPHIL # BLD AUTO: 0.11 K/UL — SIGNIFICANT CHANGE UP (ref 0–0.5)
EOSINOPHIL NFR BLD AUTO: 0.6 % — SIGNIFICANT CHANGE UP (ref 0–6)
EOSINOPHIL NFR BLD AUTO: 0.8 % — SIGNIFICANT CHANGE UP (ref 0–6)
EOSINOPHIL NFR BLD AUTO: 0.8 % — SIGNIFICANT CHANGE UP (ref 0–6)
EOSINOPHIL NFR BLD AUTO: 1.6 % — SIGNIFICANT CHANGE UP (ref 0–6)
EPI CELLS # UR: 24 — SIGNIFICANT CHANGE UP
ESTIMATED AVERAGE GLUCOSE: 114 MG/DL — SIGNIFICANT CHANGE UP (ref 68–114)
FERRITIN SERPL-MCNC: 342 NG/ML — HIGH (ref 15–150)
FLUAV AG NPH QL: SIGNIFICANT CHANGE UP
FLUAV AG NPH QL: SIGNIFICANT CHANGE UP
FLUBV AG NPH QL: SIGNIFICANT CHANGE UP
FLUBV AG NPH QL: SIGNIFICANT CHANGE UP
GAS PNL BLDV: 137 MMOL/L — SIGNIFICANT CHANGE UP (ref 136–145)
GAS PNL BLDV: SIGNIFICANT CHANGE UP
GAS PNL BLDV: SIGNIFICANT CHANGE UP
GLUCOSE BLDV-MCNC: 100 MG/DL — HIGH (ref 70–99)
GLUCOSE SERPL-MCNC: 105 MG/DL — HIGH (ref 70–99)
GLUCOSE SERPL-MCNC: 110 MG/DL — HIGH (ref 70–99)
GLUCOSE SERPL-MCNC: 113 MG/DL — HIGH (ref 70–99)
GLUCOSE SERPL-MCNC: 143 MG/DL — HIGH (ref 70–99)
GLUCOSE SERPL-MCNC: 147 MG/DL
GLUCOSE UR QL: NEGATIVE — SIGNIFICANT CHANGE UP
HCO3 BLDV-SCNC: 28 MMOL/L — SIGNIFICANT CHANGE UP (ref 22–29)
HCT VFR BLD CALC: 31.4 % — LOW (ref 34.5–45)
HCT VFR BLD CALC: 31.8 % — LOW (ref 34.5–45)
HCT VFR BLD CALC: 32.6 % — LOW (ref 34.5–45)
HCT VFR BLD CALC: 33.4 % — LOW (ref 34.5–45)
HCT VFR BLD CALC: 34 % — LOW (ref 34.5–45)
HCT VFR BLD CALC: 34 % — LOW (ref 34.5–45)
HCT VFR BLDA CALC: 34 % — LOW (ref 34.5–46.5)
HGB BLD CALC-MCNC: 11.4 G/DL — LOW (ref 11.7–16.1)
HGB BLD-MCNC: 10.3 G/DL — LOW (ref 11.5–15.5)
HGB BLD-MCNC: 10.3 G/DL — LOW (ref 11.5–15.5)
HGB BLD-MCNC: 10.6 G/DL — LOW (ref 11.5–15.5)
HGB BLD-MCNC: 10.9 G/DL — LOW (ref 11.5–15.5)
HGB BLD-MCNC: 9.5 G/DL — LOW (ref 11.5–15.5)
HGB BLD-MCNC: 9.7 G/DL — LOW (ref 11.5–15.5)
HYALINE CASTS # UR AUTO: 57 /LPF — HIGH (ref 0–7)
IMM GRANULOCYTES NFR BLD AUTO: 0.3 % — SIGNIFICANT CHANGE UP (ref 0–0.9)
IMM GRANULOCYTES NFR BLD AUTO: 0.3 % — SIGNIFICANT CHANGE UP (ref 0–0.9)
IMM GRANULOCYTES NFR BLD AUTO: 0.4 % — SIGNIFICANT CHANGE UP (ref 0–0.9)
IMM GRANULOCYTES NFR BLD AUTO: 0.4 % — SIGNIFICANT CHANGE UP (ref 0–0.9)
IRON SATN MFR SERPL: 12 % — LOW (ref 14–50)
IRON SATN MFR SERPL: 28 UG/DL — LOW (ref 30–160)
KETONES UR-MCNC: NEGATIVE — SIGNIFICANT CHANGE UP
LACTATE BLDV-MCNC: 1.4 MMOL/L — SIGNIFICANT CHANGE UP (ref 0.5–2)
LEUKOCYTE ESTERASE UR-ACNC: ABNORMAL
LIDOCAIN IGE QN: 9 U/L — SIGNIFICANT CHANGE UP (ref 7–60)
LYMPHOCYTES # BLD AUTO: 0.81 K/UL — LOW (ref 1–3.3)
LYMPHOCYTES # BLD AUTO: 1.03 K/UL — SIGNIFICANT CHANGE UP (ref 1–3.3)
LYMPHOCYTES # BLD AUTO: 1.05 K/UL — SIGNIFICANT CHANGE UP (ref 1–3.3)
LYMPHOCYTES # BLD AUTO: 1.15 K/UL — SIGNIFICANT CHANGE UP (ref 1–3.3)
LYMPHOCYTES # BLD AUTO: 10.8 % — LOW (ref 13–44)
LYMPHOCYTES # BLD AUTO: 11.3 % — LOW (ref 13–44)
LYMPHOCYTES # BLD AUTO: 16.8 % — SIGNIFICANT CHANGE UP (ref 13–44)
LYMPHOCYTES # BLD AUTO: 8.2 % — LOW (ref 13–44)
MAGNESIUM SERPL-MCNC: 1.6 MG/DL — SIGNIFICANT CHANGE UP (ref 1.6–2.6)
MAGNESIUM SERPL-MCNC: 1.7 MG/DL — SIGNIFICANT CHANGE UP (ref 1.6–2.6)
MCHC RBC-ENTMCNC: 26.3 PG — LOW (ref 27–34)
MCHC RBC-ENTMCNC: 26.5 PG — LOW (ref 27–34)
MCHC RBC-ENTMCNC: 26.6 PG — LOW (ref 27–34)
MCHC RBC-ENTMCNC: 27 PG — SIGNIFICANT CHANGE UP (ref 27–34)
MCHC RBC-ENTMCNC: 27.1 PG — SIGNIFICANT CHANGE UP (ref 27–34)
MCHC RBC-ENTMCNC: 27.2 PG — SIGNIFICANT CHANGE UP (ref 27–34)
MCHC RBC-ENTMCNC: 30.3 GM/DL — LOW (ref 32–36)
MCHC RBC-ENTMCNC: 30.3 GM/DL — LOW (ref 32–36)
MCHC RBC-ENTMCNC: 30.5 GM/DL — LOW (ref 32–36)
MCHC RBC-ENTMCNC: 31.6 GM/DL — LOW (ref 32–36)
MCHC RBC-ENTMCNC: 31.7 G/DL — LOW (ref 32–36)
MCHC RBC-ENTMCNC: 32.1 GM/DL — SIGNIFICANT CHANGE UP (ref 32–36)
MCV RBC AUTO: 84.6 FL — SIGNIFICANT CHANGE UP (ref 80–100)
MCV RBC AUTO: 85 FL — SIGNIFICANT CHANGE UP (ref 80–100)
MCV RBC AUTO: 86 FL — SIGNIFICANT CHANGE UP (ref 80–100)
MCV RBC AUTO: 87 FL — SIGNIFICANT CHANGE UP (ref 80–100)
MCV RBC AUTO: 87.1 FL — SIGNIFICANT CHANGE UP (ref 80–100)
MCV RBC AUTO: 87.6 FL — SIGNIFICANT CHANGE UP (ref 80–100)
MONOCYTES # BLD AUTO: 0.77 K/UL — SIGNIFICANT CHANGE UP (ref 0–0.9)
MONOCYTES # BLD AUTO: 0.88 K/UL — SIGNIFICANT CHANGE UP (ref 0–0.9)
MONOCYTES # BLD AUTO: 0.89 K/UL — SIGNIFICANT CHANGE UP (ref 0–0.9)
MONOCYTES # BLD AUTO: 0.96 K/UL — HIGH (ref 0–0.9)
MONOCYTES NFR BLD AUTO: 10.4 % — SIGNIFICANT CHANGE UP (ref 2–14)
MONOCYTES NFR BLD AUTO: 13 % — SIGNIFICANT CHANGE UP (ref 2–14)
MONOCYTES NFR BLD AUTO: 8.1 % — SIGNIFICANT CHANGE UP (ref 2–14)
MONOCYTES NFR BLD AUTO: 8.9 % — SIGNIFICANT CHANGE UP (ref 2–14)
NEUTROPHILS # BLD AUTO: 4.63 K/UL — SIGNIFICANT CHANGE UP (ref 1.8–7.4)
NEUTROPHILS # BLD AUTO: 7.14 K/UL — SIGNIFICANT CHANGE UP (ref 1.8–7.4)
NEUTROPHILS # BLD AUTO: 7.58 K/UL — HIGH (ref 1.8–7.4)
NEUTROPHILS # BLD AUTO: 8.08 K/UL — HIGH (ref 1.8–7.4)
NEUTROPHILS NFR BLD AUTO: 67.9 % — SIGNIFICANT CHANGE UP (ref 43–77)
NEUTROPHILS NFR BLD AUTO: 77 % — SIGNIFICANT CHANGE UP (ref 43–77)
NEUTROPHILS NFR BLD AUTO: 79.9 % — HIGH (ref 43–77)
NEUTROPHILS NFR BLD AUTO: 81.5 % — HIGH (ref 43–77)
NITRITE UR-MCNC: NEGATIVE — SIGNIFICANT CHANGE UP
NRBC # BLD: 0 /100 WBCS — SIGNIFICANT CHANGE UP (ref 0–0)
PCO2 BLDV: 50 MMHG — HIGH (ref 39–42)
PH BLDV: 7.35 — SIGNIFICANT CHANGE UP (ref 7.32–7.43)
PH UR: 5.5 — SIGNIFICANT CHANGE UP (ref 5–8)
PHOSPHATE SERPL-MCNC: 3.3 MG/DL — SIGNIFICANT CHANGE UP (ref 2.5–4.5)
PLATELET # BLD AUTO: 330 K/UL — SIGNIFICANT CHANGE UP (ref 150–400)
PLATELET # BLD AUTO: 365 K/UL — SIGNIFICANT CHANGE UP (ref 150–400)
PLATELET # BLD AUTO: 371 K/UL — SIGNIFICANT CHANGE UP (ref 150–400)
PLATELET # BLD AUTO: 371 K/UL — SIGNIFICANT CHANGE UP (ref 150–400)
PLATELET # BLD AUTO: 397 K/UL — SIGNIFICANT CHANGE UP (ref 150–400)
PLATELET # BLD AUTO: 422 K/UL — HIGH (ref 150–400)
PO2 BLDV: 30 MMHG — SIGNIFICANT CHANGE UP (ref 25–45)
POTASSIUM BLDV-SCNC: 3.4 MMOL/L — LOW (ref 3.5–5.1)
POTASSIUM SERPL-MCNC: 3.2 MMOL/L — LOW (ref 3.5–5.3)
POTASSIUM SERPL-MCNC: 3.5 MMOL/L — SIGNIFICANT CHANGE UP (ref 3.5–5.3)
POTASSIUM SERPL-MCNC: 4 MMOL/L — SIGNIFICANT CHANGE UP (ref 3.5–5.3)
POTASSIUM SERPL-MCNC: 4.2 MMOL/L — SIGNIFICANT CHANGE UP (ref 3.5–5.3)
POTASSIUM SERPL-SCNC: 3.2 MMOL/L — LOW (ref 3.5–5.3)
POTASSIUM SERPL-SCNC: 3.5 MMOL/L — SIGNIFICANT CHANGE UP (ref 3.5–5.3)
POTASSIUM SERPL-SCNC: 3.8 MMOL/L
POTASSIUM SERPL-SCNC: 4 MMOL/L — SIGNIFICANT CHANGE UP (ref 3.5–5.3)
POTASSIUM SERPL-SCNC: 4.2 MMOL/L — SIGNIFICANT CHANGE UP (ref 3.5–5.3)
PROT SERPL-MCNC: 6.5 G/DL
PROT SERPL-MCNC: 6.7 G/DL — SIGNIFICANT CHANGE UP (ref 6–8.3)
PROT SERPL-MCNC: 6.7 G/DL — SIGNIFICANT CHANGE UP (ref 6–8.3)
PROT SERPL-MCNC: 7.1 G/DL — SIGNIFICANT CHANGE UP (ref 6–8.3)
PROT UR-MCNC: ABNORMAL
RBC # BLD: 3.61 M/UL — LOW (ref 3.8–5.2)
RBC # BLD: 3.65 M/UL — LOW (ref 3.8–5.2)
RBC # BLD: 3.79 M/UL — LOW (ref 3.8–5.2)
RBC # BLD: 3.88 M/UL — SIGNIFICANT CHANGE UP (ref 3.8–5.2)
RBC # BLD: 3.93 M/UL — SIGNIFICANT CHANGE UP (ref 3.8–5.2)
RBC # BLD: 4.02 M/UL — SIGNIFICANT CHANGE UP (ref 3.8–5.2)
RBC # FLD: 17.2 % — HIGH (ref 10.3–14.5)
RBC # FLD: 17.2 % — HIGH (ref 10.3–14.5)
RBC # FLD: 17.3 % — HIGH (ref 10.3–14.5)
RBC # FLD: 17.3 % — HIGH (ref 10.3–14.5)
RBC # FLD: 17.5 % — HIGH (ref 10.3–14.5)
RBC # FLD: 17.6 % — HIGH (ref 10.3–14.5)
RBC CASTS # UR COMP ASSIST: 15 /HPF — HIGH (ref 0–4)
RSV RNA NPH QL NAA+NON-PROBE: SIGNIFICANT CHANGE UP
RSV RNA NPH QL NAA+NON-PROBE: SIGNIFICANT CHANGE UP
SAO2 % BLDV: 43.1 % — LOW (ref 67–88)
SARS-COV-2 N GENE NPH QL NAA+PROBE: NOT DETECTED
SARS-COV-2 RNA SPEC QL NAA+PROBE: SIGNIFICANT CHANGE UP
SARS-COV-2 RNA SPEC QL NAA+PROBE: SIGNIFICANT CHANGE UP
SODIUM SERPL-SCNC: 138 MMOL/L — SIGNIFICANT CHANGE UP (ref 135–145)
SODIUM SERPL-SCNC: 138 MMOL/L — SIGNIFICANT CHANGE UP (ref 135–145)
SODIUM SERPL-SCNC: 139 MMOL/L — SIGNIFICANT CHANGE UP (ref 135–145)
SODIUM SERPL-SCNC: 140 MMOL/L
SODIUM SERPL-SCNC: 142 MMOL/L — SIGNIFICANT CHANGE UP (ref 135–145)
SP GR SPEC: 1.02 — SIGNIFICANT CHANGE UP (ref 1.01–1.02)
SPECIMEN SOURCE: SIGNIFICANT CHANGE UP
TIBC SERPL-MCNC: 241 UG/DL — SIGNIFICANT CHANGE UP (ref 220–430)
TROPONIN T, HIGH SENSITIVITY RESULT: 12 NG/L — SIGNIFICANT CHANGE UP (ref 0–51)
TROPONIN T, HIGH SENSITIVITY RESULT: 13 NG/L — SIGNIFICANT CHANGE UP (ref 0–51)
UIBC SERPL-MCNC: 213 UG/DL — SIGNIFICANT CHANGE UP (ref 110–370)
UROBILINOGEN FLD QL: SIGNIFICANT CHANGE UP
WBC # BLD: 10.05 K/UL — SIGNIFICANT CHANGE UP (ref 3.8–10.5)
WBC # BLD: 6.83 K/UL — SIGNIFICANT CHANGE UP (ref 3.8–10.5)
WBC # BLD: 8.99 K/UL — SIGNIFICANT CHANGE UP (ref 3.8–10.5)
WBC # BLD: 9.27 K/UL — SIGNIFICANT CHANGE UP (ref 3.8–10.5)
WBC # BLD: 9.5 K/UL — SIGNIFICANT CHANGE UP (ref 3.8–10.5)
WBC # BLD: 9.91 K/UL — SIGNIFICANT CHANGE UP (ref 3.8–10.5)
WBC # FLD AUTO: 10.05 K/UL — SIGNIFICANT CHANGE UP (ref 3.8–10.5)
WBC # FLD AUTO: 6.83 K/UL — SIGNIFICANT CHANGE UP (ref 3.8–10.5)
WBC # FLD AUTO: 8.99 K/UL — SIGNIFICANT CHANGE UP (ref 3.8–10.5)
WBC # FLD AUTO: 9.27 K/UL — SIGNIFICANT CHANGE UP (ref 3.8–10.5)
WBC # FLD AUTO: 9.5 K/UL — SIGNIFICANT CHANGE UP (ref 3.8–10.5)
WBC # FLD AUTO: 9.91 K/UL — SIGNIFICANT CHANGE UP (ref 3.8–10.5)
WBC UR QL: 54 /HPF — HIGH (ref 0–5)

## 2023-01-01 PROCEDURE — 80053 COMPREHEN METABOLIC PANEL: CPT

## 2023-01-01 PROCEDURE — 99223 1ST HOSP IP/OBS HIGH 75: CPT

## 2023-01-01 PROCEDURE — 99233 SBSQ HOSP IP/OBS HIGH 50: CPT

## 2023-01-01 PROCEDURE — 74177 CT ABD & PELVIS W/CONTRAST: CPT | Mod: MA

## 2023-01-01 PROCEDURE — 74177 CT ABD & PELVIS W/CONTRAST: CPT | Mod: 26,MA

## 2023-01-01 PROCEDURE — 83690 ASSAY OF LIPASE: CPT

## 2023-01-01 PROCEDURE — 87040 BLOOD CULTURE FOR BACTERIA: CPT

## 2023-01-01 PROCEDURE — 85025 COMPLETE CBC W/AUTO DIFF WBC: CPT

## 2023-01-01 PROCEDURE — 99232 SBSQ HOSP IP/OBS MODERATE 35: CPT | Mod: GC

## 2023-01-01 PROCEDURE — 84295 ASSAY OF SERUM SODIUM: CPT

## 2023-01-01 PROCEDURE — 99215 OFFICE O/P EST HI 40 MIN: CPT

## 2023-01-01 PROCEDURE — 71045 X-RAY EXAM CHEST 1 VIEW: CPT | Mod: 26

## 2023-01-01 PROCEDURE — 80048 BASIC METABOLIC PNL TOTAL CA: CPT

## 2023-01-01 PROCEDURE — 96360 HYDRATION IV INFUSION INIT: CPT

## 2023-01-01 PROCEDURE — 84100 ASSAY OF PHOSPHORUS: CPT

## 2023-01-01 PROCEDURE — 99232 SBSQ HOSP IP/OBS MODERATE 35: CPT

## 2023-01-01 PROCEDURE — 99213 OFFICE O/P EST LOW 20 MIN: CPT | Mod: 95

## 2023-01-01 PROCEDURE — 83735 ASSAY OF MAGNESIUM: CPT

## 2023-01-01 PROCEDURE — 84132 ASSAY OF SERUM POTASSIUM: CPT

## 2023-01-01 PROCEDURE — 99285 EMERGENCY DEPT VISIT HI MDM: CPT

## 2023-01-01 PROCEDURE — 87449 NOS EACH ORGANISM AG IA: CPT

## 2023-01-01 PROCEDURE — 83540 ASSAY OF IRON: CPT

## 2023-01-01 PROCEDURE — 71045 X-RAY EXAM CHEST 1 VIEW: CPT

## 2023-01-01 PROCEDURE — 99215 OFFICE O/P EST HI 40 MIN: CPT | Mod: 95

## 2023-01-01 PROCEDURE — 87637 SARSCOV2&INF A&B&RSV AMP PRB: CPT

## 2023-01-01 PROCEDURE — G0378: CPT

## 2023-01-01 PROCEDURE — 83550 IRON BINDING TEST: CPT

## 2023-01-01 PROCEDURE — 76705 ECHO EXAM OF ABDOMEN: CPT | Mod: 26

## 2023-01-01 PROCEDURE — 99214 OFFICE O/P EST MOD 30 MIN: CPT

## 2023-01-01 PROCEDURE — 82435 ASSAY OF BLOOD CHLORIDE: CPT

## 2023-01-01 PROCEDURE — 83605 ASSAY OF LACTIC ACID: CPT

## 2023-01-01 PROCEDURE — 81001 URINALYSIS AUTO W/SCOPE: CPT

## 2023-01-01 PROCEDURE — 85014 HEMATOCRIT: CPT

## 2023-01-01 PROCEDURE — 99239 HOSP IP/OBS DSCHRG MGMT >30: CPT

## 2023-01-01 PROCEDURE — 85018 HEMOGLOBIN: CPT

## 2023-01-01 PROCEDURE — 83036 HEMOGLOBIN GLYCOSYLATED A1C: CPT

## 2023-01-01 PROCEDURE — 87086 URINE CULTURE/COLONY COUNT: CPT

## 2023-01-01 PROCEDURE — 97161 PT EVAL LOW COMPLEX 20 MIN: CPT

## 2023-01-01 PROCEDURE — 82803 BLOOD GASES ANY COMBINATION: CPT

## 2023-01-01 PROCEDURE — 99215 OFFICE O/P EST HI 40 MIN: CPT | Mod: 25

## 2023-01-01 PROCEDURE — 93010 ELECTROCARDIOGRAM REPORT: CPT | Mod: 76

## 2023-01-01 PROCEDURE — 87324 CLOSTRIDIUM AG IA: CPT

## 2023-01-01 PROCEDURE — 82728 ASSAY OF FERRITIN: CPT

## 2023-01-01 PROCEDURE — 93000 ELECTROCARDIOGRAM COMPLETE: CPT

## 2023-01-01 PROCEDURE — 84484 ASSAY OF TROPONIN QUANT: CPT

## 2023-01-01 PROCEDURE — 82330 ASSAY OF CALCIUM: CPT

## 2023-01-01 PROCEDURE — 76705 ECHO EXAM OF ABDOMEN: CPT

## 2023-01-01 PROCEDURE — 99223 1ST HOSP IP/OBS HIGH 75: CPT | Mod: GC

## 2023-01-01 PROCEDURE — 36415 COLL VENOUS BLD VENIPUNCTURE: CPT

## 2023-01-01 PROCEDURE — 85027 COMPLETE CBC AUTOMATED: CPT

## 2023-01-01 PROCEDURE — 82947 ASSAY GLUCOSE BLOOD QUANT: CPT

## 2023-01-01 RX ORDER — ALPRAZOLAM 0.25 MG
1 TABLET ORAL
Qty: 5 | Refills: 0
Start: 2023-01-01 | End: 2023-01-01

## 2023-01-01 RX ORDER — CHOLECALCIFEROL (VITAMIN D3) 125 MCG
1000 CAPSULE ORAL DAILY
Refills: 0 | Status: DISCONTINUED | OUTPATIENT
Start: 2023-01-01 | End: 2023-01-01

## 2023-01-01 RX ORDER — LANOLIN ALCOHOL/MO/W.PET/CERES
3 CREAM (GRAM) TOPICAL AT BEDTIME
Refills: 0 | Status: DISCONTINUED | OUTPATIENT
Start: 2023-01-01 | End: 2023-01-01

## 2023-01-01 RX ORDER — ESCITALOPRAM OXALATE 10 MG/1
5 TABLET, FILM COATED ORAL DAILY
Refills: 0 | Status: DISCONTINUED | OUTPATIENT
Start: 2023-01-01 | End: 2023-01-01

## 2023-01-01 RX ORDER — METOPROLOL TARTRATE 25 MG/1
25 TABLET, FILM COATED ORAL DAILY
Qty: 90 | Refills: 3 | Status: ACTIVE | COMMUNITY
Start: 2023-01-01 | End: 1900-01-01

## 2023-01-01 RX ORDER — POTASSIUM CHLORIDE 20 MEQ
40 PACKET (EA) ORAL EVERY 4 HOURS
Refills: 0 | Status: COMPLETED | OUTPATIENT
Start: 2023-01-01 | End: 2023-01-01

## 2023-01-01 RX ORDER — FUROSEMIDE 40 MG
1 TABLET ORAL
Qty: 30 | Refills: 0
Start: 2023-01-01 | End: 2023-01-01

## 2023-01-01 RX ORDER — OXYMETAZOLINE HYDROCHLORIDE 0.5 MG/ML
2 SPRAY NASAL
Qty: 0 | Refills: 0 | DISCHARGE

## 2023-01-01 RX ORDER — ALPRAZOLAM 0.25 MG
0.25 TABLET ORAL ONCE
Refills: 0 | Status: DISCONTINUED | OUTPATIENT
Start: 2023-01-01 | End: 2023-01-01

## 2023-01-01 RX ORDER — ESCITALOPRAM OXALATE 10 MG/1
1 TABLET, FILM COATED ORAL
Qty: 0 | Refills: 0 | DISCHARGE

## 2023-01-01 RX ORDER — ALPRAZOLAM 0.25 MG
1 TABLET ORAL
Qty: 0 | Refills: 0 | DISCHARGE

## 2023-01-01 RX ORDER — GUAIFENESIN/DEXTROMETHORPHAN 600MG-30MG
10 TABLET, EXTENDED RELEASE 12 HR ORAL
Qty: 0 | Refills: 0 | DISCHARGE
Start: 2023-01-01

## 2023-01-01 RX ORDER — ATORVASTATIN CALCIUM 80 MG/1
40 TABLET, FILM COATED ORAL AT BEDTIME
Refills: 0 | Status: DISCONTINUED | OUTPATIENT
Start: 2023-01-01 | End: 2023-01-01

## 2023-01-01 RX ORDER — OXYCODONE HYDROCHLORIDE 5 MG/1
5 TABLET ORAL EVERY 6 HOURS
Refills: 0 | Status: DISCONTINUED | OUTPATIENT
Start: 2023-01-01 | End: 2023-01-01

## 2023-01-01 RX ORDER — LIDOCAINE AND PRILOCAINE 25; 25 MG/G; MG/G
2.5-2.5 CREAM TOPICAL
Qty: 1 | Refills: 2 | Status: COMPLETED | COMMUNITY
Start: 2021-12-10 | End: 2023-01-01

## 2023-01-01 RX ORDER — LANOLIN ALCOHOL/MO/W.PET/CERES
1 CREAM (GRAM) TOPICAL
Qty: 0 | Refills: 0 | DISCHARGE

## 2023-01-01 RX ORDER — METOPROLOL TARTRATE 50 MG
100 TABLET ORAL
Refills: 0 | Status: DISCONTINUED | OUTPATIENT
Start: 2023-01-01 | End: 2023-01-01

## 2023-01-01 RX ORDER — PANTOPRAZOLE SODIUM 20 MG/1
40 TABLET, DELAYED RELEASE ORAL
Refills: 0 | Status: DISCONTINUED | OUTPATIENT
Start: 2023-01-01 | End: 2023-01-01

## 2023-01-01 RX ORDER — FUROSEMIDE 40 MG
40 TABLET ORAL DAILY
Refills: 0 | Status: DISCONTINUED | OUTPATIENT
Start: 2023-01-01 | End: 2023-01-01

## 2023-01-01 RX ORDER — OXYCODONE HYDROCHLORIDE 5 MG/1
1 TABLET ORAL
Qty: 20 | Refills: 0
Start: 2023-01-01 | End: 2023-01-01

## 2023-01-01 RX ORDER — NALOXONE HYDROCHLORIDE 4 MG/.1ML
4 SPRAY NASAL
Qty: 120 | Refills: 0
Start: 2023-01-01 | End: 2023-01-01

## 2023-01-01 RX ORDER — ALPRAZOLAM 0.25 MG
0.25 TABLET ORAL DAILY
Refills: 0 | Status: DISCONTINUED | OUTPATIENT
Start: 2023-01-01 | End: 2023-01-01

## 2023-01-01 RX ORDER — ALPRAZOLAM 0.5 MG/1
0.5 TABLET ORAL
Qty: 30 | Refills: 0 | Status: ACTIVE | COMMUNITY
Start: 2018-01-22 | End: 1900-01-01

## 2023-01-01 RX ORDER — FAMOTIDINE 10 MG/ML
20 INJECTION INTRAVENOUS ONCE
Refills: 0 | Status: COMPLETED | OUTPATIENT
Start: 2023-01-01 | End: 2023-01-01

## 2023-01-01 RX ORDER — ONDANSETRON 8 MG/1
4 TABLET, FILM COATED ORAL EVERY 8 HOURS
Refills: 0 | Status: DISCONTINUED | OUTPATIENT
Start: 2023-01-01 | End: 2023-01-01

## 2023-01-01 RX ORDER — METOPROLOL TARTRATE 50 MG
1 TABLET ORAL
Qty: 0 | Refills: 0 | DISCHARGE

## 2023-01-01 RX ORDER — SPIRONOLACTONE 25 MG/1
1 TABLET, FILM COATED ORAL
Qty: 0 | Refills: 0 | DISCHARGE

## 2023-01-01 RX ORDER — PANTOPRAZOLE SODIUM 20 MG/1
1 TABLET, DELAYED RELEASE ORAL
Qty: 30 | Refills: 0
Start: 2023-01-01 | End: 2023-01-01

## 2023-01-01 RX ORDER — APIXABAN 2.5 MG/1
5 TABLET, FILM COATED ORAL EVERY 12 HOURS
Refills: 0 | Status: DISCONTINUED | OUTPATIENT
Start: 2023-01-01 | End: 2023-01-01

## 2023-01-01 RX ORDER — LANOLIN ALCOHOL/MO/W.PET/CERES
1 CREAM (GRAM) TOPICAL
Qty: 0 | Refills: 0 | DISCHARGE
Start: 2023-01-01

## 2023-01-01 RX ORDER — GUAIFENESIN/DEXTROMETHORPHAN 600MG-30MG
10 TABLET, EXTENDED RELEASE 12 HR ORAL THREE TIMES A DAY
Refills: 0 | Status: DISCONTINUED | OUTPATIENT
Start: 2023-01-01 | End: 2023-01-01

## 2023-01-01 RX ORDER — SODIUM CHLORIDE 9 MG/ML
1000 INJECTION, SOLUTION INTRAVENOUS ONCE
Refills: 0 | Status: COMPLETED | OUTPATIENT
Start: 2023-01-01 | End: 2023-01-01

## 2023-01-01 RX ORDER — ALBUTEROL SULFATE 90 UG/1
108 (90 BASE) INHALANT RESPIRATORY (INHALATION)
Qty: 1 | Refills: 3 | Status: ACTIVE | COMMUNITY
Start: 2022-01-01 | End: 1900-01-01

## 2023-01-01 RX ORDER — SPIRONOLACTONE 25 MG/1
25 TABLET, FILM COATED ORAL DAILY
Refills: 0 | Status: DISCONTINUED | OUTPATIENT
Start: 2023-01-01 | End: 2023-01-01

## 2023-01-01 RX ORDER — SENNA PLUS 8.6 MG/1
1 TABLET ORAL
Qty: 30 | Refills: 0
Start: 2023-01-01

## 2023-01-01 RX ORDER — POLYETHYLENE GLYCOL 3350 17 G/17G
17 POWDER, FOR SOLUTION ORAL
Qty: 1020 | Refills: 0
Start: 2023-01-01 | End: 2023-01-01

## 2023-01-01 RX ORDER — ACETAMINOPHEN 500 MG
650 TABLET ORAL EVERY 6 HOURS
Refills: 0 | Status: DISCONTINUED | OUTPATIENT
Start: 2023-01-01 | End: 2023-01-01

## 2023-01-01 RX ORDER — CHOLECALCIFEROL (VITAMIN D3) 125 MCG
1 CAPSULE ORAL
Qty: 0 | Refills: 0 | DISCHARGE

## 2023-01-01 RX ORDER — ESCITALOPRAM OXALATE 10 MG/1
10 TABLET ORAL DAILY
Qty: 30 | Refills: 6 | Status: ACTIVE | COMMUNITY
Start: 2022-01-01 | End: 1900-01-01

## 2023-01-01 RX ADMIN — ATORVASTATIN CALCIUM 40 MILLIGRAM(S): 80 TABLET, FILM COATED ORAL at 21:25

## 2023-01-01 RX ADMIN — Medication 10 MILLILITER(S): at 13:28

## 2023-01-01 RX ADMIN — ESCITALOPRAM OXALATE 5 MILLIGRAM(S): 10 TABLET, FILM COATED ORAL at 13:28

## 2023-01-01 RX ADMIN — APIXABAN 5 MILLIGRAM(S): 2.5 TABLET, FILM COATED ORAL at 18:12

## 2023-01-01 RX ADMIN — APIXABAN 5 MILLIGRAM(S): 2.5 TABLET, FILM COATED ORAL at 06:49

## 2023-01-01 RX ADMIN — APIXABAN 5 MILLIGRAM(S): 2.5 TABLET, FILM COATED ORAL at 17:08

## 2023-01-01 RX ADMIN — FAMOTIDINE 20 MILLIGRAM(S): 10 INJECTION INTRAVENOUS at 22:50

## 2023-01-01 RX ADMIN — ATORVASTATIN CALCIUM 40 MILLIGRAM(S): 80 TABLET, FILM COATED ORAL at 21:35

## 2023-01-01 RX ADMIN — APIXABAN 5 MILLIGRAM(S): 2.5 TABLET, FILM COATED ORAL at 05:12

## 2023-01-01 RX ADMIN — APIXABAN 5 MILLIGRAM(S): 2.5 TABLET, FILM COATED ORAL at 18:26

## 2023-01-01 RX ADMIN — ONDANSETRON 4 MILLIGRAM(S): 8 TABLET, FILM COATED ORAL at 13:40

## 2023-01-01 RX ADMIN — Medication 40 MILLIGRAM(S): at 06:49

## 2023-01-01 RX ADMIN — Medication 40 MILLIGRAM(S): at 18:05

## 2023-01-01 RX ADMIN — Medication 10 MILLILITER(S): at 21:25

## 2023-01-01 RX ADMIN — Medication 100 MILLIGRAM(S): at 05:54

## 2023-01-01 RX ADMIN — SODIUM CHLORIDE 1000 MILLILITER(S): 9 INJECTION, SOLUTION INTRAVENOUS at 19:00

## 2023-01-01 RX ADMIN — Medication 100 MILLIGRAM(S): at 18:05

## 2023-01-01 RX ADMIN — Medication 1000 UNIT(S): at 13:22

## 2023-01-01 RX ADMIN — Medication 3 MILLIGRAM(S): at 22:19

## 2023-01-01 RX ADMIN — SPIRONOLACTONE 25 MILLIGRAM(S): 25 TABLET, FILM COATED ORAL at 05:25

## 2023-01-01 RX ADMIN — SPIRONOLACTONE 25 MILLIGRAM(S): 25 TABLET, FILM COATED ORAL at 12:19

## 2023-01-01 RX ADMIN — Medication 100 MILLIGRAM(S): at 17:26

## 2023-01-01 RX ADMIN — SPIRONOLACTONE 25 MILLIGRAM(S): 25 TABLET, FILM COATED ORAL at 05:12

## 2023-01-01 RX ADMIN — PANTOPRAZOLE SODIUM 40 MILLIGRAM(S): 20 TABLET, DELAYED RELEASE ORAL at 06:49

## 2023-01-01 RX ADMIN — Medication 100 MILLIGRAM(S): at 06:48

## 2023-01-01 RX ADMIN — APIXABAN 5 MILLIGRAM(S): 2.5 TABLET, FILM COATED ORAL at 17:27

## 2023-01-01 RX ADMIN — Medication 3 MILLIGRAM(S): at 22:21

## 2023-01-01 RX ADMIN — Medication 40 MILLIEQUIVALENT(S): at 13:38

## 2023-01-01 RX ADMIN — ESCITALOPRAM OXALATE 5 MILLIGRAM(S): 10 TABLET, FILM COATED ORAL at 12:19

## 2023-01-01 RX ADMIN — ESCITALOPRAM OXALATE 5 MILLIGRAM(S): 10 TABLET, FILM COATED ORAL at 13:08

## 2023-01-01 RX ADMIN — Medication 10 MILLILITER(S): at 05:12

## 2023-01-01 RX ADMIN — Medication 1000 UNIT(S): at 12:33

## 2023-01-01 RX ADMIN — SPIRONOLACTONE 25 MILLIGRAM(S): 25 TABLET, FILM COATED ORAL at 06:49

## 2023-01-01 RX ADMIN — Medication 100 MILLIGRAM(S): at 18:26

## 2023-01-01 RX ADMIN — Medication 30 MILLILITER(S): at 22:50

## 2023-01-01 RX ADMIN — ESCITALOPRAM OXALATE 5 MILLIGRAM(S): 10 TABLET, FILM COATED ORAL at 11:29

## 2023-01-01 RX ADMIN — APIXABAN 5 MILLIGRAM(S): 2.5 TABLET, FILM COATED ORAL at 18:05

## 2023-01-01 RX ADMIN — Medication 100 MILLIGRAM(S): at 17:08

## 2023-01-01 RX ADMIN — ATORVASTATIN CALCIUM 40 MILLIGRAM(S): 80 TABLET, FILM COATED ORAL at 22:15

## 2023-01-01 RX ADMIN — APIXABAN 5 MILLIGRAM(S): 2.5 TABLET, FILM COATED ORAL at 05:18

## 2023-01-01 RX ADMIN — Medication 0.25 MILLIGRAM(S): at 22:34

## 2023-01-01 RX ADMIN — Medication 100 MILLIGRAM(S): at 05:12

## 2023-01-01 RX ADMIN — Medication 600 MILLIGRAM(S): at 21:35

## 2023-01-01 RX ADMIN — Medication 100 MILLIGRAM(S): at 05:39

## 2023-01-01 RX ADMIN — ESCITALOPRAM OXALATE 5 MILLIGRAM(S): 10 TABLET, FILM COATED ORAL at 13:22

## 2023-01-01 RX ADMIN — ATORVASTATIN CALCIUM 40 MILLIGRAM(S): 80 TABLET, FILM COATED ORAL at 22:19

## 2023-01-01 RX ADMIN — ESCITALOPRAM OXALATE 5 MILLIGRAM(S): 10 TABLET, FILM COATED ORAL at 12:33

## 2023-01-01 RX ADMIN — Medication 100 MILLIGRAM(S): at 23:48

## 2023-01-01 RX ADMIN — Medication 100 MILLIGRAM(S): at 18:11

## 2023-01-01 RX ADMIN — Medication 0.25 MILLIGRAM(S): at 22:38

## 2023-01-01 RX ADMIN — SODIUM CHLORIDE 1000 MILLILITER(S): 9 INJECTION, SOLUTION INTRAVENOUS at 17:43

## 2023-01-01 RX ADMIN — Medication 100 MILLIGRAM(S): at 05:18

## 2023-01-01 RX ADMIN — Medication 1000 UNIT(S): at 13:08

## 2023-01-01 RX ADMIN — Medication 0.25 MILLIGRAM(S): at 00:21

## 2023-01-01 RX ADMIN — Medication 10 MILLILITER(S): at 13:50

## 2023-01-01 RX ADMIN — ESCITALOPRAM OXALATE 5 MILLIGRAM(S): 10 TABLET, FILM COATED ORAL at 11:10

## 2023-01-01 RX ADMIN — Medication 40 MILLIEQUIVALENT(S): at 18:06

## 2023-01-01 RX ADMIN — Medication 40 MILLIGRAM(S): at 05:25

## 2023-01-01 RX ADMIN — Medication 0.25 MILLIGRAM(S): at 22:51

## 2023-01-01 RX ADMIN — APIXABAN 5 MILLIGRAM(S): 2.5 TABLET, FILM COATED ORAL at 05:25

## 2023-01-01 RX ADMIN — Medication 100 MILLIGRAM(S): at 05:25

## 2023-01-01 RX ADMIN — PANTOPRAZOLE SODIUM 40 MILLIGRAM(S): 20 TABLET, DELAYED RELEASE ORAL at 08:36

## 2023-01-01 RX ADMIN — Medication 40 MILLIGRAM(S): at 05:12

## 2023-01-01 RX ADMIN — APIXABAN 5 MILLIGRAM(S): 2.5 TABLET, FILM COATED ORAL at 05:55

## 2023-01-01 RX ADMIN — Medication 0.25 MILLIGRAM(S): at 23:05

## 2023-01-01 RX ADMIN — ATORVASTATIN CALCIUM 40 MILLIGRAM(S): 80 TABLET, FILM COATED ORAL at 22:32

## 2023-01-01 RX ADMIN — APIXABAN 5 MILLIGRAM(S): 2.5 TABLET, FILM COATED ORAL at 05:39

## 2023-01-01 RX ADMIN — Medication 0.25 MILLIGRAM(S): at 05:30

## 2023-01-01 RX ADMIN — Medication 0.25 MILLIGRAM(S): at 23:36

## 2023-01-10 PROBLEM — R53.83 FATIGUE DUE TO TREATMENT: Status: ACTIVE | Noted: 2022-01-01

## 2023-01-10 PROBLEM — I50.33 ACUTE ON CHRONIC HEART FAILURE WITH PRESERVED EJECTION FRACTION: Status: ACTIVE | Noted: 2022-01-01

## 2023-01-12 NOTE — ED ADULT NURSE NOTE - NSIMPLEMENTINTERV_GEN_ALL_ED
Implemented All Fall Risk Interventions:  [place] to call system. Call bell, personal items and telephone within reach. Instruct patient to call for assistance. Room bathroom lighting operational. Non-slip footwear when patient is off stretcher. Physically safe environment: no spills, clutter or unnecessary equipment. Stretcher in lowest position, wheels locked, appropriate side rails in place. Provide visual cue, wrist band, yellow gown, etc. Monitor gait and stability. Monitor for mental status changes and reorient to person, place, and time. Review medications for side effects contributing to fall risk. Reinforce activity limits and safety measures with patient and family.

## 2023-01-12 NOTE — ED ADULT NURSE NOTE - OBJECTIVE STATEMENT
Pt presented to ed with c/o near syncope that happened today. pt reports that she has been having diarrhea for past 1 wk. pt reports fever, but denies nausea, vomiting, sob, chest pain, headache, dizziness. pt is on home o2 @ 2L via NC. Oxygen tank full. Will continue to monitor.

## 2023-01-12 NOTE — ED ADULT TRIAGE NOTE - WEIGHT IN LBS
On 12/23/2019 the patient signed an YONAS authorizing medical records to be exchanged between [facility] and [facility]  for the purpose of continuing care. I sent the request to medical records via the tube system and kept a copy in psychiatry until scanning is complete.  Daya Caceres, [facility]     160

## 2023-01-12 NOTE — ED PROVIDER NOTE - PROGRESS NOTE DETAILS
Pt reassessed, states has not had any diarrhea in the ED but feels generalized weak and lives by herself and does not feel comfortable going home. Case discussed with oncology fellow earlier to admit to hospitalist if needed. JANELLE

## 2023-01-12 NOTE — ED ADULT NURSE REASSESSMENT NOTE - NS ED NURSE REASSESS COMMENT FT1
Contact isolation precaution maintained. 2L via NC oxygen connected to wall oxygen. pt is a&ox3. breathing at ease. Will continue to monitor.

## 2023-01-12 NOTE — ED PROVIDER NOTE - ATTENDING APP SHARED VISIT CONTRIBUTION OF CARE
RGUJRAL 77-year-old female history of pancreatic cancer on chemo, home oxygen 2 L, atrial fibrillation status post ablation on Eliquis presents today with diarrhea.  Patient states that she had few episodes of diarrhea last week that resolved after she took imodium. Today pt had 1 episode of diarrhea that was associated with lightheadedness. States she was not able to able to make it to the bathroom. States she felt weak after BM. Denies any chest pain, sob. Patient also admits to low-grade temperatures about 100 last week.  Patient was evaluated at the urgent care and her PCP and had blood work drawn that was normal per patient.  Patient complains of mild abdominal pain intermittently for a week currently has no pain.  Patient has chronic nausea.  Denies any blood in stool.  No recent travel or antibiotics or sick contacts.  Last chemo was December.  Patient spoke to her oncologist today and in for evaluation on exam patient is well-appearing speaking full sentences.  Chest with left basilar rales positive S1-S2.  Abdomen is soft nondistended nontender.  No lower extremity edema.  Check labs and culture, stool culture, CDiff, CT to evaluate for but not limited to infection, colitis.  Supportive care and will follow. RGUJRAL 77-year-old female history of pancreatic cancer on chemo, CVA, HTN, HLD, home oxygen 2 L, atrial fibrillation status post ablation on Eliquis presents today with diarrhea.  Patient states that she had few episodes of diarrhea last week that resolved after she took imodium. Today pt had 1 episode of diarrhea that was associated with lightheadedness. States she was not able to able to make it to the bathroom. States she felt weak after BM. Denies any chest pain, sob. Patient also admits to low-grade temperatures about 100 last week.  Patient was evaluated at the urgent care and her PCP and had blood work drawn that was normal per patient.  Patient complains of mild abdominal pain intermittently for a week currently has no pain.  Patient has chronic nausea.  Denies any blood in stool.  No recent travel or antibiotics or sick contacts.  Last chemo was December.  Patient spoke to her oncologist today and in for evaluation on exam patient is well-appearing speaking full sentences.  Chest with left basilar rales positive S1-S2.  Abdomen is soft nondistended nontender.  No lower extremity edema.  Check labs and culture, stool culture, CDiff, CT to evaluate for but not limited to infection, colitis.  Supportive care and will follow.

## 2023-01-13 NOTE — H&P ADULT - PROBLEM SELECTOR PLAN 1
See above.   One episode of loose but formed BM.   Doubt C. difficile.  S/P IVF.   Stool samples ordered.

## 2023-01-13 NOTE — H&P ADULT - NSHPADDITIONALINFOADULT_GEN_ALL_CORE
NIGHT HOSPITALIST:    Patient aware of course and agrees with plan/care as above.   Given patient's comorbidities, patient's long term prognosis is guarded.   Emotional support provided to patient.   Care reviewed with covering NP/PA for endorsement to my physician colleagues in the AM.    Romario Rowe MD  Available on [employer] Teams. NIGHT HOSPITALIST:    Patient aware of course and agrees with plan/care as above.   Given patient's comorbidities, patient's long term prognosis is guarded.   Emotional support provided to patient.   Care reviewed with covering NP/PA< Damaris  for endorsement to my physician colleagues in the AM.    Romario Rowe MD  Available on [employer] Teams.

## 2023-01-13 NOTE — H&P ADULT - NSHPSOURCEINFOTX_GEN_ALL_CORE
Reviewed Medex with patient via patient recall and HIE Office Note from 12/22/22 Dr. Lele Dos Santos.

## 2023-01-13 NOTE — H&P ADULT - NSICDXPASTMEDICALHX_GEN_ALL_CORE_FT
PAST MEDICAL HISTORY:  Afib s/p ablation x3, on Eliquis    Anxiety and depression     Atrial flutter     COVID-19 vaccine series declined     CVA (cerebral vascular accident)     H/O diarrhea     HLD (hyperlipidemia)     HTN (hypertension)     Pancreatic cancer

## 2023-01-13 NOTE — H&P ADULT - ASSESSMENT
NIGHT HOSPITALIST:   NIGHT HOSPITALIST:    Self referral of patient with one episode of loose but formed elements BM with no clinical or radiographic evidence of colitis, no recent antibiotic use, with an asymptomatic leukocytes in the urine (urine and blood cultures sent).   Doubt C. difficile infection.   Would consider formal evaluation by oncology in the AM, if the new findings on CTT abdomen are contributing to the patient's presentation.    Will follow urine culture and HOLD off on antibiotics with asymptomatic leukocytes in the urine.    Patient aware of risks/benefits and agrees to continuation of full AC with PAF.    On Xanax PRN for anxiety.     NIGHT HOSPITALIST:    Self referral of patient with one episode of loose but formed elements BM with no clinical or radiographic evidence of colitis, no recent antibiotic use, with an asymptomatic leukocytes in the urine (urine and blood cultures sent) in this patient with stage 4 pancreatic CA.    Doubt C. difficile infection.   Would consider formal evaluation by oncology in the AM, if the new findings on CTT abdomen are contributing to the patient's presentation.    Will follow urine culture and HOLD off on antibiotics with asymptomatic leukocytes in the urine.    Patient aware of risks/benefits and agrees to continuation of full AC with PAF.    On Xanax PRN for anxiety.  See [place] I Stop # [id number] in the chart.    On Lopressor 100 mg BID.    Will temporarily HOLD the Aldactone to avoid hypotension.

## 2023-01-13 NOTE — H&P ADULT - NSHPREVIEWOFSYSTEMS_GEN_ALL_CORE
NO fever, no chills, no rigors.  NO chest pain/pressure.  NO palpitations.  NO cough, no wheezing, no dyspnoea.  NO back pain, no tearing back pain.  NO breast symptoms.    NO vaginal bleeding.  NO rash.  NO joint pain.  NO SI/HI.  NO thyroid symptoms.

## 2023-01-13 NOTE — H&P ADULT - PROBLEM SELECTOR PLAN 7
Transitions of Care Status:  1.  Name of PCP:     John Riley MD (PCP) [phone]  2.  PCP Contacted on Admission: [ ] Y    [ x] N    3.  PCP contacted at Discharge: [ ] Y    [ ] N    [ ] N/A  4.  Post-Discharge Appointment Date and Location:  5.  Summary of Handoff given to PCP:

## 2023-01-13 NOTE — H&P ADULT - NSHPLABSRESULTS_GEN_ALL_CORE
EKG tracing interpreted by me with NSR at 67.    Chest radiograph interpreted by me with  diffuse reticulonodular opacities no apparent change from prior study.    WBC 9.5  80N    Hgb 10.9    Platelets of 397K    HS troponin 12>>13    Alb 3.3    Random glucose of 110  Cr 0.6  K+ 3.5    Lipase of 9    RVP and COVID-19 PCR>>negative.    UA with 54 WBC and small leukocyte esterase>>asymptomatic. EKG tracing interpreted by me with NSR at 67.    Chest radiograph interpreted by me with  diffuse reticulonodular opacities no apparent change from prior study.    WBC 9.5  80N    Hgb 10.9    Platelets of 397K    HS troponin 12>>13    Alb 3.3    Random glucose of 110  Cr 0.6  K+ 3.5    Lipase of 9    RVP and COVID-19 PCR>>negative.    UA with 54 WBC and small leukocyte esterase>>asymptomatic.    CTT abdomen with IV contrast with stable nodular interlobular septal thickening  and peripheral consolidative lung opacities and stable pleural effusions, stable size pancreatic mass, stable size complex LEFT adnexal mass, mildly increased peritoneal carcinomatosis with increased volume ascites, RIGHT hepatic lobe may represent a small serosal implant.

## 2023-01-13 NOTE — PATIENT PROFILE ADULT - FALL HARM RISK - HARM RISK INTERVENTIONS

## 2023-01-13 NOTE — H&P ADULT - HISTORY OF PRESENT ILLNESS
NIGHT HOSPITALIST:    Patient UNKNOWN to me previously, assigned to me at this point via the ER to admit this 78 y/o F--followed by her office physicians above--patient with a past history of stage 4 pancreatic CA with last chemo in 12/2022, cerebrovascular disease, PAF S/P past ablation and maintained on Eliquis, essential HTN maintained on Lopressor 100 mg BID, Aldactone, anxiety disorder on Xanax 0.25 mg daily PRN, on chronic supplemental O2  (2 Litres / min) due to apparently pulmonary complications from prior chemotherapy, with a last discharge from [facility] on 10/24/22 for diarrhoea that resolved without treatment at the time, with patient self refers to the ER with one episode of loose BM but with formed elements.  NO melena.  NO red blood per rectum.  NO abdominal pain, no tenesmus.  NO back pain, no tearing back pain.  NO fever, no chills, no rigors.  NO dysuria, no hematuria.  NO anorexia.    Patient has refused all COVID-19 vaccines to date.

## 2023-01-14 NOTE — PROGRESS NOTE ADULT - PROBLEM SELECTOR PLAN 1
- no BMs today  - C. diff and bcx negative thus far  - ucx pending  - appears stable from diarrhea standpoint

## 2023-01-14 NOTE — PROGRESS NOTE ADULT - SUBJECTIVE AND OBJECTIVE BOX
PROGRESS NOTE: 77F with PMH including stage IV pancreatic cancer (chemo 12/2022), PAF s/p ablation on eliquis, HTN, anxiety disorder, discharged in October 2022 for diarrhea which self-resolved, now here with loose BMs. No BMs this AM, thus far cultures negative, cleared from oncology standpoint for discharge, but patient does not feel ready to go home.    INTERVAL EVENTS: patient has some R upper abdominal pain, appears positional, no BMs today    MEDICATIONS  (STANDING):  apixaban 5 milliGRAM(s) Oral every 12 hours  atorvastatin 40 milliGRAM(s) Oral at bedtime  cholecalciferol 1000 Unit(s) Oral daily  escitalopram 5 milliGRAM(s) Oral daily  metoprolol tartrate 100 milliGRAM(s) Oral two times a day    MEDICATIONS  (PRN):  ALPRAZolam 0.25 milliGRAM(s) Oral daily PRN ANXIETY    Vital Signs Last 24 Hrs  T(C): 36.4 (14 Jan 2023 13:52), Max: 37.1 (14 Jan 2023 00:20)  T(F): 97.6 (14 Jan 2023 13:52), Max: 98.7 (14 Jan 2023 00:20)  HR: 71 (14 Jan 2023 13:52) (62 - 78)  BP: 118/72 (14 Jan 2023 13:52) (118/72 - 156/81)  BP(mean): --  RR: 18 (14 Jan 2023 13:52) (18 - 20)  SpO2: 96% (14 Jan 2023 13:52) (93% - 99%)    Parameters below as of 14 Jan 2023 13:52  Patient On (Oxygen Delivery Method): nasal cannula  O2 Flow (L/min): 2    CONSTITUTIONAL: NAD, well-developed, well-groomed  EYES: conjunctiva and sclera clear  ENMT: normal  RESPIRATORY: normal respiratory effort; lungs are clear to auscultation bilaterally  CARDIOVASCULAR: S1S2, RRR  ABDOMEN: +BS, NTND  PSYCH: affect appropriate  NEUROLOGY: grossly normal  SKIN: no jaundice      LABS: reviewed                                   9.3    10.95 )-----------( 210      ( 14 Jan 2023 11:09 )             29.7     01-14    141  |  105  |  15  ----------------------------<  91  3.9   |  26  |  0.55    Ca    7.9<L>      14 Jan 2023 03:36  Phos  3.5     01-14  Mg     2.0     01-14      RADIOLOGY & ADDITIONAL TESTS:  Results Reviewed:   Imaging Personally Reviewed:  Electrocardiogram Personally Reviewed:  Tele:    COORDINATION OF CARE:  Care Discussed with Consultants/Other Providers [Y/N]:  Prior or Outpatient Records Reviewed [Y/N]:

## 2023-01-14 NOTE — PROGRESS NOTE ADULT - ASSESSMENT
77F with PMH including stage IV pancreatic cancer (chemo 12/2022), PAF s/p ablation on eliquis, HTN, anxiety disorder, discharged in October 2022 for diarrhea which self-resolved, now here with loose BMs. No BMs this AM, thus far cultures negative, cleared from oncology standpoint for discharge, but patient does not feel ready to go home.

## 2023-01-14 NOTE — PROGRESS NOTE ADULT - PROBLEM SELECTOR PLAN 2
- patients would like to speak directly with oncology  - onc note reviewed; no onc issues re: discharge, outpatient f/u

## 2023-01-14 NOTE — PROGRESS NOTE ADULT - PROBLEM SELECTOR PLAN 7
Transitions of Care Status:  1.  Name of PCP:     John Riley MD (PCP) [phone]  2.  PCP Contacted on Admission: [ ] Y    [ x] N    3.  PCP contacted at Discharge: [ ] Y    [ ] N    [ ] N/A  4.  Post-Discharge Appointment Date and Location:  5.  Summary of Handoff given to PCP:  ______________  Eleazar Wood MD  [facility]  [phone]

## 2023-01-15 NOTE — CHART NOTE - NSCHARTNOTEFT_GEN_A_CORE
78 y/o F history of stage 4 pancreatic CA with last chemo in 12/2022, cerebrovascular disease, PAF S/P past ablation on Eliquis, essential HTN, anxiety, on chronic supplemental O2  (2 Litres / min) due to pulmonary complications from prior chemotherapy presented to ED with one episode of loose BM but with formed elements.  NO melena. Pt. admitted with a dx of  Diarrhea x2 days- resolved, s/p IVF    Patient seen and examined at bedside. states that she has not had a bowel movement for 2 days, last BM was Friday ~6am . she is tolerating regular diet well and denies abd pain.       # Diarrhea. Resolved    - C. diff and bcx negative thus far  - ucx contaminated     # Metastasis from pancreatic cancer.   Small increase in peritoneal carcinomatous and new subcentimeter liver nodule.   Oncology consult appreciated: rec will not change inpatient management, this can be managed outpatient. Unable to perform liver biopsy at this time given it is subcentimeter.  Patient to f/u with Dr. Dos Santos outpt    # PAF (paroxysmal atrial fibrillation).   ·  Plan: - c/w apixaban.     Problem/Plan - 5:  ·  Problem: Anxiety disorder.   ·  Plan: - c/w PRN xanax.    #Essential hypertension.   c.w home Lopressor and Aldactone since diarrhea has resolved       Patient is medically stable for d/c home today  d/w Medicine LEONARDA Llanos   TIme spent 35min
Spoke with primary team regarding case.    Pt with Stage IV Pancreatic cancer. On FOLFOX, last received 12/13/2022 based on chart review.    Pt presenting with diarrhea, now improved. Low suspicion that diarrhea from Chemotherapy given extent. Would need to rule out infectious etiology however pt no longer with diarrhea, thus unable to send infectious tests.    CT A/P reviewed. Small increase in peritoneal carcinomatous and new subcentimeter liver nodule. The following will not change inpatient management, this can be managed outpatient. Unable to perform liver biopsy at this time given it is subcentimeter.     Please page with questions or concerns. Stable for discharge from the Oncology perspective.      Nicanor Morrison M.D.  Hematology/Oncology Fellow PGY5  Pager [phone]  After 5pm, please contact on-call team.
77 y.o femlae with PMHX of stage 4 pancreatic CA (last treatment 10/10), CVA, Afib s/p ablation on eliquis, presented with loose stools for 2 days.   This morning has not had any BMs, no clinical or radiographic evidence of colitis & no recent antibiotic use.    CT abd/p: Mildly increased peritoneal carcinomatosis with increased small volume ascites & small serosal implant.   - GI PCR, stool cx pending collection (pr w/o BM)  - Heme/onc consult called due to CT abd results   - if stable from oncology stand point and no further BMs possible dc home today   - patient is tolerating diet well, s/p IVF      d/w Medicine ACP Yajaira

## 2023-01-15 NOTE — DISCHARGE NOTE PROVIDER - NSDCFUSCHEDAPPT_GEN_ALL_CORE_FT
Megan Morales  [facility]  [facility]  Scheduled Appointment: 01/18/2023    [facility]  Scheduled Appointment: 01/20/2023    Asher Yuan  [facility]  CARDIOLOGY [address]   Scheduled Appointment: 01/20/2023    [facility]  [facility]  Scheduled Appointment: 01/23/2023    Nicanor Wilkinson  [facility]  PULMMED [address]  Scheduled Appointment: 01/30/2023

## 2023-01-15 NOTE — DISCHARGE NOTE PROVIDER - NSDCCPCAREPLAN_GEN_ALL_CORE_FT
PRINCIPAL DISCHARGE DIAGNOSIS  Diagnosis: Diarrhea  Assessment and Plan of Treatment: now resolved , doing better  f/u with pcp in 4 to 7 days      SECONDARY DISCHARGE DIAGNOSES  Diagnosis: Metastasis from pancreatic cancer  Assessment and Plan of Treatment: f/u with oncology outpatient    Diagnosis: PAF (paroxysmal atrial fibrillation)  Assessment and Plan of Treatment: Atrial fibrillation is the most common heart rhythm problem.  The condition puts you at risk for has stroke and heart attack  It helps if you control your blood pressure, not drink more than 1-2 alcohol drinks per day, cut down on caffeine, getting treatment for over active thyroid gland, and get regular exercise  Call your doctor if you feel your heart racing or beating unusually, chest tightness or pain, lightheaded, faint, shortness of breath especially with exercise  It is important to take your heart medication as prescribed  You may be on anticoagulation which is very important to take as directed - you may need blood work to monitor drug levels      Diagnosis: Diarrhea  Assessment and Plan of Treatment:

## 2023-01-15 NOTE — DISCHARGE NOTE NURSING/CASE MANAGEMENT/SOCIAL WORK - NSDCPEFALRISK_GEN_ALL_CORE
For information on Fall & Injury Prevention, visit: [email or web address] OR  [email or web address] OR  https://www.cdc.gov/steadi/patient.html

## 2023-01-15 NOTE — DISCHARGE NOTE PROVIDER - NSDCQMSTROKE_NEU_ALL_CORE
Family Medicine Clinic Visit    Date: 11/1/2019     Primary Care Provider: Jailene Gonzales MD     Reason for visit: Complete physical/preventative    HISTORY OF PRESENT ILLNESS: Umair Tomas is a 44 year old male presents for the following:    Physical:  DM - improved over the last 3 months with a1c 6.8  HL - stable, triglycerides better than they have been  He has been eating better but not exercising much  He is tired due to stressful time in his job     PAST MEDICAL HISTORY:  Past Medical History:   Diagnosis Date   • Depressive disorder    • PRESTON on CPAP      MEDICATIONS:  Current Outpatient Medications   Medication Sig   • tadalafil (CIALIS) 20 MG tablet Tadalafil 15mg lozenges. Patient to quarter and take 1 quarter daily.   • glipiZIDE (GLUCOTROL ER) 10 MG 24 hr tablet Take 1 tablet by mouth daily.   • metformin (GLUCOPHAGE-XR) 500 MG 24 hr tablet Take 2 tablets by mouth 2 times a day.   • atorvastatin (LIPITOR) 10 MG tablet Take 1 tablet by mouth daily.   • Vitamin D, Ergocalciferol, 72157 units capsule Take 1 capsule by mouth 1 day a week. Recheck Vitamin D level after completion.   • Lancets (FREESTYLE) Misc Use a new lancet to test blood sugar 3 time daily as directed. Diagnosis: E11.9. Meter: Freestyle.   • blood glucose test strip Test blood sugar 3 time daily as directed. Diagnosis: E11.9. Meter: Freestyle.   • sildenafil (VIAGRA) 50 MG tablet Take 1 tablet by mouth as needed for Erectile Dysfunction.   • hydrocortisone (ANUSOL-HC) 2.5 % rectal cream Use twice a day as needed     No current facility-administered medications for this visit.        ALLERGIES:  ALLERGIES:  No Known Allergies    FAMILY HISTORY:  Family History   Problem Relation Age of Onset   • Heart disease Father 50   • High blood pressure Father    • High cholesterol Father    • Diabetes Maternal Grandmother    • Diabetes Paternal Grandmother        SOCIAL HISTORY:  Social History     Tobacco Use   • Smoking status: Former Smoker     Last  attempt to quit: 2005     Years since quittin.3   • Smokeless tobacco: Never Used   Substance Use Topics   • Alcohol use: Yes     Alcohol/week: 0.0 standard drinks   • Drug use: Not on file       REVIEW OF SYSTEMS:  General: Denies: headache, fever  HEENT: Denies: vision changes, hearing changes  Pulmonary: Denies: shortness of breath  Cardiovascular: Denies: chest pain, palpitations and syncope   Gastrointestinal: Denies: nausea, vomiting, diarrhea, constipation and abdominal pain  Genitourinary: Denies: dysuria  Musculoskeletal: Denies: arthralgia  Skin: Denies: rashes and itching  Endocrine: Denies: heat or cold intolerance  Neurologic: Denies: loss of consciousness, sensory deficits and motor deficits  Psychiatric: Denies: substance dependency/abuse    Physical:   Visit Vitals  /80 (BP Location: RUE - Right upper extremity, Patient Position: Sitting, Cuff Size: Regular)   Pulse 85   Temp 97.3 °F (36.3 °C) (Tympanic)   Ht 5' 11\" (1.803 m)   Wt 116.3 kg   SpO2 97%   BMI 35.76 kg/m²       Physical Exam   General - Patient is in no acute distress. Patient is conversing freely in full sentences.   HEENT - TM (Tympanic membranes) are clear without effusion or inflammation.  The posterior pharynx is pink without inflammation or exudate.  There is no thyromegaly. Pupils equally round and reactive to light. Sclerae are anicteric. Oropharyngeal mucous membranes are moist. Neck is soft and supple.   Cardiovascular - Regular rate and rhythm without additional heart sounds. No carotid bruits.   Pulmonary - Lungs are clear to auscultation bilaterally. No wheezes, rales or rhonchi.   Abdomen - Soft, non-tender and non-distended. Bowel sounds are normoactive. No guarding or rebound tenderness.   Musculoskeletal - Warm and well perfused. No cyanosis or edema.   Skin- No rashes or lesions.   Neurologic - Alert and oriented to person, place and time. CNs (Cranial nerves) II-XII are intact. Strength, sensation, and  coordination are grossly intact.     Labs   Hemoglobin A1C (%)   Date Value   10/23/2019 6.7 (H)      CHOLESTEROL (mg/dL)   Date Value   10/23/2019 144     HDL (mg/dL)   Date Value   10/23/2019 37 (L)     TRIGLYCERIDE (mg/dL)   Date Value   10/23/2019 239 (H)     CALCULATED LDL (mg/dL)   Date Value   10/23/2019 59      Assessment and Plan   Umair Tomas is a 44 year old male with the following:    ASSESSMENT: Annual physical exam  (primary encounter diagnosis)  Comment: up to date  Plan: LIPID PANEL WITH REFLEX, COMPREHENSIVE         METABOLIC PANEL, GLYCOHEMOGLOBIN, PSA          Need for vaccination  Plan: INFLUENZA QUADRIVALENT SPLIT PRES FREE 0.5 ML         VACC, IM (FLULAVAL,FLUARIX,FLUZONE)          Diabetes mellitus type II, non insulin dependent (CMS/[facility])  Comment: at goal  Plan: LIPID PANEL WITH REFLEX, COMPREHENSIVE         METABOLIC PANEL, GLYCOHEMOGLOBIN        Continue metformin and glipizide, work on exercise    Hyperlipidemia, unspecified hyperlipidemia type  Comment: stable  Plan: LIPID PANEL WITH REFLEX, COMPREHENSIVE         METABOLIC PANEL        Continue statin    Return in 4 months for follow-up    Routine Health Maintenance: up to date  Anticipatory Guidance:  Healthy lifestyle    Jailene Gonzales MD   11/1/2019  [address]  [address]  T [phone]  F [phone]     No

## 2023-01-15 NOTE — DISCHARGE NOTE PROVIDER - NSDCMRMEDTOKEN_GEN_ALL_CORE_FT
Afrin 0.05% nasal spray: 2 spray(s) nasal 2 times a day, As Needed  apixaban 5 mg oral tablet: 1 tab(s) orally every 12 hours  atorvastatin 40 mg oral tablet: 1 tab(s) orally once a day (at bedtime)  escitalopram 5 mg oral tablet: 1 tab(s) orally once a day  Imodium 2 mg oral capsule: 1 cap(s) orally 3 times a day  Metoprolol Tartrate 100 mg oral tablet: 1 tab(s) orally 2 times a day  spironolactone 25 mg oral tablet: 1 tab(s) orally once a day  Vitamin D3 25 mcg (1000 intl units) oral tablet: 1 tab(s) orally once a day  Xanax 0.25 mg oral tablet: 1 tab(s) orally once a day, As Needed   Afrin 0.05% nasal spray: 2 spray(s) nasal 2 times a day, As Needed  apixaban 5 mg oral tablet: 1 tab(s) orally every 12 hours  atorvastatin 40 mg oral tablet: 1 tab(s) orally once a day (at bedtime)  escitalopram 5 mg oral tablet: 1 tab(s) orally once a day  Imodium 2 mg oral capsule: 1 cap(s) orally 3 times a day  Metoprolol Tartrate 100 mg oral tablet: 1 tab(s) orally 2 times a day  shower chair:   spironolactone 25 mg oral tablet: 1 tab(s) orally once a day  Vitamin D3 25 mcg (1000 intl units) oral tablet: 1 tab(s) orally once a day  Xanax 0.25 mg oral tablet: 1 tab(s) orally once a day, As Needed

## 2023-01-15 NOTE — DISCHARGE NOTE PROVIDER - HOSPITAL COURSE
78 y/o F history of stage 4 pancreatic CA with last chemo in 12/2022, cerebrovascular disease, PAF S/P past ablation on Eliquis, essential HTN, anxiety, on chronic supplemental O2  (2 Litres / min) due to pulmonary complications from prior chemotherapy presented to ED with one episode of loose BM but with formed elements.  NO melena. Pt. admitted with a dx of  Diarrhea x2 days- resolved, s/p IVF, holding aldactone ,metastatic pancreatic Ca , stool culture sent   CT A/P reviewed revealed a small increase in peritoneal carcinomatous and new subcentimeter liver nodule. Per oncology the following will not change inpatient management, this can be managed outpatient. Unable to perform liver biopsy at this time given it is subcentimeter. Pt. cleared for discharge per oncology.          78 y/o F history of stage 4 pancreatic CA with last chemo in 12/2022, cerebrovascular disease, PAF S/P past ablation on Eliquis, essential HTN, anxiety, on chronic supplemental O2  (2 Litres / min) due to pulmonary complications from prior chemotherapy presented to ED with one episode of loose BM but with formed elements.  NO melena. Pt. admitted with a dx of  Diarrhea x2 days- resolved, s/p IVF    Patient seen and examined at bedside. states that she has not had a bowel movement for 2 days, last BM was Friday ~6am . she is tolerating regular diet well and denies abd pain.       # Diarrhea. Resolved    - C. diff and bcx negative thus far  - ucx contaminated     # Metastasis from pancreatic cancer.   Small increase in peritoneal carcinomatous and new subcentimeter liver nodule.   Oncology consult appreciated: rec will not change inpatient management, this can be managed outpatient. Unable to perform liver biopsy at this time given it is subcentimeter.  Patient to f/u with Dr. Dos Santos outpt    # PAF (paroxysmal atrial fibrillation).   ·  Plan: - c/w apixaban.     Problem/Plan - 5:  ·  Problem: Anxiety disorder.   ·  Plan: - c/w PRN xanax.    #Essential hypertension.   c.w home Lopressor and Aldactone since diarrhea has resolved

## 2023-01-15 NOTE — DISCHARGE NOTE PROVIDER - DID THE PATIENT PRESENT WITH OR WAS TREATED FOR MALNUTRITION DURING THIS ADMISSION
10/18/18        [address] [place] [id number]      Dear Angie Winn,    [id number] records indicate that you have outstanding lab work and or testing that was ordered for you and has not yet been completed:  Orders Placed This E No

## 2023-01-15 NOTE — DISCHARGE NOTE PROVIDER - CARE PROVIDER_API CALL
John Riley)  Family Medicine  [address] [place] [address]  Phone: [phone]  Fax: [phone]  Follow Up Time:

## 2023-01-15 NOTE — DISCHARGE NOTE NURSING/CASE MANAGEMENT/SOCIAL WORK - PATIENT PORTAL LINK FT
You can access the FollowMyHealth Patient Portal offered by [facility] by registering at the following website: [email or web address]. By joining [employer]’s FollowMyHealth portal, you will also be able to view your health information using other applications (apps) compatible with our system.

## 2023-01-15 NOTE — DISCHARGE NOTE PROVIDER - NSDCFUADDAPPT_GEN_ALL_CORE_FT
APPTS ARE READY TO BE MADE: [x ] YES    Best Family or Patient Contact (if needed):    Additional Information about above appointments (if needed):    1:   2:   3:     Other comments or requests:    APPTS ARE READY TO BE MADE: [x ] YES    Best Family or Patient Contact (if needed):    Additional Information about above appointments (if needed):    1:   2:   3:     Other comments or requests:   Patient was provided with follow up request details for John Richards and was advised to call to schedule follow up within specified time frame.

## 2023-01-17 PROBLEM — Z87.898 PERSONAL HISTORY OF OTHER SPECIFIED CONDITIONS: Chronic | Status: ACTIVE | Noted: 2023-01-01

## 2023-01-17 PROBLEM — Z28.21 IMMUNIZATION NOT CARRIED OUT BECAUSE OF PATIENT REFUSAL: Chronic | Status: ACTIVE | Noted: 2023-01-01

## 2023-01-17 PROBLEM — C25.9 MALIGNANT NEOPLASM OF PANCREAS, UNSPECIFIED: Chronic | Status: ACTIVE | Noted: 2023-01-01

## 2023-01-17 NOTE — HISTORY OF PRESENT ILLNESS
[Disease: _____________________] : Disease: [unfilled] [AJCC Stage: ____] : AJCC Stage: [unfilled] [de-identified] : 77 F w/ h/o CVA, CAD, a fib, presents for further management of Stage IV pancreatic cancer. \par \giulia Peralta was admitted to [facility] for worsening cough and acute SOB in September 2021 and was found to have extensive ill defined pulmonary nodules on CT Chest suggestive of either a metastatic disease or infectious process. She was started on IV antibiotics. CT A/P on 9/4/21 showed mixed solid and cystic left ovarian mass measuring up to 4.6 cm suspicious for an ovarian cystic neoplasm, no pelvic or RP adenopathy, ill defined cystic changes within the pancreatic body and tail, possibly representing a pancreatic cystic neoplasm such as an IPMN. CTA on 9/4/21 showed extensive scattered ill defined pulmonary nodule/nodular infiltrates throughout the lungs, concerning for atypical infection/pneumonia. A bronchoscopy was done on 9/7/21 with BAL and RML x 3 of RLL posterior segment. Negative for malignancy. Pt was d/c from [facility] after improvement in symptoms with Ab. \par \giulia Peralta followed up with GI and underwent an MR Abd on 9/29/21 which showed segmental dilatation of the pancreatic duct within the distal body and tail, measuring up to 5 mm associated pancreatic parenchymal atrophy. Mass suspected in the mid body. No liver mass noted. 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 EUS at [facility] on 10/12/21 showed an ill defined mass in the body involving the splenic vein. FNA c/w adenocarcinoma. CA 19-9 3164,  35. \par \par Referred to Dr. Barrow at [facility]. 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 Underwent a lung biopsy on 11/11/21 which confirmed metastatic pancreatic adenocarcinoma, MMR intact, NTRK negative. \par \par 110/30/21 CT CAP with progression of lymphangitic spread of cancer, pulmonary nodes, 2.6 cm pancreatic body mass, small ascites\par 12/06/21: C1D1, 8, 15 Q 28 cycle Gemzar 800 mg/m2 + Abraxane 100 mg/m2 \par 12/24/21: admitted to [facility] for neutropenic fever after syncope and diarrhea at home. \par 01/10/22: C2D1 and D15 (alternating weeks) \par 02/01/22: CT CAP: decr in lymphangitis carcinomatosis and pulm nodules, 1.7 x 1.1 pancreatic body mass decr in size\par 02/07/22: C3D1 -> Decided to transfer care to [facility] due to proximity to home. \par 02/22-4/18/22: [id number]\par 04/25/22: CT CAP: interval improvement in diffuse interlobular septal thickening and multiple bilateral pulmonary nodules, stable pancreatic mass\par 05/2-7/18/22: [id number]\par 7/25/22: CT CAP: irregular thickening along left bladder wall; otherwise stable disease\par 08/2-8/9/22: [facility] admission due to hypoxia/worsening GALLARDO\par 09/22/22- Was seen By Pulm/ cardiology -- Pt was on Elliott/ abraxane and developed Pulm edema and Hypoxia. Unclear if this is Gemcitabine related (concern for capillary leak syndrome likely from gem ) VS. POD\par 10/03/22 saw Pulmonology for acute bronchitis, recommended that given her response to Amoxicillin will continue with same antibiotic and plan for additional 7 days of 500mg BID (until 10/10/22) \par 10/04/22- CT A/P-  progression, with interval increase in size of the known pancreatic body mass. Progressed pulmonary nodular opacities and interlobular septal thickening, concerning for worsening pulmonary metastases and lymphangitic spread of tumor. New mild peritoneal carcinomatosis. \par 10/10/22: C # 1 5 FU 2400 mg/m2/  mg/m2/ Onivyde 70 mg/m2 \par 10/20/22 hospitalized for fainting/diarrhea\par \par 10/31/22 C1 FOLFOX- 5 FU 1800 mg/m2/  mg/m2/ Oxaliplatin 50 mg/m2.\par 11/14/22 C2 FOLFOX- 5 FU 1800 mg/m2/  mg/m2/ Oxaliplatin 50 mg/m2.\par 11/28/22 C3 FOLFOX \par 12/13/22- C4 FOLFOX \par \par 12/19/22 Restaging scans showing increasing peritoneal nodularity and lung mets, but decreasing size of pancreatic mass\par \par 01/10/23- Postponed C # 5 - Pt feeling sick \par 01/12/23 ~ 01/15/23 admitted to [facility] for diarrhea\par CT A/P Stable size of the pancreatic mass. Mildly increased peritoneal carcinomatosis with increased small volume ascites. Subcentimeter peripheral right hepatic lobe hypodense focus may represent a small serosal implant. 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 She was told to stop lasix due to diarrhea at [facility].\par She states that she gets winded when standing up and walking around and her O2sat decreases to ~70% even with the O2 supply.\par \par

## 2023-01-17 NOTE — REVIEW OF SYSTEMS
[Fatigue] : fatigue [Shortness Of Breath] : shortness of breath [SOB on Exertion] : shortness of breath during exertion [Negative] : Allergic/Immunologic [Cough] : no cough [FreeTextEntry6] : On nasal cannula

## 2023-01-17 NOTE — PHYSICAL EXAM
[Ambulatory and capable of all self care but unable to carry out any work activities] : Status 2- Ambulatory and capable of all self care but unable to carry out any work activities. Up and about more than 50% of waking hours [Normal] : affect appropriate [de-identified] :  on nasal cannula oxygen, no appreciable crackles [de-identified] : swelling of b/l feet, improving

## 2023-01-17 NOTE — END OF VISIT
[Time Spent: ___ minutes] : I have spent [unfilled] minutes of time on the encounter. [>50% of the face to face encounter time was spent on counseling and/or coordination of care for ___] : Greater than 50% of the face to face encounter time was spent on counseling and/or coordination of care for [unfilled] [] : Fellow [FreeTextEntry3] : Kuldip Dos Santos MD PhD\par Medical Oncology Attending\par I personally have spent a total of 45 minutes of time on the date of this encounter reviewing test results, documenting findings, coordinating care, and directly consulting with the patient and/or designated family member.\par I was present with the trainee during the key portions of the history and exam. I agree with the findings and plan as documented above.\par

## 2023-01-17 NOTE — ASSESSMENT
[FreeTextEntry1] : 77 F diagnosed with stage IV pancreas cancer, currently on Gemzar 800 mg/m2/Abraxane 100 mg/m2 Q 15 days \par \par She was recently hospitalized from 8/1-8/9 for SOB. CXR on admission showed pulmonary edema and bilateral pleural effusions. CT chest s/p diuresis showed improved pulmonary edema and pleural effusions . Her SOB could be from HFpEF or Gemzar related non-cardiogenic pulmonary edema.Patient has portable oxygen arranged at home. She is now being followed by pulmonology as an out-patient for management of hypoxia and pulmonary edema of uncertain cause.\par \par 09/01/22:  she is deconditioned, had to catch her breath even walking into the examination room. Given that she is still recovering from hospitalization and the nature of the insult may be related to gemcitabine, we will hold off on therapy for now, allowing further time of recovery. \par \par 10/04/22 repeat CT A/P: Findings concerning for progression of disease. Interval increase in size of the known pancreatic body mass. Progressed pulmonary nodular opacities and interlobular septal thickening, concerning for worsening pulmonary metastases and lymphangitic spread of tumor. New mild peritoneal carcinomatosis. VERTEBRAL BODY ANALYSIS: Vertebral compression fractures as described, consider further workup for osteoporosis. \par \par Pt was on [place]/ Abraxane and had POD. Recieved 1 cycle of NALIRI but was hospitalized with diarrhea/syncope, so stopped  FOLFIRII due to intolerance and patient preference. Started pt on  FOLFOX modified dose, has been tolerating well.\par \par 12/22/22: Restaging scans showing POD with increasing size of peritoneal mets and lung mets and decreasing size of pancreatic mass. Ca 19-9 also rising, now 972.\par \par 1/17/23: extensive discussion today to review her decline in performance status since hospitalization. She was just hospitalized with diarrhea of unclear etiology, fatigue. Difficult to know if her current performance status represents a permanent decline or a temporary setback. Given that her scan is showing increasing progression of disease I am concerned of the latter. Reviewed that we do not have much remaining regarding SOC options - perhaps 5-FU with standard (increased) dosing oxaliplatin but have concerns she may not tolerate that well. Gemcitabine is possible but possibly caused capiliary leakage so not enthusiastic about resuming and unlikely to have much of a response at low dosage.  Also, she only received a single dose of irinotecan and we could resume that at lower dose, although at lower dosage it is less likely to be effective and she hated how that made her felt. She is ECOG 3 today. She is not a trial candidate. I suspect at this point chemotherapy is more likely to cause harm than good. I discussed hospice care in detail. They had some reservations, wanted to remain open to the possibility of treatment in the future, and I think it is reasonable to reassess again. Afterall, she has not seen Dr Nielson since she has left for maternity leave and will be returning soon so that provides a convenient touchpoint back to the oncology service. Still, since I think hospice is likely to benefit her at this point, I gael refer her to hospice to initiate that discussion; if she feels better, regains performance status, she can decide to cancel hospice at any time and return to treatment. \par \par We discussed about stopping any chemotherapy since there is a great chance of chemotherapy harming the patient more than benefitting her. It would be the best interest of her life to focus on the quality of life at this point. She can still have visit with us in a few weeks if she feels better. However, we recommended to starting hospice care.\par \par Plan\par - refer to hospice\par - return appointment with Dr Nielson to re-evaluate performance status / check in on hospice\par \par Mindi Mckeon MD\par Translational oncology fellow, PGY-6\par Case d/w Dr. Kuldip Dos Santos MD PhD\par

## 2023-01-26 NOTE — PHYSICAL EXAM
[No Acute Distress] : no acute distress [Normal Rate/Rhythm] : normal rate/rhythm [Normal S1, S2] : normal s1, s2 [No Resp Distress] : no resp distress [Rales] : rales [Normal Gait] : normal gait [TextBox_105] : +3 edema

## 2023-01-26 NOTE — HISTORY OF PRESENT ILLNESS
[TextBox_4] : 77 y.o female PMH  stage 4 pancreatic ca (currently chemo on hold), PAF (on Eliquis) HFpEF, CAD here for f/u pleural effusion, s/p recent hospitalization 1/12-1/13 for dehydration.  Here with daughter who notes over the past 2 weeks she has been more hypoxic with minimal activities and saO2 down to the 70s on her baseline 2L/min.  Pt also c/o Increased cough and LE swelling. Initially off lasix on discharge but has since resumed at half the dose with minimal effect on symptoms\par \par PFT 9/2022: FEV1 / FVC 71 FEV1 87 FVC 91 TLC 80 DLCO 45\par \par Hospital Course: \par Discharge Date	15-Stephen-2023 \par Admission Date	12-Jan-2023 23:07 \par Reason for Admission	One episode of loose BM through today \par Hospital Course	 \par 78 y/o F history of stage 4 pancreatic CA with last chemo in 12/2022, cerebrovascular disease, PAF S/P past ablation on Eliquis, essential HTN,  anxiety, on chronic supplemental O2  (2 Litres / min) due to pulmonary  complications from prior chemotherapy presented to ED with one episode of loose BM but with formed elements.  NO melena. Pt. admitted with a dx of  Diarrhea x2 \par days- resolved, s/p IVF \par \par Patient seen and examined at bedside. states that she has not had a bowel movement for 2 days, last BM was Friday . she is tolerating regular diet well and denies abd pain. \par # Diarrhea. Resolved \par - C. diff and bcx negative thus far \par - ucx contaminated \par \par # Metastasis from pancreatic cancer. \par Small increase in peritoneal carcinomatous and new subcentimeter liver nodule. \par Oncology consult appreciated: rec will not change inpatient management, this \par can be managed outpatient. Unable to perform liver biopsy at this time given it \par is subcentimeter. \par Patient to f/u with Dr. Dos Santos outpt \par \par # PAF (paroxysmal atrial fibrillation). \par -  Plan: - c/w apixaban. \par \par  Problem/Plan - 5: \par -  Problem: Anxiety disorder. \par -  Plan: - c/w PRN xanax. \par \par #Essential hypertension. \par c.w home Lopressor and Aldactone since diarrhea has resolved \par \par \par \par Discharge Medications	\par Afrin 0.05% nasal spray: 2 spray(s) nasal 2 times a day, As Needed \par apixaban 5 mg oral tablet: 1 tab(s) orally every 12 hours \par atorvastatin 40 mg oral tablet: 1 tab(s) orally once a day (at bedtime) \par escitalopram 5 mg oral tablet: 1 tab(s) orally once a day \par Imodium 2 mg oral capsule: 1 cap(s) orally 3 times a day \par Metoprolol Tartrate 100 mg oral tablet: 1 tab(s) orally 2 times a day \par spironolactone 25 mg oral tablet: 1 tab(s) orally once a day \par Vitamin D3 25 mcg (1000 intl units) oral tablet: 1 tab(s) orally once a day \par Xanax 0.25 mg oral tablet: 1 tab(s) orally once a day, As Needed \par  \par , \par \par \par \par

## 2023-01-26 NOTE — ASSESSMENT
[FreeTextEntry1] : SOB/ Hypoxia/ Pleural  effusion CXR on 1/12 show left pleural effusion.  Increase Furosemide to 40 mg QD and instructed to monitor s/s dehydration in which we can adjust the dose accordingly. Increase O2 prn to keep levels >93 with activities. \par \par RTC in 1 month or earlier prn\par \par I, Huma Arnold NP, am scribing for and in the presence of Dr. Nicanor Wilkinson, the following sections HISTORY OF PRESENT ILLNESS, PAST MEDICAL/FAMILY/SOCIAL HISTORY; REVIEW OF SYSTEMS; VITAL SIGNS; PHYSICAL EXAM; DISPOSITION.\par

## 2023-02-01 PROBLEM — F41.9 ANXIETY: Status: ACTIVE | Noted: 2021-08-14

## 2023-02-01 PROBLEM — C78.00 LYMPHANGITIC LUNG METASTASIS: Status: ACTIVE | Noted: 2022-01-01

## 2023-02-01 NOTE — PHYSICAL EXAM
[Ambulatory and capable of all self care but unable to carry out any work activities] : Status 2- Ambulatory and capable of all self care but unable to carry out any work activities. Up and about more than 50% of waking hours [Normal] : affect appropriate [de-identified] :  on nasal cannula oxygen, no appreciable crackles [de-identified] : swelling of b/l feet, improving

## 2023-02-01 NOTE — HISTORY OF PRESENT ILLNESS
[Disease: _____________________] : Disease: [unfilled] [AJCC Stage: ____] : AJCC Stage: [unfilled] [de-identified] : 77 F w/ h/o CVA, CAD, a fib, presents for further management of Stage IV pancreatic cancer. \par \giulia Peralta was admitted to [facility] for worsening cough and acute SOB in September 2021 and was found to have extensive ill defined pulmonary nodules on CT Chest suggestive of either a metastatic disease or infectious process. She was started on IV antibiotics. CT A/P on 9/4/21 showed mixed solid and cystic left ovarian mass measuring up to 4.6 cm suspicious for an ovarian cystic neoplasm, no pelvic or RP adenopathy, ill defined cystic changes within the pancreatic body and tail, possibly representing a pancreatic cystic neoplasm such as an IPMN. CTA on 9/4/21 showed extensive scattered ill defined pulmonary nodule/nodular infiltrates throughout the lungs, concerning for atypical infection/pneumonia. A bronchoscopy was done on 9/7/21 with BAL and RML x 3 of RLL posterior segment. Negative for malignancy. Pt was d/c from [facility] after improvement in symptoms with Ab. \par \giulia Peralta followed up with GI and underwent an MR Abd on 9/29/21 which showed segmental dilatation of the pancreatic duct within the distal body and tail, measuring up to 5 mm associated pancreatic parenchymal atrophy. Mass suspected in the mid body. No liver mass noted. 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 EUS at [place] on 10/12/21 showed an ill defined mass in the body involving the splenic vein. FNA c/w adenocarcinoma. CA 19-9 3164,  35. \par \par Referred to Dr. Barrow at [facility]. 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 Underwent a lung biopsy on 11/11/21 which confirmed metastatic pancreatic adenocarcinoma, MMR intact, NTRK negative. \par \par Restaging CT CAP oon 11/30/21 showed slight interval progression of lymphangitic spread of cancer, numerous b/l pulm nodules c/w known metastatic disease, 2.6 x 1.9 x 1.9 cm pancreatic body mass, diffuse mesenteric haziness, intra-abdominal LN, small pelvic ascites. \par \par 12/6/21: C1D1, 8, 15 Q 28 cycle Gemzar 800 mg/m2 + Abraxane 100 mg/m2 \par 12/24/21: admitted to [facility] for neutropenic fever after syncope and diarrhea at home. \par 1/10/22: C2D1 and D15 (alternating weeks) \par 2/1/22: CT CAP: decr in lymphangitis carcinomatosis and pulm nodules, 1.7 x 1.1 pancreatic body mass decr in size\par 2/7/22: C3D1\par \par Decided to transfer care to [facility] due to proximity to home. \par \par 2/22-4/18/22: [id number]\par 4/25/22: CT CAP: interval improvement in diffuse interlobular septal thickening and multiple bilateral pulmonary nodules, stable pancreatic mass\par 5/2-7/18/22: [id number]\par 7/25/22: CT CAP: irregular thickening along left bladder wall; otherwise stable disease\par 8/2-8/9/22: [facility] admission due to hypoxia/worsening GALLARDO\par 09/22/22- Was seen By Pulm/ cardiology -- Pt was on [place]/ abraxane and developed Pulm edema and Hypoxia. Unclear if this is Gemcitabine related (concern for capillary leak syndrome likely from gem ) VS. POD\par 10/03/22 saw Pulmonology for acute bronchitis, recommended that given her response to Amoxicillin will continue with same antibiotic and plan for additional 7 days of 500mg BID (until 10/10/22) \par 10/04/22- CT A/P-  progression, with interval increase in size of the known pancreatic body mass. Progressed pulmonary nodular opacities and interlobular septal thickening, concerning for worsening pulmonary metastases and lymphangitic spread of tumor. New mild peritoneal carcinomatosis. \par 10/10/22: C # 1 5 FU 2400 mg/m2/  mg/m2/ Onivyde 70 mg/m2 \par 10/20/22 hospitalized for fainting/diarrhea\par 10/31/22 C1 FOLFOX- 5 FU 1800 mg/m2/  mg/m2/ Oxaliplatin 50 mg/m2.\par 11/14/22 C2 FOLFOX- 5 FU 1800 mg/m2/  mg/m2/ Oxaliplatin 50 mg/m2.\par 11/28/22 C3 FOLFOX \par 12/13/22- C4 FOLFOX \par 12/19/22 Restaging scans showing increasing peritoneal nodularity and lung mets, but decreasing size of pancreatic mass\par 01/10/23- Postponed C # 5 - Pt feeling sick\par 1/12-1/15/23: [facility] admission for diarrhea\par 1/17/23: Hospice recommended due to poor functional status   [de-identified] : adenocarcinoma  [de-identified] : David testing: MSH3 VUS denoted [id number]  [FreeTextEntry1] : off treatment  [de-identified] : Pt is seen in office accompanied by her daughter .\par requires portable oxygen use 24/7 2-3 L, recently seen by pulmonology\par unchanged appetite, makes conscious effort to eat\par bowel movements - regular (color, consistency, frequency). \par currently in wheelchair - energy level poor\par \par requesting health aide assistance - did not enroll in hospice.

## 2023-02-01 NOTE — ASSESSMENT
[Supportive] : Goals of care discussed with patient: Supportive [Palliative Care Plan] : Patient was apprised of incurable nature of disease.  Hospice and Palliative care options discussed. [FreeTextEntry1] : \par

## 2023-02-07 NOTE — ED PROVIDER NOTE - PROGRESS NOTE DETAILS
Norm Kilpatrick DO (PGY1)  received call from CT tech regarding documented contrast allergy. patient has had a CT scan outpatient Jan 12th with no issues. As per daughter at bedside and patient, there is no contrast allergy. patient to go for CT scan. Discussed with CT tech. Keith Alvarado MD:  BW/CT nonactionable, patient states worsening progressive symptoms unable to care for self from home, hypoxemic to 70% on RA, failure to thrive in setting of progressive disease burden.

## 2023-02-07 NOTE — ED PROVIDER NOTE - CLINICAL SUMMARY MEDICAL DECISION MAKING FREE TEXT BOX
78 y/o female patient with a past history of stage 4 pancreatic CA with last chemo in 12/2022, cerebrovascular disease, PAF S/P past ablation and maintained on Eliquis, essential HTN presenting with syncope. Patient states she was eating dinner and needed to use the restroom.  States she went to the restroom and sat on the toilet and was accompanied by her daughter.  States that she has not passed out while sitting on the toilet, denies any falling forward, denies any head trauma, vision changes or headache.  Endorsing one episode of nonbloody nonbilious vomiting. Patient is also endorsing having a dry cough for 3 weeks.

## 2023-02-07 NOTE — ED PROVIDER NOTE - OBJECTIVE STATEMENT
78 y/o female patient with a past history of stage 4 pancreatic CA with last chemo in 12/2022, cerebrovascular disease, PAF S/P past ablation and maintained on Eliquis, essential HTN presenting with syncope. Patient states she was eating dinner and needed to use the restroom.  States she went to the restroom and sat on the toilet and was accompanied by her daughter.  States that she has not passed out while sitting on the toilet, denies any falling forward, denies any head trauma, vision changes or headache.  Endorsing one episode of nonbloody nonbilious vomiting. Patient is also endorsing having a dry cough for 3 weeks.  Patient's not endorsing any chest pain, shortness of breath, diarrhea, fevers, chills, neurological deficits.  Patient states she is at 3 L of oxygen at home after her chemotherapy has caused her to have some lung scarring.  Denies any recent travel or sick contacts.  States she follows [facility] to [facility]. 76 y/o female patient with a past history of stage 4 pancreatic CA with last chemo in 12/2022, cerebrovascular disease, PAF S/P past ablation and maintained on Eliquis, essential HTN presenting with syncope. Patient states she was eating dinner and needed to use the restroom.  States she went to the restroom and sat on the toilet and was accompanied by her daughter.  States that she has not passed out while sitting on the toilet, denies any falling forward, denies any head trauma, vision changes or headache.  Endorsing one episode of nonbloody nonbilious vomiting. Patient is also endorsing having a dry cough for 3 weeks with increased work of breathing and dyspnea on exertion. Endorsing abdominal distension however no abdominal pain. Patient's not endorsing any chest pain, shortness of breath, diarrhea, fevers, chills, neurological deficits.  Patient states she is at 3 L of oxygen at home after her chemotherapy has caused her to have some lung scarring.  Denies any recent travel or sick contacts.  States she follows up to [facility].

## 2023-02-07 NOTE — ED ADULT NURSE NOTE - OBJECTIVE STATEMENT
Pt is 77Y F, pmhx pancreatic cancer (last chemo december), HTN, BIBEMS c/o syncopal episode, nausea, vomiting, this evening. Per EMS, pt went to use the bathroom, helped by sister, pt experienced syncopal episode per sister, LOC for 1-2 mins, pt came to and c/o nausea, with episode of vomiting. Per EMS, 20 gauge IV placed in right ac, given 4Mg Pt is 77Y F, pmhx pancreatic cancer (last chemo december), HTN, BIBEMS c/o syncopal episode, nausea, vomiting, this evening. Per EMS, pt went to use the bathroom, helped by sister, pt experienced syncopal episode per sister, LOC for 1-2 mins, pt came to and c/o nausea, with episode of vomiting. Per EMS, 20 gauge IV placed in right ac, given 4mg zofran IVP, 350cc NS, pt on 4LNC, usually on 3LNC at home, pt has port on right shoulder last accessed in december. Pt is A&Ox3, ambulates with assistance, denies any SOB, chest pain, NVD, or any other issues, pt placed on cardiac monitor-NSR, updated on plan of care

## 2023-02-07 NOTE — ED PROVIDER NOTE - NS ED MD TWO NIGHTS YN
Yes
Implemented All Universal Safety Interventions:  [place] to call system. Call bell, personal items and telephone within reach. Instruct patient to call for assistance. Room bathroom lighting operational. Non-slip footwear when patient is off stretcher. Physically safe environment: no spills, clutter or unnecessary equipment. Stretcher in lowest position, wheels locked, appropriate side rails in place.

## 2023-02-07 NOTE — ED PROVIDER NOTE - PHYSICAL EXAMINATION
Physical Exam:    Gen: NAD, AOx3  Head: NCAT  HEENT: EOMI, PEERLA  Lung: CTAB, no respiratory distress, no wheezes/rhonchi/rales B/L  CV: RRR, no murmurs, rubs or gallops  Abd: soft, NT, distended, no guarding, no rigidity, no rebound tenderness, no CVA tenderness   MSK: no visible deformities, ROM normal in UE/LE, no back pain  Neuro: No focal sensory or motor deficits

## 2023-02-07 NOTE — ED PROVIDER NOTE - ATTENDING CONTRIBUTION TO CARE
This is a 77-year-old female who lost consciousness briefly while sitting on the toilet.  Notably she has active cancer and is on anticoagulation.  She did not have any preceding nor symptoms afterwards.  She did not fall or lose tone.  She did not strike her head.  Fluid speech, mild abdominal tenderness diffusely, no scalp tenderness  We will do CT abdomen pelvis, EKG is without any concerning arrhythmia or intervals.  Administer IV Pepcid.

## 2023-02-07 NOTE — ED PROVIDER NOTE - NS ED ROS FT
GENERAL: No fever or chills  EYES: No change in vision  HEENT: No trouble swallowing or speaking  CARDIAC: No chest pain  PULMONARY: No cough or SOB  GI: +vomiting   : No changes in urination  SKIN: No rashes  NEURO: No headache, no numbness  MSK: No joint pain

## 2023-02-08 NOTE — CONSULT NOTE ADULT - ATTENDING COMMENTS
77F with Stage IV pancreatic cancer, stopped chemotherapy over 1 mos ago due to POD and declining functioning status. 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 She lives alone, with family nearby who help. GOC discussion took place out pt 2 weeks ago, hospice was discussed and recommended but pt declined.  Pt now admitted after a vasovagal event at home. She is weak, hypoxic with minimal exertion. Admitted for symptom management and GOC discussion.     Reviewed pt's current functional status and symptom burden with pt and daughter. Discussed and strongly recommended hospice services at home to decrease hospital/ER admissions, provide additional support and manage symptoms more effectively. Pt will discuss with family.   Add Oxycodone PRN pain and PPI for dyspepsia. US Abd to eval need for therapeutic paracentesis.

## 2023-02-08 NOTE — H&P ADULT - PROBLEM SELECTOR PLAN 3
- Continue with home supplemental O2 at 3LPM   - Continue with home furosemide 40 mg PO Qd to control pleural effusions as per outpatient pulmonology recommendations   - Follow up with [facility] as an outaptient

## 2023-02-08 NOTE — CONSULT NOTE ADULT - SUBJECTIVE AND OBJECTIVE BOX
Hematology Consult Note  ***recs not final until attending attestation***    Eh Chopra MD PGY-4  Reachable on TEAMS    HPI:  77F PMH stage 4 pancreatic cancer with metastatic spread to the lungs and actively on chemotherapy, CVA of MCA, pAF s/p ablation, HTN presents to the hospital following a syncopal episode. As per the patient, she was in her normal state of health until yesterday, when she went to the bathroom and lost consciousness while on the toilet. Patient reports that this has happened to her before. Patient also reports several weeks of increasing dry cough and 1 week of nausea and vomiting. Of note, patient reports that she was started back on her furosemide 1 week ago, which previously had been discontinued due to syncope.     ED course: VSS . CBC with anemia to 10.3. CMP unremarkable. CXR performed and with diffuse opacities. CT A/P with no significant interval changes other than mild increase in ascites, and with significant pleural effusions bilaterally with left worse than right. Patient received dose of alprazolam, and now is for admission to internal medicine service for further evaluation and treatment.    (08 Feb 2023 09:38)      Allergies    [This allergen will not trigger allergy alert] Seasonal allergies - stuffy nose (Other)  [This allergen will not trigger allergy alert] Shellfish-derived Products (Angioedema)  latex (Unknown)  lisinopril (Unknown)  shellfish (Other (Moderate))    Intolerances        MEDICATIONS  (STANDING):  apixaban 5 milliGRAM(s) Oral every 12 hours  atorvastatin 40 milliGRAM(s) Oral at bedtime  escitalopram 5 milliGRAM(s) Oral daily  metoprolol tartrate 100 milliGRAM(s) Oral two times a day  potassium chloride    Tablet ER 40 milliEquivalent(s) Oral every 4 hours  spironolactone 25 milliGRAM(s) Oral daily    MEDICATIONS  (PRN):  acetaminophen     Tablet .. 650 milliGRAM(s) Oral every 6 hours PRN Temp greater or equal to 38C (100.4F), Mild Pain (1 - 3)  ALPRAZolam 0.25 milliGRAM(s) Oral daily PRN anxiety  aluminum hydroxide/magnesium hydroxide/simethicone Suspension 30 milliLiter(s) Oral every 4 hours PRN Dyspepsia  melatonin 3 milliGRAM(s) Oral at bedtime PRN Insomnia  ondansetron Injectable 4 milliGRAM(s) IV Push every 8 hours PRN Nausea and/or Vomiting      PAST MEDICAL & SURGICAL HISTORY:  HTN (hypertension)      Afib  s/p ablation x3, on Eliquis      HLD (hyperlipidemia)      CVA (cerebral vascular accident)      Anxiety and depression      Atrial flutter      H/O diarrhea      Pancreatic cancer      COVID-19 vaccine series declined      S/P popliteal-tibial bypass      Status post ORIF of fracture of ankle  s/p rght TR          FAMILY HISTORY:  No pertinent family history in first degree relatives        SOCIAL HISTORY: No EtOH, no tobacco    REVIEW OF SYSTEMS:    CONSTITUTIONAL: No weakness, fevers or chills  EYES/ENT: No visual changes;  No vertigo or throat pain   NECK: No pain or stiffness  RESPIRATORY: No cough, wheezing, hemoptysis; No shortness of breath  CARDIOVASCULAR: No chest pain or palpitations  GASTROINTESTINAL: No abdominal or epigastric pain. No nausea, vomiting, or hematemesis; No diarrhea or constipation. No melena or hematochezia.  GENITOURINARY: No dysuria, frequency or hematuria  NEUROLOGICAL: No numbness or weakness  SKIN: No itching, burning, rashes, or lesions   All other review of systems is negative unless indicated above.    Height (cm): 160 (02-07 @ 20:48)  Weight (kg): 86.2 (02-07 @ 20:48)  BMI (kg/m2): 33.7 (02-07 @ 20:48)  BSA (m2): 1.89 (02-07 @ 20:48)    T(F): 97.8 (02-08-23 @ 07:15), Max: 98 (02-07-23 @ 20:48)  HR: 82 (02-08-23 @ 12:01)  BP: 132/51 (02-08-23 @ 12:01)  RR: 21 (02-08-23 @ 12:01)  SpO2: 94% (02-08-23 @ 12:01)  Wt(kg): --    Constitutional: NAD  Eyes: EOMI, sclera non-icteric  Neck: supple  Respiratory: no inc wob  Cardiovascular: RRR,   Gastrointestinal: soft, NTND, no masses palpable,  Extremities: no cyanosis, no clubbing                          10.3   9.91  )-----------( 422      ( 07 Feb 2023 21:39 )             34.0       02-07    139  |  97  |  12  ----------------------------<  143<H>  3.2<L>   |  29  |  0.77    Ca    9.0      07 Feb 2023 21:39  Phos  3.3     02-07  Mg     1.6     02-07    TPro  7.1  /  Alb  3.3  /  TBili  0.6  /  DBili  x   /  AST  25  /  ALT  18  /  AlkPhos  104  02-07      Magnesium, Serum: 1.6 mg/dL (02-07 @ 21:39)  Phosphorus Level, Serum: 3.3 mg/dL (02-07 @ 21:39)      RADIOLOGY & ADDITIONAL TESTS:  Studies reviewed.     Hematology Consult Note  ***recs not final until attending attestation***    Eh Chopra MD PGY-4  Reachable on TEAMS    HPI:  77F PMH stage 4 pancreatic cancer with metastatic spread to the lungs and actively on chemotherapy, CVA of MCA, pAF s/p ablation, HTN presents to the hospital following a syncopal episode. As per the patient, she was in her normal state of health until yesterday, when she went to the bathroom and lost consciousness while on the toilet. Patient reports that this has happened to her before. Patient also reports several weeks of increasing dry cough and 1 week of nausea and vomiting. Of note, patient reports that she was started back on her furosemide 1 week ago, which previously had been discontinued due to syncope.     ED course: VSS . CBC with anemia to 10.3. CMP unremarkable. CXR performed and with diffuse opacities. CT A/P with no significant interval changes other than mild increase in ascites, and with significant pleural effusions bilaterally with left worse than right. Patient received dose of alprazolam, and now is for admission to internal medicine service for further evaluation and treatment.    (08 Feb 2023 09:38)    When seen, patient complained of intermittent belly tenderness      Allergies    [This allergen will not trigger allergy alert] Seasonal allergies - stuffy nose (Other)  [This allergen will not trigger allergy alert] Shellfish-derived Products (Angioedema)  latex (Unknown)  lisinopril (Unknown)  shellfish (Other (Moderate))    Intolerances        MEDICATIONS  (STANDING):  apixaban 5 milliGRAM(s) Oral every 12 hours  atorvastatin 40 milliGRAM(s) Oral at bedtime  escitalopram 5 milliGRAM(s) Oral daily  metoprolol tartrate 100 milliGRAM(s) Oral two times a day  potassium chloride    Tablet ER 40 milliEquivalent(s) Oral every 4 hours  spironolactone 25 milliGRAM(s) Oral daily    MEDICATIONS  (PRN):  acetaminophen     Tablet .. 650 milliGRAM(s) Oral every 6 hours PRN Temp greater or equal to 38C (100.4F), Mild Pain (1 - 3)  ALPRAZolam 0.25 milliGRAM(s) Oral daily PRN anxiety  aluminum hydroxide/magnesium hydroxide/simethicone Suspension 30 milliLiter(s) Oral every 4 hours PRN Dyspepsia  melatonin 3 milliGRAM(s) Oral at bedtime PRN Insomnia  ondansetron Injectable 4 milliGRAM(s) IV Push every 8 hours PRN Nausea and/or Vomiting      PAST MEDICAL & SURGICAL HISTORY:  HTN (hypertension)      Afib  s/p ablation x3, on Eliquis      HLD (hyperlipidemia)      CVA (cerebral vascular accident)      Anxiety and depression      Atrial flutter      H/O diarrhea      Pancreatic cancer      COVID-19 vaccine series declined      S/P popliteal-tibial bypass      Status post ORIF of fracture of ankle  s/p rght TR          FAMILY HISTORY:  No pertinent family history in first degree relatives        SOCIAL HISTORY: No EtOH, no tobacco    REVIEW OF SYSTEMS:    CONSTITUTIONAL: No weakness, fevers or chills  EYES/ENT: No visual changes;  No vertigo or throat pain   NECK: No pain or stiffness  RESPIRATORY: No cough, wheezing, hemoptysis; No shortness of breath  CARDIOVASCULAR: No chest pain or palpitations  GASTROINTESTINAL: No abdominal or epigastric pain. No nausea, vomiting, or hematemesis; No diarrhea or constipation. No melena or hematochezia.  GENITOURINARY: No dysuria, frequency or hematuria  NEUROLOGICAL: No numbness or weakness  SKIN: No itching, burning, rashes, or lesions   All other review of systems is negative unless indicated above.    Height (cm): 160 (02-07 @ 20:48)  Weight (kg): 86.2 (02-07 @ 20:48)  BMI (kg/m2): 33.7 (02-07 @ 20:48)  BSA (m2): 1.89 (02-07 @ 20:48)    T(F): 97.8 (02-08-23 @ 07:15), Max: 98 (02-07-23 @ 20:48)  HR: 82 (02-08-23 @ 12:01)  BP: 132/51 (02-08-23 @ 12:01)  RR: 21 (02-08-23 @ 12:01)  SpO2: 94% (02-08-23 @ 12:01)  Wt(kg): --    Constitutional: NAD  Eyes: EOMI, sclera non-icteric  Neck: supple  Respiratory: no inc wob  Cardiovascular: RRR,   Gastrointestinal: soft, NTND, no masses palpable,  Extremities: no cyanosis, no clubbing                          10.3   9.91  )-----------( 422      ( 07 Feb 2023 21:39 )             34.0       02-07    139  |  97  |  12  ----------------------------<  143<H>  3.2<L>   |  29  |  0.77    Ca    9.0      07 Feb 2023 21:39  Phos  3.3     02-07  Mg     1.6     02-07    TPro  7.1  /  Alb  3.3  /  TBili  0.6  /  DBili  x   /  AST  25  /  ALT  18  /  AlkPhos  104  02-07      Magnesium, Serum: 1.6 mg/dL (02-07 @ 21:39)  Phosphorus Level, Serum: 3.3 mg/dL (02-07 @ 21:39)      RADIOLOGY & ADDITIONAL TESTS:  Studies reviewed.     Eh Chopra MD PGY-4  Reachable on TEAMS    HPI:  77F with Stage IV pancreatic cancer, stopped chemotherapy over 1 mos ago due to POD and declining functioning status    ED course: VSS . CBC with anemia to 10.3. CMP unremarkable. CXR performed and with diffuse opacities. CT A/P with no significant interval changes other than mild increase in ascites, and with significant pleural effusions bilaterally with left worse than right. Patient received dose of alprazolam, and now is for admission to internal medicine service for further evaluation and treatment.    (08 Feb 2023 09:38)    When seen, patient complained of intermittent belly tenderness      Allergies    [This allergen will not trigger allergy alert] Seasonal allergies - stuffy nose (Other)  [This allergen will not trigger allergy alert] Shellfish-derived Products (Angioedema)  latex (Unknown)  lisinopril (Unknown)  shellfish (Other (Moderate))    Intolerances        MEDICATIONS  (STANDING):  apixaban 5 milliGRAM(s) Oral every 12 hours  atorvastatin 40 milliGRAM(s) Oral at bedtime  escitalopram 5 milliGRAM(s) Oral daily  metoprolol tartrate 100 milliGRAM(s) Oral two times a day  potassium chloride    Tablet ER 40 milliEquivalent(s) Oral every 4 hours  spironolactone 25 milliGRAM(s) Oral daily    MEDICATIONS  (PRN):  acetaminophen     Tablet .. 650 milliGRAM(s) Oral every 6 hours PRN Temp greater or equal to 38C (100.4F), Mild Pain (1 - 3)  ALPRAZolam 0.25 milliGRAM(s) Oral daily PRN anxiety  aluminum hydroxide/magnesium hydroxide/simethicone Suspension 30 milliLiter(s) Oral every 4 hours PRN Dyspepsia  melatonin 3 milliGRAM(s) Oral at bedtime PRN Insomnia  ondansetron Injectable 4 milliGRAM(s) IV Push every 8 hours PRN Nausea and/or Vomiting      PAST MEDICAL & SURGICAL HISTORY:  HTN (hypertension)      Afib  s/p ablation x3, on Eliquis      HLD (hyperlipidemia)      CVA (cerebral vascular accident)      Anxiety and depression      Atrial flutter      H/O diarrhea      Pancreatic cancer      COVID-19 vaccine series declined      S/P popliteal-tibial bypass      Status post ORIF of fracture of ankle  s/p rght TR          FAMILY HISTORY:  No pertinent family history in first degree relatives        SOCIAL HISTORY: No EtOH, no tobacco    REVIEW OF SYSTEMS:    CONSTITUTIONAL: No weakness, fevers or chills  EYES/ENT: No visual changes;  No vertigo or throat pain   NECK: No pain or stiffness  RESPIRATORY: No cough, wheezing, hemoptysis; No shortness of breath  CARDIOVASCULAR: No chest pain or palpitations  GASTROINTESTINAL: No abdominal or epigastric pain. No nausea, vomiting, or hematemesis; No diarrhea or constipation. No melena or hematochezia.  GENITOURINARY: No dysuria, frequency or hematuria  NEUROLOGICAL: No numbness or weakness  SKIN: No itching, burning, rashes, or lesions   All other review of systems is negative unless indicated above.    Height (cm): 160 (02-07 @ 20:48)  Weight (kg): 86.2 (02-07 @ 20:48)  BMI (kg/m2): 33.7 (02-07 @ 20:48)  BSA (m2): 1.89 (02-07 @ 20:48)    T(F): 97.8 (02-08-23 @ 07:15), Max: 98 (02-07-23 @ 20:48)  HR: 82 (02-08-23 @ 12:01)  BP: 132/51 (02-08-23 @ 12:01)  RR: 21 (02-08-23 @ 12:01)  SpO2: 94% (02-08-23 @ 12:01)  Wt(kg): --    Constitutional: NAD  Eyes: EOMI, sclera non-icteric  Neck: supple  Respiratory: no inc wob  Cardiovascular: RRR,   Gastrointestinal: soft, NTND, no masses palpable,  Extremities: no cyanosis, no clubbing                          10.3   9.91  )-----------( 422      ( 07 Feb 2023 21:39 )             34.0       02-07    139  |  97  |  12  ----------------------------<  143<H>  3.2<L>   |  29  |  0.77    Ca    9.0      07 Feb 2023 21:39  Phos  3.3     02-07  Mg     1.6     02-07    TPro  7.1  /  Alb  3.3  /  TBili  0.6  /  DBili  x   /  AST  25  /  ALT  18  /  AlkPhos  104  02-07      Magnesium, Serum: 1.6 mg/dL (02-07 @ 21:39)  Phosphorus Level, Serum: 3.3 mg/dL (02-07 @ 21:39)      RADIOLOGY & ADDITIONAL TESTS:  Studies reviewed.     HPI:  77F with Stage IV pancreatic cancer, stopped chemotherapy over 1 mos ago due to POD and declining functioning status. 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 She lives alone, with family nearby who help. GOC discussion took place out pt 2 weeks ago, hospice was discussed and recommended but pt declined.     Pt is now admitted after a vasovagal event at her home. She got up from the bed with the help of her daughter, went to the bathroom and upon sitting on the toilet, slumped over and passed out. Daughter called EMS.  ED course: VSS . CBC with anemia to 10.3. CMP unremarkable. CXR performed and with diffuse opacities. CT A/P with no significant interval changes other than mild increase in ascites, and with significant pleural effusions bilaterally with left worse than right. Patient received dose of alprazolam, and now is for admission to internal medicine service for further evaluation and treatment.    (08 Feb 2023 09:38)    When seen, patient complained of intermittent pain in lower abdomen. Had 1 episode of vomiting 4 days ago, + dyspepsia. Does not take any pain medication at home.       Allergies    [This allergen will not trigger allergy alert] Seasonal allergies - stuffy nose (Other)  [This allergen will not trigger allergy alert] Shellfish-derived Products (Angioedema)  latex (Unknown)  lisinopril (Unknown)  shellfish (Other (Moderate))    Intolerances        MEDICATIONS  (STANDING):  apixaban 5 milliGRAM(s) Oral every 12 hours  atorvastatin 40 milliGRAM(s) Oral at bedtime  escitalopram 5 milliGRAM(s) Oral daily  metoprolol tartrate 100 milliGRAM(s) Oral two times a day  potassium chloride    Tablet ER 40 milliEquivalent(s) Oral every 4 hours  spironolactone 25 milliGRAM(s) Oral daily    MEDICATIONS  (PRN):  acetaminophen     Tablet .. 650 milliGRAM(s) Oral every 6 hours PRN Temp greater or equal to 38C (100.4F), Mild Pain (1 - 3)  ALPRAZolam 0.25 milliGRAM(s) Oral daily PRN anxiety  aluminum hydroxide/magnesium hydroxide/simethicone Suspension 30 milliLiter(s) Oral every 4 hours PRN Dyspepsia  melatonin 3 milliGRAM(s) Oral at bedtime PRN Insomnia  ondansetron Injectable 4 milliGRAM(s) IV Push every 8 hours PRN Nausea and/or Vomiting      PAST MEDICAL & SURGICAL HISTORY:  HTN (hypertension)      Afib  s/p ablation x3, on Eliquis      HLD (hyperlipidemia)      CVA (cerebral vascular accident)      Anxiety and depression      Atrial flutter      H/O diarrhea      Pancreatic cancer      COVID-19 vaccine series declined      S/P popliteal-tibial bypass      Status post ORIF of fracture of ankle  s/p rght TR          FAMILY HISTORY:  No pertinent family history in first degree relatives        SOCIAL HISTORY: No EtOH, no tobacco, lives alone     REVIEW OF SYSTEMS:    CONSTITUTIONAL: No weakness, fevers or chills  EYES/ENT: No visual changes;  No vertigo or throat pain   NECK: No pain or stiffness  RESPIRATORY: No cough, wheezing, hemoptysis; No shortness of breath  CARDIOVASCULAR: No chest pain or palpitations  GASTROINTESTINAL: No abdominal or epigastric pain. No nausea, vomiting, or hematemesis; No diarrhea or constipation. No melena or hematochezia.  GENITOURINARY: No dysuria, frequency or hematuria  NEUROLOGICAL: No numbness or weakness  SKIN: No itching, burning, rashes, or lesions   All other review of systems is negative unless indicated above.    Height (cm): 160 (02-07 @ 20:48)  Weight (kg): 86.2 (02-07 @ 20:48)  BMI (kg/m2): 33.7 (02-07 @ 20:48)  BSA (m2): 1.89 (02-07 @ 20:48)    T(F): 97.8 (02-08-23 @ 07:15), Max: 98 (02-07-23 @ 20:48)  HR: 82 (02-08-23 @ 12:01)  BP: 132/51 (02-08-23 @ 12:01)  RR: 21 (02-08-23 @ 12:01)  SpO2: 94% (02-08-23 @ 12:01)  Wt(kg): --    Constitutional: NAD  Eyes: EOMI, sclera non-icteric  Neck: supple  Respiratory: no inc wob  Cardiovascular: RRR,   Gastrointestinal: soft, NTND, no masses palpable,  Extremities: no cyanosis, no clubbing                          10.3   9.91  )-----------( 422      ( 07 Feb 2023 21:39 )             34.0       02-07    139  |  97  |  12  ----------------------------<  143<H>  3.2<L>   |  29  |  0.77    Ca    9.0      07 Feb 2023 21:39  Phos  3.3     02-07  Mg     1.6     02-07    TPro  7.1  /  Alb  3.3  /  TBili  0.6  /  DBili  x   /  AST  25  /  ALT  18  /  AlkPhos  104  02-07      Magnesium, Serum: 1.6 mg/dL (02-07 @ 21:39)  Phosphorus Level, Serum: 3.3 mg/dL (02-07 @ 21:39)      RADIOLOGY & ADDITIONAL TESTS:  Studies reviewed.

## 2023-02-08 NOTE — H&P ADULT - HISTORY OF PRESENT ILLNESS
77F PMH stage 4 pancreatic cancer with metastatic spread to the lungs and actively on chemotherapy, CVA of MCA, pAF s/p ablation, HTN presents to the hospital following a syncopal episode. As per the patient, she was in her normal state of health until yesterday, when she went to the bathroom and lost consciousness while on the toilet. Patient reports that this has happened to her before. Patient also reports several weeks of increasing dry cough and 1 week of nausea and vomiting. Of note, patient reports that she was started back on her furosemide 1 week ago, which previously had been discontinued due to syncope.     ED course: VSS . CBC with anemia to 10.3. CMP unremarkable. CXR performed and with diffuse opacities. CT A/P with no significant interval changes other than mild increase in ascites, and with significant pleural effusions bilaterally with left worse than right. Patient received dose of alprazolam, and now is for admission to internal medicine service for further evaluation and treatment.

## 2023-02-08 NOTE — H&P ADULT - NSHPPHYSICALEXAM_GEN_ALL_CORE
.  VITAL SIGNS:  T(C): 36.6 (02-08-23 @ 07:15), Max: 36.7 (02-07-23 @ 20:48)  T(F): 97.8 (02-08-23 @ 07:15), Max: 98 (02-07-23 @ 20:48)  HR: 94 (02-08-23 @ 07:15) (82 - 94)  BP: 161/81 (02-08-23 @ 07:15) (130/50 - 182/63)  BP(mean): --  RR: 23 (02-08-23 @ 07:15) (16 - 23)  SpO2: 92% (02-08-23 @ 07:15) (92% - 97%)  Wt(kg): --    PHYSICAL EXAM:    Constitutional: WDWN resting comfortably in bed; NAD  Head: NC/AT  Eyes: PERRL, EOMI, anicteric sclera  ENT: no nasal discharge; uvula midline, no oropharyngeal erythema or exudates; MMM  Neck: supple; no JVD or thyromegaly  Respiratory: CTA B/L; no W/R/R, no retractions  Cardiac: +S1/S2; RRR; no M/R/G; PMI non-displaced, nonpitting edema of the lower extremities which is equal bilaterally   Gastrointestinal: abdomen soft, NT/ND; no rebound or guarding; +BSx4  Back: spine midline, no bony tenderness or step-offs; no CVAT B/L  Extremities: WWP, no clubbing or cyanosis; no peripheral edema  Musculoskeletal: NROM x4; no joint swelling, tenderness or erythema  Vascular: 2+ radial, femoral, DP/PT pulses B/L  Dermatologic: skin warm, dry and intact; no rashes, wounds, or scars  Lymphatic: no submandibular or cervical LAD  Neurologic: AAOx3; CNII-XII grossly intact; no focal deficits  Psychiatric: affect and characteristics of appearance, verbalizations, behaviors are appropriate

## 2023-02-08 NOTE — H&P ADULT - PROBLEM SELECTOR PLAN 2
Patient with stage 4 pancreatic cancer with metastatic spread to lungs as demonstrated on CT and CXR. Last chemotherapy session in December of 2022. Effusions visible on POCUS on my exam.   - Continue with home supplemental O2 at 3LPM   - Continue with home furosemide 40 mg PO Qd to control pleural effusions as per outpatient pulmonology recommendations   - Follow up with [facility] as an outaptient   - Currently without pain and on no opioids outpatient

## 2023-02-08 NOTE — H&P ADULT - PROBLEM SELECTOR PLAN 7
DVT ppx - medically indicated, curently on Eliquis Patient anemia. Likely AOCD in setting of malignancy  - Iron studies with AM labs

## 2023-02-08 NOTE — H&P ADULT - PROBLEM SELECTOR PLAN 5
- Continue with home escitalopram 5 mg PO Qd  - Continue with home Xanax 0.5 mg PO PRN for acute anxiety

## 2023-02-08 NOTE — ED ADULT NURSE REASSESSMENT NOTE - NS ED NURSE REASSESS COMMENT FT1
Received report from Yogesh GIBSON. Pt. A&OX3, sitting up in stretcher on initial assessment, pt. does not appear to be in any acute distress at this time. Pt. is admitted and awaiting bed assignment. Pt. brought to bathroom in wheel chair with assistance. Safety and comfort provided.

## 2023-02-08 NOTE — H&P ADULT - NSHPLABSRESULTS_GEN_ALL_CORE
I have personally reviewed the CXR and found bilateral hazy opacities   I have personally reviewed the EKG and found normal sinus rhythm  I have personally performed a POCUS examination of the patients lungs and have found >2 B lines, bilateral pleural effusions

## 2023-02-08 NOTE — H&P ADULT - PROBLEM SELECTOR PLAN 1
Patient presents to the hospital with syncopal episode while sitting on toilet. Similar to previous episodes, where patient was found with vasovagal syncope. Of note, patient was recently started back on furosemide. Denies dizziness or falls from standing position.   - PT consult  - Orthostatic vital signs  - Continue to monitor for arrythmia

## 2023-02-08 NOTE — H&P ADULT - PROBLEM SELECTOR PLAN 6
- Continue with home Eliquis 5 mg PO BID  - Continue with home metoprolol tartrate 100 mg PO BID for rate control

## 2023-02-08 NOTE — CONSULT NOTE ADULT - ASSESSMENT
77F PMH stage 4 pancreatic cancer with metastatic spread to the lungs and actively on chemotherapy, CVA of MCA, pAF s/p ablation, HTN presents to the hospital following a syncopal episode, likely vasovagal.     #pancreatic cancer  - f/w Dr. Nielson, dx 10/21, stage 4 mets to lung, s/p Gemzar + Abraxane, POD in lung and pancreas 10/4/22 on imaging, given 5 FU 2400 mg/m2/  mg/m2/ Onivyde w/ severe diarrhea, switched to FOLFOX s/p 4 cycles w/ POD and admission for diarrhea, hospice recommended due to poor functional status  - not currently a candidate for further DMT nor is she is appropriate for any clinical trials due to poor ECOG  - can consult palliative care for assistance w/ further hospice discussion   77F PMH stage 4 pancreatic cancer with metastatic spread to the lungs and actively on chemotherapy, CVA of MCA, pAF s/p ablation, HTN presents to the hospital following a syncopal episode, likely vasovagal.     #pancreatic cancer  - f/w Dr. Nielson, dx 10/21, stage 4 mets to lung, s/p Gemzar + Abraxane, POD in lung and pancreas 10/4/22 on imaging, given 5 FU 2400 mg/m2/  mg/m2/ Onivyde w/ severe diarrhea, switched to FOLFOX s/p 4 cycles w/ POD and admission for diarrhea, hospice recommended due to poor functional status  - would obtain RUQ U/S to assess for drainable ascites  - would send w/ prescription of pain medications so pt can have prior to discharge  - not currently a candidate for further DMT nor is she is appropriate for any clinical trials due to poor ECOG  - discussed w/ patient and daughter the benefits of hospice including symptom management and improved quality of life, they are agreeable to hospice  - would set pt up w/ hospice prior to discharge   77F PMH stage 4 pancreatic cancer with metastatic spread to the lungs off chemotherapy, CVA of MCA, pAF s/p ablation, HTN presents to the hospital following a syncopal episode, likely vasovagal.     #pancreatic cancer  - f/w Dr. Nielson, dx 10/21, stage 4 mets to lung, s/p Gemzar + Abraxane, POD in lung and pancreas 10/4/22 on imaging, given 5 FU 2400 mg/m2/  mg/m2/ Onivyde w/ severe diarrhea, switched to FOLFOX s/p 4 cycles w/ POD and admission for diarrhea, hospice recommended due to poor functional status  - would obtain RUQ U/S to assess for drainable ascites  - would send w/ prescription of pain medications so pt can have prior to discharge  - not currently a candidate for further DMT nor is she is appropriate for any clinical trials due to poor ECOG  - discussed w/ patient and daughter the benefits of hospice including symptom management and improved quality of life, they are agreeable to hospice  - would set pt up w/ hospice prior to discharge   Trilobed Flap Text: The defect edges were debeveled with a #15 scalpel blade.  Given the location of the defect and the proximity to free margins a trilobed flap was deemed most appropriate.  Using a sterile surgical marker, an appropriate trilobed flap drawn around the defect.    The area thus outlined was incised deep to adipose tissue with a #15 scalpel blade.  The skin margins were undermined to an appropriate distance in all directions utilizing iris scissors.

## 2023-02-08 NOTE — ED ADULT NURSE REASSESSMENT NOTE - NS ED NURSE REASSESS COMMENT FT1
Pt. states admitting MD stated she will be receiving Lasix. MD contacted and states he will look into ordering lasix.

## 2023-02-08 NOTE — H&P ADULT - ASSESSMENT
77F PMH stage 4 pancreatic cancer with metastatic spread to the lungs and actively on chemotherapy, CVA of MCA, pAF s/p ablation, HTN presents to the hospital following a syncopal episode, likely vasovagal.

## 2023-02-09 NOTE — PROGRESS NOTE ADULT - SUBJECTIVE AND OBJECTIVE BOX
Patient is a 77y old  Female who presents with a chief complaint of Syncope (09 Feb 2023 12:58)        SUBJECTIVE / OVERNIGHT EVENTS: patient reports feeling tired.       MEDICATIONS  (STANDING):  apixaban 5 milliGRAM(s) Oral every 12 hours  atorvastatin 40 milliGRAM(s) Oral at bedtime  escitalopram 5 milliGRAM(s) Oral daily  furosemide    Tablet 40 milliGRAM(s) Oral daily  metoprolol tartrate 100 milliGRAM(s) Oral two times a day  pantoprazole    Tablet 40 milliGRAM(s) Oral before breakfast  spironolactone 25 milliGRAM(s) Oral daily    MEDICATIONS  (PRN):  acetaminophen     Tablet .. 650 milliGRAM(s) Oral every 6 hours PRN Temp greater or equal to 38C (100.4F), Mild Pain (1 - 3)  ALPRAZolam 0.25 milliGRAM(s) Oral daily PRN anxiety  aluminum hydroxide/magnesium hydroxide/simethicone Suspension 30 milliLiter(s) Oral every 4 hours PRN Dyspepsia  melatonin 3 milliGRAM(s) Oral at bedtime PRN Insomnia  ondansetron Injectable 4 milliGRAM(s) IV Push every 8 hours PRN Nausea and/or Vomiting  oxyCODONE    IR 5 milliGRAM(s) Oral every 6 hours PRN Moderate Pain (4 - 6)      Vital Signs Last 24 Hrs  T(C): 36.7 (09 Feb 2023 13:32), Max: 36.7 (08 Feb 2023 18:00)  T(F): 98 (09 Feb 2023 13:32), Max: 98.1 (08 Feb 2023 20:32)  HR: 85 (09 Feb 2023 13:32) (75 - 93)  BP: 109/69 (09 Feb 2023 13:32) (105/68 - 143/83)  BP(mean): 78 (08 Feb 2023 18:00) (78 - 78)  RR: 18 (09 Feb 2023 13:32) (18 - 20)  SpO2: 93% (09 Feb 2023 13:32) (92% - 97%)    Parameters below as of 09 Feb 2023 13:32  Patient On (Oxygen Delivery Method): nasal cannula  O2 Flow (L/min): 4    CAPILLARY BLOOD GLUCOSE        I&O's Summary        PHYSICAL EXAM:   GENERAL: NAD, well-developed  HEAD:  Atraumatic, Normocephalic  EYES: EOMI, PERRLA, conjunctiva and sclera clear  NECK: Supple,   CHEST/LUNG: reduced in bases  HEART: S1S2 normal. Regular rate and rhythm; No murmurs, rubs, or gallops  ABDOMEN: Soft, Nontender, mildly distended; Bowel sounds present  EXTREMITIES:  1+ LE edema  PSYCH/Neuro: AAOx3. Non-focal.   SKIN: No rashes or lesions      LABS:                        9.7    10.05 )-----------( 371      ( 09 Feb 2023 06:58 )             31.8     02-09    138  |  99  |  10  ----------------------------<  113<H>  4.2   |  31  |  0.62    Ca    9.0      09 Feb 2023 06:56  Phos  3.3     02-07  Mg     1.6     02-07    TPro  6.7  /  Alb  3.2<L>  /  TBili  0.6  /  DBili  x   /  AST  24  /  ALT  18  /  AlkPhos  97  02-09        < from: US Abdomen Limited (02.09.23 @ 11:08) >  IMPRESSION:  Small volume ascites, with the largest pocket in the left lower quadrant.        --- End of Report ---    < end of copied text >      RADIOLOGY & ADDITIONAL TESTS:    Imaging Personally Reviewed: us abd report  Consultant(s) Notes Reviewed:  PT and heme/onco  Care Discussed with Consultants/Other Providers: Dr. Nielson

## 2023-02-09 NOTE — PROGRESS NOTE ADULT - PROBLEM SELECTOR PLAN 1
Patient presents to the hospital with syncopal episode while sitting on toilet. Similar to previous episodes, where patient was found with vasovagal syncope. Of note, patient was recently started back on furosemide. Denies dizziness or falls from standing position.   - PT consult -f/u   - Orthostatic vital signs -f/u  -KAL stockings.

## 2023-02-09 NOTE — PROGRESS NOTE ADULT - SUBJECTIVE AND OBJECTIVE BOX
Eh Chopra MD PGY-4 Hematology Oncology  Reachable on TEAMS    INTERVAL HPI/OVERNIGHT EVENTS:  Patient S&E at bedside. No acute events, no active complaints    VITAL SIGNS:  T(F): 98 (02-09-23 @ 13:32)  HR: 85 (02-09-23 @ 13:32)  BP: 109/69 (02-09-23 @ 13:32)  RR: 18 (02-09-23 @ 13:32)  SpO2: 93% (02-09-23 @ 13:32)  Wt(kg): --    PHYSICAL EXAM:    Constitutional: NAD  Eyes: EOMI, sclera non-icteric  Neck: supple, no masses, no JVD  Respiratory: CTA b/l, good air entry b/l  Cardiovascular: RRR, no M/R/G  Gastrointestinal: soft, NTND, no masses palpable, + BS, no hepatosplenomegaly  Extremities: no c/c/e  Neurological: AAOx3      MEDICATIONS  (STANDING):  apixaban 5 milliGRAM(s) Oral every 12 hours  atorvastatin 40 milliGRAM(s) Oral at bedtime  escitalopram 5 milliGRAM(s) Oral daily  furosemide    Tablet 40 milliGRAM(s) Oral daily  metoprolol tartrate 100 milliGRAM(s) Oral two times a day  pantoprazole    Tablet 40 milliGRAM(s) Oral before breakfast  spironolactone 25 milliGRAM(s) Oral daily    MEDICATIONS  (PRN):  acetaminophen     Tablet .. 650 milliGRAM(s) Oral every 6 hours PRN Temp greater or equal to 38C (100.4F), Mild Pain (1 - 3)  ALPRAZolam 0.25 milliGRAM(s) Oral daily PRN anxiety  aluminum hydroxide/magnesium hydroxide/simethicone Suspension 30 milliLiter(s) Oral every 4 hours PRN Dyspepsia  melatonin 3 milliGRAM(s) Oral at bedtime PRN Insomnia  ondansetron Injectable 4 milliGRAM(s) IV Push every 8 hours PRN Nausea and/or Vomiting  oxyCODONE    IR 5 milliGRAM(s) Oral every 6 hours PRN Moderate Pain (4 - 6)      Allergies    [This allergen will not trigger allergy alert] Seasonal allergies - stuffy nose (Other)  [This allergen will not trigger allergy alert] Shellfish-derived Products (Angioedema)  latex (Unknown)  lisinopril (Unknown)  shellfish (Other (Moderate))    Intolerances        LABS:                        9.7    10.05 )-----------( 371      ( 09 Feb 2023 06:58 )             31.8     02-09    138  |  99  |  10  ----------------------------<  113<H>  4.2   |  31  |  0.62    Ca    9.0      09 Feb 2023 06:56  Phos  3.3     02-07  Mg     1.6     02-07    TPro  6.7  /  Alb  3.2<L>  /  TBili  0.6  /  DBili  x   /  AST  24  /  ALT  18  /  AlkPhos  97  02-09          RADIOLOGY & ADDITIONAL TESTS:  Studies reviewed.   Eh Chopra MD PGY-4 Hematology Oncology  Reachable on TEAMS    INTERVAL HPI/OVERNIGHT EVENTS:  Feels weak. SOB with exertion.     VITAL SIGNS:  T(F): 98 (02-09-23 @ 13:32)  HR: 85 (02-09-23 @ 13:32)  BP: 109/69 (02-09-23 @ 13:32)  RR: 18 (02-09-23 @ 13:32)  SpO2: 93% (02-09-23 @ 13:32)  Wt(kg): --    PHYSICAL EXAM:    Constitutional: NAD  Eyes: EOMI, sclera non-icteric  Neck: supple, no masses, no JVD  Respiratory: CTA b/l, good air entry b/l  Cardiovascular: RRR, no M/R/G  Gastrointestinal: soft, NTND, no masses palpable, + BS, no hepatosplenomegaly  Extremities: no c/c/e  Neurological: AAOx3      MEDICATIONS  (STANDING):  apixaban 5 milliGRAM(s) Oral every 12 hours  atorvastatin 40 milliGRAM(s) Oral at bedtime  escitalopram 5 milliGRAM(s) Oral daily  furosemide    Tablet 40 milliGRAM(s) Oral daily  metoprolol tartrate 100 milliGRAM(s) Oral two times a day  pantoprazole    Tablet 40 milliGRAM(s) Oral before breakfast  spironolactone 25 milliGRAM(s) Oral daily    MEDICATIONS  (PRN):  acetaminophen     Tablet .. 650 milliGRAM(s) Oral every 6 hours PRN Temp greater or equal to 38C (100.4F), Mild Pain (1 - 3)  ALPRAZolam 0.25 milliGRAM(s) Oral daily PRN anxiety  aluminum hydroxide/magnesium hydroxide/simethicone Suspension 30 milliLiter(s) Oral every 4 hours PRN Dyspepsia  melatonin 3 milliGRAM(s) Oral at bedtime PRN Insomnia  ondansetron Injectable 4 milliGRAM(s) IV Push every 8 hours PRN Nausea and/or Vomiting  oxyCODONE    IR 5 milliGRAM(s) Oral every 6 hours PRN Moderate Pain (4 - 6)      Allergies    [This allergen will not trigger allergy alert] Seasonal allergies - stuffy nose (Other)  [This allergen will not trigger allergy alert] Shellfish-derived Products (Angioedema)  latex (Unknown)  lisinopril (Unknown)  shellfish (Other (Moderate))    Intolerances        LABS:                        9.7    10.05 )-----------( 371      ( 09 Feb 2023 06:58 )             31.8     02-09    138  |  99  |  10  ----------------------------<  113<H>  4.2   |  31  |  0.62    Ca    9.0      09 Feb 2023 06:56  Phos  3.3     02-07  Mg     1.6     02-07    TPro  6.7  /  Alb  3.2<L>  /  TBili  0.6  /  DBili  x   /  AST  24  /  ALT  18  /  AlkPhos  97  02-09          RADIOLOGY & ADDITIONAL TESTS:  Studies reviewed.

## 2023-02-09 NOTE — PROGRESS NOTE ADULT - ATTENDING COMMENTS
77 F w/ STage IV pancreatic cancer s/p Gemzar/Abraxane c/b capillary leak syndrome, onivyde, FOLFOX x 6 with POD and declining PS. Now O2 dependent, increasing fatigue. Out pt GOC discussion took place 2 weeks ago and hospice was discussed. Pt did not agree to services. Now admitted after vasovagal event at home. She syncopized in the bathroom. Today feels ok. + abdominal distention/bloating. + dyspepsia + abdominal pain, + weakness. Lives alone, family nearby.     Recommendation  - added Oxycodone PRN pain   - US abd too small amt of ascites for drainage   - discussed hospice services with pt and daughter. They are interested in referral   - no plan for further chemotherapy or RT  - will need full time care at home to prevent readmission. 77 F w/ STage IV pancreatic cancer s/p Gemzar/Abraxane c/b capillary leak syndrome, onivyde, FOLFOX x 6 with POD and declining PS. Now O2 dependent, increasing fatigue. Out pt GOC discussion took place 2 weeks ago and hospice was discussed. Pt did not agree to services. Now admitted after vasovagal event at home. Today feels ok. + abdominal distention/bloating. + dyspepsia + abdominal pain, + weakness. Lives alone, family nearby.     Recommendation  - added Oxycodone PRN pain   - US abd too small amt of ascites for drainage   - discussed hospice services with pt and daughter. They are interested in referral   - no plan for further chemotherapy or RT  - will need full time care at home to prevent readmission.  - d/c planning for the weekend

## 2023-02-09 NOTE — PATIENT PROFILE ADULT - FALL HARM RISK - HARM RISK INTERVENTIONS

## 2023-02-09 NOTE — PATIENT PROFILE ADULT - NSPROPTRIGHTBILLOFRIGHTS_GEN_A_NUR
Goiter / Thyroid nodules  Stable, no issues  Normal TSH  Thyroid US (8/5/19): Bilateral thyroid nodules. Follow up in 1 year  Thyroid US (9/31/21): Multiple thyroid nodules. Short-term follow-up is advised.  Thyroid US (4/6/22): stable nodules. Repeat US in 2 years to monitor 6mm nodule on L side  Endocrine was following, reconsider referral if FNA if needed   patient

## 2023-02-09 NOTE — PATIENT PROFILE ADULT - FALL HARM RISK - TYPE OF ASSESSMENT
pt presents to the ED with complaints of intermittent chest pain. Pt reports she woke up with CP that radiated to her right arm. Pt was seen at urgent care PTA, had an EKG, reportedly normal, given ASA and was referred to the ED for further evaluation. Episode lasted about an hour and a half. Admission pt presents to the ED with complaints of intermittent chest pain. Pt reports she woke up with CP that radiated to her right arm. Pt was seen at urgent care PTA, had an EKG, reportedly normal, given ASA and was referred to the ED for further evaluation. Episode lasted about an hour and a half. 18 g placed to left FA. labs sent. EKG performed. Tele monitoring initiated.

## 2023-02-09 NOTE — PHYSICAL THERAPY INITIAL EVALUATION ADULT - PERTINENT HX OF CURRENT PROBLEM, REHAB EVAL
78 yo F presents to the hospital following a syncopal episode. As per the patient, she was in her normal state of health until yesterday, when she went to the bathroom and lost consciousness while on the toilet. Patient reports that this has happened to her before. Patient also reports several weeks of increasing dry cough and 1 week of nausea and vomiting. Of note, patient reports that she was started back on her furosemide 1 week ago, which previously had been discontinued due to syncope. XRay Chest 2/7: Diffuse bilateral patchy and nodular opacities, increased compared to prior study 1/13/2023. CT Abd 2/8: *  Bilateral pulmonary metastatic disease with left greater than right small pleural effusions.*  Peritoneal carcinomatosis with omental caking, left adnexal mass, and mild nodularity of the right adnexa, thickening of the diaphragm, not significantly changed.*  Abdominal and pelvic ascites, slightly increased from prior exam.

## 2023-02-09 NOTE — PHYSICAL THERAPY INITIAL EVALUATION ADULT - ADDITIONAL COMMENTS
pt lives in apt, first floor/ no steps to enter. Prior to admission, pt was I with all functional mobility and ADLs without AD. Pt does own a rolling walker.

## 2023-02-10 NOTE — DISCHARGE NOTE PROVIDER - HOSPITAL COURSE
76 y/o F, PMH of cerebrovascular dx and Stage 4 pancreatic cancer, presents with complaint of syncope after sitting on toilet accompanied by daughter. Pt also endorses 1 episode of nonbloody and nonbilious vomiting and dry cough x 3 weeks. Pt on 3L of O2 at home. Pt denies LOC/head trauma.     Dx:   Syncope while using bathroom; likely vasovagal which was consistent with previous episodes of the similar presentation, c/w TEDs           Aflutter on Eliquis, BB           H/o pancreatic cancer w/ mets; last chemo 12/2022, c/w home O2 3L, PO Lasix for effusions           Ascitis - Small on     Home Hospice recommended. ______________________   77F PMH stage 4 pancreatic cancer with metastatic spread to the lungs and actively on chemotherapy, CVA of MCA, pAF s/p ablation, HTN presents to the hospital following a syncopal episode, likely vasovagal.      Problem/Plan - 1:  ·  Problem: Syncope.   ·  Plan: Patient presents to the hospital with syncopal episode while sitting on toilet. Similar to previous episodes, where patient was found with vasovagal syncope. Of note, patient was recently started back on furosemide. Denies dizziness or falls from standing position.   - PT consult -home pt  - Orthostatic vital signs -f/u  -KAL stockings.  -elevate legs.     Problem/Plan - 2:  ·  Problem: Pancreatic cancer.   ·  Plan: Patient with stage 4 pancreatic cancer with metastatic spread to lungs as demonstrated on CT and CXR. Last chemotherapy session in December of 2022.   - Continue with home supplemental O2 at 3LPM   - Continue with home furosemide 40 mg PO Qd to control pleural effusions as per outpatient pulmonology recommendations   - Follow up with [facility] as an outpatient   - oxycodone prn.     Problem/Plan - 3:  ·  Problem: Metastasis from pancreatic cancer.   ·  Plan: - Continue with home supplemental O2 at 3LPM   - Continue with home furosemide 40 mg PO Qd to control pleural effusions as per outpatient pulmonology recommendations   - Follow up with [facility] as an outpatient  -US abd with only small ascites so no need for paracentesis at this time.     Problem/Plan - 4:  ·  Problem: CVA (cerebral vascular accident).   ·  Plan: - Continue with atorvastatin 40 mg PO Qd.     Problem/Plan - 5:  ·  Problem: Anxiety and depression.   ·  Plan: - Continue with home escitalopram 5 mg PO Qd  - Continue with home Xanax 0.5 mg PO PRN for acute anxiety  -consider appetite stimulant or change lexapro to remeron.     Problem/Plan - 6:  ·  Problem: Atrial flutter.   ·  Plan: - Continue with home Eliquis 5 mg PO BID  - Continue with home metoprolol tartrate 100 mg PO BID for rate control.     Problem/Plan - 7:  ·  Problem: Anemia.   ·  Plan: Patient anemia. Likely AOCD in setting of malignancy  - Iron studies with -aocd.     Problem/Plan - 8:  ·  Problem: Need for prophylactic measure.   ·  Plan: DVT ppx - medically indicated, currently on Eliquis  -PT recs home PT.   -Hospice referral made, DME to be delivered today and can be PR'ed after 1pm on 2/11.    Patient seen and evaluated, no acute somatic complaints. Reviewed discharge medications with patient; All new medications requiring new prescriptions were sent to the pharmacy of patient's choice. Reviewed need for prescription for previous home medications and new prescriptions sent if requested. Medically cleared/stable for discharge as per Dr. Muñiz on 2/11/2023 with close outpatient follow up within 1-2 weeks of discharge. Patient understands and agrees with plan of care. 77F PMH stage 4 pancreatic cancer with metastatic spread to the lungs and actively on chemotherapy, CVA of MCA, pAF s/p ablation, HTN presents to the hospital following a syncopal episode, likely vasovagal.      Problem/Plan - 1:  ·  Problem: Syncope.   ·  Plan: Patient presents to the hospital with syncopal episode while sitting on toilet. Similar to previous episodes, where patient was found with vasovagal syncope. Of note, patient was recently started back on furosemide. Denies dizziness or falls from standing position.   - PT consult -home pt  -KAL stockings.  -elevate legs     Problem/Plan - 2:  ·  Problem: Pancreatic cancer.   ·  Plan: Patient with stage 4 pancreatic cancer with metastatic spread to lungs as demonstrated on CT and CXR. Last chemotherapy session in December of 2022.   - Continue with home supplemental O2 at 3LPM   - Continue with home furosemide 40 mg PO Qd to control pleural effusions as per outpatient pulmonology recommendations   - Follow up with [facility] as an outpatient   - oxycodone prn.     Problem/Plan - 3:  ·  Problem: Metastasis from pancreatic cancer.   ·  Plan: - Continue with home supplemental O2 at 3LPM   - Continue with home furosemide 40 mg PO Qd to control pleural effusions as per outpatient pulmonology recommendations   - Follow up with [facility] as an outpatient  -US abd with only small ascites so no need for paracentesis at this time.     Problem/Plan - 4:  ·  Problem: CVA (cerebral vascular accident).   ·  Plan: - Continue with atorvastatin 40 mg PO Qd.     Problem/Plan - 5:  ·  Problem: Anxiety and depression.   ·  Plan: - Continue with home escitalopram 5 mg PO Qd  - Continue with home Xanax 0.5 mg PO PRN for acute anxiety  -consider appetite stimulant or change lexapro to remeron.     Problem/Plan - 6:  ·  Problem: Atrial flutter.   ·  Plan: - Continue with home Eliquis 5 mg PO BID  - Continue with home metoprolol tartrate 100 mg PO BID for rate control.     Problem/Plan - 7:  ·  Problem: Anemia.   ·  Plan: Patient anemia. Likely AOCD in setting of malignancy  - Iron studies with -aocd.     Problem/Plan - 8:  ·  Problem: Need for prophylactic measure.   ·  Plan: DVT ppx - medically indicated, currently on Eliquis  -PT recs home PT.   -Hospice referral made, DME to be delivered today and can be DC'ed after 1pm on 2/11.

## 2023-02-10 NOTE — PROGRESS NOTE ADULT - SUBJECTIVE AND OBJECTIVE BOX
Patient is a 77y old  Female who presents with a chief complaint of Syncope (10 Feb 2023 13:49)        SUBJECTIVE / OVERNIGHT EVENTS: not great appetite. a little sob sometimes. abd swelling better.       MEDICATIONS  (STANDING):  apixaban 5 milliGRAM(s) Oral every 12 hours  atorvastatin 40 milliGRAM(s) Oral at bedtime  escitalopram 5 milliGRAM(s) Oral daily  furosemide    Tablet 40 milliGRAM(s) Oral daily  guaifenesin/dextromethorphan Oral Liquid 10 milliLiter(s) Oral three times a day  metoprolol tartrate 100 milliGRAM(s) Oral two times a day  pantoprazole    Tablet 40 milliGRAM(s) Oral before breakfast  spironolactone 25 milliGRAM(s) Oral daily    MEDICATIONS  (PRN):  acetaminophen     Tablet .. 650 milliGRAM(s) Oral every 6 hours PRN Temp greater or equal to 38C (100.4F), Mild Pain (1 - 3)  ALPRAZolam 0.25 milliGRAM(s) Oral daily PRN anxiety  aluminum hydroxide/magnesium hydroxide/simethicone Suspension 30 milliLiter(s) Oral every 4 hours PRN Dyspepsia  melatonin 3 milliGRAM(s) Oral at bedtime PRN Insomnia  ondansetron Injectable 4 milliGRAM(s) IV Push every 8 hours PRN Nausea and/or Vomiting  oxyCODONE    IR 5 milliGRAM(s) Oral every 6 hours PRN Moderate Pain (4 - 6)      Vital Signs Last 24 Hrs  T(C): 36.4 (10 Feb 2023 11:47), Max: 37 (10 Feb 2023 04:44)  T(F): 97.6 (10 Feb 2023 11:47), Max: 98.6 (10 Feb 2023 04:44)  HR: 76 (10 Feb 2023 11:47) (69 - 93)  BP: 121/71 (10 Feb 2023 11:47) (107/66 - 121/71)  BP(mean): --  RR: 17 (10 Feb 2023 11:47) (17 - 18)  SpO2: 94% (10 Feb 2023 11:47) (94% - 95%)    Parameters below as of 10 Feb 2023 11:47  Patient On (Oxygen Delivery Method): nasal cannula  O2 Flow (L/min): 4    CAPILLARY BLOOD GLUCOSE        I&O's Summary    09 Feb 2023 07:01  -  10 Feb 2023 07:00  --------------------------------------------------------  IN: 780 mL / OUT: 0 mL / NET: 780 mL          PHYSICAL EXAM:   GENERAL: NAD, well-developed  HEAD:  Atraumatic, Normocephalic  EYES: EOMI, PERRLA, conjunctiva and sclera clear  NECK: Supple,    CHEST/LUNG: reduced bs in bases  HEART: S1S2 normal. Regular rate and rhythm; No murmurs, rubs, or gallops  ABDOMEN: Soft, Nontender, milldy distended; Bowel sounds present  EXTREMITIES:  1+ LE edema  PSYCH/Neuro: AAOx3. Non-focal.   SKIN: No rashes or lesions      LABS:                        9.5    8.99  )-----------( 371      ( 10 Feb 2023 07:02 )             31.4     02-09    138  |  99  |  10  ----------------------------<  113<H>  4.2   |  31  |  0.62    Ca    9.0      09 Feb 2023 06:56    TPro  6.7  /  Alb  3.2<L>  /  TBili  0.6  /  DBili  x   /  AST  24  /  ALT  18  /  AlkPhos  97  02-09          < from: US Abdomen Limited (02.09.23 @ 11:08) >  IMPRESSION:  Small volume ascites, with the largest pocket in the left lower quadrant.    < end of copied text >        RADIOLOGY & ADDITIONAL TESTS:    Imaging Personally Reviewed: us abd  Consultant(s) Notes Reviewed:  onco, pt  Care Discussed with Consultants/Other Providers: onco

## 2023-02-10 NOTE — DISCHARGE NOTE PROVIDER - NSDCCPCAREPLAN_GEN_ALL_CORE_FT
PRINCIPAL DISCHARGE DIAGNOSIS  Diagnosis: Syncope  Assessment and Plan of Treatment: Seek urgent care if you have further dizziness, loss of consciousness or fainting.  Follow up with Primary care prvider or Cardiologst for continue mangement.      SECONDARY DISCHARGE DIAGNOSES  Diagnosis: Atrial flutter  Assessment and Plan of Treatment: Continue your medications as prescribed.  Seek urgent care if you have dizziness, palpitations or bleeding.    Follow up with Cardiology as scheduled.    Diagnosis: Pancreatic cancer  Assessment and Plan of Treatment: Continue care with your Oncology Team.  Seek urgent care if you have abdominal pain or are unable to eat or vomiting.

## 2023-02-10 NOTE — DISCHARGE NOTE PROVIDER - ATTENDING DISCHARGE PHYSICAL EXAMINATION:
Patient seen and examined at bedside. Appears comfortable. Patient to be discharged today. DME delivered to home. Home oxygen also set up. Hospice service to follow. See discharge date PG for further details.

## 2023-02-10 NOTE — DISCHARGE NOTE NURSING/CASE MANAGEMENT/SOCIAL WORK - PATIENT PORTAL LINK FT
You can access the FollowMyHealth Patient Portal offered by [facility] by registering at the following website: [email or web address]. By joining [employer]’s FollowMyHealth portal, you will also be able to view your health information using other applications (apps) compatible with our system.

## 2023-02-10 NOTE — PROGRESS NOTE ADULT - PROBLEM SELECTOR PLAN 1
Patient presents to the hospital with syncopal episode while sitting on toilet. Similar to previous episodes, where patient was found with vasovagal syncope. Of note, patient was recently started back on furosemide. Denies dizziness or falls from standing position.   - PT consult -home pt  - Orthostatic vital signs -f/u  -KAL stockings.  -elevate legs

## 2023-02-10 NOTE — DISCHARGE NOTE PROVIDER - NSDCFUSCHEDAPPT_GEN_ALL_CORE_FT
Tata Nielson  [facility]  [facility]  Scheduled Appointment: 02/16/2023    Nicanor Wilkinson  [facility]  PULMMED [address] [place] [address]  Scheduled Appointment: 02/28/2023

## 2023-02-10 NOTE — DISCHARGE NOTE NURSING/CASE MANAGEMENT/SOCIAL WORK - NSDCPEFALRISK_GEN_ALL_CORE
For information on Fall & Injury Prevention, visit: [email or web address] OR  [email or web address] OR  https://www.cdc.gov/steadi/patient.html

## 2023-02-10 NOTE — DISCHARGE NOTE PROVIDER - NSDCFUADDAPPT_GEN_ALL_CORE_FT
APPTS ARE READY TO BE MADE: [X] YES    Best Family or Patient Contact (if needed):    Additional Information about above appointments (if needed):    1: primary care doctor ~1wk  2: cardiology ~1-2 wk  3: hematology ~1wk    Other comments or requests:

## 2023-02-10 NOTE — DISCHARGE NOTE PROVIDER - NSDCMRMEDTOKEN_GEN_ALL_CORE_FT
Afrin 0.05% nasal spray: 2 spray(s) nasal 2 times a day, As Needed  apixaban 5 mg oral tablet: 1 tab(s) orally every 12 hours  atorvastatin 40 mg oral tablet: 1 tab(s) orally once a day (at bedtime)  escitalopram 5 mg oral tablet: 1 tab(s) orally once a day  Metoprolol Tartrate 100 mg oral tablet: 1 tab(s) orally 2 times a day  shower chair:   spironolactone 25 mg oral tablet: 1 tab(s) orally once a day  Vitamin D3 25 mcg (1000 intl units) oral tablet: 1 tab(s) orally once a day  Xanax 0.25 mg oral tablet: 1 tab(s) orally once a day, As Needed   ALPRAZolam 0.25 mg oral tablet: 1 tab(s) orally once a day, As needed, anxiety MDD:1 tab  apixaban 5 mg oral tablet: 1 tab(s) orally every 12 hours  atorvastatin 40 mg oral tablet: 1 tab(s) orally once a day (at bedtime)  escitalopram 5 mg oral tablet: 1 tab(s) orally once a day  furosemide 40 mg oral tablet: 1 tab(s) orally once a day  guaiFENesin-dextromethorphan 100 mg-10 mg/5 mL oral liquid: 10 milliliter(s) orally 3 times a day  melatonin 3 mg oral tablet: 1 tab(s) orally once a day (at bedtime), As needed, Insomnia  Metoprolol Tartrate 100 mg oral tablet: 1 tab(s) orally 2 times a day  naloxone 4 mg/0.1 mL nasal spray: 4 milligram(s) intranasally once a day, As Needed for opioid overdose, respiratory distress,  oxyCODONE 5 mg oral tablet: 1 tab(s) orally every 6 hours, As needed, Moderate Pain (4 - 6) MDD:4 tabs  pantoprazole 40 mg oral delayed release tablet: 1 tab(s) orally once a day (before a meal)  spironolactone 25 mg oral tablet: 1 tab(s) orally once a day  Vitamin D3 25 mcg (1000 intl units) oral tablet: 1 tab(s) orally once a day   ALPRAZolam 0.25 mg oral tablet: 1 tab(s) orally once a day, As needed, anxiety MDD:1 tab  apixaban 5 mg oral tablet: 1 tab(s) orally every 12 hours  atorvastatin 40 mg oral tablet: 1 tab(s) orally once a day (at bedtime)  escitalopram 5 mg oral tablet: 1 tab(s) orally once a day  furosemide 40 mg oral tablet: 1 tab(s) orally once a day  guaiFENesin-dextromethorphan 100 mg-10 mg/5 mL oral liquid: 10 milliliter(s) orally 3 times a day  melatonin 3 mg oral tablet: 1 tab(s) orally once a day (at bedtime), As needed, Insomnia  Metoprolol Tartrate 100 mg oral tablet: 1 tab(s) orally 2 times a day  MiraLax oral powder for reconstitution: 17 gram(s) orally 2 times a day   naloxone 4 mg/0.1 mL nasal spray: 4 milligram(s) intranasally once a day, As Needed for opioid overdose, respiratory distress,  oxyCODONE 5 mg oral tablet: 1 tab(s) orally every 6 hours, As needed, Moderate Pain (4 - 6) MDD:4 tabs  pantoprazole 40 mg oral delayed release tablet: 1 tab(s) orally once a day (before a meal)  senna (sennosides) 3 mg oral tablet, chewable: 1 tab(s) chewed 2 times a day   spironolactone 25 mg oral tablet: 1 tab(s) orally once a day  Vitamin D3 25 mcg (1000 intl units) oral tablet: 1 tab(s) orally once a day

## 2023-02-10 NOTE — DISCHARGE NOTE PROVIDER - CARE PROVIDER_API CALL
Tata Nielson (DO)  HematologyOncology; Internal Medicine  [address] [place] [address]  Phone: [phone]  Fax: [phone]  Follow Up Time: 1 week

## 2023-02-11 NOTE — PROGRESS NOTE ADULT - PROBLEM SELECTOR PROBLEM 3
Metastasis from pancreatic cancer

## 2023-02-11 NOTE — PROGRESS NOTE ADULT - PROBLEM SELECTOR PLAN 3
- Continue with home supplemental O2 at 3LPM   - Continue with home furosemide 40 mg PO Qd to control pleural effusions as per outpatient pulmonology recommendations   - Follow up with [facility] as an outpatient  -US abd with only small ascites so no need for paracentesis at this time.

## 2023-02-11 NOTE — PROGRESS NOTE ADULT - PROBLEM SELECTOR PLAN 5
- Continue with home escitalopram 5 mg PO Qd  - Continue with home Xanax 0.5 mg PO PRN for acute anxiety  -consider appetite stimulant or change lexapro to remeron.
- Continue with home escitalopram 5 mg PO Qd  - Continue with home Xanax 0.5 mg PO PRN for acute anxiety  -consider appetite stimulant or change lexapro to remeron.
- Continue with home escitalopram 5 mg PO Qd  - Continue with home Xanax 0.5 mg PO PRN for acute anxiety

## 2023-02-11 NOTE — PROGRESS NOTE ADULT - PROBLEM SELECTOR PLAN 4
- Continue with atorvastatin 40 mg PO Qd

## 2023-02-11 NOTE — PROGRESS NOTE ADULT - PROBLEM SELECTOR PLAN 8
DVT ppx - medically indicated, currently on Eliquis    9. Discussed plan with patient and SW and Dr. Nielson and ACP [place]. -Hospice referral to be made. F/u PT recs. -Tentative DCP tomorrow.
DVT ppx - medically indicated, currently on Eliquis  -PT recs home PT.     9. Discussed plan with patient and SW and Dr. Nielson and ACP [place]. -Hospice referral made, DME to be delivered tomorrow and can be DC'ed after 1pm on 2/11.
DVT ppx - medically indicated, currently on Eliquis  -PT recs home PT.     9. Team had discussed plan with patient and SW and Dr. Nielson and ACP. -Hospice referral made, DME to be delivered today and can be DC'ed after 1pm on 2/11.

## 2023-02-11 NOTE — PROGRESS NOTE ADULT - PROBLEM SELECTOR PLAN 7
Patient anemia. Likely AOCD in setting of malignancy  - Iron studies with -aocd

## 2023-02-11 NOTE — PROGRESS NOTE ADULT - ASSESSMENT
77F PMH stage 4 pancreatic cancer with metastatic spread to the lungs off chemotherapy, CVA of MCA, pAF s/p ablation, HTN presents to the hospital following a syncopal episode, likely vasovagal    #pancreatic cancer  - f/w Dr. Nielson, dx 10/21, stage 4 mets to lung, s/p Gemzar + Abraxane, POD in lung and pancreas 10/4/22 on imaging, given 5 FU 2400 mg/m2/  mg/m2/ [place]iv[place]de w/ severe diarrhea, switched to FOLFOX s/p 4 cycles w/ POD and admission for diarrhea, hospice recommended due to poor functional status  -RUQ U/S- minimal ascites, not able to be tapped  - would send w/ prescription of pain medications so pt can have prior to discharge  - not currently a candidate for further DMT nor is she is appropriate for any clinical trials due to poor ECOG  - discussed w/ patient and daughter the benefits of hospice including symptom management and improved quality of life, they are agreeable to hospice  - would set pt up w/ hospice prior to discharge  
77F PMH stage 4 pancreatic cancer with metastatic spread to the lungs and actively on chemotherapy, CVA of MCA, pAF s/p ablation, HTN presents to the hospital following a syncopal episode, likely vasovagal. 

## 2023-02-11 NOTE — PROGRESS NOTE ADULT - PROBLEM SELECTOR PLAN 2
Patient with stage 4 pancreatic cancer with metastatic spread to lungs as demonstrated on CT and CXR. Last chemotherapy session in December of 2022.   - Continue with home supplemental O2 at 3LPM   - Continue with home furosemide 40 mg PO Qd to control pleural effusions as per outpatient pulmonology recommendations   - Follow up with [facility] as an outpatient   - oxycodone prn.

## 2023-02-11 NOTE — PROGRESS NOTE ADULT - PROBLEM SELECTOR PLAN 1
Patient presents to the hospital with syncopal episode while sitting on toilet. Similar to previous episodes, where patient was found with vasovagal syncope. Of note, patient was recently started back on furosemide. Denies dizziness or falls from standing position.   - PT consult -home pt  - Orthostatic vital signs -f/u  -KAL stockings.  -elevate legs Patient presents to the hospital with syncopal episode while sitting on toilet. Similar to previous episodes, where patient was found with vasovagal syncope. Of note, patient was recently started back on furosemide. Denies dizziness or falls from standing position. Orthostatics negative.   -PT consult -home pt  -KAL stockings.  -elevate legs

## 2023-02-11 NOTE — PROGRESS NOTE ADULT - SUBJECTIVE AND OBJECTIVE BOX
Patient is a 77y old  Female who presents with a chief complaint of Syncope (10 Feb 2023 13:49)      SUBJECTIVE / OVERNIGHT EVENTS: no significant overnight events. No CP, SOB, n/v, abd pain.     MEDICATIONS  (STANDING):  apixaban 5 milliGRAM(s) Oral every 12 hours  atorvastatin 40 milliGRAM(s) Oral at bedtime  escitalopram 5 milliGRAM(s) Oral daily  furosemide    Tablet 40 milliGRAM(s) Oral daily  guaifenesin/dextromethorphan Oral Liquid 10 milliLiter(s) Oral three times a day  metoprolol tartrate 100 milliGRAM(s) Oral two times a day  pantoprazole    Tablet 40 milliGRAM(s) Oral before breakfast  spironolactone 25 milliGRAM(s) Oral daily    MEDICATIONS  (PRN):  acetaminophen     Tablet .. 650 milliGRAM(s) Oral every 6 hours PRN Temp greater or equal to 38C (100.4F), Mild Pain (1 - 3)  ALPRAZolam 0.25 milliGRAM(s) Oral daily PRN anxiety  aluminum hydroxide/magnesium hydroxide/simethicone Suspension 30 milliLiter(s) Oral every 4 hours PRN Dyspepsia  melatonin 3 milliGRAM(s) Oral at bedtime PRN Insomnia  ondansetron Injectable 4 milliGRAM(s) IV Push every 8 hours PRN Nausea and/or Vomiting  oxyCODONE    IR 5 milliGRAM(s) Oral every 6 hours PRN Moderate Pain (4 - 6)      Vital Signs Last 24 Hrs  T(C): 36.4 (10 Feb 2023 11:47), Max: 37 (10 Feb 2023 04:44)  T(F): 97.6 (10 Feb 2023 11:47), Max: 98.6 (10 Feb 2023 04:44)  HR: 76 (10 Feb 2023 11:47) (69 - 93)  BP: 121/71 (10 Feb 2023 11:47) (107/66 - 121/71)  BP(mean): --  RR: 17 (10 Feb 2023 11:47) (17 - 18)  SpO2: 94% (10 Feb 2023 11:47) (94% - 95%)    Parameters below as of 10 Feb 2023 11:47  Patient On (Oxygen Delivery Method): nasal cannula  O2 Flow (L/min): 4    CAPILLARY BLOOD GLUCOSE        I&O's Summary    09 Feb 2023 07:01  -  10 Feb 2023 07:00  --------------------------------------------------------  IN: 780 mL / OUT: 0 mL / NET: 780 mL          PHYSICAL EXAM:   GENERAL: NAD, well-developed  HEAD:  Atraumatic, Normocephalic  EYES: EOMI, conjunctiva and sclera clear  NECK: Supple, trachea midline   CHEST/LUNG: reduced bs in bases  HEART: S1S2 normal. Regular rate and rhythm; No murmurs, rubs, or gallops  ABDOMEN: Soft, Nontender, milldy distended; Bowel sounds present  EXTREMITIES:  1+ LE edema  PSYCH/Neuro: AAOx3. Non-focal.   SKIN: No rashes or lesions on examined portions         LABS:                         9.5    8.99  )-----------( 371      ( 10 Feb 2023 07:02 )             31.4

## 2023-02-13 NOTE — ASSESSMENT
[Palliative] : Goals of care discussed with patient: Palliative [Palliative Care Plan] : not applicable at this time [FreeTextEntry1] : 77 F diagnosed with stage IV pancreas cancer, currently on Gemzar 800 mg/m2/Abraxane 100 mg/m2 Q 15 days \par \par She was recently hospitalized from 8/1-8/9 for SOB. CXR on admission showed pulmonary edema and bilateral pleural effusions. CT chest s/p diuresis showed improved pulmonary edema and pleural effusions . Her SOB could be from HFpEF or Gemzar related non-cardiogenic pulmonary edema.Patient has portable oxygen arranged at home. She is now being followed by pulmonology as an out-patient for management of hypoxia and pulmonary edema of uncertain cause.\par \par 09/01/22:  she is deconditioned, had to catch her breath even walking into the examination room. Given that she is still recovering from hospitalization and the nature of the insult may be related to gemcitabine, we will hold off on therapy for now, allowing further time of recovery. \par \par 10/04/22 repeat CT A/P: Findings concerning for progression of disease. Interval increase in size of the known pancreatic body mass. Progressed pulmonary nodular opacities and interlobular septal thickening, concerning for worsening pulmonary metastases and lymphangitic spread of tumor. New mild peritoneal carcinomatosis. VERTEBRAL BODY ANALYSIS: Vertebral compression fractures as described, consider further workup for osteoporosis. \par \par Pt was on [place]/ Abraxane and had POD. Recieved 1 cycle of NALIRI but was hospitalized with diarrhea/syncope, so stopped  FOLFIRII due to intolerance and patient preference. Started pt on  FOLFOX modified dose, has been tolerating well.\par \par 11/29: feeling well on FOLFOX\par \par Plan :\par Metastatic Pancreatic cancer : \par - continue FOLFOX every 2 weeks \par Will get restaging scans after this round of chemo.\par and RTC to review; patient wants to take a break after C4 during holidays\par Would like to visit [place] with her son \par \par  \par \par FOLFOX regimen\par - 5FU 1800 mg/m2\par - oxaliplatin 50 mg/m2\par - leucovorin 400 mg/m2\par \par \par Anxiety :\par On Lexapro and Xanax prn - Refilled Rx one time  but will need to follow up with PCP \par Advised pt to follow up with her PCP \par Pt may benefit with the psychooncology support referral - But pt refused at the time \par \par Swelling of feet : \par Pt was on Furosemide and was taken off while she was hospitalized \par She was evaluated by  Dr Moreno and resumed Lasix 3 days a week. \par \par RTC in 2 weeks after scans

## 2023-02-13 NOTE — HISTORY OF PRESENT ILLNESS
[Disease: _____________________] : Disease: [unfilled] [AJCC Stage: ____] : AJCC Stage: [unfilled] [de-identified] : 77 F w/ h/o CVA, CAD, a fib, presents for further management of Stage IV pancreatic cancer. \par \giulia Peralta was admitted to [facility] for worsening cough and acute SOB in September 2021 and was found to have extensive ill defined pulmonary nodules on CT Chest suggestive of either a metastatic disease or infectious process. She was started on IV antibiotics. CT A/P on 9/4/21 showed mixed solid and cystic left ovarian mass measuring up to 4.6 cm suspicious for an ovarian cystic neoplasm, no pelvic or RP adenopathy, ill defined cystic changes within the pancreatic body and tail, possibly representing a pancreatic cystic neoplasm such as an IPMN. CTA on 9/4/21 showed extensive scattered ill defined pulmonary nodule/nodular infiltrates throughout the lungs, concerning for atypical infection/pneumonia. A bronchoscopy was done on 9/7/21 with BAL and RML x 3 of RLL posterior segment. Negative for malignancy. Pt was d/c from [facility] after improvement in symptoms with Ab. \par \giulia Peralta followed up with GI and underwent an MR Abd on 9/29/21 which showed segmental dilatation of the pancreatic duct within the distal body and tail, measuring up to 5 mm associated pancreatic parenchymal atrophy. Mass suspected in the mid body. No liver mass noted. 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 EUS at [facility] on 10/12/21 showed an ill defined mass in the body involving the splenic vein. FNA c/w adenocarcinoma. CA 19-9 3164,  35. \par \par Referred to Dr. Barrow at [facility]. 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 Underwent a lung biopsy on 11/11/21 which confirmed metastatic pancreatic adenocarcinoma, MMR intact, NTRK negative. \par \par 110/30/21 CT CAP with progression of lymphangitic spread of cancer, pulmonary nodes, 2.6 cm pancreatic body mass, small ascites\par 12/06/21: C1D1, 8, 15 Q 28 cycle Gemzar 800 mg/m2 + Abraxane 100 mg/m2 \par 12/24/21: admitted to [facility] for neutropenic fever after syncope and diarrhea at home. \par 01/10/22: C2D1 and D15 (alternating weeks) \par 02/01/22: CT CAP: decr in lymphangitis carcinomatosis and pulm nodules, 1.7 x 1.1 pancreatic body mass decr in size\par 02/07/22: C3D1 -> Decided to transfer care to [facility] due to proximity to home. \par 02/22-4/18/22: [id number]\par 04/25/22: CT CAP: interval improvement in diffuse interlobular septal thickening and multiple bilateral pulmonary nodules, stable pancreatic mass\par 05/2-7/18/22: [id number]\par 7/25/22: CT CAP: irregular thickening along left bladder wall; otherwise stable disease\par 08/2-8/9/22: [facility] admission due to hypoxia/worsening GALLARDO\par 09/22/22- Was seen By Pulm/ cardiology -- Pt was on [place]/ abraxane and developed Pulm edema and Hypoxia. Unclear if this is Gemcitabine related (concern for capillary leak syndrome likely from gem ) VS. POD\par 10/03/22 saw Pulmonology for acute bronchitis, recommended that given her response to Amoxicillin will continue with same antibiotic and plan for additional 7 days of 500mg BID (until 10/10/22) \par 10/04/22- CT A/P-  progression, with interval increase in size of the known pancreatic body mass. Progressed pulmonary nodular opacities and interlobular septal thickening, concerning for worsening pulmonary metastases and lymphangitic spread of tumor. New mild peritoneal carcinomatosis. \par 10/10/22: C # 1 5 FU 2400 mg/m2/  mg/m2/ Onivyde 70 mg/m2 \par 10/20/22 hospitalized for fainting/diarrhea\par \par 10/31/22 C1 FOLFOX- 5 FU 1800 mg/m2/  mg/m2/ Oxaliplatin 50 mg/m2.\par 11/14/22 C2 FOLFOX- 5 FU 1800 mg/m2/  mg/m2/ Oxaliplatin 50 mg/m2.\par 11/28/22 C3 FOLFOX \par 12/13/22- C4 FOLFOX \par \par Germline testing: [facility] testing: MSH3 VUS denoted [id number] \par Tumor testing: [facility] on pancreas 10/12/21, showed KRAS G12D, CDKN2A loss, SMAD4 suclonal mutation, FBXW7 subclonal, MTAP loss, TMB 4, LENKA [de-identified] : adenocarcinoma  [de-identified] : 12/13/22:\par Seen in treatment room accompanied by daughter.\par feels  fatigued first few days then feels better\par - denies cold sensitivity\par - no neuropathy; perhaps mild in toes\par - has occasional nausea uses anti nausea meds prn \par - eating well/weight stable\par - still using oxygen occasionally, when doing strenuous activity\par \par \par

## 2023-02-13 NOTE — PHYSICAL EXAM
[Ambulatory and capable of all self care but unable to carry out any work activities] : Status 2- Ambulatory and capable of all self care but unable to carry out any work activities. Up and about more than 50% of waking hours [Normal] : affect appropriate [de-identified] : Mild bibasilar crackles, on nasal cannula oxygen  [de-identified] : swelling of b/l feet  [de-identified] : erythematous skin rash noted underneath bilateral breasts [de-identified] : b/l feet tingling and numbness noted

## 2023-02-14 NOTE — HISTORY OF PRESENT ILLNESS
[Disease: _____________________] : Disease: [unfilled] [AJCC Stage: ____] : AJCC Stage: [unfilled] [de-identified] : 77 F w/ h/o CVA, CAD, a fib, presents for further management of Stage IV pancreatic cancer. \par \giulia Peralta was admitted to [facility] for worsening cough and acute SOB in September 2021 and was found to have extensive ill defined pulmonary nodules on CT Chest suggestive of either a metastatic disease or infectious process. She was started on IV antibiotics. CT A/P on 9/4/21 showed mixed solid and cystic left ovarian mass measuring up to 4.6 cm suspicious for an ovarian cystic neoplasm, no pelvic or RP adenopathy, ill defined cystic changes within the pancreatic body and tail, possibly representing a pancreatic cystic neoplasm such as an IPMN. CTA on 9/4/21 showed extensive scattered ill defined pulmonary nodule/nodular infiltrates throughout the lungs, concerning for atypical infection/pneumonia. A bronchoscopy was done on 9/7/21 with BAL and RML x 3 of RLL posterior segment. Negative for malignancy. Pt was d/c from [facility] after improvement in symptoms with Ab. \par \giulia Peralta followed up with GI and underwent an MR Abd on 9/29/21 which showed segmental dilatation of the pancreatic duct within the distal body and tail, measuring up to 5 mm associated pancreatic parenchymal atrophy. Mass suspected in the mid body. No liver mass noted. 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 EUS at [facility] [facility] on 10/12/21 showed an ill defined mass in the body involving the splenic vein. FNA c/w adenocarcinoma. CA 19-9 3164,  35. \par \par Referred to Dr. Barrow at [facility]. 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 Underwent a lung biopsy on 11/11/21 which confirmed metastatic pancreatic adenocarcinoma, MMR intact, NTRK negative. \par \par 110/30/21 CT CAP with progression of lymphangitic spread of cancer, pulmonary nodes, 2.6 cm pancreatic body mass, small ascites\par 12/06/21: C1D1, 8, 15 Q 28 cycle Gemzar 800 mg/m2 + Abraxane 100 mg/m2 \par 12/24/21: admitted to [facility] for neutropenic fever after syncope and diarrhea at home. \par 01/10/22: C2D1 and D15 (alternating weeks) \par 02/01/22: CT CAP: decr in lymphangitis carcinomatosis and pulm nodules, 1.7 x 1.1 pancreatic body mass decr in size\par 02/07/22: C3D1 -> Decided to transfer care to [facility] due to proximity to home. \par 02/22-4/18/22: [id number]\par 04/25/22: CT CAP: interval improvement in diffuse interlobular septal thickening and multiple bilateral pulmonary nodules, stable pancreatic mass\par 05/2-7/18/22: [id number]\par 7/25/22: CT CAP: irregular thickening along left bladder wall; otherwise stable disease\par 08/2-8/9/22: [facility] admission due to hypoxia/worsening GALLARDO\par 09/22/22- Was seen By Pulm/ cardiology -- Pt was on [place]/ abraxane and developed Pulm edema and Hypoxia. Unclear if this is Gemcitabine related (concern for capillary leak syndrome likely from gem ) VS. POD\par 10/03/22 saw Pulmonology for acute bronchitis, recommended that given her response to Amoxicillin will continue with same antibiotic and plan for additional 7 days of 500mg BID (until 10/10/22) \par 10/04/22- CT A/P-  progression, with interval increase in size of the known pancreatic body mass. Progressed pulmonary nodular opacities and interlobular septal thickening, concerning for worsening pulmonary metastases and lymphangitic spread of tumor. New mild peritoneal carcinomatosis. \par 10/10/22: C # 1 5 FU 2400 mg/m2/  mg/m2/ Onivyde 70 mg/m2 \par 10/20/22 hospitalized for fainting/diarrhea\par \par 10/31/22 C1 FOLFOX- 5 FU 1800 mg/m2/  mg/m2/ Oxaliplatin 50 mg/m2.\par 11/14/22 C2 FOLFOX- 5 FU 1800 mg/m2/  mg/m2/ Oxaliplatin 50 mg/m2.\par 11/28/22 C3 FOLFOX \par 12/13/22- C4 FOLFOX \par \par 12/19/22 Restaging scans showing increasing peritoneal nodularity and lung mets, but decreasing size of pancreatic mass\par \par 01/10/23- Postponed C # 5 - Pt feeling sick \par 01/16/23- Plan for Chemo - C # 5  FOLFOX - 5 FU 1800 mg/m2/  mg/m2/ Oxaliplatin 85  mg/m2.\par \par Germline testing: [facility] testing: MSH3 VUS denoted [id number] \par Tumor testing: [facility] on pancreas 10/12/21, showed KRAS G12D, CDKN2A loss, SMAD4 suclonal mutation, FBXW7 subclonal, MTAP loss, TMB 4, LENKA [de-identified] : adenocarcinoma  [de-identified] : 01/10/23:\par Pt is seen in office accompanied by her daughter .\par She had a chemo break and was travelling to [place] to be with her family during the holidays- \par However after she returned from her break- she has not been feeling well. \par Was exposed to her grand daughter who tested positive for COVID- \par She did home tests and was also seen in Urgent Care - RSV/ COVID/ FLu was negative. \par She has been having low grade fevers T max-  - Has been taking Tylenol Occassionaly \par Few episodes of diarrhea and abdominal pain  - also attributes to travelling and holiday meals. \par Has poor appetite and feels like she is fighting off a bug.\par Energy level poor\par has been requiring to use more Oxygen lately as well.\par Having some fatigue\par Discussed with Dr Dos Santos - Ok to hold off on chemo today- \par Will repeat COVID test and labs today \par Will plan to treat if asymptomatic next week- daughter will make chemo appt for next week.

## 2023-02-14 NOTE — ASSESSMENT
[FreeTextEntry1] : 77 F diagnosed with stage IV pancreas cancer, currently on Gemzar 800 mg/m2/Abraxane 100 mg/m2 Q 15 days \par \par She was recently hospitalized from 8/1-8/9 for SOB. CXR on admission showed pulmonary edema and bilateral pleural effusions. CT chest s/p diuresis showed improved pulmonary edema and pleural effusions . Her SOB could be from HFpEF or Gemzar related non-cardiogenic pulmonary edema.Patient has portable oxygen arranged at home. She is now being followed by pulmonology as an out-patient for management of hypoxia and pulmonary edema of uncertain cause.\par \par 09/01/22:  she is deconditioned, had to catch her breath even walking into the examination room. Given that she is still recovering from hospitalization and the nature of the insult may be related to gemcitabine, we will hold off on therapy for now, allowing further time of recovery. \par \par 10/04/22 repeat CT A/P: Findings concerning for progression of disease. Interval increase in size of the known pancreatic body mass. Progressed pulmonary nodular opacities and interlobular septal thickening, concerning for worsening pulmonary metastases and lymphangitic spread of tumor. New mild peritoneal carcinomatosis. VERTEBRAL BODY ANALYSIS: Vertebral compression fractures as described, consider further workup for osteoporosis. \par \par Pt was on [place]/ Abraxane and had POD. Recieved 1 cycle of NALIRI but was hospitalized with diarrhea/syncope, so stopped  FOLFIRII due to intolerance and patient preference. Started pt on  FOLFOX modified dose, has been tolerating well.\par \par 12/22/22: Restaging scans showing POD with increasing size of peritoneal mets and lung mets and decreasing size of pancreatic mass. Ca 19-9 also rising, now 972.\par \par Plan :\par #Metastatic Pancreatic cancer : \par -s/p 4 cycles FOLFOX with POD on scans from 12/19/22 (worsening peritoneal carcinomatosis and septal thickening) as well as rising Ca 19-9\par -She has progressed on [place]/Abraxane. Review of [facility] shows no actionable mutations. We would prefer to rechallenge with FOLFIRI with dose-reduced irinotecan as she only received 1 cycle. However, patient is hesitant to start new therapy as she is tolerating FOLFOX and clinically feels well. Therefore, we will plan to continue with FOLFOX (C5 to be given after New Years) and monitor her disease closely with Guardant Response q monthly and scans as clinically indicated. We will increase Oxaliplatin dose from 50-->85 as patient has been tolerating the lower dose and we will aim to get more response from the oxaliplatin\par Patient wants to visit [place] over the holidays so we will plan to initiate treatment following the new year\par \par Patient was scheduled for C5 week of 1/9/23. \par However she has been having low grade fever, recent exposure to COVID - \par SHe feels more fatigued and is concerned if she can tolerate a higher dose of chemo today \par Will do a COVID swab today \par Discussed with Dr dos santos- will hold off on chemo today and reschedule chemo for next week if pt is asymptomatic .\par Daughter will reschedule chemo for next week.\par  Guardant 360 was done on 1/10/23.\par Will check labs including CMP- to r/o any concern for cholangitis \par \par FOLFOX regimen\par - 5FU 1800 mg/m2\par - oxaliplatin 85 mg/m2\par - leucovorin 400 mg/m2\par \par  RTC in a week \par \par Case discussed with Dr. Dos Santos\par \par

## 2023-02-16 PROBLEM — R11.2 CHEMOTHERAPY-INDUCED NAUSEA AND VOMITING: Status: RESOLVED | Noted: 2022-02-24 | Resolved: 2023-01-01

## 2023-02-16 NOTE — REASON FOR VISIT
[Home] : at home, [unfilled] , at the time of the visit. [Medical Office: ([facility])___] : at the medical office located in  [Family Member] : family member [Follow-Up Visit] : a follow-up

## 2023-02-16 NOTE — REVIEW OF SYSTEMS
[Fatigue] : fatigue [SOB on Exertion] : shortness of breath during exertion [Abdominal Pain] : abdominal pain [Difficulty Walking] : difficulty walking [Negative] : Allergic/Immunologic

## 2023-02-22 PROBLEM — R05.9 COUGH: Status: ACTIVE | Noted: 2022-01-01

## 2023-02-22 PROBLEM — I50.30 (HFPEF) HEART FAILURE WITH PRESERVED EJECTION FRACTION: Status: ACTIVE | Noted: 2022-01-01

## 2023-02-22 NOTE — HISTORY OF PRESENT ILLNESS
[FreeTextEntry1] : Interval History:\par Hospitalized for dehydration in January and then again 2/8-2/11/2023 for syncope. She is currently on home hospice. She remains SOB and using supplemental O2. She is currently back on daily furosemide for shortness of breath and pleural effusions.\par \par Last week had episode of atrial fibrillation at home, rate up to 190 on pulse ox and Apple watch. Lasted 1-2 hours. \par Last night had episode of dizziness and BP was 112/50. Hospice physician was notified and advised holding metoprolol. Low potassium was also noted.\par \par  \par \par History:\par This 76 year old woman with stage IV pancreatic adenocarcinoma treated with Gemzar/Abraxane since December 2021 was admitted to [facility] on 8/1/2022 for shortness of breath and hypoxia after chemotherapy. She reported dyspnea on exertion for two weeks prior. After chemotherapy, saturation was 85 % in ambient air so sent to the ER. CT-angiogram showed no pulmonary embolism, but there were new ground glass opacities and increased pleural effusions. Echocardiogram showed normal LVEF. She improved with diuretics and was dismissed on PO furosemide, 40 mg daily. Consideration given to possibility of pneumonitis and she received a dose of steroids, however as oxygenation improved significantly with diuresis, drug induced pneumonitis was thought less likely. She feels better, but desaturates with ambulation which requires supplemental oxygen. \par \par Recent PFTs showed no evidence of obstruction on spirometry, normal lung volumes and moderately reduced DLCO. On 6MWT she was able to walk 95% of predicted but required up to 8Lpm of oxygen to maintain O2 Sat of 93% (with Tee 4 only).\par \par She denies any orthopnea, lower extremity edema, palpitations, chest pain.\par \par With respect to atrial fibrillation, she had recurrent symptomatic atrial fibrillation. Dr. Sergio Meredith performed catheter ablation x3, the last in February, 2021 without apparent recurrence to date.\par \par In 2015, while on warfarin, she had acute right sided sensory/motor deficits and dysarthria was found to have multiple ischemic infarcts involving the left hemisphere. INR was subtherapeutic at the time. She recovered most function. in 2018, while on apixaban 5 mg twice daily, she had recurrent neurologic deficit following hip replacement surgery and a new acute lacunar infarct. Aspirin 81 mg daily was added to apixaban at this time. \par \par Completed Pulmonary evaluation and comes for evaluation of diastolic dysfunction or other cardiac causes of dyspnea prior to resuming systemic chemotherapy.\par \par 11/7/2022:\par Stress echo - no ischemia.\par Treatment changed to FOLFOX. Breathing about the same. OK at rest, but desaturates with exertion. Felt better after steroid pretreatment. But very fatigued after completing infusional 5FU.\par No chest pain.\par Persistent cough.\par Rare palpitations.\par No edema.\par Continues on Lasix 40 daily.\par \par \par Cardiovascular Summary:\par ----------------------------------------------\par ECG:\par 2/22/2023: sinus with 1st degree AV block 77 bpm. Otherwise normal ECG\par 10/17/2022: sinus 1st degree AV block, with PVCs, 71 bpm\par 9/23/2022: sinus bradycardia 56 bpm, with first degree AV block\par ----------------------------------------------\par Echo:\par 10/5/2022: normal diastolic function, EF normal.\par 8/2/2022: normal EF, no diastolic dysfunction, mild PHTN, small pericardial and pleural effusions\par 1/15/2018: EF 65 %\par ----------------------------------------------\par Stress echo:\par 10/5/2022: exercise 4 mets (dyspnea and desat), no ischemia, peak /80\par ----------------------------------------------\par Vascular:\par 2/17/2015: carotid sono - mild bilateral ICA plaque\par ----------------------------------------------

## 2023-02-22 NOTE — PHYSICAL EXAM
[No Acute Distress] : no acute distress [Ill-Appearing] : ill-appearing [No Edema] : no edema [de-identified] : S [de-identified] : no rales/wheezing/rhonchi

## 2023-02-22 NOTE — REASON FOR VISIT
[FreeTextEntry3] : Dr. Dos Santos (Dr. Nielson) [FreeTextEntry1] : JULIANNA LOPEZ is a 77 year old woman with a history of paroxysmal atrial fibrillation s/p ablation, prior CVA, hypertension, hypercholesterolemia, and metastatic pancreatic adenocarcinoma seen for follow up of shortness of breath.\par \par Prior Cancer Treatments:\par ------------------------------------------------------------------------\par Chemo/targeted therapy:\par 12/6/2021: Gemzar+Abraxane (s/p 8 cycles)\par 10/10/2022:1/2023 FOLFOX\par ------------------------------------------------------------------------

## 2023-02-22 NOTE — ASSESSMENT
[FreeTextEntry1] : 77 year old woman with history of atrial fibrillation, hypertension, recurrent CVA, now with metastatic pancreatic cancer including to the lungs who had acute pulmonary edema and hypoxic respiratory failure from gemcitabine pulmonary toxicity associated with capillary leak syndrome. (Dread, J Allergy Clin Immunol 2019; 143)\par \par Now with ongoing hypoxemia and shortness of breath in the setting of pulmonary metastasis, pleural effusions.\par \par #  Paroxysmal/persistent atrial fibrillation s/p 3 prior ablations \par - Continue metoprolol tartrate 100 mg BID.\par - continue apixaban 5 mg BID -- would not decrease the dose\par - discussed that AF recurrence likely given underlying sympathetic drivers\par - add metoprolol tartrate 25 mg PRN for recurrent symptomatic episodes\par \par \par #HFpEF:shortness of breath likely from anemia, pleural effusions, pulmonary metastasis and not congestive heart failure. \par - to continue spironolactone\par - advised to hold Lasix for dizziness and low BP\par \par \par We should be cautious with standing loop diuretics given recurrent issues with dehydration, syncope, and intravascular volume depletion due to third spacing.\par \par # Atherosclerosis and cerebrovascular disease:\par - Continue statin therapy.\par - DC'd aspirin to reduce bleeding risk with dual therapy\par \par \par Follow up in 1 month and PRN.\par \par Above recommendations discussed with the patient, her daughter, and son and all questions answered to the best of my ability and to their apparent satisfaction. Anticipatory guidance about atrial fib, dehydration, and symptom management was also provided. Over 30 minutes was spent face to face with them today.\par

## 2023-02-22 NOTE — REVIEW OF SYSTEMS
[SOB] : shortness of breath [Palpitations] : palpitations [Dizziness] : dizziness [Anxiety] : anxiety

## 2023-02-22 NOTE — ASSESSMENT
[Supportive] : Goals of care discussed with patient: Supportive [Designated Health Care Proxy] : Designated Health Care Proxy [Name: ___] : Name: [unfilled] [Relationship: ___] : Relationship: [unfilled] [FreeTextEntry1] : pt on hospice services

## 2023-02-22 NOTE — PHYSICAL EXAM
[Capable of only limited self care, confined to bed or chair more than 50% of waking hours] : Status 3- Capable of only limited self care, confined to bed or chair more than 50% of waking hours [Normal] : affect appropriate [de-identified] : no respiratory distress

## 2023-02-22 NOTE — HISTORY OF PRESENT ILLNESS
[Disease: _____________________] : Disease: [unfilled] [AJCC Stage: ____] : AJCC Stage: [unfilled] [de-identified] : 77 F w/ h/o CVA, CAD, a fib, presents for further management of Stage IV pancreatic cancer. \par \giulia Peralta was admitted to [facility] for worsening cough and acute SOB in September 2021 and was found to have extensive ill defined pulmonary nodules on CT Chest suggestive of either a metastatic disease or infectious process. She was started on IV antibiotics. CT A/P on 9/4/21 showed mixed solid and cystic left ovarian mass measuring up to 4.6 cm suspicious for an ovarian cystic neoplasm, no pelvic or RP adenopathy, ill defined cystic changes within the pancreatic body and tail, possibly representing a pancreatic cystic neoplasm such as an IPMN. CTA on 9/4/21 showed extensive scattered ill defined pulmonary nodule/nodular infiltrates throughout the lungs, concerning for atypical infection/pneumonia. A bronchoscopy was done on 9/7/21 with BAL and RML x 3 of RLL posterior segment. Negative for malignancy. Pt was d[facility]c from [facility] after improvement in symptoms with Ab. \par \giulia Peralta followed up with GI and underwent an MR Abd on 9/29/21 which showed segmental dilatation of the pancreatic duct within the distal body and tail, measuring up to 5 mm associated pancreatic parenchymal atrophy. Mass suspected in the mid body. No liver mass noted. 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 EUS at [facility] on 10/12/21 showed an ill defined mass in the body involving the splenic vein. FNA c/w adenocarcinoma. CA 19-9 3164,  35. \par \par Referred to Dr. Barrow at [facility]. 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 Underwent a lung biopsy on 11/11/21 which confirmed metastatic pancreatic adenocarcinoma, MMR intact, NTRK negative. \par \par Restaging CT CAP oon 11/30/21 showed slight interval progression of lymphangitic spread of cancer, numerous b/l pulm nodules c/w known metastatic disease, 2.6 x 1.9 x 1.9 cm pancreatic body mass, diffuse mesenteric haziness, intra-abdominal LN, small pelvic ascites. \par \par 12/6/21: C1D1, 8, 15 Q 28 cycle Gemzar 800 mg/m2 + Abraxane 100 mg/m2 \par 12/24/21: admitted to [facility] for neutropenic fever after syncope and diarrhea at home. \par 1/10/22: C2D1 and D15 (alternating weeks) \par 2/1/22: CT CAP: decr in lymphangitis carcinomatosis and pulm nodules, 1.7 x 1.1 pancreatic body mass decr in size\par 2/7/22: C3D1\par \par Decided to transfer care to [facility] due to proximity to home. \par \par 2/22-4/18/22: [id number]\par 4/25/22: CT CAP: interval improvement in diffuse interlobular septal thickening and multiple bilateral pulmonary nodules, stable pancreatic mass\par 5/2-7/18/22: [id number]\par 7/25/22: CT CAP: irregular thickening along left bladder wall; otherwise stable disease\par 8/2-8/9/22: [facility] admission due to hypoxia/worsening GALLARDO\par 09/22/22- Was seen By Pulm/ cardiology -- Pt was on [place]/ abraxane and developed Pulm edema and Hypoxia. Unclear if this is Gemcitabine related (concern for capillary leak syndrome likely from gem ) VS. POD\par 10/03/22 saw Pulmonology for acute bronchitis, recommended that given her response to Amoxicillin will continue with same antibiotic and plan for additional 7 days of 500mg BID (until 10/10/22) \par 10/04/22- CT A/P-  progression, with interval increase in size of the known pancreatic body mass. Progressed pulmonary nodular opacities and interlobular septal thickening, concerning for worsening pulmonary metastases and lymphangitic spread of tumor. New mild peritoneal carcinomatosis. \par 10/10/22: C # 1 5 FU 2400 mg/m2/  mg/m2/ Onivyde 70 mg/m2 \par 10/20/22 hospitalized for fainting/diarrhea\par 10/31/22 C1 FOLFOX- 5 FU 1800 mg/m2/  mg/m2/ Oxaliplatin 50 mg/m2.\par 11/14/22 C2 FOLFOX- 5 FU 1800 mg/m2/  mg/m2/ Oxaliplatin 50 mg/m2.\par 11/28/22 C3 FOLFOX \par 12/13/22- C4 FOLFOX \par 12/19/22 Restaging scans showing increasing peritoneal nodularity and lung mets, but decreasing size of pancreatic mass\par 01/10/23- Postponed C # 5 - Pt feeling sick\par 1/12-1/15/23: [facility] admission for diarrhea\par 1/17/23: Hospice recommended due to poor functional status. Pt initially refused\par 2/8-2/11/23: admitted to [facility] after syncopal event, d/c home on hospice\par  [de-identified] : adenocarcinoma  [de-identified] : [facility] testing: MSH3 VUS denoted [id number]  [FreeTextEntry1] : on hospice [de-identified] : feeling overwhelmed with coming home with hospice services. + persistent cough keeps her up at night. Hycodan prescribed 5 mL which was too sedating for the pt. She was up all night coughing. Denies any abdominal pain. Living with daughter now. \par

## 2023-03-08 PROBLEM — J90 PLEURAL EFFUSION, LEFT: Status: ACTIVE | Noted: 2023-01-01

## 2023-03-08 NOTE — HISTORY OF PRESENT ILLNESS
[Never] : never [TextBox_4] : 77 y.o female PMH stage 4 pancreatic ca (currently chemo on hold), PAF (on Eliquis) HFpEF, CAD here for f/u pleural effusion, Present with daughter -  recent admission in February for syncopal event. She was enrolled in home hospice upon discharge. She had CXR at home and diagnosed with PNA s/p abx. Denies sputum/ wet cough. Family adjusting lasix prn\par \par PFT 9/2022: FEV1 / FVC 71 FEV1 87 FVC 91 TLC 80 DLCO 45\par \par \par

## 2023-03-08 NOTE — ASSESSMENT
[FreeTextEntry1] : SOB/ Hypoxia/ Pleural effusion- advancing mets/ effusions b/l. Con comfort care/ hospice services. Adjust furosemide prn to monitor s/s dehydration in which we can adjust the dose accordingly. Increase O2 prn to keep levels >90 with activities. \par \par RTC in 1 month or earlier prn\par \par I, Huma Arnold NP, am scribing for and in the presence of Dr. Nicanor Wilkinson, the following sections HISTORY OF PRESENT ILLNESS, PAST MEDICAL/FAMILY/SOCIAL HISTORY; REVIEW OF SYSTEMS; VITAL SIGNS; PHYSICAL EXAM; DISPOSITION.\par \par \par

## 2023-03-15 PROBLEM — C25.9 PANCREATIC ADENOCARCINOMA: Status: ACTIVE | Noted: 2021-09-19

## 2023-03-15 NOTE — REVIEW OF SYSTEMS
[Fatigue] : fatigue [Recent Change In Weight] : ~T recent weight change [Shortness Of Breath] : shortness of breath [Cough] : cough [SOB on Exertion] : shortness of breath during exertion [Difficulty Walking] : difficulty walking [Negative] : Allergic/Immunologic

## 2023-03-26 PROBLEM — C79.9 METASTASIS FROM PANCREATIC CANCER: Status: ACTIVE | Noted: 2022-01-01

## 2023-03-26 NOTE — HISTORY OF PRESENT ILLNESS
[Home] : at home, [unfilled] , at the time of the visit. [Medical Office: ([facility])___] : at the medical office located in  [Verbal consent obtained from patient] : the patient, [unfilled] [Family Member] : family member [Disease: _____________________] : Disease: [unfilled] [AJCC Stage: ____] : AJCC Stage: [unfilled] [de-identified] : 77 F w/ h/o CVA, CAD, a fib, presents for further management of Stage IV pancreatic cancer. \par \giulia Peralta was admitted to [facility] for worsening cough and acute SOB in September 2021 and was found to have extensive ill defined pulmonary nodules on CT Chest suggestive of either a metastatic disease or infectious process. She was started on IV antibiotics. CT A/P on 9/4/21 showed mixed solid and cystic left ovarian mass measuring up to 4.6 cm suspicious for an ovarian cystic neoplasm, no pelvic or RP adenopathy, ill defined cystic changes within the pancreatic body and tail, possibly representing a pancreatic cystic neoplasm such as an IPMN. CTA on 9/4/21 showed extensive scattered ill defined pulmonary nodule/nodular infiltrates throughout the lungs, concerning for atypical infection/pneumonia. A bronchoscopy was done on 9/7/21 with BAL and RML x 3 of RLL posterior segment. Negative for malignancy. Pt was d/c from [facility] after improvement in symptoms with Ab. \par \giulia Peralta followed up with GI and underwent an MR Abd on 9/29/21 which showed segmental dilatation of the pancreatic duct within the distal body and tail, measuring up to 5 mm associated pancreatic parenchymal atrophy. Mass suspected in the mid body. No liver mass noted. 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 EUS at [facility] on 10/12/21 showed an ill defined mass in the body involving the splenic vein. FNA c/w adenocarcinoma. CA 19-9 3164,  35. \par \par Referred to Dr. Barrow at [facility]. 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 Underwent a lung biopsy on 11/11/21 which confirmed metastatic pancreatic adenocarcinoma, MMR intact, NTRK negative. \par \par Restaging CT CAP oon 11/30/21 showed slight interval progression of lymphangitic spread of cancer, numerous b/l pulm nodules c/w known metastatic disease, 2.6 x 1.9 x 1.9 cm pancreatic body mass, diffuse mesenteric haziness, intra-abdominal LN, small pelvic ascites. \par \par 12/6/21: C1D1, 8, 15 Q 28 cycle Gemzar 800 mg/m2 + Abraxane 100 mg/m2 \par 12/24/21: admitted to [facility] for neutropenic fever after syncope and diarrhea at home. \par 1/10/22: C2D1 and D15 (alternating weeks) \par 2/1/22: CT CAP: decr in lymphangitis carcinomatosis and pulm nodules, 1.7 x 1.1 pancreatic body mass decr in size\par 2/7/22: C3D1\par \par Decided to transfer care to [facility] due to proximity to home. \par \par 2/22-4/18/22: [id number]\par 4/25/22: CT CAP: interval improvement in diffuse interlobular septal thickening and multiple bilateral pulmonary nodules, stable pancreatic mass\par 5/2-7/18/22: [id number]\par 7/25/22: CT CAP: irregular thickening along left bladder wall; otherwise stable disease\par 8/2-8/9/22: [facility] admission due to hypoxia/worsening GALLARDO\par 09/22/22- Was seen By Pulm/ cardiology -- Pt was on [place]/ abraxane and developed Pulm edema and Hypoxia. Unclear if this is Gemcitabine related (concern for capillary leak syndrome likely from gem ) VS. POD\par 10/03/22 saw Pulmonology for acute bronchitis, recommended that given her response to Amoxicillin will continue with same antibiotic and plan for additional 7 days of 500mg BID (until 10/10/22) \par 10/04/22- CT A/P-  progression, with interval increase in size of the known pancreatic body mass. Progressed pulmonary nodular opacities and interlobular septal thickening, concerning for worsening pulmonary metastases and lymphangitic spread of tumor. New mild peritoneal carcinomatosis. \par 10/10/22: C # 1 5 FU 2400 mg/m2/  mg/m2/ Onivyde 70 mg/m2 \par 10/20/22 hospitalized for fainting/diarrhea\par 10/31/22 C1 FOLFOX- 5 FU 1800 mg/m2/  mg/m2/ Oxaliplatin 50 mg/m2.\par 11/14/22 C2 FOLFOX- 5 FU 1800 mg/m2/  mg/m2/ Oxaliplatin 50 mg/m2.\par 11/28/22 C3 FOLFOX \par 12/13/22- C4 FOLFOX \par 12/19/22 Restaging scans showing increasing peritoneal nodularity and lung mets, but decreasing size of pancreatic mass\par 01/10/23- Postponed C # 5 - Pt feeling sick\par 1/12-1/15/23: [facility] admission for diarrhea\par 1/17/23: Hospice recommended due to poor functional status. Pt initially refused\par 2/8-2/11/23: admitted to [facility] after syncopal event, d/c home on hospice\par  [de-identified] : David testing: MSH3 VUS denoted [id number]  [de-identified] : adenocarcinoma  [FreeTextEntry1] : on home hospice  [de-identified] : remains on home hospice service. Does not have an aid due to shortage in staff. family is helping care for her. reports increased in SOB, now on 6 L via NC. Took morphine ODT which helped a bit. cough is still bothersome. needs help with all ADLs. + LE edema b/l, taking Lasix BID

## 2023-03-26 NOTE — PHYSICAL EXAM
[Capable of only limited self care, confined to bed or chair more than 50% of waking hours] : Status 3- Capable of only limited self care, confined to bed or chair more than 50% of waking hours [Normal] : affect appropriate [de-identified] : chronically ill appearing  [de-identified] : no respiratory distress  [de-identified] : + LE Edema b/l

## 2023-03-26 NOTE — REASON FOR VISIT
[Follow-Up Visit] : a follow-up [Family Member] : family member [FreeTextEntry2] : Stage IV pancreatic cancer on hospice

## 2023-03-27 ENCOUNTER — RX CHANGE (OUTPATIENT)
Age: 78
End: 2023-03-27

## 2023-03-27 RX ORDER — FUROSEMIDE 40 MG/1
40 TABLET ORAL
Qty: 90 | Refills: 1 | Status: ACTIVE | COMMUNITY
Start: 2023-03-27 | End: 1900-01-01

## 2023-03-27 RX ORDER — FUROSEMIDE 40 MG/1
40 TABLET ORAL DAILY
Qty: 30 | Refills: 0 | Status: DISCONTINUED | COMMUNITY
Start: 2022-01-01 | End: 2023-03-27

## 2023-03-31 ENCOUNTER — APPOINTMENT (OUTPATIENT)
Dept: CARDIOLOGY | Facility: CLINIC | Age: 78
End: 2023-03-31

## 2023-05-18 NOTE — ED ADULT NURSE NOTE - NSFALLRSKASSESSDT_ED_ALL_ED
[facility] Neurology Consult      Patient:   Katheryn Irving  MR#:    [id number]  Account Number:                   [id number]      Room:    15/515-01   YOB: 1981  Date of Progress Note: 5/18/2023  Time of Note                           4:09 PM  Attending Physician:  Maria M Murillo MD  Consulting Physician:  Hellen Mccormick DO       CHIEF COMPLAINT:  Possible seizure     HISTORY OF PRESENT ILLNESS:   This is a 35 y.o. male who was admitted with left-sided numbness, slurred speech, falls. He does have a possible prior stroke/TIA history. He denies a history of carotid artery disease or atrial fibrillation. He has a history of possible idiopathic intracranial hypertension, partial disorder, PTSD, bipolar disorder, depression who presented to the emergency department with the above symptoms along with possible confusion and questionable aphasia. He was initially thought to have overdose. Narcan was given by EMS with improvement in his respiratory status. His last known well was before he went to bed at 10 PM last night. CT head was negative. CTA of the head and neck was negative. No overt headaches, or visual loss noted. REVIEW OF SYSTEMS:  Limited given speech difficulty     PAST MEDICAL HISTORY:      Diagnosis Date    Acute kidney injury ([address] 3/23/2021    Adult ADHD     Antisocial personality disorder (CODE)     Apnea     Bipolar 1 disorder, depressed ([address]     Hypertension     PTSD (post-traumatic stress disorder)     PTSD (post-traumatic stress disorder)        PAST SURGICAL HISTORY:      Procedure Laterality Date    ANKLE FRACTURE SURGERY N/A 12/15/2021    OPEN REDUCTION INTERNAL FIXATION MEDIAL MALLEOLUS FRACTURE performed by Santos Johnson MD at [facility]:   TOBACCO:   reports that he has been smoking cigarettes. He has been smoking an average of 2 packs per day.  He has never used smokeless tobacco.  ETOH:   reports no history of alcohol
25-May-2022 23:40

## 2023-05-31 NOTE — ED PROVIDER NOTE - NSICDXPASTMEDICALHX_GEN_ALL_CORE_FT
Problem: Wound:  Goal: Will show signs of wound healing; wound closure and no evidence of infection  Description: Will show signs of wound healing; wound closure and no evidence of infection  Outcome: Progressing PAST MEDICAL HISTORY:  Afib s/p ablation x3, on Eliquis    Anxiety and depression     CVA (cerebral vascular accident)     HLD (hyperlipidemia)     HTN (hypertension)

## 2023-11-01 NOTE — ED ADULT NURSE NOTE - CCCP TRG CHIEF CMPLNT
Anesthesia Post-op Note    Patient: Latonia Bishop  Procedure(s) Performed: CATARACT EXTRACTION WITH LENS IMPLANT, RIGHT EYE - RIGHT  Anesthesia type: MAC    Vitals Value Taken Time   Temp 36.5 °C (97.7 °F) 11/01/23 0812   Pulse 54 11/01/23 0812   Resp 16 11/01/23 0812   SpO2 98 % 11/01/23 0812   /76 11/01/23 0812         Patient Location: Phase II  Post-op Vital Signs:stable  Level of Consciousness: awake and alert  Respiratory Status: spontaneous ventilation  Cardiovascular stable  Hydration: euvolemic  Pain Management: adequately controlled  Handoff: Handoff to receiving clinician was performed and questions were answered  Vomiting: none  Nausea: None  Airway Patency:patent  Post-op Assessment: no complications and patient tolerated procedure well      No notable events documented.  
fever

## 2023-11-09 NOTE — ED PROVIDER NOTE - PRINCIPAL DIAGNOSIS
Apply 5 fluorouracil cream to the top of the scalp twice per day for 14 days. Fluorouracil (Efudex / Nam Sack)    Your physician has prescribed a topical medication that is used to treat pre-cancerous skin lesions and certain types of skin cancers. We are providing you with the following information so that you understand how the medication should be used and potential side effects you may experience. Clean and dry the areas of the skin that will be treated. Apply a small amount of the medication to your fingertip. Dab the medication onto the designated areas and rub it in. Discontinue the medication once the skin starts to scab. Typically this takes between 2 and 4 weeks after starting the medication. Avoid applying the medication in or around the eyes or eyelids. If the medication gets into your eyes, nose or mouth, rinse immediately. Wash your hands immediately after application. Side effects include: burning, redness, irritation, dryness, pain, swelling and changes in skin color. Vaseline and/or cool compresses may be applied to affected areas for comfort. Please note that you may be more sensitive to the sun. Use sunscreen and wear protective clothing when outdoors. Avoid prolonged sun exposure. Once you have discontinued the medication, apply vaseline several times daily until the skin has healed. Acute diarrhea

## 2024-01-08 NOTE — PHYSICAL THERAPY INITIAL EVALUATION ADULT - THERAPY FREQUENCY, PT EVAL
HUB TO RELAY    ----- Message from MELANIE Calderon sent at 1/7/2024  1:55 PM EST -----  Please inform the patient of the [employer] message that was not viewed.  
1-2x/week

## 2024-04-09 NOTE — GOALS OF CARE CONVERSATION - ADVANCED CARE PLANNING - CONVERSATION DETAILS
Writer called and spoke to patient. Relayed Colin message. Recommended patient continue to stay well hydrated and try perhaps a liquid protein shake to see how that would go for her and then when she does try solids again start with bland food. She is agreeable and is aware to call with pain/fever or if symptoms persist.    This alert and oriented x 4 patient was seen today for goals of care planning and conversation. Introduced self and role of PCE. Patient states understanding of health status  and prognosis. Patient states  she lives alone and has been independent with all aspects of ADLs prior to admission. This alert and oriented x 4 patient was seen today for goals of care planning and conversation. Introduced self and role of PCE. Patient states understanding of health status  and prognosis. Patient states  she lives alone and has been independent with all aspects of ADLs prior to admission. HCP completed at the bedside. The original document was given to patient and copy placed in chart for medical records. My HCP knows my wishes on artificial nutrition and hydration. Patient identified Norma Daley  (daughter) [phone]  as the health care agent and  alternates  as Josh Steward (son) [phone] Alejandro Steward (son) [phone] both equally.    CPR , intubation, artificial nutrition  procedures and possible complications explained.      Contact information for further needs provided.  No Anglican exemptions noted.      Nel Manuel  MSN -ED RN OCN   [phone] This alert and oriented x 4 patient was seen today for goals of care planning and conversation. Introduced self and role of PCE. Patient states understanding of health status  and prognosis. Patient states  she lives alone and has been independent with all aspects of ADLs prior to admission. HCP completed at the bedside. The original document was given to patient and copy placed in chart for medical records. My HCP knows my wishes on artificial nutrition and hydration. Patient identified Norma Daley  (daughter) [phone]  as the health care agent and  alternates  as Josh Steward (son) [phone] Alejandro Steward (son) [phone] both equally.    CPR , intubation, artificial nutrition  procedures and possible complications explained. Patient states she does not wan to live in a vegetative state. Patient states she would like CPR/Intubation for a trial period of time only. Patient states she does not want artificial nutrition however she has not discussed with her health care agent and will do so. . Patient aware she will remain Full Code      Contact information for further needs provided.  No Confucianist exemptions noted.      Nel Manuel  MSN -ED RN OCN   [phone]

## 2025-03-16 NOTE — PROGRESS NOTE ADULT - PROBLEM SELECTOR PROBLEM 6
toward goal   Obtain physical therapy/occupational therapy consults as needed   Instruct patient/family in ordered activity level  Goal: Maintain proper alignment of affected body part  Recent Flowsheet Documentation  Taken 3/15/2025 2000 by Lamar Newton RN  Maintain proper alignment of affected body part:   Support and protect limb and body alignment per provider's orders   Instruct and reinforce with patient and family use of appropriate assistive device and precautions (e.g. spinal or hip dislocation precautions)  Goal: Return ADL status to a safe level of function  Recent Flowsheet Documentation  Taken 3/15/2025 2000 by Lamar Newton RN  Return ADL Status to a Safe Level of Function:   Administer medication as ordered   Assess activities of daily living deficits and provide assistive devices as needed   Obtain physical therapy/occupational therapy consults as needed   Assist and instruct patient to increase activity and self care as tolerated     Problem: Gastrointestinal - Adult  Goal: Maintains or returns to baseline bowel function  Outcome: Progressing  Flowsheets  Taken 3/16/2025 0232  Maintains or returns to baseline bowel function:   Encourage oral fluids to ensure adequate hydration   Assess bowel function   Administer IV fluids as ordered to ensure adequate hydration   Administer ordered medications as needed   Encourage mobilization and activity   Nutrition consult to assist patient with appropriate food choices  Taken 3/15/2025 2000  Maintains or returns to baseline bowel function:   Assess bowel function   Encourage oral fluids to ensure adequate hydration   Administer IV fluids as ordered to ensure adequate hydration   Administer ordered medications as needed   Encourage mobilization and activity   Nutrition consult to assist patient with appropriate food choices  Goal: Maintains adequate nutritional intake  Recent Flowsheet Documentation  Taken 3/15/2025 2000 by Lamar Newton, RN  Maintains  attention obtain sitter and review sitter guidelines with assigned personnel  7. Initiate Psychosocial CNS and Spiritual Care consult, as indicated  Recent Flowsheet Documentation  Taken 3/15/2025 2000 by Lamar Newton RN  Effect of thought disturbance (confusion, delirium, dementia, or psychosis) are managed with adequate functional status:   Assess for contributors to thought disturbance, including medications, impaired vision or hearing, underlying metabolic abnormalities, dehydration, psychiatric diagnoses, notify LIP   Provide frequent short contacts to provide reality reorientation, refocusing and direction   [place] high risk fall precautions, as indicated   Decrease environmental stimuli, including noise as appropriate   Monitor and intervene to maintain adequate nutrition, hydration, elimination, sleep and activity      Hypertension, unspecified type

## 2025-05-13 NOTE — PHYSICAL EXAM
History of Present Illness:   Patient states they are here for foot pain  States she has pain when walking  Here for a second opinion from a pod in [place]  States she received inserts which has helped    Also has a wart on left foot, Has been treating  States it is still bothersome    Denies trauma  No noted PMH noted in chart    Denies being DM  No improvement noted  Looking for relief  NO other pedal complaints at this time      Past Medical History  Medical History[1]    Medications and Allergies have been reviewed.    Review Of Systems:  GENERAL: No weight loss, malaise or fevers.  HEENT: Negative for frequent or significant headaches,   RESPIRATORY: Negative for cough, wheezing or shortness of breath.  CARDIOVASCULAR: Negative for chest pain, leg swelling or palpitations.    Examination of Both Lower Extremities:   Objective:   Vasc: DP and PT pulses are palpable bilateral.  CFT is less than 3 seconds bilateral.  Skin temperature is warm to cool proximal to distal bilateral.      Neuro: Gross sensation intact    Derm: Nails 1-5 bilateral are intact.  Skin is supple with normal texture and turgor noted.  Webspaces are clean, dry and intact bilateral.  HPK to left plantar midfoot, debrided, pin point bleeding noted, cauli flower like tissue noted.     Ortho: Muscle strength is 5/5 for all pedal groups tested.  Patient exam and eval  1. Pain in both feet        2. Difficulty walking        3. Plantar wart of left foot          Patient exam and eval  Debrided hpk tissue  Applied silver nitrate to area  Discussed wart treatment, keep area filed down. Apply wart medicine to area daily. Cover with dsd    Discussed foot concerns. Pt noted mild hammertoes, that is not causing difficulty in walking  Only way to correct is surgically, do not think it will change her gait  Discussed prior radiograph results,   Discussed treatment options  Discussed stiff shoes. Shoes that do not twist or bend    Recommend stiff supportive  shoe gear  Shoes that do not twist or bend  Minimize walking barefoot  Discussed 80/20 rule  Discussed ice, nsaids, eddie lopez/biofreeze  Patient to follow up if no improvement noted.   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    Fracture of one rib, left side, initial encounter for closed fracture 08/09/2019    Closed fracture of one rib of left side, initial encounter    Fracture of orbital floor, right side, initial encounter for closed fracture (Multi) 03/17/2020    Fracture of right orbital floor    Fracture of orbital floor, unspecified side, initial encounter for closed fracture (Multi) 04/24/2015    Fracture of orbital floor      [Ambulatory and capable of all self care but unable to carry out any work activities] : Status 2- Ambulatory and capable of all self care but unable to carry out any work activities. Up and about more than 50% of waking hours [Normal] : affect appropriate [de-identified] :  on nasal cannula oxygen, no appreciable crackles [de-identified] : swelling of b/l feet, improving

## 2025-08-06 NOTE — PATIENT PROFILE ADULT - NSPROPTRIGHTSUPPORTPHONE_GEN_A_NUR
Filled medication  Start wellbutrin to help with smoking/drinking    Followup 3-6 months   [phone]